# Patient Record
Sex: MALE | Race: WHITE | Employment: OTHER | URBAN - METROPOLITAN AREA
[De-identification: names, ages, dates, MRNs, and addresses within clinical notes are randomized per-mention and may not be internally consistent; named-entity substitution may affect disease eponyms.]

---

## 2021-02-24 ENCOUNTER — APPOINTMENT (OUTPATIENT)
Dept: CT IMAGING | Age: 71
DRG: 312 | End: 2021-02-24
Payer: MEDICARE

## 2021-02-24 ENCOUNTER — APPOINTMENT (OUTPATIENT)
Dept: GENERAL RADIOLOGY | Age: 71
DRG: 312 | End: 2021-02-24
Payer: MEDICARE

## 2021-02-24 ENCOUNTER — HOSPITAL ENCOUNTER (INPATIENT)
Age: 71
LOS: 1 days | Discharge: NURSING, REHAB OR LONG-TERM CARE | DRG: 312 | End: 2021-02-26
Attending: EMERGENCY MEDICINE | Admitting: INTERNAL MEDICINE
Payer: MEDICARE

## 2021-02-24 DIAGNOSIS — R53.83 OTHER FATIGUE: Primary | ICD-10-CM

## 2021-02-24 DIAGNOSIS — R77.8 ELEVATED TROPONIN: ICD-10-CM

## 2021-02-24 LAB
A/G RATIO: 0.9 (ref 1.1–2.2)
ALBUMIN SERPL-MCNC: 3.4 G/DL (ref 3.4–5)
ALP BLD-CCNC: 120 U/L (ref 40–129)
ALT SERPL-CCNC: 10 U/L (ref 10–40)
ANION GAP SERPL CALCULATED.3IONS-SCNC: 8 MMOL/L (ref 3–16)
AST SERPL-CCNC: 18 U/L (ref 15–37)
BACTERIA: ABNORMAL /HPF
BASOPHILS ABSOLUTE: 0.1 K/UL (ref 0–0.2)
BASOPHILS RELATIVE PERCENT: 1 %
BILIRUB SERPL-MCNC: 0.4 MG/DL (ref 0–1)
BILIRUBIN URINE: NEGATIVE
BLOOD, URINE: NEGATIVE
BUN BLDV-MCNC: 25 MG/DL (ref 7–20)
CALCIUM SERPL-MCNC: 9.7 MG/DL (ref 8.3–10.6)
CHLORIDE BLD-SCNC: 101 MMOL/L (ref 99–110)
CLARITY: CLEAR
CO2: 29 MMOL/L (ref 21–32)
COLOR: YELLOW
CREAT SERPL-MCNC: 1.8 MG/DL (ref 0.8–1.3)
EKG ATRIAL RATE: 68 BPM
EKG DIAGNOSIS: NORMAL
EKG P AXIS: 35 DEGREES
EKG P-R INTERVAL: 202 MS
EKG Q-T INTERVAL: 426 MS
EKG QRS DURATION: 84 MS
EKG QTC CALCULATION (BAZETT): 452 MS
EKG R AXIS: 14 DEGREES
EKG T AXIS: 43 DEGREES
EKG VENTRICULAR RATE: 68 BPM
EOSINOPHILS ABSOLUTE: 0.4 K/UL (ref 0–0.6)
EOSINOPHILS RELATIVE PERCENT: 3.9 %
EPITHELIAL CELLS, UA: ABNORMAL /HPF (ref 0–5)
GFR AFRICAN AMERICAN: 46
GFR NON-AFRICAN AMERICAN: 38
GLOBULIN: 3.8 G/DL
GLUCOSE BLD-MCNC: 121 MG/DL (ref 70–99)
GLUCOSE BLD-MCNC: 177 MG/DL (ref 70–99)
GLUCOSE URINE: NEGATIVE MG/DL
HCT VFR BLD CALC: 36.4 % (ref 40.5–52.5)
HEMOGLOBIN: 11.5 G/DL (ref 13.5–17.5)
KETONES, URINE: NEGATIVE MG/DL
LACTIC ACID: 1.1 MMOL/L (ref 0.4–2)
LEUKOCYTE ESTERASE, URINE: ABNORMAL
LIPASE: 33 U/L (ref 13–60)
LYMPHOCYTES ABSOLUTE: 2.3 K/UL (ref 1–5.1)
LYMPHOCYTES RELATIVE PERCENT: 22 %
MCH RBC QN AUTO: 26.7 PG (ref 26–34)
MCHC RBC AUTO-ENTMCNC: 31.7 G/DL (ref 31–36)
MCV RBC AUTO: 84.2 FL (ref 80–100)
MICROSCOPIC EXAMINATION: YES
MONOCYTES ABSOLUTE: 1 K/UL (ref 0–1.3)
MONOCYTES RELATIVE PERCENT: 9.5 %
NEUTROPHILS ABSOLUTE: 6.7 K/UL (ref 1.7–7.7)
NEUTROPHILS RELATIVE PERCENT: 63.6 %
NITRITE, URINE: NEGATIVE
PDW BLD-RTO: 17.1 % (ref 12.4–15.4)
PERFORMED ON: ABNORMAL
PH UA: 6 (ref 5–8)
PLATELET # BLD: 272 K/UL (ref 135–450)
PMV BLD AUTO: 8.6 FL (ref 5–10.5)
POTASSIUM REFLEX MAGNESIUM: 4.8 MMOL/L (ref 3.5–5.1)
PROTEIN UA: ABNORMAL MG/DL
RBC # BLD: 4.32 M/UL (ref 4.2–5.9)
RBC UA: ABNORMAL /HPF (ref 0–4)
SODIUM BLD-SCNC: 138 MMOL/L (ref 136–145)
SPECIFIC GRAVITY UA: 1.02 (ref 1–1.03)
TOTAL PROTEIN: 7.2 G/DL (ref 6.4–8.2)
TROPONIN: 0.03 NG/ML
TROPONIN: 0.04 NG/ML
TROPONIN: 0.04 NG/ML
URINE TYPE: ABNORMAL
UROBILINOGEN, URINE: 0.2 E.U./DL
WBC # BLD: 10.5 K/UL (ref 4–11)
WBC UA: ABNORMAL /HPF (ref 0–5)

## 2021-02-24 PROCEDURE — 6360000002 HC RX W HCPCS: Performed by: INTERNAL MEDICINE

## 2021-02-24 PROCEDURE — 99284 EMERGENCY DEPT VISIT MOD MDM: CPT

## 2021-02-24 PROCEDURE — 36415 COLL VENOUS BLD VENIPUNCTURE: CPT

## 2021-02-24 PROCEDURE — 96372 THER/PROPH/DIAG INJ SC/IM: CPT

## 2021-02-24 PROCEDURE — G0378 HOSPITAL OBSERVATION PER HR: HCPCS

## 2021-02-24 PROCEDURE — 84484 ASSAY OF TROPONIN QUANT: CPT

## 2021-02-24 PROCEDURE — U0003 INFECTIOUS AGENT DETECTION BY NUCLEIC ACID (DNA OR RNA); SEVERE ACUTE RESPIRATORY SYNDROME CORONAVIRUS 2 (SARS-COV-2) (CORONAVIRUS DISEASE [COVID-19]), AMPLIFIED PROBE TECHNIQUE, MAKING USE OF HIGH THROUGHPUT TECHNOLOGIES AS DESCRIBED BY CMS-2020-01-R: HCPCS

## 2021-02-24 PROCEDURE — 6370000000 HC RX 637 (ALT 250 FOR IP): Performed by: INTERNAL MEDICINE

## 2021-02-24 PROCEDURE — 81001 URINALYSIS AUTO W/SCOPE: CPT

## 2021-02-24 PROCEDURE — 71045 X-RAY EXAM CHEST 1 VIEW: CPT

## 2021-02-24 PROCEDURE — 85025 COMPLETE CBC W/AUTO DIFF WBC: CPT

## 2021-02-24 PROCEDURE — 80053 COMPREHEN METABOLIC PANEL: CPT

## 2021-02-24 PROCEDURE — 2580000003 HC RX 258: Performed by: INTERNAL MEDICINE

## 2021-02-24 PROCEDURE — 83605 ASSAY OF LACTIC ACID: CPT

## 2021-02-24 PROCEDURE — 93005 ELECTROCARDIOGRAM TRACING: CPT | Performed by: PHYSICIAN ASSISTANT

## 2021-02-24 PROCEDURE — 99205 OFFICE O/P NEW HI 60 MIN: CPT | Performed by: INTERNAL MEDICINE

## 2021-02-24 PROCEDURE — 83690 ASSAY OF LIPASE: CPT

## 2021-02-24 PROCEDURE — 70450 CT HEAD/BRAIN W/O DYE: CPT

## 2021-02-24 RX ORDER — RISPERIDONE 1 MG/1
1.5 TABLET, FILM COATED ORAL NIGHTLY
Status: ON HOLD | COMMUNITY
End: 2022-04-18

## 2021-02-24 RX ORDER — M-VIT,TX,IRON,MINS/CALC/FOLIC 27MG-0.4MG
1 TABLET ORAL DAILY
Status: ON HOLD | COMMUNITY
End: 2022-05-18 | Stop reason: HOSPADM

## 2021-02-24 RX ORDER — INSULIN DETEMIR 100 [IU]/ML
77 INJECTION, SOLUTION SUBCUTANEOUS NIGHTLY
Status: ON HOLD | COMMUNITY
End: 2021-02-26 | Stop reason: SDUPTHER

## 2021-02-24 RX ORDER — DOCUSATE SODIUM 100 MG/1
100 CAPSULE, LIQUID FILLED ORAL 2 TIMES DAILY
COMMUNITY

## 2021-02-24 RX ORDER — OYSTER SHELL CALCIUM WITH VITAMIN D 500; 200 MG/1; [IU]/1
1 TABLET, FILM COATED ORAL 2 TIMES DAILY
Status: ON HOLD | COMMUNITY
End: 2022-03-01 | Stop reason: HOSPADM

## 2021-02-24 RX ORDER — ACETAMINOPHEN 500 MG
500 TABLET ORAL EVERY 6 HOURS PRN
Status: ON HOLD | COMMUNITY
End: 2022-05-18 | Stop reason: HOSPADM

## 2021-02-24 RX ORDER — POLYETHYLENE GLYCOL 3350 17 G/17G
17 POWDER, FOR SOLUTION ORAL DAILY PRN
Status: DISCONTINUED | OUTPATIENT
Start: 2021-02-24 | End: 2021-02-26 | Stop reason: HOSPADM

## 2021-02-24 RX ORDER — INSULIN LISPRO 100 [IU]/ML
0-6 INJECTION, SOLUTION INTRAVENOUS; SUBCUTANEOUS NIGHTLY
Status: DISCONTINUED | OUTPATIENT
Start: 2021-02-24 | End: 2021-02-25

## 2021-02-24 RX ORDER — ACETAMINOPHEN 325 MG/1
650 TABLET ORAL EVERY 6 HOURS PRN
Status: DISCONTINUED | OUTPATIENT
Start: 2021-02-24 | End: 2021-02-26 | Stop reason: HOSPADM

## 2021-02-24 RX ORDER — ESTRADIOL 1 MG/1
1 TABLET ORAL DAILY
Status: ON HOLD | COMMUNITY
End: 2022-03-08 | Stop reason: HOSPADM

## 2021-02-24 RX ORDER — POLYVINYL ALCOHOL 14 MG/ML
1 SOLUTION/ DROPS OPHTHALMIC PRN
Status: ON HOLD | COMMUNITY
End: 2022-02-27 | Stop reason: CLARIF

## 2021-02-24 RX ORDER — ACETAMINOPHEN 650 MG/1
650 SUPPOSITORY RECTAL EVERY 6 HOURS PRN
Status: DISCONTINUED | OUTPATIENT
Start: 2021-02-24 | End: 2021-02-26 | Stop reason: HOSPADM

## 2021-02-24 RX ORDER — DEXTROSE MONOHYDRATE 25 G/50ML
12.5 INJECTION, SOLUTION INTRAVENOUS PRN
Status: DISCONTINUED | OUTPATIENT
Start: 2021-02-24 | End: 2021-02-26 | Stop reason: HOSPADM

## 2021-02-24 RX ORDER — ONDANSETRON 2 MG/ML
4 INJECTION INTRAMUSCULAR; INTRAVENOUS EVERY 6 HOURS PRN
Status: DISCONTINUED | OUTPATIENT
Start: 2021-02-24 | End: 2021-02-26 | Stop reason: HOSPADM

## 2021-02-24 RX ORDER — SODIUM CHLORIDE 0.9 % (FLUSH) 0.9 %
10 SYRINGE (ML) INJECTION EVERY 12 HOURS SCHEDULED
Status: DISCONTINUED | OUTPATIENT
Start: 2021-02-24 | End: 2021-02-26 | Stop reason: HOSPADM

## 2021-02-24 RX ORDER — ASPIRIN 81 MG/1
81 TABLET ORAL DAILY
COMMUNITY

## 2021-02-24 RX ORDER — RISPERIDONE 0.5 MG/1
0.5 TABLET, FILM COATED ORAL
Status: ON HOLD | COMMUNITY
End: 2022-04-18

## 2021-02-24 RX ORDER — TAMSULOSIN HYDROCHLORIDE 0.4 MG/1
0.8 CAPSULE ORAL NIGHTLY
COMMUNITY

## 2021-02-24 RX ORDER — FUROSEMIDE 20 MG/1
20 TABLET ORAL DAILY
Status: ON HOLD | COMMUNITY
End: 2021-02-26 | Stop reason: HOSPADM

## 2021-02-24 RX ORDER — ALLOPURINOL 100 MG/1
100 TABLET ORAL DAILY
COMMUNITY

## 2021-02-24 RX ORDER — SODIUM CHLORIDE 0.9 % (FLUSH) 0.9 %
10 SYRINGE (ML) INJECTION PRN
Status: DISCONTINUED | OUTPATIENT
Start: 2021-02-24 | End: 2021-02-26 | Stop reason: HOSPADM

## 2021-02-24 RX ORDER — ATORVASTATIN CALCIUM 40 MG/1
40 TABLET, FILM COATED ORAL NIGHTLY
COMMUNITY

## 2021-02-24 RX ORDER — DEXTROSE MONOHYDRATE 50 MG/ML
100 INJECTION, SOLUTION INTRAVENOUS PRN
Status: DISCONTINUED | OUTPATIENT
Start: 2021-02-24 | End: 2021-02-26 | Stop reason: HOSPADM

## 2021-02-24 RX ORDER — ONDANSETRON 4 MG/1
4 TABLET, ORALLY DISINTEGRATING ORAL EVERY 8 HOURS PRN
COMMUNITY

## 2021-02-24 RX ORDER — GABAPENTIN 300 MG/1
300 CAPSULE ORAL 3 TIMES DAILY
Status: ON HOLD | COMMUNITY
End: 2021-02-26 | Stop reason: SDUPTHER

## 2021-02-24 RX ORDER — BISACODYL 10 MG
10 SUPPOSITORY, RECTAL RECTAL DAILY PRN
COMMUNITY

## 2021-02-24 RX ORDER — PROMETHAZINE HYDROCHLORIDE 25 MG/1
12.5 TABLET ORAL EVERY 6 HOURS PRN
Status: DISCONTINUED | OUTPATIENT
Start: 2021-02-24 | End: 2021-02-26 | Stop reason: HOSPADM

## 2021-02-24 RX ORDER — POLYETHYLENE GLYCOL 3350 17 G/17G
17 POWDER, FOR SOLUTION ORAL DAILY PRN
COMMUNITY

## 2021-02-24 RX ORDER — VALPROIC ACID 250 MG/5ML
250 SOLUTION ORAL 2 TIMES DAILY
Status: ON HOLD | COMMUNITY
End: 2022-02-27 | Stop reason: CLARIF

## 2021-02-24 RX ORDER — CHOLECALCIFEROL (VITAMIN D3) 1250 MCG
50000 CAPSULE ORAL WEEKLY
Status: ON HOLD | COMMUNITY
End: 2022-03-01 | Stop reason: HOSPADM

## 2021-02-24 RX ORDER — ARIPIPRAZOLE 15 MG/1
15 TABLET ORAL DAILY
COMMUNITY

## 2021-02-24 RX ORDER — FLUOXETINE HYDROCHLORIDE 60 MG/1
60 TABLET, FILM COATED ORAL; ORAL DAILY
Status: ON HOLD | COMMUNITY
End: 2022-04-18 | Stop reason: SDUPTHER

## 2021-02-24 RX ORDER — AMLODIPINE BESYLATE 5 MG/1
5 TABLET ORAL DAILY
Status: ON HOLD | COMMUNITY
End: 2022-04-18 | Stop reason: HOSPADM

## 2021-02-24 RX ORDER — FINASTERIDE 5 MG/1
5 TABLET, FILM COATED ORAL DAILY
COMMUNITY

## 2021-02-24 RX ORDER — INSULIN LISPRO 100 [IU]/ML
0-12 INJECTION, SOLUTION INTRAVENOUS; SUBCUTANEOUS
Status: DISCONTINUED | OUTPATIENT
Start: 2021-02-25 | End: 2021-02-25

## 2021-02-24 RX ORDER — NICOTINE POLACRILEX 4 MG
15 LOZENGE BUCCAL PRN
Status: DISCONTINUED | OUTPATIENT
Start: 2021-02-24 | End: 2021-02-26 | Stop reason: HOSPADM

## 2021-02-24 RX ADMIN — INSULIN LISPRO 1 UNITS: 100 INJECTION, SOLUTION INTRAVENOUS; SUBCUTANEOUS at 22:44

## 2021-02-24 RX ADMIN — Medication 10 ML: at 21:07

## 2021-02-24 RX ADMIN — ACETAMINOPHEN 650 MG: 325 TABLET ORAL at 18:51

## 2021-02-24 RX ADMIN — ENOXAPARIN SODIUM 40 MG: 40 INJECTION SUBCUTANEOUS at 21:06

## 2021-02-24 ASSESSMENT — ENCOUNTER SYMPTOMS
DIARRHEA: 0
SHORTNESS OF BREATH: 0
COUGH: 0
VOMITING: 0
ABDOMINAL PAIN: 0
NAUSEA: 0

## 2021-02-24 ASSESSMENT — PAIN SCALES - GENERAL
PAINLEVEL_OUTOF10: 0
PAINLEVEL_OUTOF10: 4

## 2021-02-24 NOTE — H&P
Hospital Medicine History & Physical      PCP: Cristopher Brown MD    Date of Admission: 2/24/2021    Date of Service: Pt seen/examined on 02/4/21and Admitted to observation with expected LOS less than two midnights due to medical therapy. Chief Complaint: Lethargy and dizziness. History Of Present Illness:     78 y.o. male with past medical history of diabetes mellitus type 2: Hypertension, hyperlipidemia, chronic kidney disease, schizophrenia, depression/bipolar disorder presented from [address] with fatigue. Patient states that he just felt tired and has not been able to get up for 2 days. He denies chest pain, shortness of breath, fever, chills, nausea, vomiting, abdominal pain, dysuria or diarrhea. Denies any sensation of room spinning or ringing in the ears. Per ER physician, nursing facility stated that patient had a fall 2 weeks ago while in isolation unit. He did hit his head. Has been having headache and slurring of speech for the past week and leaning towards the right for the past 2 weeks at the nursing facility. Per patient he does let his speech and lean towards her right side at baseline. On my evaluation, patient lethargic but able to answer questions. In the ED, patient labs are significant for creatinine at 1.8, glucose 101, troponin 0 0.03 with repeat 0.04. CT head was negative for any bleed or infarct. Chest x-ray showed bibasilar airspace disease. EKG was normal with normal axis and no acute ST segment changes. UA was negative. COVID-19 test was sent. Patient is being admitted for further management.     Past Medical History:          Diagnosis Date    Ataxia     Bipolar 1 disorder ([address]     BPH (benign prostatic hyperplasia)     Cerebral infarct (HCC)     Chronic kidney disease     [facility]     Depression     Diabetes mellitus type 2, uncomplicated ([facility]     Gout     Hyperlipidemia     Hypertension     Schizophrenia ([facility] Past Surgical History:      History reviewed. No pertinent surgical history. Medications Prior to Admission:      Prior to Admission medications    Not on File       Allergies:  Codeine, Cogentin [benztropine], and Penicillins    Social History:      The patient currently lives at [address] [place] [address]:   reports that he has never smoked. He has never used smokeless tobacco.  ETOH:   reports previous alcohol use. Family History:      Reviewed in detail and negative for DM, CAD, Cancer, CVA    History reviewed. No pertinent family history. REVIEW OF SYSTEMS:   Pertinent positives as noted in the HPI. All other systems reviewed and negative. PHYSICAL EXAM PERFORMED:    /60   Pulse 75   Temp 97.6 °F (36.4 °C) (Oral)   Resp 14   SpO2 99%     General appearance:  No apparent distress, appears stated age and cooperative. Appears lethargic  HEENT:  Normal cephalic, atraumatic without obvious deformity. Pupils equal, round, and reactive to light. Extra ocular muscles intact. Conjunctivae/corneas clear. Neck: Supple, with full range of motion. No jugular venous distention. Trachea midline. Respiratory:  Normal respiratory effort. Clear to auscultation, bilaterally without Rales/Wheezes/Rhonchi. Cardiovascular:  Regular rate and rhythm with normal S1/S2 without murmurs, rubs or gallops. Abdomen: Soft, non-tender, non-distended with normal bowel sounds. Musculoskeletal:  No clubbing, cyanosis or edema bilaterally. Full range of motion without deformity.   Skin: Bilateral lower extremities with chronic skin changes  Neurologic: Grossly nonfocal.  Dysarthria/slurred speech noted  Psychiatric:  Alert and oriented  Capillary Refill: Brisk,< 3 seconds   Peripheral Pulses: +2 palpable, equal bilaterally       Labs:     Recent Labs     02/24/21  1118   WBC 10.5   HGB 11.5*   HCT 36.4*        Recent Labs     02/24/21  1118      K 4.8      CO2 29   BUN 25*   CREATININE 1.8* CALCIUM 9.7     Recent Labs     02/24/21  1118   AST 18   ALT 10   BILITOT 0.4   ALKPHOS 120     No results for input(s): INR in the last 72 hours. Recent Labs     02/24/21  1118 02/24/21  1303   TROPONINI 0.04* 0.03*       Urinalysis:      Lab Results   Component Value Date    NITRU Negative 02/24/2021    WBCUA 3-5 02/24/2021    BACTERIA Rare 02/24/2021    RBCUA 0-2 02/24/2021    BLOODU Negative 02/24/2021    SPECGRAV 1.025 02/24/2021    GLUCOSEU Negative 02/24/2021       Radiology:     CXR: I have reviewed the CXR with the following interpretation:   As mentioned above    EKG:  I have reviewed the EKG with the following interpretation:  As mentioned above    CT HEAD WO CONTRAST   Final Result      1. No findings for acute intracranial abnormality. 2.  Age-related atrophy of mild periventricular white matter changes bilaterally consistent with chronic small vessel ischemia. XR CHEST PORTABLE   Final Result   Impression: Bibasilar airspace opacities.           ASSESSMENT:    Active Hospital Problems    Diagnosis Date Noted    Elevated troponin [R77.8] 02/24/2021         PLAN:    Fatigue and dizziness:  Might be secondary to COVID-19 or orthostatic hypotension  Blood pressure low on admission  Orthostatic vital signs pending  COVID-19 test pending  We will start gentle IV hydration after orthostatic vitals obtained  Continue droplet plus isolation    Elevated troponin:  Likely secondary to decreased clearance from CKD  Patient denies chest pain  EKG without acute ST segment change  Monitor troponin  Cardiology consulted    CKD stage IV:  Patient creatinine in care everywhere from 6380-0371 close to 1.7-2.0  Creatinine 1.8 on admission  Appears to be stable    Hypertension:  Pharmacy consulted for med  Blood pressure on low side  Will hold off on any antihypertensive for now    Diabetes mellitus type 2:  Monitor blood sugars  Continue carb controlled diet  Sliding scale insulin started    Schizophrenia/bipolar disorder/depression:  Pharmacy consulted for med rec  Will resume once medication confirmed    Bilateral lower extremity chronic skin changes:  Wound care consulted    DVT Prophylaxis: Lovenox  Diet: Carb controlled diet  Code Status: Full code    PT/OT Eval Status: Ordered    Dispo -anticipate discharge in 50 to 67 hours       Hawa Castellano MD    Thank you Georgia Rosario MD for the opportunity to be involved in this patient's care. If you have any questions or concerns please feel free to contact me at [phone].

## 2021-02-24 NOTE — ED NOTES
received report from [facility] Elsi KRUGER, [address] [place] [address]  02/24/21 5990

## 2021-02-24 NOTE — ED PROVIDER NOTES
[address] [facility] ENCOUNTER          PHYSICIAN ASSISTANT NOTE       Date of evaluation: 2/24/2021    Chief Complaint     Fatigue      History of Present Illness     Arlyn Schaumann is a 78 y.o. male with PMH of Dementia, HTN, HLD, Schizophrenia, CKD who presents with from [address] [facility] facility with fatigue. Patient states that he has been feeling generally weak, fatigued and dizzy for the past few days. He denies sensation of room spinning. Denies any loss of consciousness. He denies any fevers or chills, chest pain or shortness of breath, cough, nausea or vomiting, abdominal pain, change in bowel or urinary habits. He states that he always slurrs his speech and always leans to the right side. This is not new. He has chronic skin changes to bilateral lower extremities. He states that he typically transfers from bed to wheelchair and sometimes requires assistance with this. He does not ambulate. Per nursing staff at [facility], patient told the nurse at facility today that he thought \"his brain was hemorrhaging. \"  Patient reported a fall 2 weeks ago while on the isolation unit. He states that he had hit his head. He reportedly has been complaining of a headache for the past 24 hours. Staff at facility states that he seems to be slurring his speech more than usual in the past week and has been leaning to the right side for the past 2 weeks. Today he seemed more hard of hearing. This prompted them to send him to the emergency department. Review of Systems     Review of Systems   Constitutional: Positive for fatigue. Negative for chills and fever. Eyes: Negative for visual disturbance. Respiratory: Negative for cough and shortness of breath. Cardiovascular: Positive for leg swelling. Negative for chest pain. Gastrointestinal: Negative for abdominal pain, diarrhea, nausea and vomiting. Musculoskeletal: Negative for gait problem.    Skin: Negative for wound.   Neurological: Positive for dizziness and weakness. Negative for syncope and headaches. Psychiatric/Behavioral: The patient is not nervous/anxious. Past Medical, Surgical, Family, and Social History     He has a past medical history of Ataxia, Bipolar 1 disorder ([address] BPH (benign prostatic hyperplasia), Cerebral infarct ([address] Chronic kidney disease, Dementia ([facility] Depression, Diabetes mellitus type 2, uncomplicated ([address] Gout, Hyperlipidemia, Hypertension, and Schizophrenia ([facility] He has no past surgical history on file. His family history is not on file. He reports that he has never smoked. He has never used smokeless tobacco. He reports previous alcohol use. Medications     Previous Medications    No medications on file       Allergies     He is allergic to codeine; cogentin [benztropine]; and penicillins. Physical Exam     INITIAL VITALS: BP: (!) 110/54,Temp: 97.6 °F (36.4 °C), Pulse: 75, Resp: 14, SpO2: 100 %   Physical Exam  Vitals signs and nursing note reviewed. Constitutional:       General: He is not in acute distress. HENT:      Head: Normocephalic and atraumatic. Mouth/Throat:      Mouth: Mucous membranes are moist.   Eyes:      Extraocular Movements: Extraocular movements intact. Conjunctiva/sclera: Conjunctivae normal.   Neck:      Musculoskeletal: Neck supple. Cardiovascular:      Rate and Rhythm: Normal rate and regular rhythm. Pulmonary:      Effort: Pulmonary effort is normal. No respiratory distress. Breath sounds: No wheezing, rhonchi or rales. Abdominal:      General: Bowel sounds are normal. There is no distension. Palpations: Abdomen is soft. Tenderness: There is no abdominal tenderness. There is no guarding or rebound. Musculoskeletal:         General: No deformity. Skin:     General: Skin is warm and dry. Neurological:      General: No focal deficit present.       Mental Status: He is alert and oriented to person, place, and time.      Cranial Nerves: Dysarthria (baseline per patient) present. No cranial nerve deficit. Sensory: Sensation is intact. Motor: Motor function is intact. No pronator drift. Coordination: Finger-Nose-Finger Test normal.   Psychiatric:         Mood and Affect: Mood normal.         Behavior: Behavior normal.         Diagnostic Results     EKG   Interpreted in conjunction with emergencydepartment physician No att. providers found  Rhythm: normal sinus   Rate: normal  Axis: normal  Ectopy: none  Conduction: normal  ST Segments: no acute change  T Waves:no acute change  Q Waves: none  Clinical Impression: no acute changes  Comparison:  none    RADIOLOGY:  CT HEAD WO CONTRAST   Final Result      1. No findings for acute intracranial abnormality. 2.  Age-related atrophy of mild periventricular white matter changes bilaterally consistent with chronic small vessel ischemia. XR CHEST PORTABLE   Final Result   Impression: Bibasilar airspace opacities.           LABS:   Results for orders placed or performed during the hospital encounter of 02/24/21   CBC Auto Differential   Result Value Ref Range    WBC 10.5 4.0 - 11.0 K/uL    RBC 4.32 4.20 - 5.90 M/uL    Hemoglobin 11.5 (L) 13.5 - 17.5 g/dL    Hematocrit 36.4 (L) 40.5 - 52.5 %    MCV 84.2 80.0 - 100.0 fL    MCH 26.7 26.0 - 34.0 pg    MCHC 31.7 31.0 - 36.0 g/dL    RDW 17.1 (H) 12.4 - 15.4 %    Platelets 606 050 - 907 K/uL    MPV 8.6 5.0 - 10.5 fL    Neutrophils % 63.6 %    Lymphocytes % 22.0 %    Monocytes % 9.5 %    Eosinophils % 3.9 %    Basophils % 1.0 %    Neutrophils Absolute 6.7 1.7 - 7.7 K/uL    Lymphocytes Absolute 2.3 1.0 - 5.1 K/uL    Monocytes Absolute 1.0 0.0 - 1.3 K/uL    Eosinophils Absolute 0.4 0.0 - 0.6 K/uL    Basophils Absolute 0.1 0.0 - 0.2 K/uL   Comprehensive Metabolic Panel w/ Reflex to MG   Result Value Ref Range    Sodium 138 136 - 145 mmol/L    Potassium reflex Magnesium 4.8 3.5 - 5.1 mmol/L    Chloride 101 99 - 110 mmol/L    CO2 29 21 - 32 mmol/L    Anion Gap 8 3 - 16    Glucose 121 (H) 70 - 99 mg/dL    BUN 25 (H) 7 - 20 mg/dL    CREATININE 1.8 (H) 0.8 - 1.3 mg/dL    GFR Non- 38 (A) >60    GFR  46 (A) >60    Calcium 9.7 8.3 - 10.6 mg/dL    Total Protein 7.2 6.4 - 8.2 g/dL    Albumin 3.4 3.4 - 5.0 g/dL    Albumin/Globulin Ratio 0.9 (L) 1.1 - 2.2    Total Bilirubin 0.4 0.0 - 1.0 mg/dL    Alkaline Phosphatase 120 40 - 129 U/L    ALT 10 10 - 40 U/L    AST 18 15 - 37 U/L    Globulin 3.8 g/dL   Lipase   Result Value Ref Range    Lipase 33.0 13.0 - 60.0 U/L   Troponin   Result Value Ref Range    Troponin 0.04 (H) <0.01 ng/mL   Urinalysis, reflex to microscopic   Result Value Ref Range    Color, UA Yellow Straw/Yellow    Clarity, UA Clear Clear    Glucose, Ur Negative Negative mg/dL    Bilirubin Urine Negative Negative    Ketones, Urine Negative Negative mg/dL    Specific Gravity, UA 1.025 1.005 - 1.030    Blood, Urine Negative Negative    pH, UA 6.0 5.0 - 8.0    Protein, UA TRACE (A) Negative mg/dL    Urobilinogen, Urine 0.2 <2.0 E.U./dL    Nitrite, Urine Negative Negative    Leukocyte Esterase, Urine TRACE (A) Negative    Microscopic Examination YES     Urine Type NotGiven    Lactate, plasma   Result Value Ref Range    Lactic Acid 1.1 0.4 - 2.0 mmol/L   Microscopic Urinalysis   Result Value Ref Range    WBC, UA 3-5 0 - 5 /HPF    RBC, UA 0-2 0 - 4 /HPF    Epithelial Cells, UA 6-10 (A) 0 - 5 /HPF    Bacteria, UA Rare (A) None Seen /HPF   Troponin   Result Value Ref Range    Troponin 0.03 (H) <0.01 ng/mL   EKG 12 Lead   Result Value Ref Range    Ventricular Rate 68 BPM    Atrial Rate 68 BPM    P-R Interval 202 ms    QRS Duration 84 ms    Q-T Interval 426 ms    QTc Calculation (Bazett) 452 ms    P Axis 35 degrees    R Axis 14 degrees    T Axis 43 degrees    Diagnosis       EKG performed in ER and to be interpreted by ER physician. Confirmed by MD, ER ([id number]),  Khloe Roblero ([id number]) on 2/24/2021 11:18:21 AM       RECENT VITALS:  BP: (!) 119/58, Temp: 97.6 °F (36.4 °C), Pulse: 75,Resp: 14, SpO2: 100 %     Procedures         ED Course     Nursing Notes, Past Medical Hx, Past Surgical Hx, Social Hx, Allergies, and Family Hx were reviewed. The patient was given the followingmedications:  No orders of the defined types were placed in this encounter. CONSULTS:  IP CONSULT TO HOSPITALIST  IP CONSULT TO CARDIOLOGY  IP CONSULT TO [address] [place] [address] / [place] / Cyndie Srinivas is a 78 y.o. male with PMH of Dementia, HTN, HLD, Schizophrenia, CKD who presents from [address] [facility] facility with fatigue. Patient reports fatigue, weakness and dizziness for several days. Denies other complaints. Per nursing staff at facility, patient was complaining of HA and seemed to be slurring more in the past 1 week. Patient reported a fall with head injury 2 weeks ago on isolation unit. No record of fall but this prompted his ED visit today. Physical exam reveals alert and oriented 74-year-old male in no acute distress. Cranial nerves II through XII are intact. Finger-nose-finger intact. Patient does have dysarthria he reports is baseline. No extremity drift. He does lean to the right. Sensation intact to light touch. Heart rhythm and rate are regular. Lungs clear to auscultation. Abdomen soft without  and some mild erythema of the abdomen. Chronic skin changes of lower extremities. EKG obtained revealed no acute ischemic changes. CXR reveals bibasilar airspace opacities. Patient swabbed for COVID-19. CT head negative. IV access was established. Labs including CBC, CMP, Lipase, Troponin, Lactate were obtained. Labs remarkable for creatinine 1.8 (baseline), troponin 0.04 --> 0.03.  UA negative. No signs of infection. No leukocytosis. Lactate wnl. CXR and UA negative. ? elevated troponin secondary to CKD. However, it has never been as elevated previously.  Will plan to admit for ACS r/o, trending of troponin and further w/u as indicated. This patient was also evaluated by the attending physician. All care plans were discussed and agreed upon. Clinical Impression     1. Other fatigue    2.  Elevated troponin        Disposition      DISPOSITION Admitted 02/24/2021 02:32:04 PM       Eliseo Jeronimo PA-C  02/24/21 1500

## 2021-02-24 NOTE — CONSULTS
[facility]    Cardiology Consult/H&P  Consulting Cardiologist Alessandro Matos M.D., [facility]  Referring Provider:  Stas Beaver MD    2/24/2021, 4:42 PM    Chief Complaint   Patient presents with    Fatigue      Primary Cardiologist:  Asked by No admitting provider for patient encounter./Pavan Solis MD to evaluate and assess this patient's elevated troponin levels    History of Present Illness:   Gina Boateng is a 78 y.o. male is here as a transfer from [address] [place] [address] because of Samson weakness and dizziness. Apparently did have a slight elevation of the troponin levels. This patient has apparently advanced dementia and schizophrenia and is on several medications. I am unable to actually get him to wake up enough to give me any answers to my questions. He denies or cannot complain about chest pains at this time and no apparent syncope. Troponin levels recorded at 0.04 and 0.03 with creatinine of 1.8. According to some notes from the nursing facility apparently had been complaining of some headaches but no apparent syncope was noted. Some question that he was slurring his speech. Past Medical History:   has a past medical history of Ataxia, Bipolar 1 disorder ([address] BPH (benign prostatic hyperplasia), Cerebral infarct ([facility] Chronic kidney disease, Dementia ([address] Depression, Diabetes mellitus type 2, uncomplicated ([address] Gout, Hyperlipidemia, Hypertension, and Schizophrenia ([facility] Surgical History:   has no past surgical history on file. Social History:   reports that he has never smoked. He has never used smokeless tobacco. He reports previous alcohol use. Family History:  family history is not on file.      Home Medications:  Prior to Admission medications    Not on File        Current Medications:  Current Facility-Administered Medications   Medication Dose Route Frequency Provider Last Rate Last Admin    acetaminophen (TYLENOL) tablet 650 mg  650 mg Oral Q6H PRN Patrice Choudhury MD        Or    acetaminophen (TYLENOL) suppository 650 mg  650 mg Rectal Q6H PRN Patrice Choudhury MD         No current outpatient medications on file. Allergies:  Codeine, Cogentin [benztropine], and Penicillins     Review of Systems:   · Constitutional: there has been no unanticipated weight loss. · Eyes: No visual changes or diplopia. No scleral icterus. · ENT: No Headaches, hearing loss or vertigo. No mouth sores or sore throat. · Cardiovascular: Reviewed in HPI  ·  Pulmonary:  no cough or sputum production. · Gastrointestinal: No abdominal pain, appetite loss, blood in stools. No change in bowel or bladder habits. · Genitourinary: No dysuria, trouble voiding, or hematuria. · Musculoskeletal:  No gait disturbance, weakness or joint complaints. · Integumentary: No rash or pruritis. Endocrine: No malaise, fatigue or temperature intolerance. Hematologic/Lymphatic: No abnormal bruising or bleeding, blood clots or swollen lymph nodes. · Allergic/Immunologic: No nasal congestion or hives. · All other ROS are reviewed and are unremarkable. Physical Examination:    Vitals:    02/24/21 1345 02/24/21 1400 02/24/21 1415 02/24/21 1430   BP:  139/61  (!) 119/58   Pulse:       Resp:       Temp:       TempSrc:       SpO2: 100% 100% 100% 100%        EXAMl and General Appearance: Obese. Looks poorly kept. Has brawny edema on both lower extremities. HEENT: eyes and ears intact. No nasal masses  THYROID: not enlarged  LUNGS:  · Clear to auscultation and percussion  HEART and VASCULAR:  · The apical impulses not displaced  · Heart tones are crisp and normal  · Cervical veins are not engorged  · The carotid upstroke is normal in amplitude and contour without delay or bruit  · Peripheral pulses are symmetrical and full  · There is no clubbing, cyanosis of the extremities.   · No peripheral edema  · Femoral Arteries: 2+ and equal  · Pedal Pulses:2+ and equal   ABDOMEN[de-identified]  · No masses or tenderness  · Liver/Spleen: No Abnormalities Noted  NEUROLOGICAL:  . Not awake not alert or oriented. I am arousable. SKIN: No lesions or rashes  LYMPH NODES: none enlarged    ·   ·   ·     CBC with Differential:    Lab Results   Component Value Date    WBC 10.5 02/24/2021    RBC 4.32 02/24/2021    HGB 11.5 02/24/2021    HCT 36.4 02/24/2021     02/24/2021    MCV 84.2 02/24/2021    MCH 26.7 02/24/2021    MCHC 31.7 02/24/2021    RDW 17.1 02/24/2021    LYMPHOPCT 22.0 02/24/2021    MONOPCT 9.5 02/24/2021    BASOPCT 1.0 02/24/2021    MONOSABS 1.0 02/24/2021    LYMPHSABS 2.3 02/24/2021    EOSABS 0.4 02/24/2021    BASOSABS 0.1 02/24/2021     BMP:    Lab Results   Component Value Date     02/24/2021    K 4.8 02/24/2021     02/24/2021    CO2 29 02/24/2021    BUN 25 02/24/2021    LABALBU 3.4 02/24/2021    CREATININE 1.8 02/24/2021    CALCIUM 9.7 02/24/2021    GFRAA 46 02/24/2021    LABGLOM 38 02/24/2021     Uric Acid:  No components found for: URIC  PT/INR:  No results found for: PROTIME, INR  Last 3 Troponin:    Lab Results   Component Value Date    TROPONINI 0.03 02/24/2021    TROPONINI 0.04 02/24/2021     FLP:  No results found for: TRIG, HDL, LDLCALC, LDLDIRECT, LABVLDL    EKG: On 2/24/2021 sinus rhythm 78/min. Essentially normal study. Georgina Heman echocardiogram pending  Assessment/ Plan     Patient Active Problem List    Diagnosis Date Noted    Elevated troponin 02/24/2021   Slight elevation of troponin levels likely related to his level of renal dysfunction. The EKG looks normal.  I found nothing credible that suggest that this is acute coronary syndrome. Given his overall degree of neuropsychiatric issues I am not sure we can do a complete cardiac evaluation. #1 would place him on telemetry monitoring complete his enzyme series  #2 Place him on telemetry monitoring  #3 echocardiograph  We will follow acutely.   Lisa Morataya MD, [facility]      Thanks for allowing me the opportunity  to participate in the evaluation and care of your patients.  Please call if we can assist further [phone]    This chart was likely completed using voice recognition technology and may contain unintended grammatical , phraseology,and/or punctuation errors  Rubina Medina M.D., [facility]  7/94/65280:42 PM

## 2021-02-24 NOTE — ED TRIAGE NOTES
Pt comes from [facility] with complaints of fatigue. Pt at baseline per EMS mentally. Pt states he fell about 2 weeks ago, but unsure if he hit head. NIH negative.

## 2021-02-24 NOTE — CONSULTS
Clinical Pharmacy Progress Note  Medication History     Admit Date: 2/24/2021    Asked by Dr. Richard Quevedo to clarify home medications. List of current medications patient is taking is complete. Source of information is [facility] Medication List.     Attempting to call RN from [address] to clarify the following:   · Novolog TIDM + Insulin NPH BID + Levemir QHS all on med list. Unusual for patient to be on NPH + Levemir. · Risperdal on med list as 0.5mg BID and 1mg nightly. Will clarify if patient takes 0.5mg in AM and after noon + 1mg nightly, or 0.5mg in AM and 1.5mg nightly       Please call with questions.   Jackelin Gallegos, PharmD, [address] [place] [address] Pharmacy: [phone]  [address] [place] [address]: [phone]  2/24/2021 6:16 PM

## 2021-02-24 NOTE — FLOWSHEET NOTE
02/24/21 1710   Vitals   Orthostatic B/P and Pulse?  Yes   Blood Pressure Lying 141/80   Pulse Lying 69 PER MINUTE   Blood Pressure Sitting 156/87   Pulse Sitting 62 PER MINUTE

## 2021-02-24 NOTE — ED NOTES
Pt resting comfortably in room, provided head support for pt and pt urinated at 1140 in urinal with assistance of NGHIA .       Leonel Meza RN  02/24/21 2729

## 2021-02-25 LAB
A/G RATIO: 0.8 (ref 1.1–2.2)
ALBUMIN SERPL-MCNC: 3.3 G/DL (ref 3.4–5)
ALP BLD-CCNC: 113 U/L (ref 40–129)
ALT SERPL-CCNC: 9 U/L (ref 10–40)
ANION GAP SERPL CALCULATED.3IONS-SCNC: 10 MMOL/L (ref 3–16)
AST SERPL-CCNC: 15 U/L (ref 15–37)
BASOPHILS ABSOLUTE: 0.1 K/UL (ref 0–0.2)
BASOPHILS RELATIVE PERCENT: 1 %
BILIRUB SERPL-MCNC: 0.4 MG/DL (ref 0–1)
BUN BLDV-MCNC: 29 MG/DL (ref 7–20)
CALCIUM SERPL-MCNC: 9.6 MG/DL (ref 8.3–10.6)
CHLORIDE BLD-SCNC: 98 MMOL/L (ref 99–110)
CO2: 27 MMOL/L (ref 21–32)
CREAT SERPL-MCNC: 1.5 MG/DL (ref 0.8–1.3)
EOSINOPHILS ABSOLUTE: 0.4 K/UL (ref 0–0.6)
EOSINOPHILS RELATIVE PERCENT: 3.8 %
GFR AFRICAN AMERICAN: 56
GFR NON-AFRICAN AMERICAN: 46
GLOBULIN: 3.9 G/DL
GLUCOSE BLD-MCNC: 192 MG/DL (ref 70–99)
GLUCOSE BLD-MCNC: 198 MG/DL (ref 70–99)
GLUCOSE BLD-MCNC: 210 MG/DL (ref 70–99)
GLUCOSE BLD-MCNC: 216 MG/DL (ref 70–99)
GLUCOSE BLD-MCNC: 244 MG/DL (ref 70–99)
GLUCOSE BLD-MCNC: 291 MG/DL (ref 70–99)
HCT VFR BLD CALC: 37.3 % (ref 40.5–52.5)
HEMOGLOBIN: 12 G/DL (ref 13.5–17.5)
LYMPHOCYTES ABSOLUTE: 2.3 K/UL (ref 1–5.1)
LYMPHOCYTES RELATIVE PERCENT: 23.6 %
MCH RBC QN AUTO: 26.6 PG (ref 26–34)
MCHC RBC AUTO-ENTMCNC: 32.2 G/DL (ref 31–36)
MCV RBC AUTO: 82.5 FL (ref 80–100)
MONOCYTES ABSOLUTE: 1 K/UL (ref 0–1.3)
MONOCYTES RELATIVE PERCENT: 10.1 %
NEUTROPHILS ABSOLUTE: 5.9 K/UL (ref 1.7–7.7)
NEUTROPHILS RELATIVE PERCENT: 61.5 %
PDW BLD-RTO: 16.7 % (ref 12.4–15.4)
PERFORMED ON: ABNORMAL
PLATELET # BLD: 260 K/UL (ref 135–450)
PMV BLD AUTO: 8.7 FL (ref 5–10.5)
POTASSIUM REFLEX MAGNESIUM: 4.7 MMOL/L (ref 3.5–5.1)
RBC # BLD: 4.52 M/UL (ref 4.2–5.9)
SARS-COV-2, PCR: NOT DETECTED
SODIUM BLD-SCNC: 135 MMOL/L (ref 136–145)
TOTAL PROTEIN: 7.2 G/DL (ref 6.4–8.2)
TROPONIN: 0.03 NG/ML
TROPONIN: 0.03 NG/ML
WBC # BLD: 9.6 K/UL (ref 4–11)

## 2021-02-25 PROCEDURE — 6360000002 HC RX W HCPCS: Performed by: INTERNAL MEDICINE

## 2021-02-25 PROCEDURE — 36415 COLL VENOUS BLD VENIPUNCTURE: CPT

## 2021-02-25 PROCEDURE — 6370000000 HC RX 637 (ALT 250 FOR IP): Performed by: INTERNAL MEDICINE

## 2021-02-25 PROCEDURE — 97530 THERAPEUTIC ACTIVITIES: CPT

## 2021-02-25 PROCEDURE — 85025 COMPLETE CBC W/AUTO DIFF WBC: CPT

## 2021-02-25 PROCEDURE — G0378 HOSPITAL OBSERVATION PER HR: HCPCS

## 2021-02-25 PROCEDURE — 97535 SELF CARE MNGMENT TRAINING: CPT

## 2021-02-25 PROCEDURE — 80053 COMPREHEN METABOLIC PANEL: CPT

## 2021-02-25 PROCEDURE — 84484 ASSAY OF TROPONIN QUANT: CPT

## 2021-02-25 PROCEDURE — 97116 GAIT TRAINING THERAPY: CPT

## 2021-02-25 PROCEDURE — 1200000000 HC SEMI PRIVATE

## 2021-02-25 PROCEDURE — 2580000003 HC RX 258: Performed by: INTERNAL MEDICINE

## 2021-02-25 PROCEDURE — 97162 PT EVAL MOD COMPLEX 30 MIN: CPT

## 2021-02-25 PROCEDURE — 99233 SBSQ HOSP IP/OBS HIGH 50: CPT | Performed by: INTERNAL MEDICINE

## 2021-02-25 PROCEDURE — 97166 OT EVAL MOD COMPLEX 45 MIN: CPT

## 2021-02-25 RX ORDER — LANOLIN ALCOHOL/MO/W.PET/CERES
CREAM (GRAM) TOPICAL 2 TIMES DAILY
Status: DISCONTINUED | OUTPATIENT
Start: 2021-02-25 | End: 2021-02-26 | Stop reason: HOSPADM

## 2021-02-25 RX ORDER — ATORVASTATIN CALCIUM 40 MG/1
40 TABLET, FILM COATED ORAL NIGHTLY
Status: DISCONTINUED | OUTPATIENT
Start: 2021-02-25 | End: 2021-02-26 | Stop reason: HOSPADM

## 2021-02-25 RX ORDER — DOCUSATE SODIUM 100 MG/1
100 CAPSULE, LIQUID FILLED ORAL 2 TIMES DAILY
Status: DISCONTINUED | OUTPATIENT
Start: 2021-02-25 | End: 2021-02-26 | Stop reason: HOSPADM

## 2021-02-25 RX ORDER — BISACODYL 10 MG
10 SUPPOSITORY, RECTAL RECTAL DAILY PRN
Status: DISCONTINUED | OUTPATIENT
Start: 2021-02-25 | End: 2021-02-26 | Stop reason: HOSPADM

## 2021-02-25 RX ORDER — B-COMPLEX WITH VITAMIN C
1 TABLET ORAL 2 TIMES DAILY
Status: DISCONTINUED | OUTPATIENT
Start: 2021-02-25 | End: 2021-02-26 | Stop reason: HOSPADM

## 2021-02-25 RX ORDER — TAMSULOSIN HYDROCHLORIDE 0.4 MG/1
0.8 CAPSULE ORAL NIGHTLY
Status: DISCONTINUED | OUTPATIENT
Start: 2021-02-25 | End: 2021-02-26 | Stop reason: HOSPADM

## 2021-02-25 RX ORDER — INSULIN LISPRO 100 [IU]/ML
0-18 INJECTION, SOLUTION INTRAVENOUS; SUBCUTANEOUS
Status: DISCONTINUED | OUTPATIENT
Start: 2021-02-25 | End: 2021-02-26 | Stop reason: HOSPADM

## 2021-02-25 RX ORDER — ALLOPURINOL 100 MG/1
100 TABLET ORAL DAILY
Status: DISCONTINUED | OUTPATIENT
Start: 2021-02-25 | End: 2021-02-26 | Stop reason: HOSPADM

## 2021-02-25 RX ORDER — FLUOXETINE HYDROCHLORIDE 20 MG/1
40 CAPSULE ORAL DAILY
Status: DISCONTINUED | OUTPATIENT
Start: 2021-02-25 | End: 2021-02-26 | Stop reason: HOSPADM

## 2021-02-25 RX ORDER — ASPIRIN 81 MG/1
81 TABLET ORAL DAILY
Status: DISCONTINUED | OUTPATIENT
Start: 2021-02-25 | End: 2021-02-26 | Stop reason: HOSPADM

## 2021-02-25 RX ORDER — RISPERIDONE 0.25 MG/1
0.5 TABLET, FILM COATED ORAL
Status: DISCONTINUED | OUTPATIENT
Start: 2021-02-25 | End: 2021-02-26 | Stop reason: HOSPADM

## 2021-02-25 RX ORDER — VALPROIC ACID 250 MG/5ML
250 SOLUTION ORAL 2 TIMES DAILY
Status: DISCONTINUED | OUTPATIENT
Start: 2021-02-25 | End: 2021-02-26 | Stop reason: HOSPADM

## 2021-02-25 RX ORDER — RISPERIDONE 0.25 MG/1
0.5 TABLET, FILM COATED ORAL 2 TIMES DAILY
Status: DISCONTINUED | OUTPATIENT
Start: 2021-02-25 | End: 2021-02-25

## 2021-02-25 RX ORDER — ARIPIPRAZOLE 5 MG/1
15 TABLET ORAL DAILY
Status: DISCONTINUED | OUTPATIENT
Start: 2021-02-25 | End: 2021-02-26 | Stop reason: HOSPADM

## 2021-02-25 RX ORDER — FINASTERIDE 5 MG/1
5 TABLET, FILM COATED ORAL DAILY
Status: DISCONTINUED | OUTPATIENT
Start: 2021-02-25 | End: 2021-02-26 | Stop reason: HOSPADM

## 2021-02-25 RX ORDER — RISPERIDONE 1 MG/1
1 TABLET, FILM COATED ORAL NIGHTLY
Status: DISCONTINUED | OUTPATIENT
Start: 2021-02-25 | End: 2021-02-25

## 2021-02-25 RX ORDER — INSULIN LISPRO 100 [IU]/ML
0-9 INJECTION, SOLUTION INTRAVENOUS; SUBCUTANEOUS NIGHTLY
Status: DISCONTINUED | OUTPATIENT
Start: 2021-02-25 | End: 2021-02-26 | Stop reason: HOSPADM

## 2021-02-25 RX ADMIN — ACETAMINOPHEN 650 MG: 325 TABLET ORAL at 22:57

## 2021-02-25 RX ADMIN — CALCIUM CARBONATE-VITAMIN D TAB 500 MG-200 UNIT 1 TABLET: 500-200 TAB at 20:45

## 2021-02-25 RX ADMIN — RISPERIDONE 0.5 MG: 0.25 TABLET ORAL at 12:45

## 2021-02-25 RX ADMIN — RISPERIDONE 1.5 MG: 1 TABLET ORAL at 20:45

## 2021-02-25 RX ADMIN — ALLOPURINOL 100 MG: 100 TABLET ORAL at 09:21

## 2021-02-25 RX ADMIN — VALPROIC ACID 250 MG: 250 SOLUTION ORAL at 22:00

## 2021-02-25 RX ADMIN — ATORVASTATIN CALCIUM 40 MG: 40 TABLET, FILM COATED ORAL at 20:45

## 2021-02-25 RX ADMIN — ACETAMINOPHEN 650 MG: 325 TABLET ORAL at 03:24

## 2021-02-25 RX ADMIN — INSULIN LISPRO 6 UNITS: 100 INJECTION, SOLUTION INTRAVENOUS; SUBCUTANEOUS at 18:54

## 2021-02-25 RX ADMIN — FINASTERIDE 5 MG: 5 TABLET, FILM COATED ORAL at 09:21

## 2021-02-25 RX ADMIN — ASPIRIN 81 MG: 81 TABLET, FILM COATED ORAL at 09:21

## 2021-02-25 RX ADMIN — Medication: at 20:49

## 2021-02-25 RX ADMIN — VALPROIC ACID 250 MG: 250 SOLUTION ORAL at 12:45

## 2021-02-25 RX ADMIN — ENOXAPARIN SODIUM 40 MG: 40 INJECTION SUBCUTANEOUS at 09:22

## 2021-02-25 RX ADMIN — TAMSULOSIN HYDROCHLORIDE 0.8 MG: 0.4 CAPSULE ORAL at 20:45

## 2021-02-25 RX ADMIN — INSULIN LISPRO 3 UNITS: 100 INJECTION, SOLUTION INTRAVENOUS; SUBCUTANEOUS at 22:00

## 2021-02-25 RX ADMIN — FLUOXETINE 40 MG: 20 CAPSULE ORAL at 09:21

## 2021-02-25 RX ADMIN — ARIPIPRAZOLE 15 MG: 5 TABLET ORAL at 09:21

## 2021-02-25 RX ADMIN — INSULIN LISPRO 2 UNITS: 100 INJECTION, SOLUTION INTRAVENOUS; SUBCUTANEOUS at 09:29

## 2021-02-25 RX ADMIN — Medication 10 ML: at 09:22

## 2021-02-25 RX ADMIN — Medication 10 ML: at 22:05

## 2021-02-25 RX ADMIN — INSULIN LISPRO 9 UNITS: 100 INJECTION, SOLUTION INTRAVENOUS; SUBCUTANEOUS at 12:47

## 2021-02-25 RX ADMIN — CALCIUM CARBONATE-VITAMIN D TAB 500 MG-200 UNIT 1 TABLET: 500-200 TAB at 12:44

## 2021-02-25 RX ADMIN — INSULIN GLARGINE 5 UNITS: 100 INJECTION, SOLUTION SUBCUTANEOUS at 22:00

## 2021-02-25 ASSESSMENT — PAIN DESCRIPTION - PAIN TYPE: TYPE: ACUTE PAIN

## 2021-02-25 ASSESSMENT — PAIN DESCRIPTION - PROGRESSION: CLINICAL_PROGRESSION: GRADUALLY WORSENING

## 2021-02-25 ASSESSMENT — PAIN DESCRIPTION - ONSET: ONSET: ON-GOING

## 2021-02-25 ASSESSMENT — PAIN SCALES - GENERAL
PAINLEVEL_OUTOF10: 0
PAINLEVEL_OUTOF10: 3
PAINLEVEL_OUTOF10: 0
PAINLEVEL_OUTOF10: 0

## 2021-02-25 ASSESSMENT — PAIN DESCRIPTION - ORIENTATION: ORIENTATION: RIGHT

## 2021-02-25 NOTE — PLAN OF CARE
Problem: Gas Exchange - Impaired  Goal: Absence of hypoxia  Outcome: Ongoing  Note: Thus far this shift pt has maintained SpO2 > 92% on room air with respiratory rate 18-20 breaths/min and no complaints of shortness of breath/dyspnea. Will continue to monitor. Problem: Body Temperature -  Risk of, Imbalanced  Goal: Ability to maintain a body temperature within defined limits  Outcome: Ongoing  Note: Thus far this shift pt has remained afebrile. Will continue to monitor.

## 2021-02-25 NOTE — PLAN OF CARE
Problem: Falls - Risk of:  Goal: Will remain free from falls  Description: Will remain free from falls  Outcome: Ongoing   Bed alarm on, call light with in reach. Encourage pt to use call light. Will continue to monitor safety. Problem: Skin Integrity:  Goal: Will show no infection signs and symptoms  Description: Will show no infection signs and symptoms  Outcome: Ongoing   Decrease Pio scale. Turning pt every two hours to help with skin breakdown. Skin barrier cream applied. Heels elevated off bed. Patient getting zinc cream to coccyx for a dried healing abrasion. Wound care evaluate and was their recommendation. Patient is incontinent, trying to keep CDI. Will continue to monitor skin. Problem: Infection:  Goal: Will remain free from infection  Description: Will remain free from infection  Outcome: Completed   Patient showing no signs of an infection. A-febrile. CoVID 19 not detected. Problem: Pain:  Goal: Patient's pain/discomfort is manageable  Description: Patient's pain/discomfort is manageable  Outcome: Ongoing   No complaints of pain throughout shift, will continue to monitor.

## 2021-02-25 NOTE — PROGRESS NOTES
The [address] [place] [address]  Cardiology Daily Progress Note  2/25/2021,4:32 PM      Rubi Vyas MD  Primary cardiologist:    [address] [place] [address] Date:  2/24/2021  Hospital Day: Hospital Day: 2  Subjective:  Mr. Luz Obrien is seen on the floor. He is clinically unchanged from yesterday. Sitting up in chair and unresponsive unable to speak to me at this time. I do not see any evidence for chest pain and his rhythm has remained stable. The blood pressure and vitals are stable. Troponin levels 0.03 and 0.03. Creatinine is 1.5. Objective:   /65   Pulse 78   Temp 98 °F (36.7 °C) (Oral)   Resp 18   Ht 5' 8\" (1.727 m)   Wt 223 lb 12.3 oz (101.5 kg)   SpO2 99%   BMI 34.02 kg/m²       Intake/Output Summary (Last 24 hours) at 2/25/2021 1632  Last data filed at 2/25/2021 1400  Gross per 24 hour   Intake 735 ml   Output 0 ml   Net 735 ml       TELEMETRY: Sinus at 80 bpm.     Physical Examination:    Vitals:    02/25/21 0324 02/25/21 0913 02/25/21 1230 02/25/21 1523   BP: 117/68 123/69 135/73 127/65   Pulse: 82 80 73 78   Resp: 20 18 18 18   Temp: 98.5 °F (36.9 °C) 97.6 °F (36.4 °C) 97.5 °F (36.4 °C) 98 °F (36.7 °C)   TempSrc: Oral Oral Oral Oral   SpO2: 94% 96% 99% 99%   Weight:       Height:            Constitutional and General Appearance: Elderly unresponsive to me. HEENT: eyes and ears intact. No nasal masses  THYROID: not enlarged  LUNGS:  · Clear to auscultation and percussion  HEART and VASCULAR:  · The apical impulses not displaced  · Heart tones are crisp and normal  · Cervical veins are not engorged  · The carotid upstroke is normal in amplitude and contour without delay or bruit  · Peripheral pulses are symmetrical and full  · There is no clubbing, cyanosis of the extremities.   · No peripheral edema  · Femoral Arteries: 2+ and equal  · Pedal Pulses: 2+ and equal   ABDOMEN[de-identified]  · No masses or tenderness  · Liver/Spleen: No Abnormalities Noted  NEUROLOGICAL:  . Elderly. Sitting up in chair. Poorly responsive. SKIN: No lesions or rashes  LYMPH NODES: none enlarged      Medications:    allopurinol  100 mg Oral Daily    ARIPiprazole  15 mg Oral Daily    aspirin  81 mg Oral Daily    atorvastatin  40 mg Oral Nightly    Oyster Shell Calcium/D  1 tablet Oral BID    docusate sodium  100 mg Oral BID    finasteride  5 mg Oral Daily    FLUoxetine  40 mg Oral Daily    tamsulosin  0.8 mg Oral Nightly    valproic acid  250 mg Oral BID    risperiDONE  0.5 mg Oral QAM AC    risperiDONE  1.5 mg Oral Nightly    insulin lispro  0-18 Units Subcutaneous TID WC    insulin lispro  0-9 Units Subcutaneous Nightly    insulin glargine  5 Units Subcutaneous Nightly    Hydrocerin   Topical BID    sodium chloride flush  10 mL Intravenous 2 times per day    enoxaparin  40 mg Subcutaneous Daily      dextrose       bisacodyl, sodium chloride flush, promethazine **OR** ondansetron, polyethylene glycol, acetaminophen **OR** acetaminophen, glucose, dextrose, glucagon (rDNA), dextrose, perflutren lipid microspheres    Lab Data:  CBC:   Recent Labs     02/24/21  1118 02/25/21  0428   WBC 10.5 9.6   HGB 11.5* 12.0*   HCT 36.4* 37.3*   MCV 84.2 82.5    260     BMP:   Recent Labs     02/24/21  1118 02/25/21  0428    135*   K 4.8 4.7    98*   CO2 29 27   BUN 25* 29*   CREATININE 1.8* 1.5*     Troponin:Troponin:   Lab Results   Component Value Date    TROPONINI 0.03 02/25/2021     LIVER PROFILE:   Recent Labs     02/24/21  1118 02/25/21  0428   AST 18 15   ALT 10 9*   LIPASE 33.0  --    BILITOT 0.4 0.4   ALKPHOS 120 113     PT/INR: No results for input(s): PROTIME, INR in the last 72 hours. APTT: No results for input(s): APTT in the last 72 hours. BNP:  No results for input(s): BNP in the last 72 hours. IMAGING: as noted  Echocardiogram pending.   Assessment:  Patient Active Problem List    Diagnosis Date Noted    Elevated troponin 02/24/2021       Plan:   Continue current medications. Core Measures:  · Discharge instructions:   · LVEF documented:   · ACEI for LV dysfunction:   ·    Patient seems clinically stable. Given his overall neurological obtundation. I do not see any further need for further cardiac evaluation. We will follow acutely. The echocardiograph that was ordered is pending. Will follow acutely. Thanks for allowing me  the opportunity to participate in the evaluation and care of your patient.  If there are questions please call me [phone]    This note was likely completed using voice recognition technology and may contain unintended grammatical, phraseology and/or punctuation  errors      Renaldo Forbes M.D.,[facility]  2/25/2021 4:32 PM

## 2021-02-25 NOTE — PROGRESS NOTES
Physical Therapy    Facility/Department: [place] [address] [facility]  Initial Assessment and treatment    NAME: Jeannette Otto  : 1950  MRN: [id number]    Date of Service: 2021    Discharge Recommendations:Jose Daniels scored a 16/24 on the AM-PAC short mobility form. Current research shows that an AM-PAC score of 17 or less is typically not associated with a discharge to the patient's home setting. Based on the patient's AM-PAC score and their current functional mobility deficits, it is recommended that the patient have 3-5 sessions per week of Physical Therapy at d/c to increase the patient's independence. Please see assessment section for further patient specific details. If patient discharges prior to next session this note will serve as a discharge summary. Please see below for the latest assessment towards goals. PT Equipment Recommendations  Equipment Needed: No    Assessment   Body structures, Functions, Activity limitations: Decreased functional mobility   Assessment: The pt is a 79 y/o male who presents with fatigue and dizziness. He is a questionable historian and at times contradicts what he said at another time so PLOF is questionable. It sounds as though he ambulates with walker very short distances and uses w/c otherwise. Pt with weakness, decreased activity tolerance, pain in LE with mobility, impaired balance, and impaired gait mechanics. The pt requires assist for transfers and ambulation today. He would benefit from further skilled PT upon discharge to improve his safety and independence with mobility. PT to continue to follow in the acute setting. Treatment Diagnosis: impaired mobility secondary to fatigue  Prognosis: Good  Decision Making: Medium Complexity  PT Education: Goals;PT Role;Plan of Care; Functional Mobility Training;Transfer Training  Patient Education: pt will require reinforcement  Barriers to Learning: [place], cognition?   REQUIRES PT FOLLOW UP: Yes  Activity Tolerance  Activity Tolerance: Patient limited by fatigue;Patient limited by endurance       Patient Diagnosis(es): The primary encounter diagnosis was Other fatigue. A diagnosis of Elevated troponin was also pertinent to this visit. has a past medical history of Ataxia, Bipolar 1 disorder ([facility] BPH (benign prostatic hyperplasia), Cerebral infarct ([facility] Chronic kidney disease, Dementia ([facility] [address] Depression, Diabetes mellitus type 2, uncomplicated ([facility] [address] Gout, Hyperlipidemia, Hypertension, and Schizophrenia ([facility] [address] has no past surgical history on file. Restrictions  Position Activity Restriction  Other position/activity restrictions: up as tolerated  Vision/Hearing  Vision: Within Functional Limits  Hearing: Exceptions to [facility]  Hearing Exceptions: Hard of hearing/hearing concerns     Subjective  General  Chart Reviewed: Yes  Patient assessed for rehabilitation services?: Yes  Additional Pertinent Hx: Pt admitted 2/24/21 with fatigue, weakness and dizziness. Head CT= No findings for acute intracranial abnormality, Age-related atrophy of mild periventricular white matter changes bilaterally consistent with chronic small vessel ischemia. PMH includes: DM, ataxia, HTN, depression, bi-polar, cerebral infarct, schizophrenia, gout, CKD  Family / Caregiver Present: No  Referring Practitioner: Sawyer Rojas MD  Diagnosis: elevated troponin  Follows Commands: Impaired  General Comment  Comments: Pt presents sitting up in chair and willing to work with therapist.  Subjective  Subjective: \"Sure I can work with you. \"  Pain Screening  Patient Currently in Pain: Denies  Vital Signs  Patient Currently in Pain: Denies       Orientation  Orientation  Overall Orientation Status: Within Functional Limits  Social/Functional History  Social/Functional History  Lives With: Other (comment)(facility staff)  Type of Home: Facility  Home Layout: One level  Home Access: Level entry  Bathroom Shower/Tub: Shower chair with back  ADL Assistance: Needs assistance  Homemaking Assistance: Needs assistance  Ambulation Assistance: Needs assistance  Transfer Assistance: Needs assistance  Active : No  Additional Comments: pt notes that he falls a lot at [employer], goes to dining room in w/c. He states that he walks short distances with walker sometimes. Pt notes that lately he hasn't been able to use a walker. He says he uses w/c to the bathroom though with staff present. Pt questionable historian  Cognition   Cognition  Overall Cognitive Status: Exceptions  Arousal/Alertness: Delayed responses to stimuli  Following Commands: Follows one step commands with repetition; Follows one step commands with increased time  Attention Span: Attends with cues to redirect  Memory: Decreased recall of biographical Information;Decreased short term memory  Safety Judgement: Decreased awareness of need for assistance  Problem Solving: Assistance required to generate solutions;Assistance required to identify errors made;Assistance required to implement solutions  Insights: Decreased awareness of deficits  Initiation: Requires cues for some  Sequencing: Requires cues for some    Objective       Strength RLE  Strength RLE: Exception  Comment: hip flex 4-/5, knee flex 4-/5, knee ext 4-/5, DF3+/5  Strength LLE  Strength LLE: Exception  Comment: hip flex 4-/5, knee flex 4-/5, knee ext 4-/5, DF3+/5           Transfers  Sit to Stand: Contact guard assistance(from bedside chair)  Stand to sit: Contact guard assistance(to bedside chair)  Comment: 2 stands from chair  Ambulation  Ambulation?: Yes  Ambulation 1  Surface: level tile  Device: Rolling Walker  Assistance: Contact guard assistance  Quality of Gait: decreased step height and length, shuffling gait, forward flexed posture with walker out in front  Distance: 6'  Comments: pt noting that he needs to sit but unable to state exactly why- weakness? .  He refused transfer to bed and then back to chair  Stairs/Curb  Stairs?: No     Balance  Sitting - Static: Good  Sitting - Dynamic: Fair;+  Standing - Static: Fair  Standing - Dynamic: Fair;-      Treatment:  Functional mobility training and pt education    Plan   Plan  Times per week: 2-5  Times per day: Daily  Current Treatment Recommendations: Strengthening, Transfer Training, Endurance Training, Neuromuscular Re-education, Patient/Caregiver Education & Training, Balance Training, Gait Training, Functional Mobility Training, Safety Education & Training  Safety Devices  Type of devices: Call light within reach, Chair alarm in place, Left in chair, Nurse notified    AM-PAC Score  AM-PAC Inpatient Mobility Raw Score : 16 (02/25/21 1100)  AM-PAC Inpatient T-Scale Score : 40.78 (02/25/21 1100)  Mobility Inpatient CMS 0-100% Score: 54.16 (02/25/21 1100)  Mobility Inpatient CMS G-Code Modifier : CK (02/25/21 1100)          Goals  Short term goals  Time Frame for Short term goals: By discharge  Short term goal 1: The pt will perform bed mobility with supervision  Short term goal 2: The pt will transfer with supervision and RW  Short term goal 3: The pt will ambulate with supervision and RW x 150'  Patient Goals   Patient goals :  To get better       Therapy Time   Individual Concurrent Group Co-treatment   Time In 0828         Time Out 0910         Minutes 42         Timed Code Treatment Minutes: 27 Minutes    Timed Code Treatment Minutes:  27 Minutes    Total Treatment Minutes:  42 minutes    Trice Ross, PT

## 2021-02-25 NOTE — CARE COORDINATION
Case Management Assessment           Initial Evaluation                Date / Time of Evaluation: 2/25/2021 3:05 PM                 Assessment Completed by: Matti Lara    Patient Name: Eva Nguyen     YOB: 1950  Diagnosis: Elevated troponin [R77.8]     Date / Time: 2/24/2021  9:12 AM    Patient Admission Status: Inpatient    If patient is discharged prior to next notation, then this note serves as note for discharge by case management. Current PCP: Beatrice Maciel MD  Clinic Patient: No    Chart Reviewed: Yes  Patient/ Family Interviewed: Yes    Initial assessment completed at bedside with: patient via phone due to [facility]    Hospitalization in the last 30 days: No    Emergency Contacts:  Extended Emergency Contact Information  Primary Emergency Contact: Poly Drew  [place] Phone: [phone]  Relation: Brother/Sister  Secondary Emergency Contact: [address] [place] [address] [place] [address] Phone: [phone]  Relation: Brother/Sister    Advance Directives:   Code Status: Full 2021 Monik Esquively: No  Agent:   Contact Number:     Copy present: No     In paper Chart: No    Scanned into EMR No    Financial  Payor: MEDICARE / Plan: MEDICARE PART A AND B / Product Type: *No Product type* /     Pre-cert required for SNF: No    Pharmacy  No Pharmacies Listed    Potential assistance Purchasing Medications: Potential Assistance Purchasing Medications: No  Does Patient want to participate in local refill/ meds to beds program?: No    Meds To Beds General Rules:  1. Can ONLY be done Monday- Friday between 8:30am-5pm  2. Prescription(s) must be in pharmacy by 3pm to be filled same day  3. Copy of patient's insurance/ prescription drug card and patient face sheet must be sent along with the prescription(s)  4. Cost of Rx cannot be added to hospital bill. If financial assistance is needed, please contact unit  or ;   or  CANNOT provide pharmacy voucher for patients co-pays  5. Patients can then  the prescription on their way out of the hospital at discharge, or pharmacy can deliver to the bedside if staff is available. (payment due at time of pick-up or delivery - cash, check, or card accepted)     Able to afford home medications/ co-pay costs: Yes    ADLS  Support Systems: Family Members    PT AM-PAC: 16 /24  OT AM-PAC: 17 /24    [place]: from long term care at [facility]  Steps: 0    Plans to RETURN to current housing: Yes  Barriers to RETURNING to current housing: none per patient; if COVID + the will need alternate placement until negative    Sheng Rangel [address]  Currently ACTIVE with [address]: No  Home Care Agency: Not Applicable    Durable Medical Equipment  DME Provider: has at facility  Equipment: wheelchair and walker    Home Oxygen and [address] [place] prior to admission: No  [address]: Not Applicable  Other Respiratory Equipment:     Informed of need to bring portable home O2 tank on day of DISCHARGE for nursing to connect prior to leaving: No  Verbalized agreement/Understanding: No  Person to bring portable tank at discharge:     Dialysis  Active with HD/PD prior to admission: No  Nephrologist:     HD Center:  Not Applicable    DISCHARGE PLAN:  Disposition: [facility] ([place]): [facility]  Phone: [phone]  Fax: [phone]    Transportation PLAN for discharge: EMS transportation     Factors facilitating achievement of predicted outcomes: Family support, Caregiver support, Cooperative, Pleasant and Has needed Durable Medical Equipment at home    Barriers to discharge: Medication managment and pending testing    Additional Case Management Notes: CM spoke with patient via phone due to [address], patient reports he has been at [facility] for a little over 1 year, he plans to return there at [place].  CM has left voicemail with admissions Teresa ([phone]) requesting return call, CM needs to verify pre-cert need and to verify patient can return at [place]. Awaiting return call at this time. Patient reports using a wheelchair and walker at facility and will need ambulance transport back to facility at [place]. The Plan for Transition of Care is related to the following treatment goals of Elevated troponin [R77.8]    The Patient and/or patient representative Kandi Ashford and his family were provided with a choice of provider and agrees with the discharge plan Yes    Freedom of choice list was provided with basic dialogue that supports the patient's individualized plan of care/goals and shares the quality data associated with the providers.  Yes    Care Transition patient: No    Juan Oconnor RN  [facility]  Case Management Department  Ph: [phone]   Fax: [phone]

## 2021-02-25 NOTE — PROGRESS NOTES
4 Eyes Admission Assessment     I agree as the admission nurse that 2 RN's have performed a thorough Head to Toe Skin Assessment on the patient. ALL assessment sites listed below have been assessed on admission. Areas assessed by both nurses:   [x]   Head, Face, and Ears   [x]   Shoulders, Back, and Chest  [x]   Arms, Elbows, and Hands   [x]   Coccyx, Sacrum, and Ischium  [x]   Legs, Feet, and Heels        Does the Patient have Skin Breakdown?   Yes a wound was noted on the Admission Assessment and an LDA was Initiated documentation include the Franca-wound, Wound Assessment, Measurements, Dressing Treatment, Drainage, and Color\",         Pio Prevention initiated:  Yes   Wound Care Orders initiated:  No      WOC nurse consulted for Pressure Injury (Stage 3,4, Unstageable, DTI, NWPT, and Complex wounds) or Pio score 18 or lower:  No      Nurse 1 eSignature: Electronically signed by Valentine Domingo RN on 2/24/21 at 8:52 PM EST    **SHARE this note so that the co-signing nurse is able to place an eSignature**    Nurse 2 eSignature: Electronically signed by Monica Palacios RN on 2/25/21 at 1:23 AM EST

## 2021-02-25 NOTE — PROGRESS NOTES
Pt's daughter updated on pt's current status and plan of care. All questions answered.  Electronically signed by Zacarias Solis RN on 2/24/2021 at 8:52 PM

## 2021-02-25 NOTE — PROGRESS NOTES
Updated patient sister Carey Javed. Aware of the plan of needing echo and that covid test just resulted negative, which we were waiting on this before completing echo. Kameron Paris said she usually does not get up till 8 am, if possible to not call prior to that time.

## 2021-02-25 NOTE — PLAN OF CARE
Nutrition Problem #1: Increased nutrient needs  Intervention: Food and/or Nutrient Delivery: Continue Current Diet, Start Oral Nutrition Supplement  Nutritional Goals: Pt to meet >50% of nutritional requirements from diet and ONS

## 2021-02-25 NOTE — PROGRESS NOTES
Occupational Therapy   Occupational Therapy Initial Assessment/Treatment  Date: 2021   Patient Name: Teodora Morel  MRN: [id number]     : 1950    Date of Service: 2021    Discharge Recommendations:    Teodora Morel scored a 17/24 on the AM-PAC ADL Inpatient form. Current research shows that an AM-PAC score of 17 or less is typically not associated with a discharge to the patient's home setting. Based on the patient's AM-PAC score and their current ADL deficits, it is recommended that the patient have 3-5 sessions per week of Occupational Therapy at d/c to increase the patient's independence. Please see assessment section for further patient specific details. If patient discharges prior to next session this note will serve as a discharge summary. Please see below for the latest assessment towards goals. OT Equipment Recommendations  Equipment Needed: No    Assessment   Performance deficits / Impairments: Decreased functional mobility ; Decreased ADL status; Decreased endurance;Decreased balance;Decreased coordination  Assessment: Pt presents with a decline in functional independence. Pt is from a facility and received assist with ADL and mobility. Feel pt would benefit from further OT services. Treatment Diagnosis: Impaired ADL, functional mobility, and Rt UE function  Prognosis: Good  Decision Making: Medium Complexity  OT Education: OT Role;Plan of Care  Patient Education: Pt verbalized understanding  REQUIRES OT FOLLOW UP: Yes  Activity Tolerance  Activity Tolerance: Patient Tolerated treatment well  Safety Devices  Safety Devices in place: Yes  Type of devices: Left in chair;Call light within reach; Chair alarm in place;Nurse notified         Treatment Diagnosis: Impaired ADL, functional mobility, and Rt UE function      Restrictions  Position Activity Restriction  Other position/activity restrictions: up as tolerated    Subjective   General  Chart Reviewed: Yes  Patient assessed for rehabilitation services?: Yes  Additional Pertinent Hx: Pt admitted 2/24/21 with fatigue, weakness and dizziness. Head CT= No findings for acute intracranial abnormality, Age-related atrophy of mild periventricular white matter changes bilaterally consistent with chronic small vessel ischemia. PMH includes: DM, ataxia, HTN, depression, bi-polar, cerebral infarct, schizophrenia, gout, CKD  Family / Caregiver Present: No  Referring Practitioner: Dr. Lyric Edwards  Diagnosis: Elevated troponin  Subjective  Subjective: Pt p in chair upon entry. Pt agreeable to activity. Patient Currently in Pain: Denies  Pain Assessment  Pain Assessment: 0-10  Pain Level: 0  Vital Signs  Temp: 97.6 °F (36.4 °C)  Temp Source: Oral  Pulse: 80  Heart Rate Source: Monitor  Resp: 18  BP: 123/69  BP Location: Left upper arm  MAP (mmHg): 87  Patient Position: Semi fowlers  Level of Consciousness: Alert (0)  MEWS Score: 1  Patient Currently in Pain: Denies  Oxygen Therapy  SpO2: 96 %  O2 Device: None (Room air)  Social/Functional History  Social/Functional History  Lives With: Other (comment)(facility staff)  Type of Home: Facility  Home Layout: One level  Home Access: Level entry  Bathroom Shower/Tub: Shower chair with back  ADL Assistance: Needs assistance  Homemaking Assistance: Needs assistance  Ambulation Assistance: Needs assistance  Transfer Assistance: Needs assistance  Active : No  Additional Comments: pt notes that he falls a lot at [facility], goes to dining room in w/c. He states that he walks short distances with walker sometimes. He says he uses w/c to the bathroom though with staff present.   Pt questionable historian       Objective   Vision: Within Functional Limits  Hearing: Exceptions to [facility]  Hearing Exceptions: Hard of hearing/hearing concerns    Orientation  Overall Orientation Status: Within Functional Limits     Balance  Sitting Balance: Stand by assistance  Standing Balance: Contact guard assistance  Standing Balance  Time: ~1 1/2 min  Activity: short walk in room  ADL  Feeding: Setup(to open containers/packets)  Grooming: Setup(to wipe face and hands, seated in chair)  LE Dressing: Moderate assistance  Toileting: (denied need)  Tone RUE  RUE Tone: Normotonic  Tone LUE  LUE Tone: Normotonic  Coordination  Movements Are Fluid And Coordinated: No  Coordination and Movement description: Right UE;Fine motor impairments        Transfers  Stand Step Transfers: Contact guard assistance  Sit to stand: Contact guard assistance  Stand to sit: Contact guard assistance     Cognition  Overall Cognitive Status: WFL                 LUE AROM (degrees)  LUE AROM : WFL  RUE AROM (degrees)  RUE AROM : WFL  LUE Strength  Gross LUE Strength: WFL  RUE Strength  Gross RUE Strength: WFL     Hand Dominance  Hand Dominance: Right     Treatment included ADL and transfer training.         Plan   Plan  Times per week: 2-5  Times per day: Daily  Current Treatment Recommendations: ROM, Balance Training, Functional Mobility Training, Endurance Training, Self-Care / ADL    AM-[facility] Score        -[facility] Inpatient Daily Activity Raw Score: 17 (02/25/21 1012)  AM-PAC Inpatient ADL T-Scale Score : 37.26 (02/25/21 1012)  ADL Inpatient CMS 0-100% Score: 50.11 (02/25/21 1012)  ADL Inpatient CMS G-Code Modifier : CK (02/25/21 1012)    Goals                         No goals met  Short term goals  Time Frame for Short term goals: Discharge  Short term goal 1: Transfer to/from toilet with SBA  Short term goal 2: Stance with SBA x4 min while engaging in ADL/functional mobility  Short term goal 3: Lower body dressing with min assist  Short term goal 4: Pt will use Rt UE as max assist for ADL  Patient Goals   Patient goals : Return to [address] [facility] Time   Individual Concurrent Group Co-treatment   Time In 1825         Time Out 0910         Minutes 38         Timed Code Treatment Minutes: 28 Minutes    Total Treatment Qiana Tucker Thomas Ragsdale, [address] [place] [address]

## 2021-02-25 NOTE — PROGRESS NOTES
NUTRITION ASSESSMENT  Admission Date: 2/24/2021     Type and Reason for Visit: Positive Nutrition Screen    NUTRITION RECOMMENDATIONS:   1. Continue with CC4 per MD  2. Adding wound healing ONS BID    NUTRITION ASSESSMENT:  RD assessment triggered for difficulty chewing/ swallowing, pressure ulcers and home EN/TPN for pt with PMHx of T2DM (no hgb a1c per EMR), Hypertension, hyperlipidemia, CKD stage IV, schizophrenia and  depression/bipolar disorder who is admitted for fatigue and dizziness. RD confirmed with nursing- co concern with swallowing nor EN access. Excellent PO intake also reported per nursing. RD adding cole to aid with meeting elevated nutritional needs for wound healing and will continue to monitor per [facility]. RD did not conduct direct, in-person nutrition evaluation in efforts to reduce exposure and use of PPE for high risk persons, PUI persons, patients who have tested positive for Covid-19 or those awaiting respiratory panel results. EMR was screened for nutrition risk factors, as defined per nutrition standards of care. MALNUTRITION ASSESSMENT  Context of Malnutrition: Acute Illness   Malnutrition Status: No malnutrition    NUTRITION DIAGNOSIS   Problem: Problem #1: Increased nutrient needs  Etiology: Increased demand for nutrient  Signs & Symptoms: Presence of wounds     NUTRITION INTERVENTION  Food and/or Nutrient Delivery:Continue Current diet  or Start ONS   Nutrition education/counseling/coordination of care: Continue Inpatient Monitoring     NUTRITION MONITORING & EVALUATION:  Evaluation:Goals set   Goals:Goals: Pt to meet >50% of nutritional requirements from diet and ONS  Monitoring: Meal Intake , Supplement Intake  or Wound Healing      OBJECTIVE DATA:  · Nutrition-Focused Physical Findings: +1 pitting edema in BLE.  BM 2/24  · Wounds Pressure Ulcer      Past Medical History:   Diagnosis Date    Ataxia     Bipolar 1 disorder ([address]     BPH (benign prostatic hyperplasia)     Cerebral infarct (HCC)     Chronic kidney disease     Dementia [facility]     Depression     Diabetes mellitus type 2, uncomplicated (HCC)     Gout     Hyperlipidemia     Hypertension     Schizophrenia ([email or web address]         ANTHROPOMETRICS  Current Height: 5' 8\" (172.7 cm)  Current Weight: 223 lb 12.3 oz (101.5 kg)    Admission weight: 223 lb 12.3 oz (101.5 kg)  Ideal Bodyweight 154 lb   Usual Bodyweight ALLYSON   Weight Changes ALLYSON       BMI BMI (Calculated): 34.1    Wt Readings from Last 50 Encounters:   02/24/21 223 lb 12.3 oz (101.5 kg)       COMPARATIVE STANDARDS  Estimated Total Kcals/Day : 15-18 Current Bodyweight (101 kg) 6239-2570 kcal    Estimated Total Protein (g/day) : 1.3-1.5 Ideal Bodyweight  (70 kg)  g/day  Estimated Daily Total Fluid (ml/day): 3384-2259 mL per day     Food / Nutrition-Related History  Pre-Admission / Home Diet:  Pre-Admission/Home Diet: General   Home Supplements / Herbals:    none noted  Food Restrictions / Cultural Requests:    none noted    Current Nutrition Therapies   DIET CARB CONTROL; Carb Control: 4 carb choices (60 gms)/meal     PO Intake: % x1 meal  PO Supplement: None   PO Supplement Intake: None   IVF: none    NUTRITION RISK LEVEL: Risk Level:  Moderate     Mariusz Patterson, [address] [place] [address]  Kristofer:  [phone]  Office:  [phone]

## 2021-02-25 NOTE — PROGRESS NOTES
Clinical Pharmacy Progress Note  Medication History     Admit Date: 2/24/2021    Spoke with RN at [facility] ([phone]) to clarify Risperidone and insulin orders. RN confirmed that patient does take the following:  · Humulin N 12 units TIDWM  · Novolog 12 units TIDWM  · Novolog per slidind scale TIDWM  · Levemir 77 units QHS  · Also confirmed that patient takes Risperidone 0.5 mg in AM and 1.5 mg QHS. Please call with any questions.   Rick Butler PharmD, [facility]  Wireless # [phone]  2/25/2021 9:19 AM

## 2021-02-25 NOTE — ACP (ADVANCE CARE PLANNING)
Advance Care Planning     Advance Care Planning Activator (Inpatient)  Conversation Note      Date of ACP Conversation: 2/24/2021    Conversation Conducted with: Patient with Decision Making Capacity    ACP Activator: [address] Decision Maker:     Current Designated Health Care Decision Maker:     Primary Decision Maker (Active): Leonidas Phillip - Brother/Sister - [phone]    Secondary Decision Maker: Dinora Melendez - Brother/Sister - [phone]  Click here to complete Healthcare Decision Makers including section of the Healthcare Decision Maker Relationship (ie \"Primary\")  Today we of for health care decision makers is based on who is most available between two sister. .    Care Preferences    Ventilation: \"If you were in your present state of health and suddenly became very ill and were unable to breathe on your own, what would your preference be about the use of a ventilator (breathing machine) if it were available to you? \"      Would the patient desire the use of ventilator (breathing machine)?: yes    \"If your health worsens and it becomes clear that your chance of recovery is unlikely, what would your preference be about the use of a ventilator (breathing machine) if it were available to you? \"     Would the patient desire the use of ventilator (breathing machine)?: Yes      Resuscitation  \"CPR works best to restart the heart when there is a sudden event, like a heart attack, in someone who is otherwise healthy. Unfortunately, CPR does not typically restart the heart for people who have serious health conditions or who are very sick. \"    \"In the event your heart stopped as a result of an underlying serious health condition, would you want attempts to be made to restart your heart (answer \"yes\" for attempt to resuscitate) or would you prefer a natural death (answer \"no\" for do not attempt to resuscitate)? \" yes        [x] Yes   [] No   Educated Patient / Decision Maker regarding differences between Advance Directives and portable DNR orders.     Length of ACP Conversation in minutes:      Conversation Outcomes:  [x] ACP discussion completed  [] Existing advance directive reviewed with patient; no changes to patient's previously recorded wishes  [] New Advance Directive completed  [] Portable Do Not Rescitate prepared for Provider review and signature  [] POLST/POST/MOLST/MOST prepared for Provider review and signature      Follow-up plan:    [] Schedule follow-up conversation to continue planning  [] Referred individual to Provider for additional questions/concerns   [] Advised patient/agent/surrogate to review completed ACP document and update if needed with changes in condition, patient preferences or care setting    [x] This note routed to one or more involved healthcare providers

## 2021-02-26 VITALS
HEART RATE: 88 BPM | BODY MASS INDEX: 33.91 KG/M2 | HEIGHT: 68 IN | WEIGHT: 223.77 LBS | SYSTOLIC BLOOD PRESSURE: 157 MMHG | OXYGEN SATURATION: 98 % | TEMPERATURE: 97.4 F | RESPIRATION RATE: 18 BRPM | DIASTOLIC BLOOD PRESSURE: 74 MMHG

## 2021-02-26 LAB
A/G RATIO: 1 (ref 1.1–2.2)
ALBUMIN SERPL-MCNC: 3.7 G/DL (ref 3.4–5)
ALP BLD-CCNC: 130 U/L (ref 40–129)
ALT SERPL-CCNC: 11 U/L (ref 10–40)
ANION GAP SERPL CALCULATED.3IONS-SCNC: 10 MMOL/L (ref 3–16)
AST SERPL-CCNC: 17 U/L (ref 15–37)
BASOPHILS ABSOLUTE: 0.1 K/UL (ref 0–0.2)
BASOPHILS RELATIVE PERCENT: 1.3 %
BILIRUB SERPL-MCNC: 0.3 MG/DL (ref 0–1)
BUN BLDV-MCNC: 30 MG/DL (ref 7–20)
CALCIUM SERPL-MCNC: 10.3 MG/DL (ref 8.3–10.6)
CHLORIDE BLD-SCNC: 98 MMOL/L (ref 99–110)
CHOLESTEROL, TOTAL: 130 MG/DL (ref 0–199)
CO2: 28 MMOL/L (ref 21–32)
CREAT SERPL-MCNC: 1.9 MG/DL (ref 0.8–1.3)
EOSINOPHILS ABSOLUTE: 0.5 K/UL (ref 0–0.6)
EOSINOPHILS RELATIVE PERCENT: 5.5 %
GFR AFRICAN AMERICAN: 43
GFR NON-AFRICAN AMERICAN: 35
GLOBULIN: 3.8 G/DL
GLUCOSE BLD-MCNC: 196 MG/DL (ref 70–99)
GLUCOSE BLD-MCNC: 219 MG/DL (ref 70–99)
GLUCOSE BLD-MCNC: 305 MG/DL (ref 70–99)
HCT VFR BLD CALC: 37.7 % (ref 40.5–52.5)
HDLC SERPL-MCNC: 31 MG/DL (ref 40–60)
HEMOGLOBIN: 12.3 G/DL (ref 13.5–17.5)
LDL CHOLESTEROL CALCULATED: 67 MG/DL
LV EF: 58 %
LVEF MODALITY: NORMAL
LYMPHOCYTES ABSOLUTE: 2.4 K/UL (ref 1–5.1)
LYMPHOCYTES RELATIVE PERCENT: 29.2 %
MAGNESIUM: 2 MG/DL (ref 1.8–2.4)
MCH RBC QN AUTO: 27.1 PG (ref 26–34)
MCHC RBC AUTO-ENTMCNC: 32.7 G/DL (ref 31–36)
MCV RBC AUTO: 82.9 FL (ref 80–100)
MONOCYTES ABSOLUTE: 0.8 K/UL (ref 0–1.3)
MONOCYTES RELATIVE PERCENT: 10.3 %
NEUTROPHILS ABSOLUTE: 4.4 K/UL (ref 1.7–7.7)
NEUTROPHILS RELATIVE PERCENT: 53.7 %
PDW BLD-RTO: 17.3 % (ref 12.4–15.4)
PERFORMED ON: ABNORMAL
PERFORMED ON: ABNORMAL
PLATELET # BLD: 282 K/UL (ref 135–450)
PMV BLD AUTO: 8.8 FL (ref 5–10.5)
POTASSIUM REFLEX MAGNESIUM: 4.8 MMOL/L (ref 3.5–5.1)
PRO-BNP: 27 PG/ML (ref 0–124)
RBC # BLD: 4.55 M/UL (ref 4.2–5.9)
SODIUM BLD-SCNC: 136 MMOL/L (ref 136–145)
T4 FREE: 1.1 NG/DL (ref 0.9–1.8)
TOTAL PROTEIN: 7.5 G/DL (ref 6.4–8.2)
TRIGL SERPL-MCNC: 160 MG/DL (ref 0–150)
TSH REFLEX: 11.91 UIU/ML (ref 0.27–4.2)
VITAMIN D 25-HYDROXY: 60.3 NG/ML
VLDLC SERPL CALC-MCNC: 32 MG/DL
WBC # BLD: 8.3 K/UL (ref 4–11)

## 2021-02-26 PROCEDURE — C8929 TTE W OR WO FOL WCON,DOPPLER: HCPCS

## 2021-02-26 PROCEDURE — 2580000003 HC RX 258: Performed by: INTERNAL MEDICINE

## 2021-02-26 PROCEDURE — 99233 SBSQ HOSP IP/OBS HIGH 50: CPT | Performed by: NURSE PRACTITIONER

## 2021-02-26 PROCEDURE — 84439 ASSAY OF FREE THYROXINE: CPT

## 2021-02-26 PROCEDURE — 80061 LIPID PANEL: CPT

## 2021-02-26 PROCEDURE — 6370000000 HC RX 637 (ALT 250 FOR IP): Performed by: INTERNAL MEDICINE

## 2021-02-26 PROCEDURE — 97116 GAIT TRAINING THERAPY: CPT

## 2021-02-26 PROCEDURE — 80053 COMPREHEN METABOLIC PANEL: CPT

## 2021-02-26 PROCEDURE — 84443 ASSAY THYROID STIM HORMONE: CPT

## 2021-02-26 PROCEDURE — 99233 SBSQ HOSP IP/OBS HIGH 50: CPT | Performed by: INTERNAL MEDICINE

## 2021-02-26 PROCEDURE — 83880 ASSAY OF NATRIURETIC PEPTIDE: CPT

## 2021-02-26 PROCEDURE — 36415 COLL VENOUS BLD VENIPUNCTURE: CPT

## 2021-02-26 PROCEDURE — 6360000004 HC RX CONTRAST MEDICATION: Performed by: INTERNAL MEDICINE

## 2021-02-26 PROCEDURE — 97530 THERAPEUTIC ACTIVITIES: CPT

## 2021-02-26 PROCEDURE — 85025 COMPLETE CBC W/AUTO DIFF WBC: CPT

## 2021-02-26 PROCEDURE — 82306 VITAMIN D 25 HYDROXY: CPT

## 2021-02-26 PROCEDURE — 6360000002 HC RX W HCPCS: Performed by: INTERNAL MEDICINE

## 2021-02-26 PROCEDURE — 83735 ASSAY OF MAGNESIUM: CPT

## 2021-02-26 RX ORDER — LANOLIN ALCOHOL/MO/W.PET/CERES
CREAM (GRAM) TOPICAL 2 TIMES DAILY
Qty: 1 CONTAINER | Refills: 0 | Status: ON HOLD | OUTPATIENT
Start: 2021-02-26 | End: 2022-04-18

## 2021-02-26 RX ORDER — INSULIN DETEMIR 100 [IU]/ML
10 INJECTION, SOLUTION SUBCUTANEOUS NIGHTLY
Qty: 1 VIAL | Refills: 3 | Status: ON HOLD | OUTPATIENT
Start: 2021-02-26 | End: 2022-03-08 | Stop reason: SDUPTHER

## 2021-02-26 RX ORDER — 0.9 % SODIUM CHLORIDE 0.9 %
500 INTRAVENOUS SOLUTION INTRAVENOUS ONCE
Status: COMPLETED | OUTPATIENT
Start: 2021-02-26 | End: 2021-02-26

## 2021-02-26 RX ORDER — GABAPENTIN 300 MG/1
300 CAPSULE ORAL 2 TIMES DAILY
Qty: 60 CAPSULE | Refills: 0 | Status: ON HOLD | OUTPATIENT
Start: 2021-02-26 | End: 2022-03-08 | Stop reason: HOSPADM

## 2021-02-26 RX ADMIN — INSULIN LISPRO 3 UNITS: 100 INJECTION, SOLUTION INTRAVENOUS; SUBCUTANEOUS at 09:14

## 2021-02-26 RX ADMIN — Medication 10 ML: at 12:20

## 2021-02-26 RX ADMIN — FLUOXETINE 40 MG: 20 CAPSULE ORAL at 09:15

## 2021-02-26 RX ADMIN — SODIUM CHLORIDE 500 ML: 9 INJECTION, SOLUTION INTRAVENOUS at 12:20

## 2021-02-26 RX ADMIN — CALCIUM CARBONATE-VITAMIN D TAB 500 MG-200 UNIT 1 TABLET: 500-200 TAB at 09:15

## 2021-02-26 RX ADMIN — RISPERIDONE 0.5 MG: 0.25 TABLET ORAL at 05:38

## 2021-02-26 RX ADMIN — Medication: at 09:14

## 2021-02-26 RX ADMIN — FINASTERIDE 5 MG: 5 TABLET, FILM COATED ORAL at 09:15

## 2021-02-26 RX ADMIN — PERFLUTREN 1.65 MG: 6.52 INJECTION, SUSPENSION INTRAVENOUS at 08:54

## 2021-02-26 RX ADMIN — ENOXAPARIN SODIUM 40 MG: 40 INJECTION SUBCUTANEOUS at 09:15

## 2021-02-26 RX ADMIN — INSULIN LISPRO 12 UNITS: 100 INJECTION, SOLUTION INTRAVENOUS; SUBCUTANEOUS at 12:20

## 2021-02-26 RX ADMIN — ASPIRIN 81 MG: 81 TABLET, FILM COATED ORAL at 09:15

## 2021-02-26 RX ADMIN — VALPROIC ACID 250 MG: 250 SOLUTION ORAL at 09:14

## 2021-02-26 RX ADMIN — DOCUSATE SODIUM 100 MG: 100 CAPSULE, LIQUID FILLED ORAL at 09:15

## 2021-02-26 RX ADMIN — ARIPIPRAZOLE 15 MG: 5 TABLET ORAL at 09:15

## 2021-02-26 RX ADMIN — ALLOPURINOL 100 MG: 100 TABLET ORAL at 09:15

## 2021-02-26 ASSESSMENT — PAIN SCALES - GENERAL
PAINLEVEL_OUTOF10: 0

## 2021-02-26 NOTE — DISCHARGE INSTR - COC
Continuity of Care Form    Patient Name: Greta Dobson   :  1950  MRN:  [id number]    Admit date:  2021  Discharge date:  2021    Code Status Order: Full Code   Advance Directives:   [facility] Documentation       Date/Time Healthcare Directive Type of Healthcare Directive Copy in [address] Agent's Name Healthcare Agent's Phone Number    21 2411  Yes, patient has an advance directive for healthcare treatment -- -- -- -- --    21  No, patient does not have an advance directive for healthcare treatment -- -- -- -- --            Admitting Physician:  Gio Pugh MD  PCP: Zuleyka Gupta MD    Discharging Nurse: [facility] Unit/Room#: 0199/6395-41  Discharging Unit Phone Number: [phone]    Emergency Contact:   Extended Emergency Contact Information  Primary Emergency Contact: [employer] Phone: [phone]  Relation: Brother/Sister  Secondary Emergency Contact: [address] [place] [address] Phone: [phone]  Relation: Brother/Sister    Past Surgical History:  History reviewed. No pertinent surgical history. Immunization History: There is no immunization history on file for this patient. Active Problems:  Patient Active Problem List   Diagnosis Code    Elevated troponin R77.8    Other fatigue R53.83       Isolation/Infection:   Isolation            No Isolation          Patient Infection Status       Infection Onset Added Last Indicated Last Indicated By Review Planned Expiration Resolved Resolved By    None active    Resolved    COVID-19 Rule Out 21 COVID-19 (Ordered)   21 Rule-Out Test Resulted            Nurse Assessment:  Last Vital Signs: /80   Pulse 89   Temp 98.1 °F (36.7 °C) (Oral)   Resp 18   Ht 5' 8\" (1.727 m)   Wt 223 lb 12.3 oz (101.5 kg)   SpO2 99%   BMI 34.02 kg/m²     Last documented pain score (0-10 scale): Pain Level: 0  Last Weight:    Wt Readings from Last 1 Encounters:   02/24/21 223 lb 12.3 oz (101.5 kg)     Mental Status:  alert    IV Access:  - None    Nursing Mobility/ADLs:  Walking   Assisted  Transfer  Assisted  Bathing  Assisted  Dressing  Assisted  Toileting  Assisted  Feeding  [address] [facility] Delivery   none    Wound Care Documentation and Therapy:  Wound 02/24/21 Buttocks Inner (Active)   Wound Cleansed Soap and water 02/26/21 0738   Dressing/Treatment Open to air 02/26/21 0738   Wound Assessment Purple/maroon 02/26/21 0738   Drainage Amount None 02/26/21 0738   Franca-wound Assessment Blanchable erythema 02/26/21 0347   Number of days: 1        Elimination:  Continence:   · Bowel: Yes  · Bladder: No  Urinary Catheter: None   Colostomy/Ileostomy/Ileal Conduit: No       Date of Last BM: PTA    Intake/Output Summary (Last 24 hours) at 2/26/2021 1143  Last data filed at 2/25/2021 1400  Gross per 24 hour   Intake 240 ml   Output --   Net 240 ml     I/O last 3 completed shifts: In: 600 [P.O.:600]  Out: -     Safety Concerns: At Risk for Falls    Impairments/Disabilities:      Hearing    Nutrition Therapy:  Current Nutrition Therapy:   - Oral Diet:  Carb Control 3 carbs/meal (1500kcals/day)    Routes of Feeding: Oral  Liquids: No Restrictions  Daily Fluid Restriction: no  Last Modified Barium Swallow with Video (Video Swallowing Test): not done    Treatments at the Time of Hospital Discharge:   Respiratory Treatments: RA  Oxygen Therapy:  is not on home oxygen therapy.   Ventilator:    - No ventilator support    Rehab Therapies: Physical Therapy and Occupational Therapy  Weight Bearing Status/Restrictions: No weight bearing restirctions  Other Medical Equipment (for information only, NOT a DME order):  walker  Other Treatments: N/A    Patient's personal belongings (please select all that are sent with patient):  None    RN SIGNATURE:  Electronically signed by Kasandra Newby RN on 2/26/21 at 2:32 PM EST    CASE MANAGEMENT/SOCIAL WORK SECTION    Inpatient Status Date: 02.25.2021    Readmission Risk Assessment Score:  Readmission Risk              Risk of Unplanned Readmission:        20           Discharging to Facility/ Agency   · Name: [facility]  · Address: [address]  · Phone: [phone]  · Fax: [phone]    Dialysis Facility (if applicable)   · Name:  · Address:  · Dialysis Schedule:  · Phone:  · Fax:    / signature: Electronically signed by Matti Lara RN on 2/26/21 at 11:43 AM EST    PHYSICIAN SECTION    Prognosis: Fair    Condition at Discharge: Stable    Rehab Potential (if transferring to Rehab): N/A    Recommended Labs or Other Treatments After Discharge: Monitor BP. Lasix discontinued. May resume if needed. Monitor BG, adjust as needed. Follow up with PCP    Physician Certification: I certify the above information and transfer of Eva Nguyen  is necessary for the continuing treatment of the diagnosis listed and that he requires Intermediate Nursing Care for greater 30 days.      Update Admission H&P: No change in H&P    PHYSICIAN SIGNATURE:  Electronically signed by Kassie Cabrera MD on 2/26/21 at 12:21 PM EST

## 2021-02-26 NOTE — PROGRESS NOTES
Physical Therapy  Facility/Department: [place] [address] [facility]  Daily Treatment Note  NAME: Fady Orellana  : 1950  MRN: [id number]    Date of Service: 2021    Discharge Recommendations:    Fady Orellana scored a 17/24 on the AM-PAC short mobility form. Current research shows that an AM-PAC score of 17 or less is typically not associated with a discharge to the patient's home setting. Based on the patient's AM-PAC score and their current functional mobility deficits, it is recommended that the patient have 3-5 sessions per week of Physical Therapy at d/c to increase the patient's independence. Please see assessment section for further patient specific details. PT Equipment Recommendations  Equipment Needed: No    Assessment   Body structures, Functions, Activity limitations: Decreased functional mobility   Assessment: Patient able to increase ambulation distance during today's session but continues to be able to tolerate only short distances prior to needing seated rest break. Patient CGA for transfers and ambulation with use of FWW, verbal cues for safety throughout session. Patient hopeful for d/c back to [address] today. Recommend continued skilled PT upon discharge to improve safety and independence with mobility. Will continue to follow while in acute care setting. Treatment Diagnosis: impaired mobility secondary to fatigue  Prognosis: Good  PT Education: PT Role;Functional Mobility Training;Transfer Training;Gait Training;General Safety  Patient Education: pt will require reinforcement  REQUIRES PT FOLLOW UP: Yes  Activity Tolerance  Activity Tolerance: Patient limited by fatigue;Patient limited by endurance     Patient Diagnosis(es): The primary encounter diagnosis was Other fatigue. A diagnosis of Elevated troponin was also pertinent to this visit.      has a past medical history of Ataxia, Bipolar 1 disorder ([facility] [address] BPH (benign prostatic hyperplasia), Cerebral infarct ([facility] [address] [facility] Chronic kidney disease, Dementia ([facility] [address] Depression, Diabetes mellitus type 2, uncomplicated ([facility] [address] Gout, Hyperlipidemia, Hypertension, and Schizophrenia ([facility] [address] has no past surgical history on file. Restrictions  Position Activity Restriction  Other position/activity restrictions: up as tolerated  Subjective   General  Chart Reviewed: Yes  Additional Pertinent Hx: Pt admitted 2/24/21 with fatigue, weakness and dizziness. Head CT= No findings for acute intracranial abnormality, Age-related atrophy of mild periventricular white matter changes bilaterally consistent with chronic small vessel ischemia. PMH includes: DM, ataxia, HTN, depression, bi-polar, cerebral infarct, schizophrenia, gout, CKD  Family / Caregiver Present: No  Referring Practitioner: Abel Wells MD  Subjective  Subjective: \"If I have to stay another day, I'm going to be pissed. \"  General Comment  Comments: Patient sitting up in bedside chair upon arrival, agreeable to PT treatment. Pain Screening  Patient Currently in Pain: Denies  Vital Signs  Patient Currently in Pain: Denies       Orientation  Orientation  Overall Orientation Status: Within Functional Limits  Cognition   Cognition  Overall Cognitive Status: Exceptions  Arousal/Alertness: Delayed responses to stimuli  Following Commands: Follows one step commands with repetition; Follows one step commands with increased time  Attention Span: Attends with cues to redirect  Memory: Decreased recall of biographical Information;Decreased short term memory  Safety Judgement: Decreased awareness of need for assistance  Problem Solving: Assistance required to generate solutions;Assistance required to identify errors made;Assistance required to implement solutions  Insights: Decreased awareness of deficits  Initiation: Requires cues for some  Sequencing: Requires cues for some  Objective   Bed mobility  Comment: patient sitting up in bedside chair upon arrival and at end of session  Transfers  Sit to Stand: Contact guard assistance(from EOB x 3 trials and from toilet)  Stand to sit: Contact guard assistance(to toilet and to bedside chair, verbal cues for safety)  Ambulation  Ambulation?: Yes  Ambulation 1  Surface: level tile  Device: Rolling Walker  Assistance: Contact guard assistance  Quality of Gait: decreased step height and length, shuffling gait, forward flexed posture with walker out in front, cues for safety, gait fairly steady with no overt loss of balance noted  Distance: 6' forward, 6' backward, 8' into bathroom, 8' back to bedside chair  Stairs/Curb  Stairs?: No     Balance  Sitting - Static: Good  Standing - Static: Fair(CGA with UE support)  Standing - Dynamic: Fair(CGA to ambulate with FWW)                           OutComes Score                                                     AM-PAC Score  AM-PAC Inpatient Mobility Raw Score : 17 (02/26/21 1239)  AM-PAC Inpatient T-Scale Score : 42.13 (02/26/21 1239)  Mobility Inpatient CMS 0-100% Score: 50.57 (02/26/21 1239)  Mobility Inpatient CMS G-Code Modifier : CK (02/26/21 1239)          Goals  Short term goals  Time Frame for Short term goals: By discharge  Short term goal 1: The pt will perform bed mobility with supervision - ongoing 2/26  Short term goal 2: The pt will transfer with supervision and RW - ongoing 2/26  Short term goal 3: The pt will ambulate with supervision and RW x 150' - ongoing 2/26  Patient Goals   Patient goals :  To get better    Plan    Plan  Times per week: 2-5  Times per day: Daily  Current Treatment Recommendations: Strengthening, Transfer Training, Endurance Training, Neuromuscular Re-education, Patient/Caregiver Education & Training, Balance Training, Gait Training, Functional Mobility Training, Safety Education & Training  Safety Devices  Type of devices: Call light within reach, Chair alarm in place, Left in chair, Nurse notified, Gait belt     Therapy Time   Individual Concurrent Group Co-treatment   Time In 1030 Time Out 1055         Minutes 25         Timed Code Treatment Minutes: 25 Minutes     If patient discharges prior to next session this note will serve as a discharge summary. Please see below for the latest assessment towards goals.    [address] [address]

## 2021-02-26 NOTE — PROGRESS NOTES
The [address] [place] [address]  Cardiology Daily Progress Note  2/26/2021,9:27 AM      Parviz Brush MD  Primary cardiologist:    Jannette Davis Date:  2/24/2021  Hospital Day: Hospital Day: 3    Interval: Subjective: Pt up in chair, eating breakfast. More interactive today, asking if he can go home. Pt denies SOB/chest pain/palpitations. Troponin stable. VSS / in NSR   COVID neg  TTE today     HPI:  Arlyn Schaumann is a 78 y.o. male is here as a transfer from [address] [place] [address] because of [place] weakness and dizziness. Apparently did have a slight elevation of the troponin levels. This patient has apparently advanced dementia and schizophrenia and is on several medications. I am unable to actually get him to wake up enough to give me any answers to my questions. He denies or cannot complain about chest pains at this time and no apparent syncope. Troponin levels recorded at 0.04 and 0.03 with creatinine of 1.8. Objective:   /80   Pulse 89   Temp 98.1 °F (36.7 °C) (Oral)   Resp 18   Ht 5' 8\" (1.727 m)   Wt 223 lb 12.3 oz (101.5 kg)   SpO2 99%   BMI 34.02 kg/m²       Intake/Output Summary (Last 24 hours) at 2/26/2021 0927  Last data filed at 2/25/2021 1400  Gross per 24 hour   Intake 600 ml   Output --   Net 600 ml       TELEMETRY: Sinus at 70-80 bpm.     Physical Examination:    Vitals:    02/25/21 2043 02/25/21 2321 02/26/21 0329 02/26/21 0738   BP: 115/70 (!) 153/83 (!) 156/85 134/80   Pulse: 75 77 82 89   Resp: 18 16 16 18   Temp: 98.3 °F (36.8 °C) 98 °F (36.7 °C) 97.9 °F (36.6 °C) 98.1 °F (36.7 °C)   TempSrc: Oral Oral Oral Oral   SpO2: 97% 94% 94% 99%   Weight:       Height:            Constitutional and General Appearance: Sleepy drowsy difficulty alerting  HEENT: eyes and ears intact.  No nasal masses  THYROID: not enlarged  LUNGS:  · Clear to auscultation and percussion  HEART and VASCULAR:  · The apical impulses not displaced  · Heart tones are crisp and normal  · Cervical veins are not engorged  · The carotid upstroke is normal in amplitude and contour without delay or bruit  · Peripheral pulses are symmetrical and full  · There is no clubbing, cyanosis of the extremities. · BLE +1 edema  · Femoral Arteries: 2+ and equal  · Pedal Pulses: 2+ and equal   ABDOMEN[de-identified]  · No masses or tenderness  · Liver/Spleen: No Abnormalities Noted  NEUROLOGICAL: Elderly. Sitting up in chair. Interactive, asking appropriate questions.   SKIN: No lesions or rashes  LYMPH NODES: none enlarged      Medications:    insulin glargine  8 Units Subcutaneous Nightly    sodium chloride  500 mL Intravenous Once    allopurinol  100 mg Oral Daily    ARIPiprazole  15 mg Oral Daily    aspirin  81 mg Oral Daily    atorvastatin  40 mg Oral Nightly    Oyster Shell Calcium/D  1 tablet Oral BID    docusate sodium  100 mg Oral BID    finasteride  5 mg Oral Daily    FLUoxetine  40 mg Oral Daily    tamsulosin  0.8 mg Oral Nightly    valproic acid  250 mg Oral BID    risperiDONE  0.5 mg Oral QAM AC    risperiDONE  1.5 mg Oral Nightly    insulin lispro  0-18 Units Subcutaneous TID WC    insulin lispro  0-9 Units Subcutaneous Nightly    Hydrocerin   Topical BID    sodium chloride flush  10 mL Intravenous 2 times per day    enoxaparin  40 mg Subcutaneous Daily      dextrose       perflutren lipid microspheres, bisacodyl, sodium chloride flush, promethazine **OR** ondansetron, polyethylene glycol, acetaminophen **OR** acetaminophen, glucose, dextrose, glucagon (rDNA), dextrose    Lab Data:  CBC:   Recent Labs     02/24/21  1118 02/25/21  0428 02/26/21  0456   WBC 10.5 9.6 8.3   HGB 11.5* 12.0* 12.3*   HCT 36.4* 37.3* 37.7*   MCV 84.2 82.5 82.9    260 282     BMP:   Recent Labs     02/24/21  1118 02/25/21  0428 02/26/21  0456    135* 136   K 4.8 4.7 4.8    98* 98*   CO2 29 27 28   BUN 25* 29* 30*   CREATININE 1.8* 1.5* 1.9*     Troponin:  Lab Results

## 2021-02-26 NOTE — CARE COORDINATION
Case Management Assessment            Discharge Note                    Date / Time of Note: 2/26/2021 11:23 AM                  Discharge Note Completed by: Roxana Waco    Patient Name: Brigitte Galeano   YOB: 1950  Diagnosis: Elevated troponin [R77.8]   Date / Time: 2/24/2021  9:12 AM    Current PCP: Colby Contreras MD  Clinic patient: No    Hospitalization in the last 30 days: No    Advance Directives:  Code Status: Full Code  [place] DNR form completed and on chart: No, Not Indicated    Financial:  Payor: MEDICARE / Plan: MEDICARE PART A AND B / Product Type: *No Product type* /      Pharmacy:  No Pharmacies [address] medications?: Potential Assistance Purchasing Medications: No  Assistance provided by Case Management: None at this time    Does patient want to participate in local refill/ meds to beds program?: No    Meds To Beds General Rules:  1. Can ONLY be done Monday- Friday between 8:30am-5pm  2. Prescription(s) must be in pharmacy by 3pm to be filled same day  3. Copy of patient's insurance/ prescription drug card and patient face sheet must be sent along with the prescription(s)  4. Cost of Rx cannot be added to hospital bill. If financial assistance is needed, please contact unit  or ;  or  CANNOT provide pharmacy voucher for patients co-pays  5.  Patients can then  the prescription on their way out of the hospital at discharge, or pharmacy can deliver to the bedside if staff is available. (payment due at time of pick-up or delivery - cash, check, or card accepted)     Able to afford home medications/ co-pay costs: Yes    ADLS:  Current PT AM-PAC Score: 16 /24  Current OT AM-PAC Score: 17 /24      DISCHARGE Disposition: [place] (LTC): [facility]  Phone: [phone]  Fax: [phone]    LOC at discharge: [address] Completed: Yes    Notification completed in HENS/PAS?:  Not Applicable    IMM Completed:   Not Indicated    Transportation:  Transportation PLAN for discharge: EMS transportation   Mode of Transport: Ambulance stretcher - BLS  Reason for medical transport: Confused due to dementia and requires ambulance transport due to supervision during trasnport and Flight Risk due to schizophrenia and bipolar disorder as well as dementia  Name of [address]: [address]  Phone: [phone]  Time of Transport: 1600    Transport form completed: Yes    Home Care:  [address] ordered at discharge: No, Not [address]: Not Applicable  Orders faxed: No    Durable Medical Equipment:  DME Provider: facility provides  Equipment obtained during hospitalization:     Home Oxygen and Respiratory Equipment:  Oxygen needed at discharge?: No, Not [address]: Not Applicable  Portable tank available for discharge?: No, Not Indicated    Dialysis:  Dialysis patient: No    Dialysis Center:  Not Applicable    Hospice Services:  Location: Not Applicable  Agency: Not Applicable    Consents signed: No, Not Indicated    Referrals made at [facility] for outpatient continued care:  Not Applicable    Additional CM Notes: Patient plans for return to [facility] ([phone]) in admissions agreeable to patient return to facility today and reports no pre-cert needed for return. The Plan for Transition of Care is related to the following treatment goals of Elevated troponin [R77.8]    The Patient and/or patient representative Gonzalez Elder and his family were provided with a choice of provider and agrees with the discharge plan Yes    Freedom of choice list was provided with basic dialogue that supports the patient's individualized plan of care/goals and shares the quality data associated with the providers.  Yes    Care Transitions patient: No    Jose Luis Fong RN  [facility]  Case Management Department  Ph: [phone]  Fax: [phone]

## 2021-02-26 NOTE — DISCHARGE SUMMARY
Hospital Medicine Discharge Summary    Patient ID: Kimmy       Patient's PCP: Patrica Oliveros MD    Admit Date: 2/24/2021     Discharge Date:   02/26/21    Admitting Physician: Jenny Hernández MD     Discharge Physician: Jenny Hernández MD     Discharge Diagnoses: Active Hospital Problems    Diagnosis Date Noted    Other fatigue [R53.83]     Elevated troponin [R77.8] 02/24/2021       The patient was seen and examined on day of discharge and this discharge summary is in conjunction with any daily progress note from day of discharge. Hospital Course:   Patient was admitted and treated for following:    Fatigue and dizziness:  Was thought to be secondary to possible COVID-19 vs orthostatic hypotension. COVID-19 test was pending. Blood pressure was low on admission. Home antihypertensive medication were held. Patient dizziness improved. Orthostatic vital signs were normal off of antihypertensive medication. COVID-19 test came back negative. Droplet plus precaution was removed. Lasix discontinued on discharge as patient might have been dehydrated on admission. Fatigue and dizziness resolved with holding antihypertensive medication and normal saline bolus. Blood pressure started to trend up. Patient to continue amlodipine on discharge     Elevated troponin:  Likely was secondary to decreased clearance from CKD. Patient denied chest pain. EKG was without any acute ST segment changes. Troponin was monitored and remained stable. Cardiology was consulted. Echo was ordered. Echo showed an EF of 55 to 60%. Cardiology recommended no further work-up.     CKD stage IV:  Patient creatinine in care everywhere from 8106-2432 close to 1.7-2.0. Creatinine was 1.8 on admission and range from 1.5-1.9.     Hypertension:  Blood pressure was low on admission. Lasix and amlodipine was held. Blood pressure remained within normal limits. On the day of discharge blood pressure started trending up. Amlodipine resumed on discharge but Lasix discontinued     Diabetes mellitus type 2:  Patient was on 3 different kind of insulin at nursing facility. That regimen was held. Blood glucose was monitored. Patient was continued carb controlled diet, Lantus and sliding scale insulin. Patient being discharged on new insulin regimen. Blood glucose to be monitored in nursing facility and insulin to be adjusted as needed     Schizophrenia/bipolar disorder/depression:  Abilify, risperidone and valproic acid was continued     Bilateral lower extremity chronic skin changes:  Wound care was consulted and recommended moisturizing lotion for xerosis.     BPH:  Flomax and Proscar was continued     Obesity  BMI 34.02      Physical Exam Performed:     BP (!) 157/74   Pulse 88   Temp 97.4 °F (36.3 °C) (Oral)   Resp 18   Ht 5' 8\" (1.727 m)   Wt 223 lb 12.3 oz (101.5 kg)   SpO2 98%   BMI 34.02 kg/m²       General appearance:  No apparent distress, appears stated age and cooperative. Obese  HEENT:  Normal cephalic, atraumatic without obvious deformity. Pupils equal, round, and reactive to light. Extra ocular muscles intact. Conjunctivae/corneas clear. Neck: Supple, with full range of motion. No jugular venous distention. Trachea midline. Respiratory:  Normal respiratory effort. Clear to auscultation, bilaterally without Rales/Wheezes/Rhonchi. Cardiovascular:  Regular rate and rhythm with normal S1/S2 without murmurs, rubs or gallops. Abdomen: Soft, non-tender, non-distended with normal bowel sounds. Musculoskeletal:  No clubbing, cyanosis or edema bilaterally. Full range of motion without deformity. Skin: Bilateral lower extremity with chronic skin changes  Neurologic:  Grossly non-focal.  At his baseline  Psychiatric:  Alert and oriented  Capillary Refill: Brisk,< 3 seconds   Peripheral Pulses: +2 palpable, equal bilaterally       Labs:  For convenience and continuity at follow-up the following most recent labs are counseling and review of medications and discharge plan. Signed:    Tamiko Roy MD   2/26/2021      Thank you Vladimir Urbnia MD for the opportunity to be involved in this patient's care. If you have any questions or concerns please feel free to contact me at [phone].

## 2021-02-26 NOTE — PLAN OF CARE
Problem: Skin Integrity:  Goal: Absence of new skin breakdown  Description: Absence of new skin breakdown  Outcome: Ongoing     Problem: Daily Care:  Goal: Daily care needs are met  Description: Daily care needs are met  2/26/2021 0149 by Sharmila Byrd RN  Outcome: Ongoing  2/25/2021 1652 by Mitchell Brown RN  Outcome: Ongoing     Problem: Pain:  Goal: Patient's pain/discomfort is manageable  Description: Patient's pain/discomfort is manageable  2/26/2021 0149 by Sharmila Byrd RN  Outcome: Ongoing     Problem: Breathing Pattern - Ineffective  Goal: Ability to achieve and maintain a regular respiratory rate  2/26/2021 0149 by Sharmila Byrd RN  Outcome: Ongoing

## 2021-03-26 PROBLEM — R77.8 ELEVATED TROPONIN: Status: RESOLVED | Noted: 2021-02-24 | Resolved: 2021-03-26

## 2021-10-06 ENCOUNTER — APPOINTMENT (OUTPATIENT)
Dept: CT IMAGING | Age: 71
End: 2021-10-06
Payer: MEDICARE

## 2021-10-06 ENCOUNTER — APPOINTMENT (OUTPATIENT)
Dept: MRI IMAGING | Age: 71
End: 2021-10-06
Payer: MEDICARE

## 2021-10-06 ENCOUNTER — HOSPITAL ENCOUNTER (OUTPATIENT)
Age: 71
Setting detail: OBSERVATION
Discharge: HOME | End: 2021-10-07
Attending: EMERGENCY MEDICINE | Admitting: INTERNAL MEDICINE
Payer: MEDICARE

## 2021-10-06 ENCOUNTER — APPOINTMENT (OUTPATIENT)
Dept: GENERAL RADIOLOGY | Age: 71
End: 2021-10-06
Payer: MEDICARE

## 2021-10-06 DIAGNOSIS — I63.9 CEREBROVASCULAR ACCIDENT (CVA), UNSPECIFIED MECHANISM (HCC): Primary | ICD-10-CM

## 2021-10-06 PROBLEM — R29.898 RIGHT ARM WEAKNESS: Status: ACTIVE | Noted: 2021-10-06

## 2021-10-06 LAB
A/G RATIO: 0.9 (ref 1.1–2.2)
ALBUMIN SERPL-MCNC: 3.9 G/DL (ref 3.4–5)
ALP BLD-CCNC: 117 U/L (ref 40–129)
ALT SERPL-CCNC: 14 U/L (ref 10–40)
ANION GAP SERPL CALCULATED.3IONS-SCNC: 11 MMOL/L (ref 3–16)
AST SERPL-CCNC: 22 U/L (ref 15–37)
BASOPHILS ABSOLUTE: 0.1 K/UL (ref 0–0.2)
BASOPHILS RELATIVE PERCENT: 1.3 %
BILIRUB SERPL-MCNC: 0.3 MG/DL (ref 0–1)
BUN BLDV-MCNC: 26 MG/DL (ref 7–20)
CALCIUM SERPL-MCNC: 10.1 MG/DL (ref 8.3–10.6)
CHLORIDE BLD-SCNC: 98 MMOL/L (ref 99–110)
CHP ED QC CHECK: YES
CO2: 30 MMOL/L (ref 21–32)
CREAT SERPL-MCNC: 1.8 MG/DL (ref 0.8–1.3)
EOSINOPHILS ABSOLUTE: 0.4 K/UL (ref 0–0.6)
EOSINOPHILS RELATIVE PERCENT: 4.2 %
GFR AFRICAN AMERICAN: 45
GFR NON-AFRICAN AMERICAN: 38
GLOBULIN: 4.3 G/DL
GLUCOSE BLD-MCNC: 106 MG/DL
GLUCOSE BLD-MCNC: 106 MG/DL (ref 70–99)
GLUCOSE BLD-MCNC: 94 MG/DL (ref 70–99)
HCT VFR BLD CALC: 35.2 % (ref 40.5–52.5)
HEMOGLOBIN: 11.4 G/DL (ref 13.5–17.5)
INR BLD: 0.99 (ref 0.88–1.12)
LYMPHOCYTES ABSOLUTE: 2.1 K/UL (ref 1–5.1)
LYMPHOCYTES RELATIVE PERCENT: 21 %
MCH RBC QN AUTO: 27.5 PG (ref 26–34)
MCHC RBC AUTO-ENTMCNC: 32.5 G/DL (ref 31–36)
MCV RBC AUTO: 84.6 FL (ref 80–100)
MONOCYTES ABSOLUTE: 1 K/UL (ref 0–1.3)
MONOCYTES RELATIVE PERCENT: 10.2 %
NEUTROPHILS ABSOLUTE: 6.3 K/UL (ref 1.7–7.7)
NEUTROPHILS RELATIVE PERCENT: 63.3 %
PDW BLD-RTO: 15.8 % (ref 12.4–15.4)
PERFORMED ON: ABNORMAL
PLATELET # BLD: 285 K/UL (ref 135–450)
PMV BLD AUTO: 8.3 FL (ref 5–10.5)
POTASSIUM REFLEX MAGNESIUM: 4.1 MMOL/L (ref 3.5–5.1)
PROTHROMBIN TIME: 11.2 SEC (ref 9.9–12.7)
RBC # BLD: 4.16 M/UL (ref 4.2–5.9)
SODIUM BLD-SCNC: 139 MMOL/L (ref 136–145)
TOTAL PROTEIN: 8.2 G/DL (ref 6.4–8.2)
TROPONIN: 0.02 NG/ML
TROPONIN: 0.03 NG/ML
WBC # BLD: 10 K/UL (ref 4–11)

## 2021-10-06 PROCEDURE — G0378 HOSPITAL OBSERVATION PER HR: HCPCS

## 2021-10-06 PROCEDURE — 85610 PROTHROMBIN TIME: CPT

## 2021-10-06 PROCEDURE — 70551 MRI BRAIN STEM W/O DYE: CPT

## 2021-10-06 PROCEDURE — 71045 X-RAY EXAM CHEST 1 VIEW: CPT

## 2021-10-06 PROCEDURE — 6360000004 HC RX CONTRAST MEDICATION: Performed by: EMERGENCY MEDICINE

## 2021-10-06 PROCEDURE — 6370000000 HC RX 637 (ALT 250 FOR IP): Performed by: INTERNAL MEDICINE

## 2021-10-06 PROCEDURE — 70450 CT HEAD/BRAIN W/O DYE: CPT

## 2021-10-06 PROCEDURE — 99285 EMERGENCY DEPT VISIT HI MDM: CPT

## 2021-10-06 PROCEDURE — 2580000003 HC RX 258: Performed by: STUDENT IN AN ORGANIZED HEALTH CARE EDUCATION/TRAINING PROGRAM

## 2021-10-06 PROCEDURE — 6360000002 HC RX W HCPCS: Performed by: INTERNAL MEDICINE

## 2021-10-06 PROCEDURE — 85025 COMPLETE CBC W/AUTO DIFF WBC: CPT

## 2021-10-06 PROCEDURE — 80053 COMPREHEN METABOLIC PANEL: CPT

## 2021-10-06 PROCEDURE — 96372 THER/PROPH/DIAG INJ SC/IM: CPT

## 2021-10-06 PROCEDURE — 70496 CT ANGIOGRAPHY HEAD: CPT

## 2021-10-06 PROCEDURE — 84484 ASSAY OF TROPONIN QUANT: CPT

## 2021-10-06 RX ORDER — FUROSEMIDE 20 MG/1
20 TABLET ORAL DAILY
Status: ON HOLD | COMMUNITY
End: 2022-04-18 | Stop reason: SDUPTHER

## 2021-10-06 RX ORDER — ARIPIPRAZOLE 5 MG/1
15 TABLET ORAL DAILY
Status: DISCONTINUED | OUTPATIENT
Start: 2021-10-07 | End: 2021-10-07 | Stop reason: HOSPADM

## 2021-10-06 RX ORDER — UREA 10 %
1 LOTION (ML) TOPICAL NIGHTLY
Status: ON HOLD | COMMUNITY
End: 2022-04-18

## 2021-10-06 RX ORDER — FINASTERIDE 5 MG/1
5 TABLET, FILM COATED ORAL DAILY
Status: DISCONTINUED | OUTPATIENT
Start: 2021-10-07 | End: 2021-10-07 | Stop reason: HOSPADM

## 2021-10-06 RX ORDER — ASPIRIN 81 MG/1
81 TABLET ORAL DAILY
Status: DISCONTINUED | OUTPATIENT
Start: 2021-10-06 | End: 2021-10-07 | Stop reason: HOSPADM

## 2021-10-06 RX ORDER — FLUOXETINE HYDROCHLORIDE 20 MG/1
40 CAPSULE ORAL DAILY
Status: DISCONTINUED | OUTPATIENT
Start: 2021-10-07 | End: 2021-10-07 | Stop reason: HOSPADM

## 2021-10-06 RX ORDER — ONDANSETRON 4 MG/1
4 TABLET, ORALLY DISINTEGRATING ORAL EVERY 8 HOURS PRN
Status: DISCONTINUED | OUTPATIENT
Start: 2021-10-06 | End: 2021-10-07 | Stop reason: HOSPADM

## 2021-10-06 RX ORDER — POLYMYXIN B SULFATE AND TRIMETHOPRIM 1; 10000 MG/ML; [USP'U]/ML
SOLUTION OPHTHALMIC
Status: ON HOLD | COMMUNITY
End: 2022-02-27 | Stop reason: CLARIF

## 2021-10-06 RX ORDER — ONDANSETRON 2 MG/ML
4 INJECTION INTRAMUSCULAR; INTRAVENOUS EVERY 6 HOURS PRN
Status: DISCONTINUED | OUTPATIENT
Start: 2021-10-06 | End: 2021-10-07 | Stop reason: HOSPADM

## 2021-10-06 RX ORDER — CLOPIDOGREL BISULFATE 75 MG/1
75 TABLET ORAL DAILY
Status: DISCONTINUED | OUTPATIENT
Start: 2021-10-06 | End: 2021-10-07 | Stop reason: HOSPADM

## 2021-10-06 RX ORDER — NICOTINE POLACRILEX 4 MG
15 LOZENGE BUCCAL PRN
Status: DISCONTINUED | OUTPATIENT
Start: 2021-10-06 | End: 2021-10-07 | Stop reason: HOSPADM

## 2021-10-06 RX ORDER — SODIUM CHLORIDE 9 MG/ML
25 INJECTION, SOLUTION INTRAVENOUS PRN
Status: DISCONTINUED | OUTPATIENT
Start: 2021-10-06 | End: 2021-10-07 | Stop reason: HOSPADM

## 2021-10-06 RX ORDER — LATANOPROST 50 UG/ML
1 SOLUTION/ DROPS OPHTHALMIC NIGHTLY
COMMUNITY

## 2021-10-06 RX ORDER — ATORVASTATIN CALCIUM 40 MG/1
40 TABLET, FILM COATED ORAL NIGHTLY
Status: DISCONTINUED | OUTPATIENT
Start: 2021-10-06 | End: 2021-10-07 | Stop reason: HOSPADM

## 2021-10-06 RX ORDER — SODIUM CHLORIDE 0.9 % (FLUSH) 0.9 %
5-40 SYRINGE (ML) INJECTION EVERY 12 HOURS SCHEDULED
Status: DISCONTINUED | OUTPATIENT
Start: 2021-10-06 | End: 2021-10-07 | Stop reason: HOSPADM

## 2021-10-06 RX ORDER — VALPROIC ACID 250 MG/5ML
250 SOLUTION ORAL 2 TIMES DAILY
Status: DISCONTINUED | OUTPATIENT
Start: 2021-10-06 | End: 2021-10-07 | Stop reason: HOSPADM

## 2021-10-06 RX ORDER — POLYETHYLENE GLYCOL 3350 17 G/17G
17 POWDER, FOR SOLUTION ORAL DAILY PRN
Status: DISCONTINUED | OUTPATIENT
Start: 2021-10-06 | End: 2021-10-07 | Stop reason: HOSPADM

## 2021-10-06 RX ORDER — 0.9 % SODIUM CHLORIDE 0.9 %
500 INTRAVENOUS SOLUTION INTRAVENOUS ONCE
Status: COMPLETED | OUTPATIENT
Start: 2021-10-06 | End: 2021-10-06

## 2021-10-06 RX ORDER — DEXTROSE MONOHYDRATE 25 G/50ML
12.5 INJECTION, SOLUTION INTRAVENOUS PRN
Status: DISCONTINUED | OUTPATIENT
Start: 2021-10-06 | End: 2021-10-07 | Stop reason: HOSPADM

## 2021-10-06 RX ORDER — RISPERIDONE 0.5 MG/1
0.5 TABLET, FILM COATED ORAL
Status: DISCONTINUED | OUTPATIENT
Start: 2021-10-07 | End: 2021-10-07 | Stop reason: HOSPADM

## 2021-10-06 RX ORDER — DEXTROSE MONOHYDRATE 50 MG/ML
100 INJECTION, SOLUTION INTRAVENOUS PRN
Status: DISCONTINUED | OUTPATIENT
Start: 2021-10-06 | End: 2021-10-07 | Stop reason: HOSPADM

## 2021-10-06 RX ORDER — SODIUM CHLORIDE 0.9 % (FLUSH) 0.9 %
5-40 SYRINGE (ML) INJECTION PRN
Status: DISCONTINUED | OUTPATIENT
Start: 2021-10-06 | End: 2021-10-07 | Stop reason: HOSPADM

## 2021-10-06 RX ADMIN — SODIUM CHLORIDE 500 ML: 9 INJECTION, SOLUTION INTRAVENOUS at 17:27

## 2021-10-06 RX ADMIN — ASPIRIN 81 MG: 81 TABLET, COATED ORAL at 23:36

## 2021-10-06 RX ADMIN — CLOPIDOGREL BISULFATE 75 MG: 75 TABLET ORAL at 23:37

## 2021-10-06 RX ADMIN — ATORVASTATIN CALCIUM 40 MG: 40 TABLET, FILM COATED ORAL at 23:37

## 2021-10-06 RX ADMIN — IOPAMIDOL 80 ML: 755 INJECTION, SOLUTION INTRAVENOUS at 16:16

## 2021-10-06 RX ADMIN — VALPROIC ACID 250 MG: 250 SOLUTION ORAL at 23:37

## 2021-10-06 RX ADMIN — ENOXAPARIN SODIUM 40 MG: 40 INJECTION SUBCUTANEOUS at 23:36

## 2021-10-06 RX ADMIN — INSULIN GLARGINE 10 UNITS: 100 INJECTION, SOLUTION SUBCUTANEOUS at 23:52

## 2021-10-06 RX ADMIN — RISPERIDONE 1.5 MG: 1 TABLET ORAL at 23:37

## 2021-10-06 ASSESSMENT — ENCOUNTER SYMPTOMS
CONSTIPATION: 0
VOMITING: 0
NAUSEA: 0
SHORTNESS OF BREATH: 0
DIARRHEA: 0
ABDOMINAL PAIN: 0
COUGH: 0

## 2021-10-06 ASSESSMENT — PAIN SCALES - GENERAL: PAINLEVEL_OUTOF10: 0

## 2021-10-06 NOTE — ED TRIAGE NOTES
Pt comes to ED with R sided weakness x about 4 hours (per patient). FSBS completed at bedside and EKG. Pt placed onto bedside cardiac monitor.

## 2021-10-06 NOTE — FLOWSHEET NOTE
No med list sent from [facility] with [facility] called to have list faxed- no answer.

## 2021-10-06 NOTE — ED NOTES
Upon rolling patient to change depends, I asked to grab rail and he reached out and grasped rail with right hand     Lucy Martinez RN  10/06/21 4716

## 2021-10-06 NOTE — ED NOTES
Got call from [facility] and MRI is cancelled     Debbie Rai, RN  10/06/21 3064

## 2021-10-06 NOTE — ED PROVIDER NOTES
Date of evaluation: 10/6/2021    Chief Complaint   Extremity Weakness (R side )    Nursing Notes, Past Medical Hx, Past Surgical Hx, Social Hx,Allergies, and Family Hx were reviewed. History of Present Illness     Susan Cox is a 73 y.o. male who presents with reported right-sided weakness. Patient brought in by EMS from his nursing home for reported right-sided weakness. Mixed reports have been received about the chronicity of events, however the onset of symptoms happened somewhere between 2 to 4 hours ago. (EMS reports 2 hours, Patient reports 4 hours.)  Upon arrival patient was evaluated and does have a focal weakness in his right hand which the patient states is new. Upon arrival given the patient's focal neurological deficits, CT head and CTA were ordered to evaluate for the possibility of an acute neurovascular ischemic event. Attempts were made to contact the patient's facility from which he arrived, however further collateral was not available to determine the exact onset of symptoms. The patient's exact last known normal unknown at this time. Review of Systems   Review of Systems   Constitutional: Negative for chills, fatigue and fever. HENT: Negative for congestion. Eyes: Negative for visual disturbance. Respiratory: Negative for cough and shortness of breath. Cardiovascular: Negative for chest pain, palpitations and leg swelling. Gastrointestinal: Negative for abdominal pain, constipation, diarrhea, nausea and vomiting. Neurological: Positive for numbness (Right hand). Negative for facial asymmetry, light-headedness and headaches.      Past Medical, Surgical, Family, and Social History         Diagnosis Date    Ataxia     Bipolar 1 disorder ([address]     BPH (benign prostatic hyperplasia)     Cerebral infarct ([address]     Chronic kidney disease     Dementia ([facility]     Depression     Diabetes mellitus type 2, uncomplicated ([address]     Gout     Hyperlipidemia     (ZOFRAN-ODT) 4 MG DISINTEGRATING TABLET    Take 4 mg by mouth every 8 hours as needed for Nausea or Vomiting    POLYETHYLENE GLYCOL (GLYCOLAX) 17 G PACKET    Take 17 g by mouth daily as needed for Constipation    POLYVINYL ALCOHOL (LIQUIFILM TEARS) 1.4 % OPHTHALMIC SOLUTION    Place 1 drop into both eyes as needed for Dry Eyes    RISPERIDONE (RISPERDAL) 0.5 MG TABLET    Take 0.5 mg by mouth every morning (before breakfast)     RISPERIDONE (RISPERDAL) 1 MG TABLET    Take 1.5 mg by mouth nightly     SKIN PROTECTANTS, MISC. (HYDROCERIN) CREA CREAM    Apply topically 2 times daily    TAMSULOSIN (FLOMAX) 0.4 MG CAPSULE    Take 0.8 mg by mouth nightly    VALPROIC ACID (DEPACON) 250 MG/5ML SOLN ORAL SOLUTION    Take 250 mg by mouth 2 times daily       Allergies     He is allergic to codeine, cogentin [benztropine], and penicillins. Physical Exam     INITIAL VITALS: /81   Pulse 76   Temp 97.6 °F (36.4 °C) (Oral)   Resp 18   Ht 5' 8\" (1.727 m)   Wt 227 lb (103 kg)   SpO2 94%   BMI 34.52 kg/m²      Physical Exam  Constitutional:       General: He is not in acute distress. Appearance: Normal appearance. He is not ill-appearing. HENT:      Head: Normocephalic and atraumatic. Right Ear: External ear normal.      Left Ear: External ear normal.      Nose:      Comments: Patient wearing surgical mask. Mouth/Throat:      Comments: Patient wearing surgical mask. Eyes:      Extraocular Movements: Extraocular movements intact. Pupils: Pupils are equal, round, and reactive to light. Cardiovascular:      Rate and Rhythm: Normal rate and regular rhythm. Heart sounds: Normal heart sounds. No murmur heard. No friction rub. No gallop. Pulmonary:      Effort: Pulmonary effort is normal. No respiratory distress. Breath sounds: No wheezing, rhonchi or rales. Abdominal:      General: Abdomen is flat. There is no distension. Palpations: Abdomen is soft. Tenderness:  There is no abdominal tenderness. Musculoskeletal:      Cervical back: Normal range of motion and neck supple. Right lower leg: No edema. Left lower leg: No edema. Comments: Bilateral feet with chronic venous stasis changes. Skin:     General: Skin is warm and dry. Capillary Refill: Capillary refill takes less than 2 seconds. Neurological:      Mental Status: He is alert. Sensory: Sensory deficit (Patient endorses numbness in his right hand, however this is not replicated elsewhere.) present. Motor: Weakness (Patient has focal weakness in his right hand, is unable to squeeze my fingers on physical exam however his strength is intact on the left. Upper extremities with 5 out of 5 strength on extension and flexion bilaterally. Leg extension without drift bl.) present. Psychiatric:         Mood and Affect: Mood normal.         Behavior: Behavior normal.           Diagnostic Results     EKG   Interpreted by emergency department physician Jaylyn Gallegos MD  Rhythm: normal sinus   Rate: normal  Axis: normal  Ectopy: none  Conduction: normal  ST Segments: no acute change  T Waves: no acute change  Q Waves:none  Clinical Impression: no acute changes  Comparison: 02/24/2021    RADIOLOGY:  XR CHEST PORTABLE   Final Result      Limited inspiration. Patchy ill-defined increased density is seen bilaterally, nonspecific. Atypical or viral pneumonia is not excluded. Correlate clinically. CTA HEAD NECK W CONTRAST   Final Result      Moderate focal stenosis at the level of the left carotid bifurcation. No other evidence of significant stenosis or occlusion.       PROCEDURE: CT ANGIOGRAPHY HEAD WITH CONTRAST      INDICATION: R arm weakness, facial droop      COMPARISON: Same-day CT      TECHNIQUE: Axial CT imaging obtained through the head following administration of IV contrast. Axial images, multiplanar reformatted images, and maximum intensity projection images were reviewed for CT angiographic technique. Individualized dose    optimization technique was used in order to meet ALARA standards for radiation dose reduction. In addition to vendor specific dose reduction algorithms, the dose reduction techniques vary based on the specific scanner utilized but frequently include    automated exposure control, adjustment of the mA and/or kV according to patient size, and use of iterative reconstruction technique. IV contrast: 80 mL Isovue 370. FINDINGS:      ANTERIOR CIRCULATION: The intracranial internal carotid arteries, anterior cerebral arteries, and middle cerebral arteries demonstrate no occlusion or stenosis. No evidence for aneurysm or arteriovenous malformation. POSTERIOR CIRCULATION: The bilateral vertebral arteries, basilar artery and posterior cerebral arteries demonstrate no occlusion or stenosis. No evidence for aneurysm or arteriovenous malformation. INTRACRANIAL VENOUS SYSTEM: No evidence for intracranial venous thrombosis. IMPRESSION:      No evidence of significant stenosis or occlusion. CT HEAD WO CONTRAST   Final Result      Cerebral atrophy. No acute intracranial findings. LABS:   Labs Reviewed   CBC WITH AUTO DIFFERENTIAL - Abnormal; Notable for the following components:       Result Value    RBC 4.16 (*)     Hemoglobin 11.4 (*)     Hematocrit 35.2 (*)     RDW 15.8 (*)     All other components within normal limits    Narrative:     Performed at: [facility]  [address]   Phone [phone]   COMPREHENSIVE METABOLIC PANEL W/ REFLEX TO MG FOR LOW K - Abnormal; Notable for the following components:    Chloride 98 (*)     BUN 26 (*)     CREATININE 1.8 (*)     GFR Non- 38 (*)     GFR African American 45 (*)     Albumin/Globulin Ratio 0.9 (*)     All other components within normal limits    Narrative:     Performed at:   [facility]  [address] [facility] [address]   Phone [phone]   TROPONIN - Abnormal; Notable for the following components:    Troponin 0.03 (*)     All other components within normal limits    Narrative:     Performed at: [facility]  [address] [facility] [address]   Phone [phone]   POCT GLUCOSE - Abnormal; Notable for the following components:    POC Glucose 106 (*)     All other components within normal limits    Narrative:     Performed at: [facility]  [address] [facility] [address] [address]   Phone [phone]   POCT GLUCOSE - Normal   PROTIME-INR    Narrative:     Performed at: [facility]  [address] [facility] [address]   Phone [phone]   LIPID PANEL   HEMOGLOBIN A1C   TROPONIN       RECENT VITALS:  BP: 136/81, Temp: 97.6 °F (36.4 °C), Pulse: 76,Resp: 18     Procedures     N/A    ED Course     The patient was given the following medications:  Orders Placed This Encounter   Medications    iopamidol (ISOVUE-370) 76 % injection 80 mL    0.9 % sodium chloride bolus    perflutren lipid microspheres (DEFINITY) injection 1.65 mg          CONSULTS:  IP CONSULT TO PHARMACY  PHARMACY TO CHANGE BASE FLUIDS  IP CONSULT TO HOSPITALIST  IP CONSULT TO [address] [place] [address]     Merlinda Rocker is a 73 y.o. male who presents via EMS for focal right-sided weakness in his hand. Initially upon presentation it was unclear about the exact last known normal for the patient. Patient himself stated been 4 hours since the incident, however EMS reported only 2 hours. Initially a CT head and CTA of the head and neck were ordered to look for causes of an acute ischemic event and rule out an intracranial hemorrhage. The studies were without notable abnormality.  The  stroke team was contacted in order to discuss the patient's care and next steps due to the uncertainty of the patient's last known normal.  The possibility of a CT perfusion or MRI with wake-up protocols were discussed. Initially a STAT MRI was ordered to evaluate for further etiology of stroke, however it was at this point that the facility at which the patient lives was able to be contacted who confirmed that the patient symptoms started around 11 AM this morning. Given this new last known normal, the patient was not a candidate for tPA at this time. Upon reevaluation, and it appears the patient's right-sided hand weakness may have slightly subsided, however the patient still endorses ongoing weakness in his right hand. Consult was placed to the admitting hospitalist to discuss potential admission to the hospital.  It was agreed that the patient would benefit from inpatient medical management to optimize future stroke prevention, and to perform a work-up to potentially reveal underlying etiology of his stroke. This patient was also evaluated by the attending physician. All care plans were discussed and agreed upon. Clinical Impression     1. Cerebrovascular accident (CVA), unspecified mechanism ([address]      Disposition/Plan     PATIENT REFERRED TO:  No follow-up provider specified.     DISCHARGE MEDICATIONS:  New Prescriptions    No medications on file       DISPOSITION Admitted 10/06/2021 06:49:57 PM       Debbie Craig MD  Resident  10/06/21 9660

## 2021-10-06 NOTE — ED NOTES
Spoke with nurse at [facility] and she says between 11:00am and 12PM today, pt called her over while eating and said he was having trouble holding cup; states pt is WC bound and nonambulatory; able to pivot for transfers     Zack Cristobal RN  10/06/21 4822

## 2021-10-06 NOTE — ED NOTES
Bed: B1-  Expected date:   Expected time:   Means of arrival:   Comments:  [place] Carrie Marie RN  10/06/21 3871

## 2021-10-07 VITALS
SYSTOLIC BLOOD PRESSURE: 158 MMHG | HEIGHT: 68 IN | TEMPERATURE: 97.4 F | OXYGEN SATURATION: 99 % | DIASTOLIC BLOOD PRESSURE: 76 MMHG | RESPIRATION RATE: 18 BRPM | WEIGHT: 227 LBS | HEART RATE: 96 BPM | BODY MASS INDEX: 34.4 KG/M2

## 2021-10-07 PROBLEM — R20.0 NUMBNESS OF RIGHT HAND: Status: ACTIVE | Noted: 2021-10-07

## 2021-10-07 LAB
CHOLESTEROL, TOTAL: 121 MG/DL (ref 0–199)
GLUCOSE BLD-MCNC: 128 MG/DL (ref 70–99)
GLUCOSE BLD-MCNC: 238 MG/DL (ref 70–99)
GLUCOSE BLD-MCNC: 339 MG/DL (ref 70–99)
HCT VFR BLD CALC: 36.8 % (ref 40.5–52.5)
HDLC SERPL-MCNC: 38 MG/DL (ref 40–60)
HEMOGLOBIN: 12 G/DL (ref 13.5–17.5)
LDL CHOLESTEROL CALCULATED: 63 MG/DL
MCH RBC QN AUTO: 27.7 PG (ref 26–34)
MCHC RBC AUTO-ENTMCNC: 32.6 G/DL (ref 31–36)
MCV RBC AUTO: 84.8 FL (ref 80–100)
PDW BLD-RTO: 15.7 % (ref 12.4–15.4)
PERFORMED ON: ABNORMAL
PLATELET # BLD: 306 K/UL (ref 135–450)
PMV BLD AUTO: 8.5 FL (ref 5–10.5)
RBC # BLD: 4.34 M/UL (ref 4.2–5.9)
TRIGL SERPL-MCNC: 100 MG/DL (ref 0–150)
VLDLC SERPL CALC-MCNC: 20 MG/DL
WBC # BLD: 9.9 K/UL (ref 4–11)

## 2021-10-07 PROCEDURE — 92610 EVALUATE SWALLOWING FUNCTION: CPT

## 2021-10-07 PROCEDURE — 85027 COMPLETE CBC AUTOMATED: CPT

## 2021-10-07 PROCEDURE — 2580000003 HC RX 258: Performed by: INTERNAL MEDICINE

## 2021-10-07 PROCEDURE — 6370000000 HC RX 637 (ALT 250 FOR IP): Performed by: INTERNAL MEDICINE

## 2021-10-07 PROCEDURE — 96372 THER/PROPH/DIAG INJ SC/IM: CPT

## 2021-10-07 PROCEDURE — 80061 LIPID PANEL: CPT

## 2021-10-07 PROCEDURE — 97166 OT EVAL MOD COMPLEX 45 MIN: CPT

## 2021-10-07 PROCEDURE — 92526 ORAL FUNCTION THERAPY: CPT

## 2021-10-07 PROCEDURE — 97530 THERAPEUTIC ACTIVITIES: CPT

## 2021-10-07 PROCEDURE — 99220 PR INITIAL OBSERVATION CARE/DAY 70 MINUTES: CPT | Performed by: PSYCHIATRY & NEUROLOGY

## 2021-10-07 PROCEDURE — 36415 COLL VENOUS BLD VENIPUNCTURE: CPT

## 2021-10-07 PROCEDURE — 6360000002 HC RX W HCPCS: Performed by: INTERNAL MEDICINE

## 2021-10-07 PROCEDURE — G0378 HOSPITAL OBSERVATION PER HR: HCPCS

## 2021-10-07 PROCEDURE — 97535 SELF CARE MNGMENT TRAINING: CPT

## 2021-10-07 PROCEDURE — 83036 HEMOGLOBIN GLYCOSYLATED A1C: CPT

## 2021-10-07 RX ORDER — ACETAMINOPHEN 325 MG/1
650 TABLET ORAL EVERY 4 HOURS PRN
Status: DISCONTINUED | OUTPATIENT
Start: 2021-10-07 | End: 2021-10-07 | Stop reason: HOSPADM

## 2021-10-07 RX ADMIN — ACETAMINOPHEN 650 MG: 325 TABLET ORAL at 10:43

## 2021-10-07 RX ADMIN — FLUOXETINE 40 MG: 20 CAPSULE ORAL at 10:43

## 2021-10-07 RX ADMIN — VALPROIC ACID 250 MG: 250 SOLUTION ORAL at 10:43

## 2021-10-07 RX ADMIN — FINASTERIDE 5 MG: 5 TABLET, FILM COATED ORAL at 10:43

## 2021-10-07 RX ADMIN — CLOPIDOGREL BISULFATE 75 MG: 75 TABLET ORAL at 10:43

## 2021-10-07 RX ADMIN — ASPIRIN 81 MG: 81 TABLET, COATED ORAL at 10:43

## 2021-10-07 RX ADMIN — ACETAMINOPHEN 650 MG: 325 TABLET ORAL at 04:15

## 2021-10-07 RX ADMIN — Medication 10 ML: at 10:42

## 2021-10-07 RX ADMIN — RISPERIDONE 0.5 MG: 0.5 TABLET ORAL at 06:13

## 2021-10-07 RX ADMIN — ENOXAPARIN SODIUM 40 MG: 40 INJECTION SUBCUTANEOUS at 10:43

## 2021-10-07 RX ADMIN — ARIPIPRAZOLE 15 MG: 5 TABLET ORAL at 10:43

## 2021-10-07 ASSESSMENT — PAIN DESCRIPTION - ONSET: ONSET: GRADUAL

## 2021-10-07 ASSESSMENT — PAIN DESCRIPTION - PROGRESSION: CLINICAL_PROGRESSION: NOT CHANGED

## 2021-10-07 ASSESSMENT — PAIN SCALES - GENERAL
PAINLEVEL_OUTOF10: 3
PAINLEVEL_OUTOF10: 3

## 2021-10-07 ASSESSMENT — PAIN DESCRIPTION - DESCRIPTORS: DESCRIPTORS: ACHING

## 2021-10-07 ASSESSMENT — PAIN DESCRIPTION - FREQUENCY: FREQUENCY: CONTINUOUS

## 2021-10-07 ASSESSMENT — PAIN DESCRIPTION - ORIENTATION: ORIENTATION: MID;LOWER

## 2021-10-07 ASSESSMENT — PAIN DESCRIPTION - LOCATION: LOCATION: BACK

## 2021-10-07 ASSESSMENT — PAIN DESCRIPTION - PAIN TYPE: TYPE: CHRONIC PAIN

## 2021-10-07 ASSESSMENT — PAIN - FUNCTIONAL ASSESSMENT: PAIN_FUNCTIONAL_ASSESSMENT: PREVENTS OR INTERFERES SOME ACTIVE ACTIVITIES AND ADLS

## 2021-10-07 NOTE — CARE COORDINATION
Case Management            Discharge Note                    Date / Time of Note: 10/7/2021 12:30 PM                  Discharge Note Completed by: JONAS Mascorro    Patient Name: Edwin Ag   YOB: 1950  Diagnosis: Right arm weakness [R29.898]  Cerebrovascular accident (CVA), unspecified mechanism ([facility] [I63.9]   Date / Time: 10/6/2021  3:38 PM    Current PCP: Camille Fernandez MD  Clinic patient: No    Hospitalization in the last 30 days: n/a     Advance Directives:  Code Status: Full Code  [place] DNR form completed and on chart: Not Indicated    Financial:  Payor: MEDICARE / Plan: MEDICARE PART A AND B / Product Type: *No Product type* /      Pharmacy:    [address] [place] [address] [phone] Mirna James [phone]  [address] [place] [address]  Phone: [phone] Fax: [phone]      Assistance purchasing medications?: Potential Assistance Purchasing Medications: No  Assistance provided by Case Management: None at this time    Does patient want to participate in local refill/ meds to beds program?: No    Meds To Beds General Rules:  1. Can ONLY be done Monday- Friday between 8:30am-5pm  2. Prescription(s) must be in pharmacy by 3pm to be filled same day  3. Copy of patient's insurance/ prescription drug card and patient face sheet must be sent along with the prescription(s)  4. Cost of Rx cannot be added to hospital bill. If financial assistance is needed, please contact unit  or ;  or  CANNOT provide pharmacy voucher for patients co-pays  5.  Patients can then  the prescription on their way out of the hospital at discharge, or pharmacy can deliver to the bedside if staff is available. (payment due at time of pick-up or delivery - cash, check, or card accepted)     Able to afford home medications/ co-pay costs: Yes    ADLS:  Current PT AM-PAC Score:   /24  Current OT AM-PAC Score:   /24      Discharge Disposition: [place] (LTC):   [facility]  [address], [address] [place] [address]  Report: [phone]   Fax: [phone]    LOC at discharge: [address] Completed: Yes    Notification completed in HENS/PAS?:  Not Applicable    IMM Completed:   Not Indicated    Transportation:  Transportation Plan for discharge: EMS transportation   Mode of Transport: Ambulance stretcher - BLS  Reason for medical transport: Confused due to Dementia and requires ambulance transport due to orientation status  and Other: Hx of Bi-polar and schizeophrenia. assit with transfers  Name of [address]: [employer]: [phone]  Transport Time: 6:30 pm     Transportation form completed: Yes    Additional CM Notes:   Patient medically ready to return to facility per Dr. Chelsi Fan. Patient returning to long-term care. DAVION called and notified Josh Abrams in admissions of patient returning and time. Set up with [address] [facility]. SW placed all to Rayne Gomez, decision maker [phone]. She was updated by DAVION and is in agreement with discharge plan. The Plan for Transition of Care is related to the following treatment goals Right arm weakness [R29.898]  Cerebrovascular accident (CVA), unspecified mechanism ([facility] [I63.9]      The Patient and/or patient representative  was provided with a choice of provider and agrees with the discharge plan Yes    Freedom of choice list was provided with basic dialogue that supports the patient's individualized plan of care/goals and shares the quality data associated with the providers.  Yes    Care Transitions patient: No    Dc Venegas, MSW  [facility]   Case Management Department  Ph: [phone]

## 2021-10-07 NOTE — H&P
Hospital Medicine History & Physical      PCP: Sonido Terry MD    Date of Admission: 10/6/2021    Date of Service: Pt seen/examined on 10/6/2021 and Placed in Observation. Chief Complaint:  RUE weakness      History Of Present Illness:  Patient is 53-year-old gentleman with a history of CKD stage IV, hypertension, diabetes mellitus type 2, schizophrenia/bipolar disorder, chronic venous stasis leg resident of [address] [place] [address] service came into ER with a complaint of right upper extremity weakness started at 11 AM this morning. Patient is not a good historian. History is limited. He is hard of hearing. During my evaluation denies any chest pain, shortness of breath, nausea, vomiting. Code stroke was called. On arrival to ER hemodynamically stable. Blood work within normal limit. CT head, CTA no acute intracranial abnormity. Chest x-ray patchy ill-defined increased density is seen bilaterally nonspecific. EKG normal sinus rhythm. No acute ST changes. Past Medical History:          Diagnosis Date    Ataxia     [facility] 1 disorder [facility]     BPH (benign prostatic hyperplasia)     Cerebral infarct (HCC)     Chronic kidney disease     Dementia ([address]     Depression     Diabetes mellitus type 2, uncomplicated ([address]     Gout     Hyperlipidemia     Hypertension     Schizophrenia ([facility]        Past Surgical History:      History reviewed. No pertinent surgical history. Medications Prior to Admission:      Prior to Admission medications    Medication Sig Start Date End Date Taking? Authorizing Provider   gabapentin (NEURONTIN) 300 MG capsule Take 1 capsule by mouth 2 times daily for 30 days. 2/26/21 3/28/21  Maria De Jesus Gottlieb MD   insulin detemir (LEVEMIR) 100 UNIT/ML injection vial Inject 10 Units into the skin nightly 2/26/21   Maria De Jesus Gottlieb MD   Skin Protectants, Misc.  (HYDROCERIN) CREA cream Apply topically 2 times daily 2/26/21   Maria De Jesus Gottlieb MD   acetaminophen (TYLENOL) 500 MG tablet Take 500 mg by mouth every 6 hours as needed for Pain    Historical Provider, MD   allopurinol (ZYLOPRIM) 100 MG tablet Take 100 mg by mouth daily    Historical Provider, MD   amLODIPine (NORVASC) 5 MG tablet Take 5 mg by mouth daily Hold for SBP <110 or Pulse <60    Historical Provider, MD   ARIPiprazole (ABILIFY) 15 MG tablet Take 15 mg by mouth daily    Historical Provider, MD   polyvinyl alcohol (LIQUIFILM TEARS) 1.4 % ophthalmic solution Place 1 drop into both eyes as needed for Dry Eyes    Historical Provider, MD   aspirin 81 MG EC tablet Take 81 mg by mouth daily    Historical Provider, MD   atorvastatin (LIPITOR) 40 MG tablet Take 40 mg by mouth nightly    Historical Provider, MD   bisacodyl (DULCOLAX) 10 MG suppository Place 10 mg rectally daily as needed for Constipation    Historical Provider, MD   Cranberry 425 MG CAPS Take 425 mg by mouth daily    Historical Provider, MD   docusate sodium (COLACE) 100 MG capsule Take 100 mg by mouth 2 times daily    Historical Provider, MD   estradiol (ESTRACE) 1 MG tablet Take 1 mg by mouth daily    Historical Provider, MD   finasteride (PROSCAR) 5 MG tablet Take 5 mg by mouth daily    Historical Provider, MD   FLUoxetine (PROZAC) 20 MG capsule Take 40 mg by mouth daily    Historical Provider, MD   Multiple Vitamins-Minerals (THERAPEUTIC MULTIVITAMIN-MINERALS) tablet Take 1 tablet by mouth daily    Historical Provider, MD   insulin aspart (NOVOLOG) 100 UNIT/ML injection vial Inject 0-21 Units into the skin 3 times daily (before meals) Sliding scale   201-250 = 5 units  251-300 = 8 units  301-350 = 12 units  351-400 = 15 units  401-450 = 18 units  451-500 = 21 units  500+ notify MD    Historical Provider, MD   ondansetron (ZOFRAN-ODT) 4 MG disintegrating tablet Take 4 mg by mouth every 8 hours as needed for Nausea or Vomiting    Historical Provider, MD   calcium-vitamin D (OSCAL-500) 500-200 MG-UNIT per tablet Take 1 tablet by mouth 2 times daily    Historical Provider, MD   polyethylene glycol (GLYCOLAX) 17 g packet Take 17 g by mouth daily as needed for Constipation    Historical Provider, MD   risperiDONE (RISPERDAL) 0.5 MG tablet Take 0.5 mg by mouth every morning (before breakfast)     Historical Provider, MD   risperiDONE (RISPERDAL) 1 MG tablet Take 1.5 mg by mouth nightly     Historical Provider, MD   tamsulosin (FLOMAX) 0.4 MG capsule Take 0.8 mg by mouth nightly    Historical Provider, MD   valproic acid (DEPACON) 250 MG/5ML SOLN oral solution Take 250 mg by mouth 2 times daily    Historical Provider, MD   Cholecalciferol (VITAMIN D3) 1.25 MG (53761 UT) CAPS Take 50,000 Units by mouth once a week Fridays    Historical Provider, MD       Allergies:  Codeine, Cogentin [benztropine], and Penicillins    Social History:      The patient currently lives in a skilled nursing facility. TOBACCO:   reports that he has never smoked. He has never used smokeless tobacco.  ETOH:   reports previous alcohol use. Family History:       Reviewed in detail and negative for DM, CAD, Cancer, CVA. Positive as follows:    History reviewed. No pertinent family history. REVIEW OF SYSTEMS:   Pertinent positives as noted in the HPI. All other systems reviewed and negative. PHYSICAL EXAM PERFORMED:    /81   Pulse 76   Temp 97.6 °F (36.4 °C) (Oral)   Resp 18   Ht 5' 8\" (1.727 m)   Wt 227 lb (103 kg)   SpO2 94%   BMI 34.52 kg/m²     General appearance: Chronically ill looking, no apparent distress, appears stated age and cooperative. HEENT:  Normal cephalic, atraumatic without obvious deformity. Pupils equal, round, and reactive to light. Extra ocular muscles intact. Conjunctivae/corneas clear. Neck: Supple, with full range of motion. No jugular venous distention. Trachea midline. Respiratory:  Normal respiratory effort. Clear to auscultation, bilaterally without Rales/Wheezes/Rhonchi.   Cardiovascular:  Regular rate and rhythm with normal S1/S2 without murmurs, rubs or gallops. Abdomen: Soft, non-tender, non-distended with normal bowel sounds. Musculoskeletal: Bilateral lower extremity edema. Skin: Bilateral lower extremity venous stasis with dermatitis  Neurologic: Alert oriented to self, hard of hearing, right upper extremity motor 3 able to lift up against gravity, left upper extremity motor 5 able to give in grasp, bilateral lower extremity motor 4+  Psychiatric:  Alert and oriented to self  Capillary Refill: Brisk,< 3 seconds   Peripheral Pulses: +2 palpable, equal bilaterally       Labs:     Recent Labs     10/06/21  1550   WBC 10.0   HGB 11.4*   HCT 35.2*        Recent Labs     10/06/21  1550      K 4.1   CL 98*   CO2 30   BUN 26*   CREATININE 1.8*   CALCIUM 10.1     Recent Labs     10/06/21  1550   AST 22   ALT 14   BILITOT 0.3   ALKPHOS 117     Recent Labs     10/06/21  1550   INR 0.99     Recent Labs     10/06/21  1550 10/06/21  1858   TROPONINI 0.03* 0.02*       Urinalysis:      Lab Results   Component Value Date    NITRU Negative 02/24/2021    WBCUA 3-5 02/24/2021    BACTERIA Rare 02/24/2021    RBCUA 0-2 02/24/2021    BLOODU Negative 02/24/2021    SPECGRAV 1.025 02/24/2021    GLUCOSEU Negative 02/24/2021       Radiology:     CXR: I have reviewed the CXR with the following interpretation: See radiology report. EKG:  I have reviewed the EKG with the following interpretation: Normal sinus rhythm, no acute ST changes. XR CHEST PORTABLE   Final Result      Limited inspiration. Patchy ill-defined increased density is seen bilaterally, nonspecific. Atypical or viral pneumonia is not excluded. Correlate clinically. CTA HEAD NECK W CONTRAST   Final Result      Moderate focal stenosis at the level of the left carotid bifurcation. No other evidence of significant stenosis or occlusion.       PROCEDURE: CT ANGIOGRAPHY HEAD WITH CONTRAST      INDICATION: R arm weakness, facial droop      COMPARISON: Same-day CT      TECHNIQUE: Axial CT imaging obtained through the head following administration of IV contrast. Axial images, multiplanar reformatted images, and maximum intensity projection images were reviewed for CT angiographic technique. Individualized dose    optimization technique was used in order to meet ALARA standards for radiation dose reduction. In addition to vendor specific dose reduction algorithms, the dose reduction techniques vary based on the specific scanner utilized but frequently include    automated exposure control, adjustment of the mA and/or kV according to patient size, and use of iterative reconstruction technique. IV contrast: 80 mL Isovue 370. FINDINGS:      ANTERIOR CIRCULATION: The intracranial internal carotid arteries, anterior cerebral arteries, and middle cerebral arteries demonstrate no occlusion or stenosis. No evidence for aneurysm or arteriovenous malformation. POSTERIOR CIRCULATION: The bilateral vertebral arteries, basilar artery and posterior cerebral arteries demonstrate no occlusion or stenosis. No evidence for aneurysm or arteriovenous malformation. INTRACRANIAL VENOUS SYSTEM: No evidence for intracranial venous thrombosis. IMPRESSION:      No evidence of significant stenosis or occlusion. CT HEAD WO CONTRAST   Final Result      Cerebral atrophy. No acute intracranial findings. ASSESSMENT:    Active Hospital Problems    Diagnosis Date Noted    Right arm weakness [R29.898] 10/06/2021       Assessment and plan  #Right upper extremity weakness rule out CVA  Out of window for TPA symptoms started at 11 AM.  Patient is still having weakness on right upper extremity. Already on aspirin. Due to high risk factor will start on Plavix  Neurochecks as per stroke power plan  Continue statin. Check lipid panel, A1c  I will obtain an MRI without contrast  Neurology consultation.   Patient had echo done in 5/20/2021 which was normal.  If MRI positive consider repeating limited echo

## 2021-10-07 NOTE — PROGRESS NOTES
RN screened patient's swallowing by administering the [facility] Protocol. The patient consumed 3 ounces of water by cup in sequential swallows without signs/symptoms of aspiration.

## 2021-10-07 NOTE — DISCHARGE INSTR - COC
Continuity of Care Form    Patient Name: Jamal Rivas   :  1950  MRN:  [id number]    Admit date:  10/6/2021  Discharge date:  10/7/2021     Code Status Order: Full Code   Advance Directives:      Admitting Physician:  Maulik Verma MD  PCP: Oneyda Quinones MD    Discharging Nurse: [facility] Unit/Room#: 1616/5225-08  Discharging Unit Phone Number: ***    Emergency Contact:   Extended Emergency Contact Information  Primary Emergency Contact: [address] [place] [address], [address]  [facility] Phone: [phone]  Mobile Phone: [phone]  Relation: Brother/Sister  Secondary Emergency Contact: [address] [place] [address] Phone: [phone]  Relation: Brother/Sister    Past Surgical History:  History reviewed. No pertinent surgical history. Immunization History: There is no immunization history on file for this patient.     Active Problems:  Patient Active Problem List   Diagnosis Code    Other fatigue R53.83    Right hand weakness R29.898    Numbness of right hand R20.0    Schizophrenia ([address] F20.9    Dementia ([address] F03.90    Essential hypertension I10       Isolation/Infection:   Isolation            No Isolation          Patient Infection Status       Infection Onset Added Last Indicated Last Indicated By Review Planned Expiration Resolved Resolved By    None active    Resolved    COVID-19 Rule Out 21 COVID-19 (Ordered)   21 Rule-Out Test Resulted            Nurse Assessment:  Last Vital Signs: BP (!) 150/79   Pulse 79   Temp 97.3 °F (36.3 °C) (Oral)   Resp 18   Ht 5' 8\" (1.727 m)   Wt 227 lb (103 kg)   SpO2 98%   BMI 34.52 kg/m²     Last documented pain score (0-10 scale): Pain Level: 3  Last Weight:   Wt Readings from Last 1 Encounters:   10/06/21 227 lb (103 kg)     Mental Status:  oriented, alert, coherent, logical, thought processes intact and able to concentrate and follow conversation    IV Access:  - None    Nursing Mobility/ADLs:  Walking   Dependent  Transfer Dependent  Bathing  Assisted  Dressing  Assisted  Toileting  Assisted  Feeding  Independent  Med Admin  Independent  Med Delivery   prefers mixed with applesauce    Wound Care Documentation and Therapy:  Wound 02/24/21 Buttocks Inner (Active)   Number of days: 224        Elimination:  Continence:   · Bowel: {YES / OP:79557}  · Bladder: {YES / EI:37910}  Urinary Catheter: None   Colostomy/Ileostomy/Ileal Conduit: {YES / OT:11878}       Date of Last BM: ***    Intake/Output Summary (Last 24 hours) at 10/7/2021 1100  Last data filed at 10/6/2021 2200  Gross per 24 hour   Intake 535.89 ml   Output 300 ml   Net 235.89 ml     I/O last 3 completed shifts: In: 535.9 [P.O.:75; IV Piggyback:460.9]  Out: 300 [Urine:300]    Safety Concerns: At Risk for Falls and Aspiration Risk    Impairments/Disabilities:      None    Nutrition Therapy:  Current Nutrition Therapy:   - Oral Diet:  easy to chew    Routes of Feeding: Oral  Liquids: {Slp liquid thickness:54261}  Daily Fluid Restriction: no  Last Modified Barium Swallow with Video (Video Swallowing Test): not done    Treatments at the Time of Hospital Discharge:   Respiratory Treatments: ***  Oxygen Therapy:  is not on home oxygen therapy.   Ventilator:    - No ventilator support    Rehab Therapies: {THERAPEUTIC INTERVENTION:8992163031}  Weight Bearing Status/Restrictions: No weight bearing restirctions  Other Medical Equipment (for information only, NOT a DME order):  {EQUIPMENT:781785563}  Other Treatments: ***    Patient's personal belongings (please select all that are sent with patient):  None    RN SIGNATURE:  Electronically signed by Dalia Sun RN on 10/7/21 at 11:22 AM EDT    CASE MANAGEMENT/SOCIAL WORK SECTION    Inpatient Status Date: 10/6/2021     Readmission Risk Assessment Score:  Readmission Risk              Risk of Unplanned Readmission:  0           Discharging to Facility/ [facility] [address] [facility] [address]  Report: [phone]   Fax: [phone]    / signature: Electronically signed by JONAS Monroe on 10/7/21 at 12:29 PM EDT    PHYSICIAN SECTION    Prognosis: Fair    Condition at Discharge: Stable    Rehab Potential (if transferring to Rehab): Fair    Recommended Labs or Other Treatments After Discharge: Follow up with neurology as outpatient  Need EMG/NCS for evaluation of right hand weakness and numbness    Physician Certification: I certify the above information and transfer of Juan Luis Tomas  is necessary for the continuing treatment of the diagnosis listed and that he requires Long term care facility for greater 30 days.      Update Admission H&P: No change in H&P    PHYSICIAN SIGNATURE:  Electronically signed by Mikala Cartwright MD on 10/7/21 at 11:34 AM EDT

## 2021-10-07 NOTE — PROGRESS NOTES
No acute events overnight. Pt up in the chair. Pt tolerates transfer per adrianna roque. NIH remains 1. No change in neuro status. Bed alarm on, wheels locked, bed in lowest position, side rails up 2/4, nonskid socks on, call light and bedside table in reach. Will continue to monitor.

## 2021-10-07 NOTE — PLAN OF CARE
Problem: Falls - Risk of:  Goal: Will remain free from falls  Description: Will remain free from falls  10/7/2021 1433 by Edna Hardy RN  Outcome: Completed     Problem: Falls - Risk of:  Goal: Absence of physical injury  Description: Absence of physical injury  Outcome: Completed     Problem: HEMODYNAMIC STATUS  Goal: Patient has stable vital signs and fluid balance  10/7/2021 1433 by Edna Hardy RN  Outcome: Completed     Problem: ACTIVITY INTOLERANCE/IMPAIRED MOBILITY  Goal: Mobility/activity is maintained at optimum level for patient  Outcome: Completed     Problem: COMMUNICATION IMPAIRMENT  Goal: Ability to express needs and understand communication  10/7/2021 1433 by Edna Hardy RN  Outcome: Completed     Problem: Skin Integrity:  Goal: Will show no infection signs and symptoms  Description: Will show no infection signs and symptoms  10/7/2021 1433 by Edna Hardy RN  Outcome: Completed     Problem: Skin Integrity:  Goal: Absence of new skin breakdown  Description: Absence of new skin breakdown  Outcome: Completed

## 2021-10-07 NOTE — PROGRESS NOTES
Pt arrived to 5505 from the ED. VSS. Pt is A/O x4. Pt reports pain in the back that he reports is new. NIH 1 for drift in the right arm. Pt also has very weak hand grasp to the right hand. Swallow screen passed. Skin assessed with second Rn. Pt has very red and swollen with large blisters and thickened skin. Pt's buttocks and angelica area red. Will put a wound care consult in and order a special mattress. Pt taken to the BR with the adrianna roque assist x2 and GB. Pt tolerated well. Pt educated on fall and safety precautions. Bed alarm on, wheels locked, bed in lowest position, side rails up 2/4, nonskid socks on, call light and bedside table in reach. Will continue to monitor.

## 2021-10-07 NOTE — PLAN OF CARE
Problem: Falls - Risk of:  Goal: Will remain free from falls  Description: Will remain free from falls  Outcome: Ongoing  Note: Pt will remain free of falls for the duration of the shift. Pt is A/O x 4 and uses the call light appropriately for needs. Pt is assist x1-2 with the adrianna roque. Pt is wc bound at baseline. Bed alarm on, wheels locked, bed in lowest position, side rails up 2/4, nonskid socks on, call light and bedside table in reach. Will continue to monitor. Problem: HEMODYNAMIC STATUS  Goal: Patient has stable vital signs and fluid balance  Outcome: Ongoing  Note: VSS. PO intake and output adequate.       Problem: COMMUNICATION IMPAIRMENT  Goal: Ability to express needs and understand communication  Outcome: Ongoing  Note: No communication impairment       Problem: Skin Integrity:  Goal: Will show no infection signs and symptoms  Description: Will show no infection signs and symptoms  Outcome: Ongoing  Note: Wound care eval ordered for BLE

## 2021-10-07 NOTE — CONSULTS
Neurology Consultation Note      Patient: Domi Barajas MRN: [id number]    YOB: 1950  Age: 73 y.o. Sex: male   Unit: [facility] Room/Bed: 9895/9939-36 Location: [address] [place] [address]    Date of Consultation: 10/7/2021  Date of Admission: 10/6/2021  3:38 PM ( LOS: 0 days )  Admitting Physician: Clary Sparrow    Primary Care Physician: Linda Roca MD   Consult Requested By: Patti Mosqueda MD     Reason for Consult: \"TIA\"    ASSESSMENT & RECOMMENDATIONS     Assessment  - 80yo man with psychiatric disease who presents with weakness and numbness isolated to just the right hand with negative MRI  - Diagnosis is listed as TIA, but with ongoing hand weakness, this is not a TIA and has been proven to not be a stroke by MRI  - Therefore, may be a peripheral process, but nothing about exam suggests a radiculopathy  - Will need outpt workup (EMG, etc) to further investigate    Recommendations  - No further in-patient neuro workup necessary [echo cancelled]  - Despite no evidence of TIA or stroke, would work on better BP control & continue ASA + statin  - Needs EMG/NCS as outpt; f/u with Neurology on discharge  - OT  - Will sign off; call with questions    SUBJECTIVE     Chief Complaint:   \"My right hand is numb\"    History of Present Illness:  Domi Barajas is a 73 y.o. man with PHx sig for CKD; HTN; DM; schizophrenia/bipolar disorder; cognitive decline. Presented to ER with weakness of right arm that started at 11AM yesterday morning. Per my interview with the patient:  He reports that he noticed yesterday that his right hand was numb. He also felt that it was harder to use it to do things. He feels that the numbness has improved and is very minimal, at worst. He feels that he is still having difficulty using his hand, however. he is unsure what would have precipitated these symptoms, other than the above. he feels that nothing makes them better and nothing makes them worse.  he rates the symptoms as moderate. he denies any other associated symptoms including no HA, no speech/language difficulties, no swallowing difficulties, no visual changes, no diplopia, no hearing loss, no tinnitus, no vertigo, no imbalance, no light-headedness, no other focal weakness, no sensory changes, no nausea or vomiting. he has never had this problem before. There is no history of this problem or anything similar in his family members. Past Medical History:   has a past medical history of Ataxia, Bipolar 1 disorder ([address] BPH (benign prostatic hyperplasia), Cerebral infarct ([facility] [address] Chronic kidney disease, Dementia ([facility] [address] Depression, Diabetes mellitus type 2, uncomplicated ([facility] [address] Gout, Hyperlipidemia, Hypertension, and Schizophrenia ([facility] [address] Past Surgical History:  History reviewed. No pertinent surgical history. Family History:  Reviewed with patient and/or other family members. No evidence of any potentially significant or related diagnoses in his genetically connected relatives. Social History:  he reports that he has never smoked. He has never used smokeless tobacco. He reports previous alcohol use. Medications: Allergies   Allergen Reactions    Codeine     Cogentin [Benztropine]     Penicillins        Medications Prior to Admission: furosemide (LASIX) 20 MG tablet, furosemide 20 mg tablet  melatonin 5 MG TABS tablet, Take 5 mg by mouth nightly  latanoprost (XALATAN) 0.005 % ophthalmic solution, Inject 1 drop into the eye nightly  trimethoprim-polymyxin b (POLYTRIM) 25411-4.1 UNIT/ML-% ophthalmic solution, Polytrim 10,000 unit-1 mg/mL eye drops  INSTILL 1 DROP INTO AFFECTED EYE(S) BY OPHTHALMIC ROUTE EVERY 6 HOURS  gabapentin (NEURONTIN) 300 MG capsule, Take 1 capsule by mouth 2 times daily for 30 days.  (Patient taking differently: Take 300 mg by mouth three times daily. )  insulin detemir (LEVEMIR) 100 UNIT/ML injection vial, Inject 10 Units into the skin nightly (Patient taking differently: Inject 77 Units into the skin nightly )  Skin Protectants, Misc.  (HYDROCERIN) CREA cream, Apply topically 2 times daily  acetaminophen (TYLENOL) 500 MG tablet, Take 500 mg by mouth every 6 hours as needed for Pain  allopurinol (ZYLOPRIM) 100 MG tablet, Take 100 mg by mouth daily  amLODIPine (NORVASC) 5 MG tablet, Take 5 mg by mouth daily Hold for SBP <110 or Pulse <60  ARIPiprazole (ABILIFY) 15 MG tablet, Take 15 mg by mouth daily  polyvinyl alcohol (LIQUIFILM TEARS) 1.4 % ophthalmic solution, Place 1 drop into both eyes as needed for Dry Eyes  aspirin 81 MG EC tablet, Take 81 mg by mouth daily  atorvastatin (LIPITOR) 40 MG tablet, Take 40 mg by mouth nightly  bisacodyl (DULCOLAX) 10 MG suppository, Place 10 mg rectally daily as needed for Constipation  Cranberry 425 MG CAPS, Take 425 mg by mouth daily  docusate sodium (COLACE) 100 MG capsule, Take 100 mg by mouth 2 times daily  estradiol (ESTRACE) 1 MG tablet, Take 1 mg by mouth daily  finasteride (PROSCAR) 5 MG tablet, Take 5 mg by mouth daily  FLUoxetine (PROZAC) 20 MG capsule, Take 40 mg by mouth daily  Multiple Vitamins-Minerals (THERAPEUTIC MULTIVITAMIN-MINERALS) tablet, Take 1 tablet by mouth daily  insulin aspart (NOVOLOG) 100 UNIT/ML injection vial, Inject 0-21 Units into the skin 3 times daily (before meals) Sliding scale  201-250 = 5 units 251-300 = 8 units 301-350 = 12 units 351-400 = 15 units 401-450 = 18 units 451-500 = 21 units 500+ notify MD  ondansetron (ZOFRAN-ODT) 4 MG disintegrating tablet, Take 4 mg by mouth every 8 hours as needed for Nausea or Vomiting  calcium-vitamin D (OSCAL-500) 500-200 MG-UNIT per tablet, Take 1 tablet by mouth 2 times daily  polyethylene glycol (GLYCOLAX) 17 g packet, Take 17 g by mouth daily as needed for Constipation  risperiDONE (RISPERDAL) 0.5 MG tablet, Take 0.5 mg by mouth every morning (before breakfast)   risperiDONE (RISPERDAL) 1 MG tablet, Take 1.5 mg by mouth nightly   tamsulosin (FLOMAX) 0.4 MG capsule, Take 0.8 mg by mouth nightly  valproic acid (DEPACON) 250 MG/5ML SOLN oral solution, Take 250 mg by mouth 2 times daily  Cholecalciferol (VITAMIN D3) 1.25 MG (25484 UT) CAPS, Take 50,000 Units by mouth once a week Fridays    Review of Systems:  No fevers; no fatigue; no general malaise; no visual changes/disturbances; no SOB; no cough; no CP; no palpitations; no abdominal pain; no nausea; no vomiting; no dyspepsia; no diarrhea; no constipation; no urinary complaints; no skin lesions/rashes; no myalgias; no arthralgias; no changes in mood/personality; neuro & others as per HPI    OBJECTIVE     Vitals:  Patient Vitals for the past 36 hrs:   BP Temp Temp src Pulse Resp SpO2 Height Weight   10/07/21 0715 (!) 141/86 97.3 °F (36.3 °C) Oral 79 18 97 % -- --   10/07/21 0300 (!) 164/89 97.5 °F (36.4 °C) Oral 91 16 94 % -- --   10/06/21 2345 136/74 98.5 °F (36.9 °C) Oral 75 16 96 % -- --   10/06/21 2000 (!) 166/89 -- Oral 83 16 95 % -- --   10/06/21 1830 136/81 -- -- 76 18 94 % -- --   10/06/21 1800 119/60 -- -- 70 15 94 % -- --   10/06/21 1730 105/62 -- -- 73 16 94 % -- --   10/06/21 1713 128/62 -- -- 74 19 97 % -- --   10/06/21 1649 -- -- -- -- -- -- 5' 8\" (1.727 m) 227 lb (103 kg)   10/06/21 1630 130/71 -- -- 74 -- 97 % -- --   10/06/21 1609 (!) 142/71 -- -- 78 -- 98 % -- --   10/06/21 1538 113/60 97.6 °F (36.4 °C) Oral 73 16 97 % -- --       Neurological Exam (performed on 10/7/2021 at 1015):  -Mental status: A&O x3; pleasant & appropriate  -Memory: Recent & remote memory grossly intact  -Attention: Normal  -Fund of Knowledge: Good  -Speech & Language: no aphasia; no dysarthria  -Cranial nerves: pupils 4mm->3mm bilaterally; fields intact; unable to visualize fundi; EOMI, no nystagmus; sensation intact V1, V2, V3; no facial asymmetry; hearing intact bilaterally; palate elevates symmetrically; SCMs & trapezii intact bilaterally; tongue midline  -Sensory: intact to lt touch throughout  -Motor:   RUE: 5/5 shoulder, 5/5 elbow flex/ext, 3/5 , no pronator drift  RLE: 5/5  LUE: 5/5, no pronator drift  LLE: 5/5  -Tone: Normal throughout  -Reflexes: 1+ & symmetric throughout  -Coordination: FNF intact  -Gait & Station: deferred for pt safety  -Other: no adventitious movements noted  Other Systems  -General Appearance: well-developed, well-nourished, no apparent distress  -Neck: supple  -Lungs: breathing unlabored, regular, no audible wheezes  -CV: pulses strong x4 extremities  -Abd: flat  -Extrem: no c/c/e      Imaging: All reports below personally reviewed & actual images reviewed where indicated. Pertinent positives & negatives are addressed in Assessment & Plan section of note  MRI brain without contrast  Images independently reviewed. Agree with findings. --KACHORIS    1. No acute stroke, mass, or hemorrhage. 2. Mild chronic small vessel ischemic white matter disease. 3. Mild cerebral atrophy. CTA HEAD & NECK W CONTRAST  Images independently reviewed. Agree with findings. --KACHORIS    NECK:  Moderate focal stenosis at the level of the left carotid bifurcation. No other evidence of significant stenosis or occlusion. HEAD:   No evidence of significant stenosis or occlusion. CT HEAD WO CONTRAST  Images independently reviewed. Agree with findings. --KACHORIS    Cerebral atrophy. No acute intracranial findings. - Echo (2/24/21): Left ventricular cavity size is decreased. There is concentric left   ventricular hypertrophy. Overall left ventricular systolic function appears normal with an estimated   ejection fraction of 55-60%. No regional wall motion abnormalities are noted. Diastolic filling parameters suggests normal diastolic function. The right ventricle is normal in size and function. Laboratory Review: All results below personally reviewed.  Pertinent positives & negatives are addressed in Assessment & Plan section of note  Recent Results (from the past 72 hour(s))   POCT Glucose    Collection Time: 10/06/21  3:41 PM   Result Value Ref Range    POC Glucose 106 (H) 70 - 99 mg/dl    Performed on ACCU-CHEK    POCT Glucose    Collection Time: 10/06/21  3:45 PM   Result Value Ref Range    Glucose 106 mg/dL    QC OK?  yes    CBC Auto Differential    Collection Time: 10/06/21  3:50 PM   Result Value Ref Range    WBC 10.0 4.0 - 11.0 K/uL    RBC 4.16 (L) 4.20 - 5.90 M/uL    Hemoglobin 11.4 (L) 13.5 - 17.5 g/dL    Hematocrit 35.2 (L) 40.5 - 52.5 %    MCV 84.6 80.0 - 100.0 fL    MCH 27.5 26.0 - 34.0 pg    MCHC 32.5 31.0 - 36.0 g/dL    RDW 15.8 (H) 12.4 - 15.4 %    Platelets 419 758 - 014 K/uL    MPV 8.3 5.0 - 10.5 fL    Neutrophils % 63.3 %    Lymphocytes % 21.0 %    Monocytes % 10.2 %    Eosinophils % 4.2 %    Basophils % 1.3 %    Neutrophils Absolute 6.3 1.7 - 7.7 K/uL    Lymphocytes Absolute 2.1 1.0 - 5.1 K/uL    Monocytes Absolute 1.0 0.0 - 1.3 K/uL    Eosinophils Absolute 0.4 0.0 - 0.6 K/uL    Basophils Absolute 0.1 0.0 - 0.2 K/uL   Comprehensive Metabolic Panel w/ Reflex to MG    Collection Time: 10/06/21  3:50 PM   Result Value Ref Range    Sodium 139 136 - 145 mmol/L    Potassium reflex Magnesium 4.1 3.5 - 5.1 mmol/L    Chloride 98 (L) 99 - 110 mmol/L    CO2 30 21 - 32 mmol/L    Anion Gap 11 3 - 16    Glucose 94 70 - 99 mg/dL    BUN 26 (H) 7 - 20 mg/dL    CREATININE 1.8 (H) 0.8 - 1.3 mg/dL    GFR Non- 38 (A) >60    GFR  45 (A) >60    Calcium 10.1 8.3 - 10.6 mg/dL    Total Protein 8.2 6.4 - 8.2 g/dL    Albumin 3.9 3.4 - 5.0 g/dL    Albumin/Globulin Ratio 0.9 (L) 1.1 - 2.2    Total Bilirubin 0.3 0.0 - 1.0 mg/dL    Alkaline Phosphatase 117 40 - 129 U/L    ALT 14 10 - 40 U/L    AST 22 15 - 37 U/L    Globulin 4.3 g/dL   Troponin    Collection Time: 10/06/21  3:50 PM   Result Value Ref Range    Troponin 0.03 (H) <0.01 ng/mL   Protime-INR    Collection Time: 10/06/21  3:50 PM   Result Value Ref Range    Protime 11.2 9.9 - 12.7 sec    INR 0.99 0.88 - 1.12   Troponin    Collection Time:

## 2021-10-07 NOTE — DISCHARGE SUMMARY
Hospital Medicine Discharge Summary    Patient ID: Juan Luis Tomas      Patient's PCP: Facundo Heck MD    Admit Date: 10/6/2021     Discharge Date:  10/07/2021    Admitting Physician: Mraiel Mayfield MD     Discharge Physician: Mikala Cartwright MD     Discharge Diagnoses:  Right hand numbness, ruled TIA/CVA, likely peripheral process, and will need outpatient work up      [address] Problems    Diagnosis Date Noted    Numbness of right hand [R20.0] 10/07/2021    Schizophrenia ([address] [F20.9]     Dementia ([address] [F03.90]     Essential hypertension [I10]     Right hand weakness [R29.898] 10/06/2021       The patient was seen and examined on day of discharge and this discharge summary is in conjunction with any daily progress note from day of discharge. Hospital Course:   - 71yo man with psychiatric disease who presents with weakness and numbness isolated to just the right hand with negative MRI. Agree with neurology not TIA, peripheral process and will need outpatient EMG/NCS. Review of outpatient records show good control of BP actually BP systolic 075O. I suspect the elevated BP here are due to anxiety of being in the hospital,  Ok to dc back to long term care facility, no changes in med. Will need outpatient neurology follow up and EMG/NCS    Physical Exam Performed:     BP (!) 150/79   Pulse 79   Temp 97.4 °F (36.3 °C) (Axillary)   Resp 18   Ht 5' 8\" (1.727 m)   Wt 227 lb (103 kg)   SpO2 98%   BMI 34.52 kg/m²     General appearance: Chronically ill looking, no apparent distress, appears stated age and cooperative. HEENT:  Normal cephalic, atraumatic without obvious deformity. Pupils equal, round, and reactive to light. Extra ocular muscles intact. Conjunctivae/corneas clear. Neck: Supple, with full range of motion. No jugular venous distention. Trachea midline. Respiratory:  Normal respiratory effort. Clear to auscultation, bilaterally without Rales/Wheezes/Rhonchi.   Cardiovascular: Regular rate and rhythm with normal S1/S2 without murmurs, rubs or gallops. Abdomen: Soft, non-tender, non-distended with normal bowel sounds. Musculoskeletal: Bilateral lower extremity edema. Skin: Bilateral lower extremity venous stasis with dermatitis  Neurologic: Alert oriented to self, hard of hearing, right upper extremity motor 3 able to lift up against gravity, left upper extremity motor 5 able to give in grasp, bilateral lower extremity motor 4+  Psychiatric:  Alert and oriented to self  Capillary Refill: Brisk,< 3 seconds   Peripheral Pulses: +2 palpable, equal bilaterally      Labs: For convenience and continuity at follow-up the following most recent labs are provided:      CBC:    Lab Results   Component Value Date    WBC 9.9 10/07/2021    HGB 12.0 10/07/2021    HCT 36.8 10/07/2021     10/07/2021       Renal:    Lab Results   Component Value Date     10/06/2021    K 4.1 10/06/2021    CL 98 10/06/2021    CO2 30 10/06/2021    BUN 26 10/06/2021    CREATININE 1.8 10/06/2021    CALCIUM 10.1 10/06/2021         Significant Diagnostic Studies    Radiology:   MRI brain without contrast   Final Result      1. No acute stroke, mass, or hemorrhage. 2. Mild chronic small vessel ischemic white matter disease. 3. Mild cerebral atrophy. XR CHEST PORTABLE   Final Result      Limited inspiration. Patchy ill-defined increased density is seen bilaterally, nonspecific. Atypical or viral pneumonia is not excluded. Correlate clinically. CTA HEAD NECK W CONTRAST   Final Result      Moderate focal stenosis at the level of the left carotid bifurcation. No other evidence of significant stenosis or occlusion.       PROCEDURE: CT ANGIOGRAPHY HEAD WITH CONTRAST      INDICATION: R arm weakness, facial droop      COMPARISON: Same-day CT      TECHNIQUE: Axial CT imaging obtained through the head following administration of IV contrast. Axial images, multiplanar reformatted images, and maximum intensity projection images were reviewed for CT angiographic technique. Individualized dose    optimization technique was used in order to meet ALARA standards for radiation dose reduction. In addition to vendor specific dose reduction algorithms, the dose reduction techniques vary based on the specific scanner utilized but frequently include    automated exposure control, adjustment of the mA and/or kV according to patient size, and use of iterative reconstruction technique. IV contrast: 80 mL Isovue 370. FINDINGS:      ANTERIOR CIRCULATION: The intracranial internal carotid arteries, anterior cerebral arteries, and middle cerebral arteries demonstrate no occlusion or stenosis. No evidence for aneurysm or arteriovenous malformation. POSTERIOR CIRCULATION: The bilateral vertebral arteries, basilar artery and posterior cerebral arteries demonstrate no occlusion or stenosis. No evidence for aneurysm or arteriovenous malformation. INTRACRANIAL VENOUS SYSTEM: No evidence for intracranial venous thrombosis. IMPRESSION:      No evidence of significant stenosis or occlusion. CT HEAD WO CONTRAST   Final Result      Cerebral atrophy. No acute intracranial findings.                    Consults:     IP CONSULT TO PHARMACY  PHARMACY TO CHANGE BASE FLUIDS  IP CONSULT TO HOSPITALIST  IP CONSULT TO NEUROLOGY    Disposition:  Long term care facility     Condition at Discharge: Stable    Discharge Instructions/Follow-up:    Follow up with neurology as outpatient    Code Status:  Full Code    Activity: activity as tolerated    Diet: regular diet      Discharge Medications:     Current Discharge Medication List           Details   furosemide (LASIX) 20 MG tablet furosemide 20 mg tablet      melatonin 5 MG TABS tablet Take 5 mg by mouth nightly      latanoprost (XALATAN) 0.005 % ophthalmic solution Inject 1 drop into the eye nightly      trimethoprim-polymyxin b (POLYTRIM) 16956-0.1 UNIT/ML-% ophthalmic solution Polytrim 10,000 unit-1 mg/mL eye drops   INSTILL 1 DROP INTO AFFECTED EYE(S) BY OPHTHALMIC ROUTE EVERY 6 HOURS      gabapentin (NEURONTIN) 300 MG capsule Take 1 capsule by mouth 2 times daily for 30 days. Qty: 60 capsule, Refills: 0      insulin detemir (LEVEMIR) 100 UNIT/ML injection vial Inject 10 Units into the skin nightly  Qty: 1 vial, Refills: 3      Skin Protectants, Misc.  (HYDROCERIN) CREA cream Apply topically 2 times daily  Qty: 1 Container, Refills: 0      acetaminophen (TYLENOL) 500 MG tablet Take 500 mg by mouth every 6 hours as needed for Pain      allopurinol (ZYLOPRIM) 100 MG tablet Take 100 mg by mouth daily      amLODIPine (NORVASC) 5 MG tablet Take 5 mg by mouth daily Hold for SBP <110 or Pulse <60      ARIPiprazole (ABILIFY) 15 MG tablet Take 15 mg by mouth daily      polyvinyl alcohol (LIQUIFILM TEARS) 1.4 % ophthalmic solution Place 1 drop into both eyes as needed for Dry Eyes      aspirin 81 MG EC tablet Take 81 mg by mouth daily      atorvastatin (LIPITOR) 40 MG tablet Take 40 mg by mouth nightly      bisacodyl (DULCOLAX) 10 MG suppository Place 10 mg rectally daily as needed for Constipation      Cranberry 425 MG CAPS Take 425 mg by mouth daily      docusate sodium (COLACE) 100 MG capsule Take 100 mg by mouth 2 times daily      estradiol (ESTRACE) 1 MG tablet Take 1 mg by mouth daily      finasteride (PROSCAR) 5 MG tablet Take 5 mg by mouth daily      FLUoxetine (PROZAC) 20 MG capsule Take 40 mg by mouth daily      Multiple Vitamins-Minerals (THERAPEUTIC MULTIVITAMIN-MINERALS) tablet Take 1 tablet by mouth daily      insulin aspart (NOVOLOG) 100 UNIT/ML injection vial Inject 0-21 Units into the skin 3 times daily (before meals) Sliding scale   201-250 = 5 units  251-300 = 8 units  301-350 = 12 units  351-400 = 15 units  401-450 = 18 units  451-500 = 21 units  500+ notify MD      ondansetron (ZOFRAN-ODT) 4 MG disintegrating tablet Take 4 mg by mouth every 8 hours as needed for Nausea or Vomiting      calcium-vitamin D (OSCAL-500) 500-200 MG-UNIT per tablet Take 1 tablet by mouth 2 times daily      polyethylene glycol (GLYCOLAX) 17 g packet Take 17 g by mouth daily as needed for Constipation      !! risperiDONE (RISPERDAL) 0.5 MG tablet Take 0.5 mg by mouth every morning (before breakfast)       !! risperiDONE (RISPERDAL) 1 MG tablet Take 1.5 mg by mouth nightly       tamsulosin (FLOMAX) 0.4 MG capsule Take 0.8 mg by mouth nightly      valproic acid (DEPACON) 250 MG/5ML SOLN oral solution Take 250 mg by mouth 2 times daily      Cholecalciferol (VITAMIN D3) 1.25 MG (79020 UT) CAPS Take 50,000 Units by mouth once a week Fridays       ! ! - Potential duplicate medications found. Please discuss with provider. Time Spent on discharge is more than 30 minutes in the examination, evaluation, counseling and review of medications and discharge plan. Signed:    Kumar Ahuja MD   10/7/2021      Thank you Oneyda Quinones MD for the opportunity to be involved in this patient's care. If you have any questions or concerns please feel free to contact me at [phone].

## 2021-10-07 NOTE — PROGRESS NOTES
Speech Language Pathology  Facility/Department: [facility] 5T ORTHO/NEURO   CLINICAL BEDSIDE SWALLOW EVALUATION  Speech, language, cognitive screen  Treatment      NAME: Julián uTrner  : 1950  MRN: [id number]    ADMISSION DATE: 10/6/2021  ADMITTING DIAGNOSIS: has Other fatigue and Right arm weakness on their problem list.  ONSET DATE: 10/6/21    Recent Chest Xray 10/6/21  Impression       Limited inspiration.       Patchy ill-defined increased density is seen bilaterally, nonspecific. Atypical or viral pneumonia is not excluded. Correlate clinically       CT of head 10/6/21  Impression       Cerebral atrophy.       No acute intracranial findings. MRI brain 10/6/21  Impression       1. No acute stroke, mass, or hemorrhage. 2. Mild chronic small vessel ischemic white matter disease. 3. Mild cerebral atrophy. Date of Eval: 10/7/2021  Evaluating Therapist: RICHIE Mann    Current Diet level:  Current Diet : NPO  Current Liquid Diet : NPO      Primary Complaint  Patient Complaint: pt c/o of right hand weakness    Pain:  Pt denied pain    Reason for Referral  Julián Turner was referred for a bedside swallow evaluation to assess the efficiency of his swallow function, identify signs and symptoms of aspiration and make recommendations regarding safe dietary consistencies, effective compensatory strategies, and safe eating environment. History of Present Illness      Julián Turner is a 73 y.o. male who presents with reported right-sided weakness. Patient brought in by EMS from his nursing home for reported right-sided weakness. Mixed reports have been received about the chronicity of events, however the onset of symptoms happened somewhere between 2 to 4 hours ago. (EMS reports 2 hours, Patient reports 4 hours.)  Upon arrival patient was evaluated and does have a focal weakness in his right hand which the patient states is new.  Upon arrival given the patient's focal neurological deficits, CT head and CTA were ordered to evaluate for the possibility of an acute neurovascular ischemic event. Attempts were made to contact the patient's facility from which he arrived, however further collateral was not available to determine the exact onset of symptoms. The patient's exact last known normal unknown at this time.       Impression  Pt alert and oriented x3. Pt's speech is dysarthric, but pt reports this is his baseline. Pt has history of CVA. Pt able to follow command and respond to most questions appropriately. Pt appears to be [facility] which interferes with comprehension at times. Pt is with limited dentition and dentition that is present is in poor condition. ROM of oral structures was Open English/Vow To Be [place] [place]. Pt had no difficulty closing lips around spoon or drawing liquid up a straw. Pt had mild dificulty with mastication with solids. There was no anterior spillage or oral residue noted with any consistency. Pt able to initiate swallow without difficulty with laryngeal movement observed. Vocal quality remained clear throughout. When pt was asked to consume 3oz of water continuously, pt produced a reactive cough after consuming approximately 1 oz of water. Pt endorsed sensation of aspiration. Attempted 3oz water screen again later in the session. On second attempt pt displayed no s/s aspiration. Per RN, pt passes swallow screen last night. Pt had no other incidences of coughing,  throat clearing or change of vocal quality through out the assessment, even when consuming thin liquids by straw. Will con't to monitor   Dysphagia Diagnosis: Mild oral stage dysphagia  Dysphagia Outcome Severity Scale: Level 5: Mild dysphagia- Distant supervision. May need one diet consistency restricted     Treatment Plan  Requires SLP Intervention: Yes  Duration/Frequency of Treatment: 2-4x  D/C Recommendations:  To be determined       Recommended Diet and Intervention  Diet Solids Recommendation:  Easy to chew diet  Liquid Consistency Recommendation: Thin-Make NPO if s/s aspiration emerge and alert SLP    Recommended Form of Meds: PO  Recommendations: Dysphagia treatment  Therapeutic Interventions: Diet tolerance monitoring;Patient/Family education    Compensatory Swallowing Strategies  Compensatory Swallowing Strategies: Upright as possible for all oral intake;Small bites/sips    Treatment/Goals  1- The patient will tolerate recommended diet without observed clinical signs of aspiration    2- The pt/caregiver will demonstrate understanding of swallowing recommendations and concerns. 10/7- The pt was educated to purpose of the visit, anatomy and physiology of the swallow, concerns for aspiration, swallowing strategies, diet recommendations and possibility of being made NPO if s/s aspiration emerge. The pt stated comprehension but would benefit from reinforcement. con't goal      General  Chart Reviewed: Yes  Behavior/Cognition: Alert; Cooperative;Pleasant mood  Respiratory Status: Room air  Communication Observation: Dysarthria (suspect is pt's basline. pt reported speech is at baseline as pt has history of CVA)  Follows Directions: Simple  Dentition: Some missing teeth (limited dentition with poor condition of dentition)  Patient Positioning: Upright in chair  Baseline Vocal Quality: Normal  Volitional Cough: Strong  Prior Dysphagia History: no history of dysphagia per pt and chart  Consistencies Administered: Reg solid; Dysphagia Pureed (Dysphagia I); Thin - straw; Thin - cup; Ice Chips         Vision/Hearing  Vision  Vision: Within Functional Limits  Hearing  Hearing: Exceptions to [facility]  Hearing Exceptions: Hard of hearing/hearing concerns    Oral Motor Deficits  Oral/Motor  Oral Motor:  Within functional limits          Prognosis  Prognosis  Prognosis for safe diet advancement: good  Individuals consulted  Consulted and agree with results and recommendations: Patient;RN    Education  Patient Education: Role of SLP  Patient Education Response: Verbalizes understanding;Needs reinforcement  Safety Devices in place: Yes  Type of devices: Call light within reach       Therapy Time  SLP Individual Minutes  Time In: 0740  Time Out: 0810  Minutes: 30          Pt's goal:pt denied difficulty with communication and swallowing so did not state goal       Plan:  Continue goals per POC  Recommended diet: Easy to [employer] with thin liquids-Make NPO if s/s aspiration emerge and alert SLP  Total treatment time:30  Pt's discharge plan:back to SNF  Discharge Plan: To be determined closer to discharge  Discussed with RN, Anne  Needs within reach.        Lu Franco, [address], [facility]- [address] [place] [address]  Pg # [phone]  This document will serve as a discharge summary if pt discharge before next treatment   session

## 2021-10-07 NOTE — PROGRESS NOTES
Occupational Therapy   Occupational Therapy Initial Assessment/Treatment/Discharge  Date: 10/7/2021   Patient Name: Edgar Galicia  MRN: [id number]     : 1950    Date of Service: 10/7/2021    Discharge Recommendations:      Edgar Galicia scored a 17/24 on the AM-PAC ADL Inpatient form. Current research shows that an AM-PAC score of 17 or less is typically not associated with a discharge to the patient's home setting. Based on the patient's AM-PAC score and their current ADL deficits, it is recommended that the patient have 3-5 sessions per week of Occupational Therapy at d/c to increase the patient's independence. Please see assessment section for further patient specific details. OT Equipment Recommendations  Equipment Needed: No    Assessment   Assessment: Pt was referred to therapy after admission from nursing home for RUE weakness. At baseline, pt requires assistance for ADLs and ambulates in manual w/c. At [facility], pt completed ADLs with some assist and complete chair to toilet transfer with Brittany Li, tolerated well. Pt appears to be currently functioning at baseline, no acute OT needs at this time. Pt provided with foam block and educated on HEP to increase R hand strength. Pt stated that he will continue to receive therapy at nursing home. Prognosis: Good  Decision Making: Medium Complexity  OT Education: OT Role;Plan of Care;Home Exercise Program;ADL Adaptive Strategies;Transfer Training  Patient Education: Pt was given sponge and educated on grasp and release exercises to strengthen R hand. Pt demonstrated understanding of education. No Skilled OT: At baseline function  REQUIRES OT FOLLOW UP: No  Activity Tolerance  Activity Tolerance: Patient Tolerated treatment well  Safety Devices  Safety Devices in place: Yes  Type of devices: Call light within reach; Chair alarm in place; Left in chair;Nurse notified (Tray table positioned next to pt)           Restrictions  Position Activity Restriction  Other position/activity restrictions: Up as tolerated    Subjective   General  Chart Reviewed: Yes  Patient assessed for rehabilitation services?: Yes  Additional Pertinent Hx: Pt is 72 yo male admitted from nursing home for RUE weakness. PMH includes ataxia, bipolar 1 disoorder, BPH, cerebral infarct, CKD, dementia, depression, DMII, HTN, and schizophrenia. Family / Caregiver Present: No  Referring Practitioner: Dr. Jesenia Hoang  Diagnosis: Right arm weakness  Subjective  Subjective: Pt was in chair upon arrival. Pt stated he wanted to get dressed. Patient Currently in Pain: Denies    Social/Functional History  Social/Functional History  Lives With: Alone  Type of Home: Facility  Bathroom Shower/Tub: Walk-in shower  Bathroom Toilet: Standard  Bathroom Equipment: Grab bars in shower, Grab bars around toilet, Shower chair  Bathroom Accessibility: Wheelchair accessible  Home Equipment: Rolling walker, [address] Help From:  (Facility staff)  ADL Assistance: Needs assistance (Assist for dressing, toileting, and bathing)  Homemaking Responsibilities: No  Ambulation Assistance: Needs assistance (Uses manual w/c. Can self-propel with feet and hands. Facility staff pushes him for long distances. )  Transfer Assistance: Needs assistance  Active : No  Leisure & Hobbies: Watching TV (Let's Make a Deal, Marie Cronin and Holli Ferris is Right)  Additional Comments: Pt reported 2 falls in the past 6 months. First fall, staff helped him get up. Second fall, he helped himself up.      Objective   Vision: Impaired  Vision Exceptions: Wears glasses for reading  Hearing: Exceptions to [facility]  Hearing Exceptions: Hard of hearing/hearing concerns    Orientation  Overall Orientation Status: Within Normal Limits     Balance  Sitting Balance: Stand by assistance (Sitting in chair and Ozie Guillermo)  Standing Balance: Contact guard assistance (Standing with Ozie Guillermo and when holding onto chair)  Standing Balance  Time: ~4min total. Longest stand was ~1.5 minutes. Activity: ADLs, stand to Martin Reynoso  Functional Mobility  Functional Mobility Comments: Transferred from chair to toilet with Martin Reynoso. Toilet Transfers  Toilet Transfers Comments: Toilet transfer with Martin Reynoso, CGA for sit to stand, Min A for stand to sit. ADL  Grooming: Contact guard assistance (Washed hands at sink while standing with CGA)  UE Dressing: Minimal assistance (Pt donned sweater with min A to pull down over torso)  LE Dressing: Moderate assistance (Pt donned pants with CGA and required total A to don socks. Min A to change brief.)  Toileting: Minimal assistance (Completed toilet hygiene with CGA, clothing management min A)  Tone RUE  RUE Tone: Normotonic  Tone LUE  LUE Tone: Normotonic  Coordination  Movements Are Fluid And Coordinated: No  Coordination and Movement description: Decreased speed;Fine motor impairments     Bed mobility  Scooting: Stand by assistance (Scooting in chair)  Transfers  Sit to stand: Contact guard assistance  Stand to sit: Minimal assistance     Cognition  Overall Cognitive Status: Exceptions  Initiation: Requires cues for some  Sequencing: Requires cues for some     LUE AROM (degrees)  LUE AROM : WFL  RUE AROM (degrees)  RUE AROM : Exceptions  R Shoulder Flexion 0-180: WFL  R Shoulder Extension 0-45: WFL  R Elbow Flexion 0-145: WFL  R Elbow Extension 145-0: WFL  R Wrist Flexion 0-80: WFL  R Wrist Extension 0-70: ~5 degrees  LUE Strength  Gross LUE Strength: WFL  L Hand General: 5/5  RUE Strength  Gross RUE Strength: Exceptions to WFL;WFL  R Wrist Ext: 2/5  R Hand General: 3/5     Pt seen for OT evaluation, treatment, and discharge including ADL training and functional mobility.      Plan   Plan  Plan Comment: Discharge from acute OT services    AM-PAC Score        AM-[facility] Inpatient Daily Activity Raw Score: 17 (10/07/21 1546)  AM-PAC Inpatient ADL T-Scale Score : 37.26 (10/07/21 1546)  ADL Inpatient CMS 0-100% Score: 50.11 (10/07/21 1546)  ADL Inpatient CMS G-Code Modifier : CK (10/07/21 1546)    Goals  Patient Goals   Patient goals : No goals indicated       Therapy Time   Individual Concurrent Group Co-treatment   Time In 1430         Time Out 1512         Minutes 42         Timed Code Treatment Minutes: 32 Minutes    Total Treatment Time: [facility]  A licensed therapist was present, directed the patient's care, made skilled judgement, and was responsible for assessment and treatment of the patient.

## 2021-10-07 NOTE — PROGRESS NOTES
4 Eyes Admission Assessment     I agree as the admission nurse that 2 RN's have performed a thorough Head to Toe Skin Assessment on the patient. ALL assessment sites listed below have been assessed on admission. Areas assessed by both nurses:   [x]   Head, Face, and Ears   [x]   Shoulders, Back, and Chest  [x]   Arms, Elbows, and Hands   [x]   Coccyx, Sacrum, and Ischium  [x]   Legs, Feet, and Heels        Does the Patient have Skin Breakdown?   Yes a wound was noted on the Admission Assessment and an LDA was Initiated documentation include the Franca-wound, Wound Assessment, Measurements, Dressing Treatment, Drainage, and Color\",       Red swollen BLE, skin tear right posterior upper leg, scattered abrasions,   Pio Prevention initiated:  Yes   Wound Care Orders initiated:  Yes      [address]  nurse consulted for Pressure Injury (Stage 3,4, Unstageable, DTI, NWPT, and Complex wounds) or Pio score 18 or lower:  Yes      Nurse 1 eSignature: Electronically signed by Bonnie Cai RN on 10/6/21 at 8:45 PM EDT    **SHARE this note so that the co-signing nurse is able to place an eSignature**    Nurse 2 eSignature: Electronically signed by Evan Gutierrez RN on 10/7/21 at 6:55 AM EDT

## 2021-10-07 NOTE — PLAN OF CARE
Ok to [employer], MRI negative for acute CVA, agree with neurology not TIA, peripheral process and will need outpatient EMG/NCS. Review of outpatient records show good control of BP actually BP systolic 664K. I suspect the elevated BP here are due to anxiety of being in the hospital,  Ok to dc back to long term care facility, no changes in med.  Will need outpatient neurology follow up and EMG/NCS  Formal Dc summary to follow

## 2021-10-07 NOTE — PROGRESS NOTES
Stroke Admission    I agree as the admission nurse that I have completed a thorough stroke assessment and completed the admission on the patient. ALL assessment areas listed below have been addressed and completed. Presentation: Ischemic    Handoff assessment completed with ED ,RN.     Current NIHSS 1    [x]   Education Assessment  [x]   Individualized Stroke/TIA Education template added, including patient specific risk factors: Hypertension, High Cholesterol, Overweight, Lack of exercise and Diabetes  [x]   Individualized Stroke/TIA Care Plan template added  [x]   Bedside swallow screen completed using the [place] Swallow Protocol, and documented PRIOR to any PO meds, food or drink: Pass  [x]   VTE Prophylaxis: SCDs ordered/addressed; SCDs: Lovenox          (As a reminder, ASA, Plavix and TPA are not VTE prophylaxis.)  [x]   Stroke education booklet given, and education initiated with patient and/or caregiver      Nurse eSignature: Electronically signed by Melinda Dye RN on 10/7/21 at 12:04 AM EDT

## 2021-10-07 NOTE — PROGRESS NOTES
Physical Therapy  Discharge  Per OT, pt is functioning at baseline. No inpatient PT indicated.  D/C PT.    Petra Quiles, MPT [id number]

## 2021-10-07 NOTE — PROGRESS NOTES
Clinical Pharmacy Progress Note    IV in NS - Management by Pharmacy    Consult Date(s): 10/6  Consulting Provider(s): Dr. Naseem Harris / Pedro Pablo Griffin Weakness - All IVs in Normal Saline   Drips will be adjusted to normal saline as appropriate based on compatibility, in an effort to avoid fluid shifts, as D5W is osmotically active.  The following intermittent IV drips / infusions have been adjusted to saline:  o None currently   Note: Patient has D5W ordered as part of hypoglycemia orderset.  Total IV fluid delivered to patient over last 24 hrs: ~500   RPh will follow daily to ensure all IVPBs / drips are in NS where possible. Please call with questions--  Alyssa Alcocer PharmD, [facility]  Wireless: [id number]   10/7/2021 1:28 PM        Subjective/Objective: Mr. Sadia Santana is a 73 y.o. male admitted for possible stroke. Pharmacy has been consulted to change IVs to NS.     Height:   Ht Readings from Last 1 Encounters:   10/06/21 5' 8\" (1.727 m)     Weight:   Wt Readings from Last 1 Encounters:   10/06/21 227 lb (103 kg)

## 2021-10-07 NOTE — PROGRESS NOTES
NIH Stroke Scale  Interval: Hand-off/Transfer  Level of Consciousness (1a. ): Alert  LOC Questions (1b. ):  Answers both correctly  LOC Commands (1c. ): Performs both tasks correctly  Best Gaze (2. ): Normal  Visual (3. ): No visual loss  Facial Palsy (4. ): Normal symmetrical movement  Motor Arm, Left (5a. ): No drift  Motor Arm, Right (5b. ): Drift, but does not hit bed (also mininal strength in the hand)  Motor Leg, Left (6a. ): No drift  Motor Leg, Right (6b. ): No drift  Limb Ataxia (7. ): Absent  Sensory (8. ): Normal  Best Language (9. ): No aphasia  Dysarthria (10. ): Normal  Extinction and Inattention (11): No abnormality  Total: 1

## 2021-10-08 LAB
ESTIMATED AVERAGE GLUCOSE: 203 MG/DL
HBA1C MFR BLD: 8.7 %

## 2021-10-15 LAB
EKG ATRIAL RATE: 76 BPM
EKG DIAGNOSIS: NORMAL
EKG P AXIS: 44 DEGREES
EKG P-R INTERVAL: 182 MS
EKG Q-T INTERVAL: 392 MS
EKG QRS DURATION: 84 MS
EKG QTC CALCULATION (BAZETT): 441 MS
EKG R AXIS: 3 DEGREES
EKG T AXIS: 41 DEGREES
EKG VENTRICULAR RATE: 76 BPM

## 2022-02-26 ENCOUNTER — HOSPITAL ENCOUNTER (INPATIENT)
Age: 72
LOS: 3 days | Discharge: NURSING, REHAB OR LONG-TERM CARE | DRG: 871 | End: 2022-03-01
Attending: STUDENT IN AN ORGANIZED HEALTH CARE EDUCATION/TRAINING PROGRAM | Admitting: INTERNAL MEDICINE
Payer: MEDICARE

## 2022-02-26 ENCOUNTER — APPOINTMENT (OUTPATIENT)
Dept: GENERAL RADIOLOGY | Age: 72
DRG: 871 | End: 2022-02-26
Payer: MEDICARE

## 2022-02-26 ENCOUNTER — APPOINTMENT (OUTPATIENT)
Dept: CT IMAGING | Age: 72
DRG: 871 | End: 2022-02-26
Payer: MEDICARE

## 2022-02-26 DIAGNOSIS — R73.9 HYPERGLYCEMIA: ICD-10-CM

## 2022-02-26 DIAGNOSIS — N30.01 ACUTE CYSTITIS WITH HEMATURIA: ICD-10-CM

## 2022-02-26 DIAGNOSIS — R41.82 ALTERED MENTAL STATUS, UNSPECIFIED ALTERED MENTAL STATUS TYPE: Primary | ICD-10-CM

## 2022-02-26 DIAGNOSIS — R65.10 SIRS (SYSTEMIC INFLAMMATORY RESPONSE SYNDROME) (HCC): ICD-10-CM

## 2022-02-26 PROBLEM — A41.9 SEPSIS (HCC): Status: ACTIVE | Noted: 2022-02-26

## 2022-02-26 LAB
A/G RATIO: 0.9 (ref 1.1–2.2)
ALBUMIN SERPL-MCNC: 3.7 G/DL (ref 3.4–5)
ALP BLD-CCNC: 143 U/L (ref 40–129)
ALT SERPL-CCNC: 15 U/L (ref 10–40)
ANION GAP SERPL CALCULATED.3IONS-SCNC: 12 MMOL/L (ref 3–16)
AST SERPL-CCNC: 30 U/L (ref 15–37)
BACTERIA: ABNORMAL /HPF
BASE EXCESS VENOUS: 5.5 MMOL/L (ref -2–3)
BASOPHILS ABSOLUTE: 0.1 K/UL (ref 0–0.2)
BASOPHILS RELATIVE PERCENT: 0.8 %
BILIRUB SERPL-MCNC: <0.2 MG/DL (ref 0–1)
BILIRUBIN URINE: NEGATIVE
BLOOD, URINE: ABNORMAL
BUN BLDV-MCNC: 33 MG/DL (ref 7–20)
CALCIUM SERPL-MCNC: 10.9 MG/DL (ref 8.3–10.6)
CARBOXYHEMOGLOBIN: 0.7 % (ref 0–1.5)
CHLORIDE BLD-SCNC: 99 MMOL/L (ref 99–110)
CLARITY: ABNORMAL
CO2: 28 MMOL/L (ref 21–32)
COLOR: YELLOW
CREAT SERPL-MCNC: 1.8 MG/DL (ref 0.8–1.3)
EOSINOPHILS ABSOLUTE: 0.1 K/UL (ref 0–0.6)
EOSINOPHILS RELATIVE PERCENT: 0.9 %
GFR AFRICAN AMERICAN: 45
GFR NON-AFRICAN AMERICAN: 38
GLUCOSE BLD-MCNC: 210 MG/DL (ref 70–99)
GLUCOSE BLD-MCNC: 278 MG/DL (ref 70–99)
GLUCOSE BLD-MCNC: 329 MG/DL (ref 70–99)
GLUCOSE URINE: NEGATIVE MG/DL
HCO3 VENOUS: 32.5 MMOL/L (ref 24–28)
HCT VFR BLD CALC: 33.3 % (ref 40.5–52.5)
HEMOGLOBIN, VEN, REDUCED: 19.9 %
HEMOGLOBIN: 10.6 G/DL (ref 13.5–17.5)
KETONES, URINE: NEGATIVE MG/DL
LACTIC ACID: 1.9 MMOL/L (ref 0.4–2)
LACTIC ACID: 2.4 MMOL/L (ref 0.4–2)
LACTIC ACID: 3.1 MMOL/L (ref 0.4–2)
LEUKOCYTE ESTERASE, URINE: ABNORMAL
LYMPHOCYTES ABSOLUTE: 1 K/UL (ref 1–5.1)
LYMPHOCYTES RELATIVE PERCENT: 10.1 %
MCH RBC QN AUTO: 26.5 PG (ref 26–34)
MCHC RBC AUTO-ENTMCNC: 31.8 G/DL (ref 31–36)
MCV RBC AUTO: 83.5 FL (ref 80–100)
METHEMOGLOBIN VENOUS: 0.3 % (ref 0–1.5)
MICROSCOPIC EXAMINATION: YES
MONOCYTES ABSOLUTE: 0.5 K/UL (ref 0–1.3)
MONOCYTES RELATIVE PERCENT: 4.8 %
NEUTROPHILS ABSOLUTE: 7.9 K/UL (ref 1.7–7.7)
NEUTROPHILS RELATIVE PERCENT: 83.4 %
NITRITE, URINE: NEGATIVE
O2 SAT, VEN: 80 %
PCO2, VEN: 58.7 MMHG (ref 41–51)
PDW BLD-RTO: 17.6 % (ref 12.4–15.4)
PERFORMED ON: ABNORMAL
PERFORMED ON: ABNORMAL
PH UA: 5.5 (ref 5–8)
PH VENOUS: 7.35 (ref 7.35–7.45)
PLATELET # BLD: 283 K/UL (ref 135–450)
PMV BLD AUTO: 8.2 FL (ref 5–10.5)
PO2, VEN: 48.4 MMHG (ref 25–40)
POTASSIUM REFLEX MAGNESIUM: 4.7 MMOL/L (ref 3.5–5.1)
PROTEIN UA: ABNORMAL MG/DL
RBC # BLD: 3.99 M/UL (ref 4.2–5.9)
RBC UA: ABNORMAL /HPF (ref 0–4)
SODIUM BLD-SCNC: 139 MMOL/L (ref 136–145)
SPECIFIC GRAVITY UA: >=1.03 (ref 1–1.03)
TCO2 CALC VENOUS: 34 MMOL/L
TOTAL PROTEIN: 8 G/DL (ref 6.4–8.2)
TROPONIN: 0.02 NG/ML
TROPONIN: 0.02 NG/ML
URINE REFLEX TO CULTURE: YES
URINE TYPE: ABNORMAL
UROBILINOGEN, URINE: 0.2 E.U./DL
WBC # BLD: 9.5 K/UL (ref 4–11)
WBC UA: >100 /HPF (ref 0–5)

## 2022-02-26 PROCEDURE — 2060000000 HC ICU INTERMEDIATE R&B

## 2022-02-26 PROCEDURE — 6370000000 HC RX 637 (ALT 250 FOR IP): Performed by: STUDENT IN AN ORGANIZED HEALTH CARE EDUCATION/TRAINING PROGRAM

## 2022-02-26 PROCEDURE — 2580000003 HC RX 258: Performed by: STUDENT IN AN ORGANIZED HEALTH CARE EDUCATION/TRAINING PROGRAM

## 2022-02-26 PROCEDURE — 87040 BLOOD CULTURE FOR BACTERIA: CPT

## 2022-02-26 PROCEDURE — 82803 BLOOD GASES ANY COMBINATION: CPT

## 2022-02-26 PROCEDURE — 81001 URINALYSIS AUTO W/SCOPE: CPT

## 2022-02-26 PROCEDURE — 93005 ELECTROCARDIOGRAM TRACING: CPT | Performed by: INTERNAL MEDICINE

## 2022-02-26 PROCEDURE — 82010 KETONE BODYS QUAN: CPT

## 2022-02-26 PROCEDURE — 74176 CT ABD & PELVIS W/O CONTRAST: CPT

## 2022-02-26 PROCEDURE — 96365 THER/PROPH/DIAG IV INF INIT: CPT

## 2022-02-26 PROCEDURE — 83605 ASSAY OF LACTIC ACID: CPT

## 2022-02-26 PROCEDURE — 87086 URINE CULTURE/COLONY COUNT: CPT

## 2022-02-26 PROCEDURE — 87077 CULTURE AEROBIC IDENTIFY: CPT

## 2022-02-26 PROCEDURE — 84484 ASSAY OF TROPONIN QUANT: CPT

## 2022-02-26 PROCEDURE — 85025 COMPLETE CBC W/AUTO DIFF WBC: CPT

## 2022-02-26 PROCEDURE — 99284 EMERGENCY DEPT VISIT MOD MDM: CPT

## 2022-02-26 PROCEDURE — 6360000002 HC RX W HCPCS: Performed by: STUDENT IN AN ORGANIZED HEALTH CARE EDUCATION/TRAINING PROGRAM

## 2022-02-26 PROCEDURE — 36415 COLL VENOUS BLD VENIPUNCTURE: CPT

## 2022-02-26 PROCEDURE — 71045 X-RAY EXAM CHEST 1 VIEW: CPT

## 2022-02-26 PROCEDURE — 70450 CT HEAD/BRAIN W/O DYE: CPT

## 2022-02-26 PROCEDURE — 80053 COMPREHEN METABOLIC PANEL: CPT

## 2022-02-26 PROCEDURE — 87186 SC STD MICRODIL/AGAR DIL: CPT

## 2022-02-26 RX ORDER — INSULIN LISPRO 100 [IU]/ML
8 INJECTION, SOLUTION INTRAVENOUS; SUBCUTANEOUS ONCE
Status: DISCONTINUED | OUTPATIENT
Start: 2022-02-26 | End: 2022-02-26

## 2022-02-26 RX ORDER — 0.9 % SODIUM CHLORIDE 0.9 %
500 INTRAVENOUS SOLUTION INTRAVENOUS ONCE
Status: COMPLETED | OUTPATIENT
Start: 2022-02-26 | End: 2022-02-27

## 2022-02-26 RX ORDER — SENNA AND DOCUSATE SODIUM 50; 8.6 MG/1; MG/1
2 TABLET, FILM COATED ORAL ONCE
Status: COMPLETED | OUTPATIENT
Start: 2022-02-26 | End: 2022-02-26

## 2022-02-26 RX ORDER — 0.9 % SODIUM CHLORIDE 0.9 %
1000 INTRAVENOUS SOLUTION INTRAVENOUS ONCE
Status: COMPLETED | OUTPATIENT
Start: 2022-02-26 | End: 2022-02-26

## 2022-02-26 RX ADMIN — DEXTROSE MONOHYDRATE 1000 MG: 5 INJECTION INTRAVENOUS at 20:40

## 2022-02-26 RX ADMIN — DOCUSATE SODIUM 50 MG AND SENNOSIDES 8.6 MG 2 TABLET: 8.6; 5 TABLET, FILM COATED ORAL at 21:47

## 2022-02-26 RX ADMIN — SODIUM CHLORIDE 1000 ML: 0.9 INJECTION, SOLUTION INTRAVENOUS at 21:38

## 2022-02-26 RX ADMIN — SODIUM CHLORIDE 1000 ML: 0.9 INJECTION, SOLUTION INTRAVENOUS at 20:11

## 2022-02-26 RX ADMIN — SODIUM CHLORIDE 500 ML: 9 INJECTION, SOLUTION INTRAVENOUS at 23:55

## 2022-02-26 ASSESSMENT — ENCOUNTER SYMPTOMS
ABDOMINAL PAIN: 1
SHORTNESS OF BREATH: 0
DIARRHEA: 0

## 2022-02-27 LAB
ALBUMIN SERPL-MCNC: 3 G/DL (ref 3.4–5)
ANION GAP SERPL CALCULATED.3IONS-SCNC: 10 MMOL/L (ref 3–16)
BASOPHILS ABSOLUTE: 0.1 K/UL (ref 0–0.2)
BASOPHILS RELATIVE PERCENT: 1.1 %
BETA-HYDROXYBUTYRATE: 0.09 MMOL/L (ref 0–0.27)
BUN BLDV-MCNC: 31 MG/DL (ref 7–20)
CALCIUM SERPL-MCNC: 9.8 MG/DL (ref 8.3–10.6)
CHLORIDE BLD-SCNC: 107 MMOL/L (ref 99–110)
CO2: 25 MMOL/L (ref 21–32)
CREAT SERPL-MCNC: 1.8 MG/DL (ref 0.8–1.3)
EKG ATRIAL RATE: 69 BPM
EKG DIAGNOSIS: NORMAL
EKG P AXIS: 49 DEGREES
EKG P-R INTERVAL: 182 MS
EKG Q-T INTERVAL: 408 MS
EKG QRS DURATION: 70 MS
EKG QTC CALCULATION (BAZETT): 437 MS
EKG R AXIS: 16 DEGREES
EKG T AXIS: -46 DEGREES
EKG VENTRICULAR RATE: 69 BPM
EOSINOPHILS ABSOLUTE: 0 K/UL (ref 0–0.6)
EOSINOPHILS RELATIVE PERCENT: 0.3 %
GFR AFRICAN AMERICAN: 45
GFR NON-AFRICAN AMERICAN: 38
GLUCOSE BLD-MCNC: 101 MG/DL (ref 70–99)
GLUCOSE BLD-MCNC: 111 MG/DL (ref 70–99)
GLUCOSE BLD-MCNC: 122 MG/DL (ref 70–99)
GLUCOSE BLD-MCNC: 136 MG/DL (ref 70–99)
GLUCOSE BLD-MCNC: 143 MG/DL (ref 70–99)
GLUCOSE BLD-MCNC: 156 MG/DL (ref 70–99)
GLUCOSE BLD-MCNC: 174 MG/DL (ref 70–99)
GLUCOSE BLD-MCNC: 94 MG/DL (ref 70–99)
HCT VFR BLD CALC: 31.5 % (ref 40.5–52.5)
HEMOGLOBIN: 10.2 G/DL (ref 13.5–17.5)
LYMPHOCYTES ABSOLUTE: 0.8 K/UL (ref 1–5.1)
LYMPHOCYTES RELATIVE PERCENT: 8.1 %
MCH RBC QN AUTO: 26.8 PG (ref 26–34)
MCHC RBC AUTO-ENTMCNC: 32.3 G/DL (ref 31–36)
MCV RBC AUTO: 82.9 FL (ref 80–100)
MONOCYTES ABSOLUTE: 0.5 K/UL (ref 0–1.3)
MONOCYTES RELATIVE PERCENT: 5.3 %
NEUTROPHILS ABSOLUTE: 8.1 K/UL (ref 1.7–7.7)
NEUTROPHILS RELATIVE PERCENT: 85.2 %
PARATHYROID HORMONE INTACT: 41.8 PG/ML (ref 14–72)
PDW BLD-RTO: 18 % (ref 12.4–15.4)
PERFORMED ON: ABNORMAL
PERFORMED ON: NORMAL
PHOSPHORUS: 3 MG/DL (ref 2.5–4.9)
PLATELET # BLD: 274 K/UL (ref 135–450)
PMV BLD AUTO: 8.5 FL (ref 5–10.5)
POTASSIUM SERPL-SCNC: 4.7 MMOL/L (ref 3.5–5.1)
RBC # BLD: 3.8 M/UL (ref 4.2–5.9)
SODIUM BLD-SCNC: 142 MMOL/L (ref 136–145)
VITAMIN D 25-HYDROXY: 52.5 NG/ML
WBC # BLD: 9.5 K/UL (ref 4–11)

## 2022-02-27 PROCEDURE — 82652 VIT D 1 25-DIHYDROXY: CPT

## 2022-02-27 PROCEDURE — 83970 ASSAY OF PARATHORMONE: CPT

## 2022-02-27 PROCEDURE — 6360000002 HC RX W HCPCS: Performed by: INTERNAL MEDICINE

## 2022-02-27 PROCEDURE — 84156 ASSAY OF PROTEIN URINE: CPT

## 2022-02-27 PROCEDURE — 6370000000 HC RX 637 (ALT 250 FOR IP): Performed by: INTERNAL MEDICINE

## 2022-02-27 PROCEDURE — 84166 PROTEIN E-PHORESIS/URINE/CSF: CPT

## 2022-02-27 PROCEDURE — 36415 COLL VENOUS BLD VENIPUNCTURE: CPT

## 2022-02-27 PROCEDURE — 2060000000 HC ICU INTERMEDIATE R&B

## 2022-02-27 PROCEDURE — 82306 VITAMIN D 25 HYDROXY: CPT

## 2022-02-27 PROCEDURE — 84165 PROTEIN E-PHORESIS SERUM: CPT

## 2022-02-27 PROCEDURE — 85025 COMPLETE CBC W/AUTO DIFF WBC: CPT

## 2022-02-27 PROCEDURE — 2580000003 HC RX 258: Performed by: INTERNAL MEDICINE

## 2022-02-27 PROCEDURE — 84155 ASSAY OF PROTEIN SERUM: CPT

## 2022-02-27 PROCEDURE — 6360000002 HC RX W HCPCS: Performed by: STUDENT IN AN ORGANIZED HEALTH CARE EDUCATION/TRAINING PROGRAM

## 2022-02-27 PROCEDURE — 83883 ASSAY NEPHELOMETRY NOT SPEC: CPT

## 2022-02-27 PROCEDURE — 82570 ASSAY OF URINE CREATININE: CPT

## 2022-02-27 PROCEDURE — 2580000003 HC RX 258: Performed by: STUDENT IN AN ORGANIZED HEALTH CARE EDUCATION/TRAINING PROGRAM

## 2022-02-27 PROCEDURE — 80069 RENAL FUNCTION PANEL: CPT

## 2022-02-27 RX ORDER — NYSTATIN 100000 [USP'U]/G
POWDER TOPICAL
COMMUNITY

## 2022-02-27 RX ORDER — SODIUM CHLORIDE 9 MG/ML
25 INJECTION, SOLUTION INTRAVENOUS PRN
Status: DISCONTINUED | OUTPATIENT
Start: 2022-02-27 | End: 2022-03-01 | Stop reason: HOSPADM

## 2022-02-27 RX ORDER — SODIUM CHLORIDE 0.9 % (FLUSH) 0.9 %
5-40 SYRINGE (ML) INJECTION PRN
Status: DISCONTINUED | OUTPATIENT
Start: 2022-02-27 | End: 2022-03-01 | Stop reason: HOSPADM

## 2022-02-27 RX ORDER — POLYETHYLENE GLYCOL 3350 17 G/17G
17 POWDER, FOR SOLUTION ORAL
COMMUNITY

## 2022-02-27 RX ORDER — DIVALPROEX SODIUM 125 MG/1
250 CAPSULE, COATED PELLETS ORAL NIGHTLY
COMMUNITY

## 2022-02-27 RX ORDER — DIVALPROEX SODIUM 125 MG/1
125 CAPSULE, COATED PELLETS ORAL EVERY MORNING
COMMUNITY

## 2022-02-27 RX ORDER — ONDANSETRON 2 MG/ML
4 INJECTION INTRAMUSCULAR; INTRAVENOUS EVERY 6 HOURS PRN
Status: DISCONTINUED | OUTPATIENT
Start: 2022-02-27 | End: 2022-03-01 | Stop reason: HOSPADM

## 2022-02-27 RX ORDER — POLYETHYLENE GLYCOL 3350 17 G/17G
17 POWDER, FOR SOLUTION ORAL DAILY PRN
Status: DISCONTINUED | OUTPATIENT
Start: 2022-02-27 | End: 2022-03-01 | Stop reason: HOSPADM

## 2022-02-27 RX ORDER — INSULIN LISPRO 100 [IU]/ML
0-12 INJECTION, SOLUTION INTRAVENOUS; SUBCUTANEOUS EVERY 4 HOURS
Status: DISCONTINUED | OUTPATIENT
Start: 2022-02-27 | End: 2022-03-01 | Stop reason: HOSPADM

## 2022-02-27 RX ORDER — SODIUM CHLORIDE 0.9 % (FLUSH) 0.9 %
5-40 SYRINGE (ML) INJECTION EVERY 12 HOURS SCHEDULED
Status: DISCONTINUED | OUTPATIENT
Start: 2022-02-27 | End: 2022-03-01 | Stop reason: HOSPADM

## 2022-02-27 RX ORDER — ONDANSETRON 4 MG/1
4 TABLET, ORALLY DISINTEGRATING ORAL EVERY 8 HOURS PRN
Status: DISCONTINUED | OUTPATIENT
Start: 2022-02-27 | End: 2022-03-01 | Stop reason: HOSPADM

## 2022-02-27 RX ORDER — ACETAMINOPHEN 650 MG/1
650 SUPPOSITORY RECTAL EVERY 6 HOURS PRN
Status: DISCONTINUED | OUTPATIENT
Start: 2022-02-27 | End: 2022-03-01 | Stop reason: HOSPADM

## 2022-02-27 RX ORDER — ACETAMINOPHEN 325 MG/1
650 TABLET ORAL EVERY 6 HOURS PRN
Status: DISCONTINUED | OUTPATIENT
Start: 2022-02-27 | End: 2022-03-01 | Stop reason: HOSPADM

## 2022-02-27 RX ORDER — SODIUM CHLORIDE 9 MG/ML
INJECTION, SOLUTION INTRAVENOUS CONTINUOUS
Status: DISCONTINUED | OUTPATIENT
Start: 2022-02-27 | End: 2022-03-01

## 2022-02-27 RX ADMIN — ACETAMINOPHEN 650 MG: 650 SUPPOSITORY RECTAL at 10:24

## 2022-02-27 RX ADMIN — MINERAL OIL 330 ML: 1000 LIQUID ORAL at 01:12

## 2022-02-27 RX ADMIN — INSULIN GLARGINE 10 UNITS: 100 INJECTION, SOLUTION SUBCUTANEOUS at 20:29

## 2022-02-27 RX ADMIN — SODIUM CHLORIDE: 9 INJECTION, SOLUTION INTRAVENOUS at 01:28

## 2022-02-27 RX ADMIN — MEROPENEM 1000 MG: 1 INJECTION, POWDER, FOR SOLUTION INTRAVENOUS at 12:00

## 2022-02-27 RX ADMIN — INSULIN GLARGINE 10 UNITS: 100 INJECTION, SOLUTION SUBCUTANEOUS at 01:12

## 2022-02-27 RX ADMIN — INSULIN LISPRO 2 UNITS: 100 INJECTION, SOLUTION INTRAVENOUS; SUBCUTANEOUS at 12:02

## 2022-02-27 RX ADMIN — INSULIN LISPRO 2 UNITS: 100 INJECTION, SOLUTION INTRAVENOUS; SUBCUTANEOUS at 01:12

## 2022-02-27 RX ADMIN — MEROPENEM 1000 MG: 1 INJECTION, POWDER, FOR SOLUTION INTRAVENOUS at 00:02

## 2022-02-27 RX ADMIN — ENOXAPARIN SODIUM 40 MG: 100 INJECTION SUBCUTANEOUS at 08:59

## 2022-02-27 ASSESSMENT — PAIN SCALES - WONG BAKER
WONGBAKER_NUMERICALRESPONSE: 0

## 2022-02-27 ASSESSMENT — PAIN SCALES - GENERAL
PAINLEVEL_OUTOF10: 0

## 2022-02-27 NOTE — PROGRESS NOTES
4 Eyes Skin Assessment     The patient is being assess for  Admission    I agree that 2 RN's have performed a thorough Head to Toe Skin Assessment on the patient. ALL assessment sites listed below have been assessed. Areas assessed by both nurses:   [x]   Head, Face, and Ears   [x]   Shoulders, Back, and Chest  [x]   Arms, Elbows, and Hands   [x]   Coccyx, Sacrum, and Ischum  [x]   Legs, Feet, and Heels        Does the Patient have Skin Breakdown?   Yes a wound was noted on the Admission Assessment and an WOUND LDA was Initiated documentation include the Franca-wound, Wound Assessment, Measurements, Dressing Treatment, Drainage, and Color\",   BLE redness with scabbing/scaly skin        Pio Prevention initiated:  YES   Wound Care Orders initiated:  No      WOC nurse consulted for Pressure Injury (Stage 3,4, Unstageable, DTI, NWPT, and Complex wounds):  No      Nurse 1 eSignature: Electronically signed by Jane Tomas RN on 2/26/22 at 11:42 PM EST    **SHARE this note so that the co-signing nurse is able to place an eSignature**    Nurse 2 eSignature: Electronically signed by Deloris Vo RN on 6/13/34 at 12:43 AM EST

## 2022-02-27 NOTE — PROGRESS NOTES
Speech Language Pathology  Assessment attempt  HOLD    Referral rec'd, chart reviewed. Attempted to initiate swallowing evaluation but pt too lethargic and unable to participate. Also of note, respiratory rate fluctuated between 22-31, a RR >25 carries a heightened aspiration risk. Per hard chart, pt was on a regular diet with thin liquids at [facility]. Recommend con't NPO with oral care with suction and small amounts of ice chips when adequately alert to encourage swallow function. Will attempt to see again tomorrow. Discussed with RN, [facility], [address] [place] [address] Shaquille Corona 42  Speech-Language Pathologist  Pager [phone]

## 2022-02-27 NOTE — PROGRESS NOTES
Pharmacy Note - Renal Dosing    Meropenem 1g IV q8 hours  ordered for treatment of UTI. Per [facility] Renal Dose Adjustment Policy, dosing will be changed to meropenem 1g IV q12 hours. Estimated Creatinine Clearance: Estimated Creatinine Clearance: 44 mL/min (A) (based on SCr of 1.8 mg/dL (H)). Dialysis Status, KIMBERLY, CKD: CKD  BMI: Body mass index is 32.85 kg/m². Rationale for Adjustment: Agent is renally eliminated. Pharmacy will continue to monitor renal function and adjust dose as necessary. Please call with any questions.     Shiraz Raymond [facility] 2/27/2022, 1:04 AM

## 2022-02-27 NOTE — CONSULTS
Clinical Pharmacy Progress Note  Medication History     Admit Date: 2/26/2022    List of of current medications patient is taking is complete. Home Medication list in EPIC updated to reflect changes noted below. Source of information: [facility] Paperwork    Changes made to medication list:   Medications removed: (include reason, ex: therapy completed, inactive medication)   Valproic Acid   Liquifilm Tears   Polytrim  Medications added:    Nystatin  Medication doses adjusted:    Levemir- NH paperwork states 10 units QHS   Patient is on Miralax [id number] and Daily PRN.  Depakote-- uses Sprinkle Caps rather than oral solution. Patient takes 125mg QAM and 250mg QHS   Melatonin 1g QHS  Other notes:    Medication list is now complete based on NH paperwork. Please call with any questions. Josefa Chapa, PharmD  PGY-1 Pharmacy Resident  [id number]  2/27/2022 12:39 PM    No current facility-administered medications on file prior to encounter. Current Outpatient Medications on File Prior to Encounter   Medication Sig Dispense Refill    insulin aspart (NOVOLOG) 100 UNIT/ML injection vial Inject 20 Units into the skin 3 times daily (before meals)      nystatin (MYCOSTATIN) 916378 UNIT/GM powder Apply topically 4 times daily Apply topically 4 times daily.  polyethylene glycol (MIRALAX) 17 g PACK packet Take 17 g by mouth every 48 hours      divalproex (DEPAKOTE SPRINKLES) 125 MG capsule Take 250 mg by mouth at bedtime      divalproex (DEPAKOTE SPRINKLES) 125 MG capsule Take 125 mg by mouth every morning      furosemide (LASIX) 20 MG tablet 20 mg daily       latanoprost (XALATAN) 0.005 % ophthalmic solution Place 1 drop into the right eye nightly       gabapentin (NEURONTIN) 300 MG capsule Take 1 capsule by mouth 2 times daily for 30 days.  60 capsule 0    acetaminophen (TYLENOL) 500 MG tablet Take 500 mg by mouth every 6 hours as needed for Pain      allopurinol (ZYLOPRIM) 100 MG tablet Take 100 mg by mouth daily      amLODIPine (NORVASC) 5 MG tablet Take 5 mg by mouth daily Hold for SBP <110 or Pulse <60      ARIPiprazole (ABILIFY) 15 MG tablet Take 15 mg by mouth daily      aspirin 81 MG EC tablet Take 81 mg by mouth daily      atorvastatin (LIPITOR) 40 MG tablet Take 40 mg by mouth nightly      bisacodyl (DULCOLAX) 10 MG suppository Place 10 mg rectally daily as needed for Constipation      Cranberry 425 MG CAPS Take 425 mg by mouth daily      docusate sodium (COLACE) 100 MG capsule Take 100 mg by mouth 2 times daily      estradiol (ESTRACE) 1 MG tablet Take 1 mg by mouth daily      finasteride (PROSCAR) 5 MG tablet Take 5 mg by mouth daily      FLUoxetine (PROZAC) 20 MG capsule Take 40 mg by mouth daily      insulin aspart (NOVOLOG) 100 UNIT/ML injection vial Inject 0-21 Units into the skin 3 times daily (before meals) Sliding scale   201-250 = 5 units  251-300 = 8 units  301-350 = 12 units  351-400 = 15 units  401-450 = 18 units  451-500 = 21 units  500+ notify MD      ondansetron (ZOFRAN-ODT) 4 MG disintegrating tablet Take 4 mg by mouth every 8 hours as needed for Nausea or Vomiting      calcium-vitamin D (OSCAL-500) 500-200 MG-UNIT per tablet Take 1 tablet by mouth 2 times daily      polyethylene glycol (GLYCOLAX) 17 g packet Take 17 g by mouth daily as needed for Constipation      risperiDONE (RISPERDAL) 0.5 MG tablet Take 0.5 mg by mouth every morning (before breakfast)       risperiDONE (RISPERDAL) 1 MG tablet Take 1.5 mg by mouth nightly       tamsulosin (FLOMAX) 0.4 MG capsule Take 0.8 mg by mouth nightly      Cholecalciferol (VITAMIN D3) 1.25 MG (14691 UT) CAPS Take 50,000 Units by mouth once a week Fridays      melatonin 1 MG tablet Take 1 mg by mouth nightly       [DISCONTINUED] trimethoprim-polymyxin b (POLYTRIM) 04185-1.1 UNIT/ML-% ophthalmic solution Polytrim 10,000 unit-1 mg/mL eye drops   INSTILL 1 DROP INTO AFFECTED EYE(S) BY OPHTHALMIC ROUTE EVERY 6 HOURS  insulin detemir (LEVEMIR) 100 UNIT/ML injection vial Inject 10 Units into the skin nightly 1 vial 3    Skin Protectants, Misc.  (HYDROCERIN) CREA cream Apply topically 2 times daily 1 Container 0    Multiple Vitamins-Minerals (THERAPEUTIC MULTIVITAMIN-MINERALS) tablet Take 1 tablet by mouth daily

## 2022-02-27 NOTE — PROGRESS NOTES
Hospitalist Progress Note      PCP: Bandar Barksdale MD    Date of Admission: 2/26/2022    Chief Complaint: Altered mental status    Hospital Course: Sepsis and acute metabolic encephalopathy secondary to UTI. On IV fluids and antibiotics. Patient also noted to have hypercalcemia. Nephrology following. Subjective: Patient lethargic. Able to open eyes to verbal command but unable to follow commands or answer questions. Medications:  Reviewed    Infusion Medications    sodium chloride 100 mL/hr at 02/27/22 0128    sodium chloride       Scheduled Medications    meropenem  1,000 mg IntraVENous Q12H    insulin glargine  10 Units SubCUTAneous Nightly    insulin lispro  0-12 Units SubCUTAneous Q4H    sodium chloride flush  5-40 mL IntraVENous 2 times per day    enoxaparin  40 mg SubCUTAneous Daily     PRN Meds: sodium chloride flush, sodium chloride, ondansetron **OR** ondansetron, polyethylene glycol, acetaminophen **OR** acetaminophen      Intake/Output Summary (Last 24 hours) at 2/27/2022 0902  Last data filed at 2/27/2022 0516  Gross per 24 hour   Intake 0 ml   Output --   Net 0 ml       Physical Exam Performed:    BP (!) 117/49   Pulse 96   Temp 98 °F (36.7 °C) (Axillary)   Resp 23   Ht 5' 8\" (1.727 m)   Wt 216 lb 0.8 oz (98 kg)   SpO2 92%   BMI 32.85 kg/m²     General appearance: Chronically ill-appearing. HEENT: Pupils equal, round, and reactive to light. Conjunctivae/corneas clear. Neck: Supple, with full range of motion. No jugular venous distention. Trachea midline. Respiratory:  Normal respiratory effort. Clear to auscultation, bilaterally without Rales/Wheezes/Rhonchi. Cardiovascular: Regular rate and rhythm with normal S1/S2 without murmurs, rubs or gallops. Abdomen: Soft, non-tender, non-distended with normal bowel sounds. Musculoskeletal: No clubbing, cyanosis or edema bilaterally. Full range of motion without deformity.   Skin: Bilateral lower extremity chronic venous stasis dermatitis  Neurologic: Open eyes to verbal command but unable to answer questions or follow commands. Lethargic  Capillary Refill: Brisk,< 3 seconds   Peripheral Pulses: +2 palpable, equal bilaterally       Labs:   Recent Labs     02/26/22 1926 02/27/22  0536   WBC 9.5 9.5   HGB 10.6* 10.2*   HCT 33.3* 31.5*    274     Recent Labs     02/26/22 1926 02/27/22  0536    142   K 4.7 4.7   CL 99 107   CO2 28 25   BUN 33* 31*   CREATININE 1.8* 1.8*   CALCIUM 10.9* 9.8   PHOS  --  3.0     Recent Labs     02/26/22 1926   AST 30   ALT 15   BILITOT <0.2   ALKPHOS 143*     No results for input(s): INR in the last 72 hours. Recent Labs     02/26/22 1938 02/26/22  2308   TROPONINI 0.02* 0.02*       Urinalysis:      Lab Results   Component Value Date    NITRU Negative 02/26/2022    WBCUA >100 02/26/2022    BACTERIA 4+ 02/26/2022    RBCUA 0-2 02/26/2022    BLOODU SMALL 02/26/2022    SPECGRAV >=1.030 02/26/2022    GLUCOSEU Negative 02/26/2022       Radiology:  CT ABDOMEN PELVIS WO CONTRAST Additional Contrast? None   Final Result      1. Diffuse bladder wall thickening appreciated of uncertain etiology up to 11 mm.   2. Chronic renal atrophy left kidney and benign-appearing cyst measuring up to 3.1 x 2.1 cm with more normal appearing right kidney without hydronephrosis or hydroureter appreciated      3. Moderate stool noted throughout the colon and rectum which may indicate constipation. 4. No free fluid or free air or adenopathy appreciated. 5. Degenerative spondylosis in the spine without acute fracture      CT HEAD WO CONTRAST   Final Result      Normal noncontrast CT the brain without acute hemorrhage, edema or hydrocephalus. Mild symmetric cerebral atrophic change      XR CHEST PORTABLE   Final Result      1.  No acute process or consolidation                          Assessment/Plan:    Sepsis likely due to urinary tract infection:  S/p merrem and angely hugger  Monitor for urinary retention  Follow-up blood and urine cultures  Lactic acid normalized  Continue IV hydration and merrem    Acute metabolic encephalopathy due to sepsis:  Head CT was nonacute  Holding sedatives  NPO per SLP eval  Cont Antibiotics, fluids, supportive care     CKD stage III with hypercalcemia:  Does not look like patient had previous work-up  Creatinine is at baseline around 1.8  Monitor calcium level, improving  Continue IV fluids  Nephrology following    Hyperglycemia/DM type II:  Monitor blood glucose  Continue Lantus and sliding scale insulin    HTN:  Monitor blood pressure, currently on lower side  Holding home medication    Dementia/schizophrenia:  Holding home medication due to mental status and n.p.o. status  Will resume once able     DVT Prophylaxis: Lovenox  Diet: Diet NPO  Code Status: Full Code    PT/OT Eval Status: Ordered    Dispo -pending sepsis resolution/mental status improved, culture result/antibiotic determination    Samantha Grier MD

## 2022-02-27 NOTE — ED PROVIDER NOTES
[id number] RESIDENT NOTE       Date of evaluation: 2/26/2022    Chief Complaint     Hyperglycemia and Altered Mental Status      History of Present Illness     Rosamaria Kehr is a 73 y.o. male past medical history of diabetes type 2, schizophrenia, bipolar, CKD, TIA who presents from his nursing facility with hyperglycemia and altered mentation. Per EMS report patient was hyperglycemic to 351 at nursing facility and got 10 units of insulin. EMS reported blood glucose was 509 and they gave him 300 mL of normal saline with repeat blood glucose 411. They also noted patient seems to be altered from his baseline. Nursing home was called to obtain collateral but was unsuccessful. Patient sister was called who reports that patient at baseline and is interactive and oriented. He usually complains of inability to walk and has been using a wheelchair for the past year. She reports that he has not been feeling well for the past 3 days but is unable to provide a more detailed history. He was tested for covid on 2/24 and was negative. Patient is able to tell me his birthday and he is in the hospital.  However he tells me he is in the hospital because he is unable to walk. He denies any dyspnea, chest pain, abdominal pain, dizziness, fevers and chills. Review of Systems     Review of Systems   Unable to perform ROS: Mental status change   Constitutional: Negative for chills and fever. Respiratory: Negative for shortness of breath. Cardiovascular: Negative for chest pain. Gastrointestinal: Positive for abdominal pain. Negative for diarrhea. Neurological: Negative for headaches.        Past Medical, Surgical, Family, and Social History     He has a past medical history of Ataxia, Bipolar 1 disorder ([address] BPH (benign prostatic hyperplasia), Cerebral infarct ([address] Chronic kidney disease, Dementia ([facility] Depression, Diabetes mellitus type 2, uncomplicated ([address] Gout, Hyperlipidemia, Hypertension, and Schizophrenia ([facility] He has no past surgical history on file. His family history is not on file. He reports that he has never smoked. He has never used smokeless tobacco. He reports previous alcohol use. Medications     Previous Medications    ACETAMINOPHEN (TYLENOL) 500 MG TABLET    Take 500 mg by mouth every 6 hours as needed for Pain    ALLOPURINOL (ZYLOPRIM) 100 MG TABLET    Take 100 mg by mouth daily    AMLODIPINE (NORVASC) 5 MG TABLET    Take 5 mg by mouth daily Hold for SBP <110 or Pulse <60    ARIPIPRAZOLE (ABILIFY) 15 MG TABLET    Take 15 mg by mouth daily    ASPIRIN 81 MG EC TABLET    Take 81 mg by mouth daily    ATORVASTATIN (LIPITOR) 40 MG TABLET    Take 40 mg by mouth nightly    BISACODYL (DULCOLAX) 10 MG SUPPOSITORY    Place 10 mg rectally daily as needed for Constipation    CALCIUM-VITAMIN D (OSCAL-500) 500-200 MG-UNIT PER TABLET    Take 1 tablet by mouth 2 times daily    CHOLECALCIFEROL (VITAMIN D3) 1.25 MG (65239 UT) CAPS    Take 50,000 Units by mouth once a week Fridays    CRANBERRY 425 MG CAPS    Take 425 mg by mouth daily    DOCUSATE SODIUM (COLACE) 100 MG CAPSULE    Take 100 mg by mouth 2 times daily    ESTRADIOL (ESTRACE) 1 MG TABLET    Take 1 mg by mouth daily    FINASTERIDE (PROSCAR) 5 MG TABLET    Take 5 mg by mouth daily    FLUOXETINE (PROZAC) 20 MG CAPSULE    Take 40 mg by mouth daily    FUROSEMIDE (LASIX) 20 MG TABLET    furosemide 20 mg tablet    GABAPENTIN (NEURONTIN) 300 MG CAPSULE    Take 1 capsule by mouth 2 times daily for 30 days.     INSULIN ASPART (NOVOLOG) 100 UNIT/ML INJECTION VIAL    Inject 0-21 Units into the skin 3 times daily (before meals) Sliding scale   201-250 = 5 units  251-300 = 8 units  301-350 = 12 units  351-400 = 15 units  401-450 = 18 units  451-500 = 21 units  500+ notify MD    INSULIN DETEMIR (LEVEMIR) 100 UNIT/ML INJECTION VIAL    Inject 10 Units into the skin nightly    LATANOPROST (XALATAN) 0.005 % OPHTHALMIC SOLUTION    Inject 1 drop into the eye nightly    MELATONIN 5 MG TABS TABLET    Take 5 mg by mouth nightly    MULTIPLE VITAMINS-MINERALS (THERAPEUTIC MULTIVITAMIN-MINERALS) TABLET    Take 1 tablet by mouth daily    ONDANSETRON (ZOFRAN-ODT) 4 MG DISINTEGRATING TABLET    Take 4 mg by mouth every 8 hours as needed for Nausea or Vomiting    POLYETHYLENE GLYCOL (GLYCOLAX) 17 G PACKET    Take 17 g by mouth daily as needed for Constipation    POLYVINYL ALCOHOL (LIQUIFILM TEARS) 1.4 % OPHTHALMIC SOLUTION    Place 1 drop into both eyes as needed for Dry Eyes    RISPERIDONE (RISPERDAL) 0.5 MG TABLET    Take 0.5 mg by mouth every morning (before breakfast)     RISPERIDONE (RISPERDAL) 1 MG TABLET    Take 1.5 mg by mouth nightly     SKIN PROTECTANTS, MISC. (HYDROCERIN) CREA CREAM    Apply topically 2 times daily    TAMSULOSIN (FLOMAX) 0.4 MG CAPSULE    Take 0.8 mg by mouth nightly    TRIMETHOPRIM-POLYMYXIN B (POLYTRIM) 05176-7.1 UNIT/ML-% OPHTHALMIC SOLUTION    Polytrim 10,000 unit-1 mg/mL eye drops   INSTILL 1 DROP INTO AFFECTED EYE(S) BY OPHTHALMIC ROUTE EVERY 6 HOURS    VALPROIC ACID (DEPACON) 250 MG/5ML SOLN ORAL SOLUTION    Take 250 mg by mouth 2 times daily       Allergies     He is allergic to codeine, cogentin [benztropine], and penicillins. Physical Exam     INITIAL VITALS: BP: 116/63, Temp: (!) 91.6 °F (33.1 °C), Pulse: 70, Resp: 18, SpO2: 96 %   Physical Exam  Constitutional:       General: He is not in acute distress. HENT:      Head: Normocephalic. Mouth/Throat:      Mouth: Mucous membranes are moist.   Cardiovascular:      Rate and Rhythm: Normal rate and regular rhythm. Heart sounds: Normal heart sounds. Pulmonary:      Effort: Pulmonary effort is normal.      Breath sounds: Normal breath sounds. Abdominal:      General: Bowel sounds are normal. There is no distension. Tenderness: There is abdominal tenderness. Skin:     General: Skin is dry.       Comments: LE chronic skin changes that do not appear infected    Neurological:      Mental Status: He is alert. He is disoriented and confused. Comments: L hand resting tremor    Psychiatric:         Mood and Affect: Mood normal.         Diagnostic Results     EKG   Interpreted inconjunction with emergency department physician Christ Martinez MD  Rhythm: normal sinus   Rate: normal  Axis: normal  Ectopy: none  Conduction: normal  ST Segments: nonspecific changes  T Waves: non specific changes  Q Waves: none  Clinical Impression: NSR  Comparison: none    RADIOLOGY:  CT ABDOMEN PELVIS WO CONTRAST Additional Contrast? None   Final Result      1. Diffuse bladder wall thickening appreciated of uncertain etiology up to 11 mm.   2. Chronic renal atrophy left kidney and benign-appearing cyst measuring up to 3.1 x 2.1 cm with more normal appearing right kidney without hydronephrosis or hydroureter appreciated      3. Moderate stool noted throughout the colon and rectum which may indicate constipation. 4. No free fluid or free air or adenopathy appreciated. 5. Degenerative spondylosis in the spine without acute fracture      CT HEAD WO CONTRAST   Final Result      Normal noncontrast CT the brain without acute hemorrhage, edema or hydrocephalus. Mild symmetric cerebral atrophic change      XR CHEST PORTABLE   Final Result      1.  No acute process or consolidation                      LABS:   Results for orders placed or performed during the hospital encounter of 02/26/22   Comprehensive Metabolic Panel w/ Reflex to MG   Result Value Ref Range    Sodium 139 136 - 145 mmol/L    Potassium reflex Magnesium 4.7 3.5 - 5.1 mmol/L    Chloride 99 99 - 110 mmol/L    CO2 28 21 - 32 mmol/L    Anion Gap 12 3 - 16    Glucose 278 (H) 70 - 99 mg/dL    BUN 33 (H) 7 - 20 mg/dL    CREATININE 1.8 (H) 0.8 - 1.3 mg/dL    GFR Non- 38 (A) >60    GFR  45 (A) >60    Calcium 10.9 (H) 8.3 - 10.6 mg/dL    Total Protein 8.0 6.4 - 8.2 g/dL    Albumin 3.7 3.4 - 5.0 g/dL    Albumin/Globulin Ratio 0.9 (L) 1.1 - 2.2    Total Bilirubin <0.2 0.0 - 1.0 mg/dL    Alkaline Phosphatase 143 (H) 40 - 129 U/L    ALT 15 10 - 40 U/L    AST 30 15 - 37 U/L   CBC with Auto Differential   Result Value Ref Range    WBC 9.5 4.0 - 11.0 K/uL    RBC 3.99 (L) 4.20 - 5.90 M/uL    Hemoglobin 10.6 (L) 13.5 - 17.5 g/dL    Hematocrit 33.3 (L) 40.5 - 52.5 %    MCV 83.5 80.0 - 100.0 fL    MCH 26.5 26.0 - 34.0 pg    MCHC 31.8 31.0 - 36.0 g/dL    RDW 17.6 (H) 12.4 - 15.4 %    Platelets 372 461 - 174 K/uL    MPV 8.2 5.0 - 10.5 fL    Neutrophils % 83.4 %    Lymphocytes % 10.1 %    Monocytes % 4.8 %    Eosinophils % 0.9 %    Basophils % 0.8 %    Neutrophils Absolute 7.9 (H) 1.7 - 7.7 K/uL    Lymphocytes Absolute 1.0 1.0 - 5.1 K/uL    Monocytes Absolute 0.5 0.0 - 1.3 K/uL    Eosinophils Absolute 0.1 0.0 - 0.6 K/uL    Basophils Absolute 0.1 0.0 - 0.2 K/uL   Urinalysis with Reflex to Culture    Specimen: Urine   Result Value Ref Range    Color, UA Yellow Straw/Yellow    Clarity, UA SL CLOUDY (A) Clear    Glucose, Ur Negative Negative mg/dL    Bilirubin Urine Negative Negative    Ketones, Urine Negative Negative mg/dL    Specific Gravity, UA >=1.030 1.005 - 1.030    Blood, Urine SMALL (A) Negative    pH, UA 5.5 5.0 - 8.0    Protein, UA TRACE (A) Negative mg/dL    Urobilinogen, Urine 0.2 <2.0 E.U./dL    Nitrite, Urine Negative Negative    Leukocyte Esterase, Urine LARGE (A) Negative    Microscopic Examination YES     Urine Type NotGiven     Urine Reflex to Culture Yes    Lactic Acid   Result Value Ref Range    Lactic Acid 3.1 (H) 0.4 - 2.0 mmol/L   Blood gas, venous (Lab)   Result Value Ref Range    pH, Daquan 7.351 7.350 - 7.450    pCO2, Daquan 58.7 (H) 41.0 - 51.0 mmHg    pO2, Daquan 48.4 (H) 25.0 - 40.0 mmHg    HCO3, Venous 32.5 (H) 24.0 - 28.0 mmol/L    Base Excess, Daquan 5.5 (H) -2.0 - 3.0 mmol/L    O2 Sat, Daquan 80 Not established %    Carboxyhemoglobin 0.7 0.0 - 1.5 %    MetHgb, Daquan 0.3 0.0 - 1.5 %    TC02 (Calc), Daquan 34 mmol/L    Hemoglobin, Daquan, Reduced 19.90 %   Troponin   Result Value Ref Range    Troponin 0.02 (H) <0.01 ng/mL   Microscopic Urinalysis   Result Value Ref Range    WBC, UA >100 (A) 0 - 5 /HPF    RBC, UA 0-2 0 - 4 /HPF    Bacteria, UA 4+ (A) None Seen /HPF   Lactic Acid   Result Value Ref Range    Lactic Acid 2.4 (H) 0.4 - 2.0 mmol/L   POCT Glucose   Result Value Ref Range    POC Glucose 329 (H) 70 - 99 mg/dl    Performed on ACCU-CHEK    POCT Glucose   Result Value Ref Range    POC Glucose 210 (H) 70 - 99 mg/dl    Performed on ACCU-CHEK        ED BEDSIDE ULTRASOUND:    RECENT VITALS:  BP: (!) 102/45, Temp: (!) 91.6 °F (33.1 °C),Pulse: 70, Resp: 12, SpO2: 99 %     Procedures       ED Course     Nursing Notes, Past Medical Hx, Past Surgical Hx, Social Hx, Allergies, and FamilyHx were reviewed. The patient was giventhe following medications:  Orders Placed This Encounter   Medications    0.9 % sodium chloride bolus    DISCONTD: insulin lispro (1 Unit Dial) 8 Units    cefTRIAXone (ROCEPHIN) 1000 mg IVPB in 50 mL D5W minibag     Order Specific Question:   Antimicrobial Indications     Answer:   Urinary Tract Infection    0.9 % sodium chloride bolus    sennosides-docusate sodium (SENOKOT-S) 8.6-50 MG tablet 2 tablet    meropenem (MERREM) 1,000 mg in sodium chloride 0.9 % 100 mL IVPB (mini-bag)     Order Specific Question:   Antimicrobial Indications     Answer:   Urinary Tract Infection     Order Specific Question:   Antimicrobial Indications     Answer:   Sepsis of Unknown Etiology    0.9 % sodium chloride bolus       CONSULTS:  IP CONSULT TO HOSPITALIST  IP CONSULT TO HOSPITALIST  IP CONSULT TO NEPHROLOGY  IP CONSULT  W [address] [place] [address] / [facility] / Ericka Kb is a 73 y.o. male with PMH of DM2, HTN presenting from SNF with altered mentation and hyperglycemia. On my examination patient appears hemodynamically stable.   He is not tachycardic, hypoxic, tachypneic or hypotensive. Temperature could not be obtained therefore core temperature was attempted and was found to be 91.6. He is a pleasant gentleman who is oriented to his birthdate and that he is in the hospital but unable to tell me why he is here and does not tell me his name. However he follows simple commands. He does appear to be confused and unable to answer other questions appropriately. Per his sister at baseline patient is interactive and able to answer questions clearly although he does have some dementia. On examination patient does not appear volume overloaded or dry. Lungs sound clear to auscultation. Abdomen is mildly tender in the right quadrant. No focal deficits. POC glucose is 326 on arrival. CBC does not show any leukocytosis but he does have a left shift with neutrophilia, hemoglobin appears to be stable. CMP is significant for creatinine of 1.8 which appears to be his baseline, no electrolyte abnormalities, alk phos of 143 with normal LFTs. Lactate is elevated to 3.1. UA significant for blood and protein with leuk esterase pyuria and bacteria suggestive of UTI but no ketones. VBG with pH 7.35. Urine cultures pending and blood cultures were obtained. EKG appears normal sinus without any acute ST changes. Troponin is 0.02 and appears to be at his baseline given his CKD. Chest x-ray was obtained to look for infiltrates but did not show any acute processes. CT head was without acute pathology. CT abdomen was obtained to assess for possible intra-abdominal pathology or pyelonephritis. CT demonstrated diffuse bladder wall thickening, benign kidney cyst, moderate stool. He meets SIRS criteria for hypothermia, left shift, and a lactic acid with known source of infection in his urine. Given his altered mentation and UA findings we will treat patient with Merrem and he was given 2.5 L normal saline bolus.   Initially patient did receive 1 g of ceftriaxone before lactate resulted and concern for sepsis was established. Repeat lactate and troponin are pending. He is given Senokot and an enema for his constipation. Repeat blood glucose was checked and was 210. Given patient's altered mental status, hyperglycemia and sepsis secondary to UTI he will require inpatient admission. Case has been discussed with hospitalist Dr. Shobha Obregon who has accepted. This patient was also evaluated by the attending physician. All care plans were discussed and agreed upon. Clinical Impression     1. Altered mental status, unspecified altered mental status type    2. SIRS (systemic inflammatory response syndrome) (HCC)    3. Hyperglycemia    4. Acute cystitis with hematuria        Disposition     PATIENT REFERRED TO:  No follow-up provider specified.     DISCHARGE MEDICATIONS:  New Prescriptions    No medications on file       DISPOSITION Admitted 02/26/2022 10:18:21 PM            Lula Veliz MD  Resident  02/26/22 5231

## 2022-02-27 NOTE — ED TRIAGE NOTES
Pt to ed from [facility] for high blood sugar and altered mental status. Pt Blood sugar at [facility] pt received 10 units insuline at n.[facility]. when Blood sugar was 351. Ems found BS to be 509 and after 300 ml saline .

## 2022-02-27 NOTE — ED NOTES
Blood culture drawn. Specimen labeled using patient name, date of birth, medical number as identifiers, and staff initials. Patient tolerated well and left on stretcher locked in lowest position, with side rails up and call light within reach.       Klever Longoria RN  02/26/22 2039

## 2022-02-27 NOTE — CONSULTS
Interval History and plan:      Start IV fluid. Hold Lasix, calcium supplements and vitamin D. Recheck vitamin D level. Check SPEP, UPEP and serum for free light chains. Assessment :     Stage IIIb chronic kidney disease: His creatinine fluctuates between 1.5-1.9. He is currently at his baseline GFR. Urinalysis shows trace protein and WBC. CT scan showed right and left kidney with left renal atrophy and a cyst.  There was no hydronephrosis. He likely has chronic kidney disease stage IIIb from hypertensive nephrosclerosis/vascular disease. He has mild hypercalcemia. Phosphorus is normal.  PTH is 42. Vitamin D is 61    Patient is on vitamin D supplements, calcium and Lasix. LDL is within target range. Echocardiogram showed EF of 55% with LVH. He has mild anemia: No need for Procrit. I will check iron studies, SPEP, UPEP and serum for free light chains. I will also check B12 and folate. [facility] would like to thank Carrie Suarez MD   for opportunity to serve this patient      Please call with questions at-   24 Hrs Answering service [phone] or  7 am- 5 pm via Perfect serve or cell phone  Bruce Alva MD          CC/reason for consult :     Acute kidney injury/chronic kidney disease     HPI :     Juan Marte is a 73 y.o. male presented to   the hospital on 2/26/2022 with altered mental status    He has past medical history of schizophrenia, bipolar disorder, history of CVA, dementia, type 2 diabetes, gout, hyperlipidemia. There is a mention that he has stage III chronic kidney disease. He was sent from a nursing facility with hypoglycemia and altered mental status. He presented with a sugar of 500. He was given 10 units of insulin arrival.  He was also hypothermic. It is worth mentioning that most of the history was obtained from the chart and speaking with staff. Patient cannot give any reliable history.     He is now admitted with UTI and sepsis. He is on IV hydration and antibiotics. Creatinine is around 1.8 associated with hypercalcemia. I was called for further management of chronic kidney disease and hypercalcemia. ROS:     Seen with-resident    Cannot be reliably obtained from the patient                      All other remaining systems are negative or unable to obtain        PMH/PSH/SH/Family History:     Past Medical History:   Diagnosis Date    Ataxia     [facility]     BPH (benign prostatic hyperplasia)     [facility]     Chronic kidney disease     D[facility]     Depression     Diabetes mellitus type 2, uncomplicated ([facility]     Gout     Hyperlipidemia     Hypertension     Schizophrenia ([facility]        No past surgical history on file. reports that he has never smoked. He has never used smokeless tobacco. He reports previous alcohol use.    family history is not on file.          Medication:     Current Facility-Administered Medications: meropenem (MERREM) 1,000 mg in sodium chloride 0.9 % 100 mL IVPB (mini-bag), 1,000 mg, IntraVENous, Q12H  0.9 % sodium chloride infusion, , IntraVENous, Continuous  insulin glargine (LANTUS;BASAGLAR) injection pen 10 Units, 10 Units, SubCUTAneous, Nightly  insulin lispro (1 Unit Dial) 0-12 Units, 0-12 Units, SubCUTAneous, Q4H  sodium chloride flush 0.9 % injection 5-40 mL, 5-40 mL, IntraVENous, 2 times per day  sodium chloride flush 0.9 % injection 5-40 mL, 5-40 mL, IntraVENous, PRN  0.9 % sodium chloride infusion, 25 mL, IntraVENous, PRN  enoxaparin (LOVENOX) injection 40 mg, 40 mg, SubCUTAneous, Daily  ondansetron (ZOFRAN-ODT) disintegrating tablet 4 mg, 4 mg, Oral, Q8H PRN **OR** ondansetron (ZOFRAN) injection 4 mg, 4 mg, IntraVENous, Q6H PRN  polyethylene glycol (GLYCOLAX) packet 17 g, 17 g, Oral, Daily PRN  acetaminophen (TYLENOL) tablet 650 mg, 650 mg, Oral, Q6H PRN **OR** acetaminophen (TYLENOL) suppository 650 mg, 650 mg, Rectal, Q6H PRN       Vitals :     Vitals:    02/27/22 0400   BP: (!) 106/51   Pulse: 98   Resp: 18   Temp: 98 °F (36.7 °C)   SpO2: 100%       I & O :       Intake/Output Summary (Last 24 hours) at 2/27/2022 0743  Last data filed at 2/27/2022 0516  Gross per 24 hour   Intake 0 ml   Output --   Net 0 ml        Physical Examination :     General appearance: Awake and in no acute distress  HEENT: Lips- normal, teeth- ok , oral mucosa- moist  Neck : Mass- no, appears symmetrical, JVD- not visible  Respiratory: Respiratory effort-none, wheeze- no, crackles -none  Cardiovascular:  Ausculation- No M/R/G, Edema ++  Abdomen: visible mass- no, distention- no, scar- no, tenderness- no                            hepatosplenomegaly-  no  Musculoskeletal:  clubbing no,cyanosis- no , digital ischemia- no                           muscle strength- grossly normal , tone - grossly normal  Skin: rashes- no , ulcers- no, induration- no, tightening - no  Psychiatric: Cannot be evaluated     LABS:     Recent Labs     02/26/22 1926 02/27/22  0536   WBC 9.5 9.5   HGB 10.6* 10.2*   HCT 33.3* 31.5*    274     Recent Labs     02/26/22 1926 02/27/22  0536    142   K 4.7 4.7   CL 99 107   CO2 28 25   BUN 33* 31*   CREATININE 1.8* 1.8*   GLUCOSE 278* 101*   PHOS  --  3.0      Nephrology  [address] [place] [address]  Office: [phone]  Fax: [phone]

## 2022-02-27 NOTE — PROGRESS NOTES
Talked with sister kinjal on plan of care for patient. Spoke with her about IV antibiotics for UTI and fluid replacement per sepsis protocol. All questions answered.

## 2022-02-27 NOTE — H&P
Hospital Medicine History & Physical      PCP: Darryn Butler MD    Date of Admission: 2/26/2022    Date of Service: Pt seen/examined on 2/26/2022 and Admitted to Inpatient with expected LOS greater than two midnights due to medical therapy. Chief Complaint: Altered mental status      History Of Present Illness: The patient is a 73 y.o. male with medical history as below, most notable for HTN, CKD stage III, dementia, schizophrenia, diabetes mellitus who presents to [facility] from his nursing facility with elevated blood sugar and altered mental status. Upon arrival to emergency room he was found to be hypothermic. EMS checked his glucose it was in the 500s, he was given 10 units of insulin prior to presentation. In the ER work-up was significant for elevated lactic acid, calcium level and abnormal urinalysis suggestive of UTI. Patient was started on Sharyn hugger for hypothermia. Upon my evaluation patient is able to tell his name and states that he is feeling better but unable to provide any further history. He is able to follow simple commands. Past Medical History:        Diagnosis Date    Ataxia     [facility]     BPH (benign prostatic hyperplasia)     Cerebral infarct ([facility])     Chronic kidney disease     Dementia ([facility]     Depression     Diabetes mellitus type 2, uncomplicated ([facility]     Gout     Hyperlipidemia     Hypertension     Schizophrenia ([facility]        Past Surgical History:    No past surgical history on file. Medications Prior to Admission:    Prior to Admission medications    Medication Sig Start Date End Date Taking?  Authorizing Provider   furosemide (LASIX) 20 MG tablet furosemide 20 mg tablet    Historical Provider, MD   melatonin 5 MG TABS tablet Take 5 mg by mouth nightly    Historical Provider, MD   latanoprost (XALATAN) 0.005 % ophthalmic solution Inject 1 drop into the eye nightly    Historical Provider, MD   trimethoprim-polymyxin b (POLYTRIM) 88821-7.1 UNIT/ML-% ophthalmic solution Polytrim 10,000 unit-1 mg/mL eye drops   INSTILL 1 DROP INTO AFFECTED EYE(S) BY OPHTHALMIC ROUTE EVERY 6 HOURS    Historical Provider, MD   gabapentin (NEURONTIN) 300 MG capsule Take 1 capsule by mouth 2 times daily for 30 days. Patient taking differently: Take 300 mg by mouth three times daily. 2/26/21 10/6/21  Nithya Feldman MD   insulin detemir (LEVEMIR) 100 UNIT/ML injection vial Inject 10 Units into the skin nightly  Patient taking differently: Inject 77 Units into the skin nightly  2/26/21   Nihtya Feldman MD   Skin Protectants, Misc.  (HYDROCERIN) CREA cream Apply topically 2 times daily 2/26/21   Nithya Feldman MD   acetaminophen (TYLENOL) 500 MG tablet Take 500 mg by mouth every 6 hours as needed for Pain    Historical Provider, MD   allopurinol (ZYLOPRIM) 100 MG tablet Take 100 mg by mouth daily    Historical Provider, MD   amLODIPine (NORVASC) 5 MG tablet Take 5 mg by mouth daily Hold for SBP <110 or Pulse <60    Historical Provider, MD   ARIPiprazole (ABILIFY) 15 MG tablet Take 15 mg by mouth daily    Historical Provider, MD   polyvinyl alcohol (LIQUIFILM TEARS) 1.4 % ophthalmic solution Place 1 drop into both eyes as needed for Dry Eyes    Historical Provider, MD   aspirin 81 MG EC tablet Take 81 mg by mouth daily    Historical Provider, MD   atorvastatin (LIPITOR) 40 MG tablet Take 40 mg by mouth nightly    Historical Provider, MD   bisacodyl (DULCOLAX) 10 MG suppository Place 10 mg rectally daily as needed for Constipation    Historical Provider, MD   Cranberry 425 MG CAPS Take 425 mg by mouth daily    Historical Provider, MD   docusate sodium (COLACE) 100 MG capsule Take 100 mg by mouth 2 times daily    Historical Provider, MD   estradiol (ESTRACE) 1 MG tablet Take 1 mg by mouth daily    Historical Provider, MD   finasteride (PROSCAR) 5 MG tablet Take 5 mg by mouth daily    Historical Provider, MD   FLUoxetine (PROZAC) 20 MG capsule Take 40 mg by mouth daily    Historical Provider, MD   Multiple Vitamins-Minerals (THERAPEUTIC MULTIVITAMIN-MINERALS) tablet Take 1 tablet by mouth daily    Historical Provider, MD   insulin aspart (NOVOLOG) 100 UNIT/ML injection vial Inject 0-21 Units into the skin 3 times daily (before meals) Sliding scale   201-250 = 5 units  251-300 = 8 units  301-350 = 12 units  351-400 = 15 units  401-450 = 18 units  451-500 = 21 units  500+ notify MD    Historical Provider, MD   ondansetron (ZOFRAN-ODT) 4 MG disintegrating tablet Take 4 mg by mouth every 8 hours as needed for Nausea or Vomiting    Historical Provider, MD   calcium-vitamin D (OSCAL-500) 500-200 MG-UNIT per tablet Take 1 tablet by mouth 2 times daily    Historical Provider, MD   polyethylene glycol (GLYCOLAX) 17 g packet Take 17 g by mouth daily as needed for Constipation    Historical Provider, MD   risperiDONE (RISPERDAL) 0.5 MG tablet Take 0.5 mg by mouth every morning (before breakfast)     Historical Provider, MD   risperiDONE (RISPERDAL) 1 MG tablet Take 1.5 mg by mouth nightly     Historical Provider, MD   tamsulosin (FLOMAX) 0.4 MG capsule Take 0.8 mg by mouth nightly    Historical Provider, MD   valproic acid (DEPACON) 250 MG/5ML SOLN oral solution Take 250 mg by mouth 2 times daily    Historical Provider, MD   Cholecalciferol (VITAMIN D3) 1.25 MG (52759 UT) CAPS Take 50,000 Units by mouth once a week Fridays    Historical Provider, MD       Allergies:  Codeine, Cogentin [benztropine], and Penicillins    Social History:  The patient currently lives at the nursing facility    TOBACCO:   reports that he has never smoked. He has never used smokeless tobacco.  ETOH:   reports previous alcohol use. Family History:  Reviewed in detail and negative for DM, Early CAD, Cancer, CVA. Positive as follows:    Family history reviewed and noncontributory    REVIEW OF SYSTEMS:   Positive and negative as noted in the HPI. All other systems reviewed and negative.     PHYSICAL EXAM:    BP (!) 99/53   Pulse 72   Temp 92.5 °F (33.6 °C) (Rectal)   Resp 18   Wt 216 lb (98 kg)   SpO2 100%   BMI 32.84 kg/m²     General appearance: No apparent distress appears stated age and cooperative. HEENT Normal cephalic, atraumatic without obvious deformity. Pupils equal, round, and reactive to light. Extra ocular muscles intact. Conjunctivae/corneas clear. Neck: Supple, No jugular venous distention/bruits. Trachea midline without thyromegaly or adenopathy with full range of motion. Lungs: Clear to auscultation, bilaterally without Rales/Wheezes/Rhonchi with good respiratory effort. Heart: Regular rate and rhythm with Normal S1/S2 without murmurs, rubs or gallops, point of maximum impulse non-displaced  Abdomen: Soft, minimally tender in lower abdomen, mildly distended without rigidity or guarding and positive bowel sounds all four quadrants. Extremities: No clubbing, cyanosis, chronic leg edema  Skin: Chronic venous stasis dermatitis  Neurologic: Alert and oriented to self only, grossly non-focal.  Mental status: Alert, oriented, thought content appropriate. Capillary refill is brisk  Peripheral pulses 2+  CT abdomen pelvis:    1.   Diffuse bladder wall thickening appreciated of uncertain etiology up to 11 mm.   2. Chronic renal atrophy left kidney and benign-appearing cyst measuring up to 3.1 x 2.1 cm with more normal appearing right kidney without hydronephrosis or hydroureter appreciated     3. Moderate stool noted throughout the colon and rectum which may indicate constipation. 4. No free fluid or free air or adenopathy appreciated.    5. Degenerative spondylosis in the spine without acute fracture  EKG:  I have reviewed the EKG with the following interpretation: No acute ST-T wave changes    CBC   Recent Labs     02/26/22 1926   WBC 9.5   HGB 10.6*   HCT 33.3*         RENAL  Recent Labs     02/26/22 1926      K 4.7   CL 99   CO2 28   BUN 33*   CREATININE 1.8* LFT'S  Recent Labs     02/26/22 1926   AST 30   ALT 15   BILITOT <0.2   ALKPHOS 143*     COAG  No results for input(s): INR in the last 72 hours. CARDIAC ENZYMES  Recent Labs     02/26/22 1938   TROPONINI 0.02*       U/A:    Lab Results   Component Value Date    COLORU Yellow 02/26/2022    WBCUA >100 02/26/2022    RBCUA 0-2 02/26/2022    BACTERIA 4+ 02/26/2022    CLARITYU SL CLOUDY 02/26/2022    SPECGRAV >=1.030 02/26/2022    LEUKOCYTESUR LARGE 02/26/2022    BLOODU SMALL 02/26/2022    GLUCOSEU Negative 02/26/2022       ABG  No results found for: OZQ7LOV, BEART, G5ZAOQPG, PHART, THGBART, GKA4QPF, PO2ART, PLY5GCA        Active Hospital Problems    Diagnosis Date Noted    Sepsis ([facility] [A41.9] 02/26/2022         ASSESSMENT/PLAN:    Sepsis likely due to urinary tract infection:  Admit to stepdown  Follow-up blood and urine cultures  Meropenem was started in the emergency room-we will continue  Monitor for urinary retention, monitor serial lactic acids  Continue IV hydration  Continue Sharyn hugger until temperature returns to normal range    Acute metabolic encephalopathy due to sepsis:  Head CT was nonacute  Will need swallow eval prior to oral intake  Hold sedatives  Antibiotics, fluids, supportive care    CKD stage III with hypercalcemia:  Does not look like patient had previous work-up  Creatinine is at baseline around 1.8  Will follow up calcium levels of the hydration  Consult nephrology    HTN, dementia, schizophrenia, DM type II:  Will ask pharmacy service to reconcile home medications  Insulin sliding scale for now, low-dose Lantus  Resume other medications when mentation improved unable to swallow safely      DVT Prophylaxis: Lovenox  Diet: No diet orders on file  Code Status: Prior  PT/OT Eval Status: Ordered    Dispo - inpatient     Franklyn Loera MD    Thank you Thompson Gates MD for the opportunity to be involved in this patient's care.  If you have any questions or concerns please feel free to contact me at [phone].

## 2022-02-28 LAB
ALBUMIN SERPL-MCNC: 2.7 G/DL (ref 3.1–4.9)
ALBUMIN SERPL-MCNC: 3 G/DL (ref 3.4–5)
ALPHA-1-GLOBULIN: 0.3 G/DL (ref 0.2–0.4)
ALPHA-2-GLOBULIN: 0.8 G/DL (ref 0.4–1.1)
ANION GAP SERPL CALCULATED.3IONS-SCNC: 9 MMOL/L (ref 3–16)
BETA GLOBULIN: 1 G/DL (ref 0.9–1.6)
BUN BLDV-MCNC: 32 MG/DL (ref 7–20)
CALCIUM SERPL-MCNC: 9.6 MG/DL (ref 8.3–10.6)
CHLORIDE BLD-SCNC: 110 MMOL/L (ref 99–110)
CO2: 24 MMOL/L (ref 21–32)
CREAT SERPL-MCNC: 1.8 MG/DL (ref 0.8–1.3)
CREATININE URINE: 145.9 MG/DL (ref 39–259)
FERRITIN: 483.6 NG/ML (ref 30–400)
FOLATE: 13.34 NG/ML (ref 4.78–24.2)
GAMMA GLOBULIN: 1.8 G/DL (ref 0.6–1.8)
GFR AFRICAN AMERICAN: 45
GFR NON-AFRICAN AMERICAN: 38
GLUCOSE BLD-MCNC: 105 MG/DL (ref 70–99)
GLUCOSE BLD-MCNC: 118 MG/DL (ref 70–99)
GLUCOSE BLD-MCNC: 125 MG/DL (ref 70–99)
GLUCOSE BLD-MCNC: 127 MG/DL (ref 70–99)
GLUCOSE BLD-MCNC: 129 MG/DL (ref 70–99)
GLUCOSE BLD-MCNC: 164 MG/DL (ref 70–99)
GLUCOSE BLD-MCNC: 167 MG/DL (ref 70–99)
IRON SATURATION: 19 % (ref 20–50)
IRON: 36 UG/DL (ref 59–158)
PERFORMED ON: ABNORMAL
PHOSPHORUS: 2.1 MG/DL (ref 2.5–4.9)
POTASSIUM SERPL-SCNC: 4.8 MMOL/L (ref 3.5–5.1)
PROTEIN PROTEIN: 0.04 G/DL
PROTEIN PROTEIN: 45 MG/DL
PROTEIN PROTEIN: 46 MG/DL
PROTEIN/CREAT RATIO: 0.3 MG/DL
SODIUM BLD-SCNC: 143 MMOL/L (ref 136–145)
SPE/IFE INTERPRETATION: NORMAL
TOTAL IRON BINDING CAPACITY: 187 UG/DL (ref 260–445)
TOTAL PROTEIN: 6.6 G/DL (ref 6.4–8.2)
VITAMIN B-12: 1168 PG/ML (ref 211–911)

## 2022-02-28 PROCEDURE — 2060000000 HC ICU INTERMEDIATE R&B

## 2022-02-28 PROCEDURE — 36415 COLL VENOUS BLD VENIPUNCTURE: CPT

## 2022-02-28 PROCEDURE — 82607 VITAMIN B-12: CPT

## 2022-02-28 PROCEDURE — 97530 THERAPEUTIC ACTIVITIES: CPT

## 2022-02-28 PROCEDURE — 97166 OT EVAL MOD COMPLEX 45 MIN: CPT

## 2022-02-28 PROCEDURE — 92610 EVALUATE SWALLOWING FUNCTION: CPT

## 2022-02-28 PROCEDURE — 83550 IRON BINDING TEST: CPT

## 2022-02-28 PROCEDURE — 80069 RENAL FUNCTION PANEL: CPT

## 2022-02-28 PROCEDURE — 97162 PT EVAL MOD COMPLEX 30 MIN: CPT

## 2022-02-28 PROCEDURE — 2580000003 HC RX 258: Performed by: INTERNAL MEDICINE

## 2022-02-28 PROCEDURE — 82728 ASSAY OF FERRITIN: CPT

## 2022-02-28 PROCEDURE — 82746 ASSAY OF FOLIC ACID SERUM: CPT

## 2022-02-28 PROCEDURE — 6360000002 HC RX W HCPCS: Performed by: INTERNAL MEDICINE

## 2022-02-28 PROCEDURE — 83540 ASSAY OF IRON: CPT

## 2022-02-28 PROCEDURE — 97535 SELF CARE MNGMENT TRAINING: CPT

## 2022-02-28 RX ORDER — LIDOCAINE 4 G/G
1 PATCH TOPICAL DAILY
Status: DISCONTINUED | OUTPATIENT
Start: 2022-02-28 | End: 2022-03-01 | Stop reason: HOSPADM

## 2022-02-28 RX ADMIN — MEROPENEM 1000 MG: 1 INJECTION, POWDER, FOR SOLUTION INTRAVENOUS at 12:39

## 2022-02-28 RX ADMIN — MEROPENEM 1000 MG: 1 INJECTION, POWDER, FOR SOLUTION INTRAVENOUS at 00:06

## 2022-02-28 RX ADMIN — INSULIN LISPRO 2 UNITS: 100 INJECTION, SOLUTION INTRAVENOUS; SUBCUTANEOUS at 00:10

## 2022-02-28 RX ADMIN — INSULIN LISPRO 2 UNITS: 100 INJECTION, SOLUTION INTRAVENOUS; SUBCUTANEOUS at 13:16

## 2022-02-28 RX ADMIN — ENOXAPARIN SODIUM 40 MG: 100 INJECTION SUBCUTANEOUS at 08:26

## 2022-02-28 RX ADMIN — INSULIN GLARGINE 10 UNITS: 100 INJECTION, SOLUTION SUBCUTANEOUS at 22:00

## 2022-02-28 RX ADMIN — SODIUM CHLORIDE: 9 INJECTION, SOLUTION INTRAVENOUS at 12:37

## 2022-02-28 RX ADMIN — SODIUM CHLORIDE, PRESERVATIVE FREE 10 ML: 5 INJECTION INTRAVENOUS at 22:00

## 2022-02-28 RX ADMIN — SODIUM CHLORIDE, PRESERVATIVE FREE 10 ML: 5 INJECTION INTRAVENOUS at 08:26

## 2022-02-28 RX ADMIN — SODIUM CHLORIDE: 9 INJECTION, SOLUTION INTRAVENOUS at 00:03

## 2022-02-28 ASSESSMENT — PAIN SCALES - WONG BAKER
WONGBAKER_NUMERICALRESPONSE: 0
WONGBAKER_NUMERICALRESPONSE: 0

## 2022-02-28 ASSESSMENT — PAIN SCALES - GENERAL
PAINLEVEL_OUTOF10: 0

## 2022-02-28 NOTE — PROGRESS NOTES
Physical Therapy    Facility/Department: [place] [address] [facility]  Initial Assessment/Treatment Note    NAME: Yasmany Farrell  : 1950  MRN: [id number]    Date of Service: 2022    Discharge Recommendations:  Yasmany Farrell scored a 6/24 on the AM-PAC short mobility form. Current research shows that an AM-PAC score of 17 or less is typically not associated with a discharge to the patient's home setting. Based on the patient's AM-PAC score and their current functional mobility deficits, it is recommended that the patient have 3-5 sessions per week of Physical Therapy at d/c to increase the patient's independence. Please see assessment section for further patient specific details. If patient discharges prior to next session this note will serve as a discharge summary. Please see below for the latest assessment towards goals. PT Equipment Recommendations  Other: defer    Assessment   Body structures, Functions, Activity limitations: Decreased functional mobility ; Decreased balance;Decreased coordination;Decreased cognition;Decreased ROM; Decreased endurance;Decreased strength; Increased pain  Assessment: Pt is a 73 y.o. male with diagnosis of hyperglycemia presenting with decreased functional mobility. Pt is currently requiring assist of two for all mobility, and use of adrianna steady for transfers, which is a decline from reported baseline. Safety concerns for return home due to heavy assist required. Pt will benefit from skilled therapy to maximize safety and independence. Treatment Diagnosis: decreased functional mobility due to hyperglycemia  Prognosis: Fair  Decision Making: Medium Complexity  Patient Education: Role of PT, goals, d/c recommendations, will require reinforcement  REQUIRES PT FOLLOW UP: Yes  Activity Tolerance  Activity Tolerance: Patient limited by fatigue;Patient limited by pain; Patient limited by endurance; Patient limited by cognitive status       Patient Diagnosis(es): The primary encounter diagnosis was Altered mental status, unspecified altered mental status type. Diagnoses of SIRS (systemic inflammatory response syndrome) ([facility] Hyperglycemia, and Acute cystitis with hematuria were also pertinent to this visit. has a past medical history of Ataxia, Bipolar 1 disorder ([facility] BPH (benign prostatic hyperplasia), Cerebral infarct ([facility] Chronic kidney disease, Dementia ([facility] Depression, Diabetes mellitus type 2, uncomplicated ([facility] Gout, Hyperlipidemia, Hypertension, and Schizophrenia ([facility] has no past surgical history on file. Restrictions  Position Activity Restriction  Other position/activity restrictions: up as tolerated, up with assist  Vision/Hearing        Subjective  General  Chart Reviewed: Yes  Patient assessed for rehabilitation services?: Yes  Additional Pertinent Hx: Pt is a 73 y.o. male admitted to [facility] on 2/26/22 from NH with reported hyperglycemia and AMS. Pt has been using a w/c for the past year. XR chest: neg. CT head: neg acute. CT abdomen/pelvis: chronic renal atrophy L kidney. PMH: DM2, HLD, HTN, bipolar, BPH, dementia, schoizophrenia. Family / Caregiver Present: No  Referring Practitioner: Maury Middleton MD  Diagnosis: hyperglycemia  Follows Commands: Impaired  Other (Comment): encouragement and repetition required  General Comment  Comments: Pt founs supine in bed upon PT arrival.  Pt agreeable to therapy session. Pt with confusion throughout session and some emotional lability regarding situation. Pt also requesting that blinds and door remain closed throughout session.   Subjective  Subjective: \"What's wrong with me?\"  Pain Screening  Patient Currently in Pain: Yes (unrated back pain, RN reporting pt is not due for pain medicine)  Vital Signs  Patient Currently in Pain: Yes (unrated back pain, RN reporting pt is not due for pain medicine)       Orientation  Orientation  Overall Orientation Status: Impaired  Orientation Level: Oriented to person;Disoriented to situation;Disoriented to place  Social/Functional History  Social/Functional History  Lives With: Alone  Type of Home: Facility  Home Layout: One level  Home Access: Level entry  Bathroom Shower/Tub: Walk-in shower,Shower chair with back  Bathroom Toilet: Handicap height  Bathroom Equipment:  (unable to state)  Home Equipment: [employer]  ADL Assistance: Needs assistance  Homemaking Assistance: Needs assistance  Ambulation Assistance: Needs assistance (in w/c)  Transfer Assistance: Needs assistance  Additional Comments: Unclear of exact assist being provided to pt at facility and true functional status prior to admission due to confusion. Cognition        Objective     Observation/Palpation  Observation: ACE to R foot and IV intact throughout session    AROM RLE (degrees)  RLE General AROM: grossly decreased due to pain  AROM LLE (degrees)  LLE General AROM: grossly decreased due to pain  Strength RLE  Comment: unable to formally assess due to poor command following, pt able to perform partial LAQ in sitting, unable to perform active movement of RLE while in supine  Strength LLE  Comment: unable to formally assess due to poor command following, pt able to perform partial LAQ in sitting, partial heel slide and DF in supine        Bed mobility  Rolling to Right: Dependent/Total (ModAx2, HOHA for use of bedrail, increased time, heavy cues for technique)  Supine to Sit: Dependent/Total (MaxAx2, HOB flat, HOHA for use of bedrail, assist for BLE and trunk placement, increased time and effort)  Scooting: Maximal assistance (to scoot to EOB in sitting)  Transfers  Sit to Stand: Dependent/Total (unsuccessful attempt from EOB with MaxAx2, unable to clear bottom.   ModAx2 from EOB and adrianna steady to adrianna steady, cues for hand placement and technique, faciliation for anterior weight shift, increased time)  Stand to sit: Dependent/Total (ModAx2, increased time)  Bed to Chair: Dependent/Total (EOB to recliner via adrianna steady)  Ambulation  Ambulation?: No (unsafe to attempt, pt is non-ambulatory at baseline)  Stairs/Curb  Stairs?: No     Balance  Comments: CGA for sitting balance EOB with unilateral UE support to prep for transfer and don gait belt. Plan   Plan  Times per week: 2-5  Times per day: Daily  Current Treatment Recommendations: Dipti Situ Re-education,Patient/Caregiver Education & Training,ROM,Wheelchair Mobility Training,Equipment Evaluation, Education, & procurement,Balance Training,Home Exercise Program,Functional Mobility Training,Safety Education & Training  Safety Devices  Type of devices: All fall risk precautions in place,Call light within reach,Chair alarm in place,Gait belt,Left in chair,Nurse notified    G-Code       OutComes Score                                                  AM-PAC Score  AM-PAC Inpatient Mobility Raw Score : 6 (02/28/22 1611)  AM-PAC Inpatient T-Scale Score : 23.55 (02/28/22 1611)  Mobility Inpatient CMS 0-100% Score: 100 (02/28/22 1611)  Mobility Inpatient CMS G-Code Modifier : CN (02/28/22 1611)          Goals  Short term goals  Time Frame for Short term goals: Discharge  Short term goal 1: Pt will perform all bed mobility with MaxA  Short term goal 2: Pt will perform sit to/from stand with MaxA  Short term goal 3: Pt will perform stand pivot with MaxA  Patient Goals   Patient goals : pt did not state       Therapy Time   Individual Concurrent Group Co-treatment   Time In 1450         Time Out 1545         Minutes 55              Timed Code Treatment Minutes:   40    Total Treatment Minutes:  [address], PT    This note to serve as discharge summary if patient discharged before next session.

## 2022-02-28 NOTE — PLAN OF CARE
Problem: Neurological  Intervention: Speech Evaluation/treatment  SLP completed evaluation. Please refer to notes in EMR. DISPLAY PLAN FREE TEXT

## 2022-02-28 NOTE — PROGRESS NOTES
Called POC, Aida Goldsmith. Answered all question at this time.   Electronically signed by Alysia Foster RN on 2/28/2022 at 6:01 PM

## 2022-02-28 NOTE — PROGRESS NOTES
Interval History and plan:      Creatinine is stable 1.8  Calcium is better   Continue  IV fluid. Hold Lasix, calcium supplements and vitamin D. Recheck vitamin D level. Check SPEP, UPEP and serum for free light chains. Assessment :     Stage IIIb chronic kidney disease: His creatinine fluctuates between 1.5-1.9. He is currently at his baseline GFR. Urinalysis shows trace protein and WBC. CT scan showed right and left kidney with left renal atrophy and a cyst.  There was no hydronephrosis. He likely has chronic kidney disease stage IIIb from hypertensive nephrosclerosis/vascular disease. He has mild hypercalcemia. Phosphorus is normal.  PTH is 42. Vitamin D is 48    Patient is on vitamin D supplements, calcium and Lasix. LDL is within target range. Echocardiogram showed EF of 55% with LVH. He has mild anemia: No need for Procrit. I will check iron studies, SPEP, UPEP and serum for free light chains. I will also check B12 and folate. [facility] would like to thank Hanane Humphreys MD   for opportunity to serve this patient      Please call with questions at-   24 Hrs Answering service [phone] or  7 am- 5 pm via Perfect serve or cell phone  Doron Mak MD          CC/reason for consult :     Acute kidney injury/chronic kidney disease     HPI :     Florecita Umaña is a 73 y.o. male presented to   the hospital on 2/26/2022 with altered mental status    He has past medical history of schizophrenia, bipolar disorder, history of CVA, dementia, type 2 diabetes, gout, hyperlipidemia. There is a mention that he has stage III chronic kidney disease. He was sent from a nursing facility with hypoglycemia and altered mental status. He presented with a sugar of 500. He was given 10 units of insulin arrival.  He was also hypothermic. It is worth mentioning that most of the history was obtained from the chart and speaking with staff.   Patient cannot give any reliable history. He is now admitted with UTI and sepsis. He is on IV hydration and antibiotics. Creatinine is around 1.8 associated with hypercalcemia. I was called for further management of chronic kidney disease and hypercalcemia. ROS:     Seen with-resident    Cannot be reliably obtained from the patient                      All other remaining systems are negative or unable to obtain        PMH/PSH/SH/Family History:     Past Medical History:   Diagnosis Date    Ataxia     [facility]     BPH (benign prostatic hyperplasia)     Cerebral infarct ([facility]     Chronic kidney disease     Dementia ([facility]     Depression     Diabetes mellitus type 2, uncomplicated ([facility]     Gout     Hyperlipidemia     Hypertension     Schizophrenia ([facility]        No past surgical history on file. reports that he has never smoked. He has never used smokeless tobacco. He reports previous alcohol use.    family history is not on file.          Medication:     Current Facility-Administered Medications: meropenem (MERREM) 1,000 mg in sodium chloride 0.9 % 100 mL IVPB (mini-bag), 1,000 mg, IntraVENous, Q12H  0.9 % sodium chloride infusion, , IntraVENous, Continuous  insulin glargine (LANTUS;BASAGLAR) injection pen 10 Units, 10 Units, SubCUTAneous, Nightly  insulin lispro (1 Unit Dial) 0-12 Units, 0-12 Units, SubCUTAneous, Q4H  sodium chloride flush 0.9 % injection 5-40 mL, 5-40 mL, IntraVENous, 2 times per day  sodium chloride flush 0.9 % injection 5-40 mL, 5-40 mL, IntraVENous, PRN  0.9 % sodium chloride infusion, 25 mL, IntraVENous, PRN  enoxaparin (LOVENOX) injection 40 mg, 40 mg, SubCUTAneous, Daily  ondansetron (ZOFRAN-ODT) disintegrating tablet 4 mg, 4 mg, Oral, Q8H PRN **OR** ondansetron (ZOFRAN) injection 4 mg, 4 mg, IntraVENous, Q6H PRN  polyethylene glycol (GLYCOLAX) packet 17 g, 17 g, Oral, Daily PRN  acetaminophen (TYLENOL) tablet 650 mg, 650 mg, Oral, Q6H PRN **OR** acetaminophen (TYLENOL) suppository 650 mg, 650 mg, Rectal, Q6H PRN       Vitals :     Vitals:    02/28/22 0823   BP: 139/61   Pulse: 77   Resp: 18   Temp: 98 °F (36.7 °C)   SpO2: 93%       I & O :       Intake/Output Summary (Last 24 hours) at 2/28/2022 0851  Last data filed at 2/28/2022 2940  Gross per 24 hour   Intake 0 ml   Output 500 ml   Net -500 ml        Physical Examination :     General appearance: Awake and in no acute distress  HEENT: Lips- normal, teeth- ok , oral mucosa- moist  Neck : Mass- no, appears symmetrical, JVD- not visible  Respiratory: Respiratory effort-none, wheeze- no, crackles -none  Cardiovascular:  Ausculation- No M/R/G, Edema ++  Abdomen: visible mass- no, distention- no, scar- no, tenderness- no                            hepatosplenomegaly-  no  Musculoskeletal:  clubbing no,cyanosis- no , digital ischemia- no                           muscle strength- grossly normal , tone - grossly normal  Skin: rashes- no , ulcers- no, induration- no, tightening - no  Psychiatric: Cannot be evaluated     LABS:     Recent Labs     02/26/22 1926 02/27/22  0536   WBC 9.5 9.5   HGB 10.6* 10.2*   HCT 33.3* 31.5*    274     Recent Labs     02/26/22 1926 02/27/22  0536 02/28/22  0500    142 143   K 4.7 4.7 4.8   CL 99 107 110   CO2 28 25 24   BUN 33* 31* 32*   CREATININE 1.8* 1.8* 1.8*   GLUCOSE 278* 101* 125*   PHOS  --  3.0 2.1*      Nephrology  [address] [facility], [address] [facility]  Office: [phone]  Fax: [phone]

## 2022-02-28 NOTE — PROGRESS NOTES
Pharmacy Note - Extended Infusion Beta-Lactam Adjustment    Meropenem ordered for treatment of Sepsis d/t UTI. Per [facility] Extended Infusion Beta-Lactam Policy, Meropenem will be changed to 2000 mg IV q12h. Estimated Creatinine Clearance: Estimated Creatinine Clearance: 45 mL/min (A) (based on SCr of 1.8 mg/dL (H)). Dialysis Status, KIMBERLY, CKD: CKD  Baseline SCr 1.5-1.9  BMI: Body mass index is 33.79 kg/m². Rationale for Adjustment: Agent is renally eliminated and demonstrates time-dependent effect on bacterial eradication. Extended-infusion dosing strategy aims to enhance microbiologic and clinical efficacy. Based on indication and renal function, will adjust dose to Meropenem 2000mg IV q12h. Pharmacy will continue to monitor renal function and adjust dose as necessary. Please call with any questions.     Carlos Eduardo Cevallos PharmD, BCPS  Wireless: [id number]   2/28/2022 1:19 PM

## 2022-02-28 NOTE — PROGRESS NOTES
Pt placed on specialty mattress at this time.     Electronically signed by Skylar Wallace RN on 0/78/5874 at 10:21 PM

## 2022-02-28 NOTE — PROGRESS NOTES
Speech Language Pathology  Facility/Department: [place] [address] [facility]   CLINICAL BEDSIDE SWALLOW EVALUATION    NAME: Babs Jessica  : 1950  MRN: [id number]    ADMISSION DATE: 2022  ADMITTING DIAGNOSIS: has Other fatigue; Right hand weakness; Numbness of right hand; Schizophrenia ([facility] Dementia ([facility] Essential hypertension; and Sepsis ([facility] on their problem list.  ONSET DATE: 10/26/22    Recent Chest Xray 22     1. No acute process or consolidation       CT of head   Impression       Normal noncontrast CT the brain without acute hemorrhage, edema or hydrocephalus.        Mild symmetric cerebral atrophic change     Date of Eval: 2022  Evaluating Therapist: RICHIE Elkins    Current Diet level:  Current Diet : NPO  Current Liquid Diet : NPO    Primary Complaint  Patient Complaint: none stated    Pain:  Pain Assessment  Pain Assessment: none reported    Reason for Referral  Babs Jessica was referred for a bedside swallow evaluation to assess the efficiency of his swallow function, identify signs and symptoms of aspiration and make recommendations regarding safe dietary consistencies, effective compensatory strategies, and safe eating environment. History Of Present Illness:    Per MD notes:  \" The patient is a 73 y.o. male with medical history as below, most notable for HTN, CKD stage III, dementia, schizophrenia, diabetes mellitus who presents to [facility] from his nursing facility with elevated blood sugar and altered mental status. Upon arrival to emergency room he was found to be hypothermic. EMS checked his glucose it was in the 500s, he was given 10 units of insulin prior to presentation. In the ER work-up was significant for elevated lactic acid, calcium level and abnormal urinalysis suggestive of UTI. Patient was started on Sharyn hugger for hypothermia.   Upon my evaluation patient is able to tell his name and states that he is feeling better but unable to provide any further history. He is able to follow simple commands. \"    Impression  RN states pt appropriate for swallow eval at this time. Pt awakened to verbal stim, however required cues throughout session to maintain alertness. Speech intelligibility is reduced appears partially r/t lethargy. Pt followed simple commands inconsistently, oral structures appear grossly intact. Pt presented with ice chips, water via tsp/cup and straw and puree. Pt inconsistently able to achieve labial seal and suck from straw. Initially no overt signs of aspiration emerged, however pt exhibited significant coughing after last trial of water. What appears to be delayed swallow movement felt upon palpation of anterior neck. Due to above possible s/s of aspiration and inability to maintain alertness recommend NPO, oral care, ice chips/tsps of water per RN only. Plan to re-assess 3/1/22  Dysphagia Diagnosis: Suspected needs further assessment  Dysphagia Outcome Severity Scale: Level 3: Moderate dysphagia- Total assisstance, supervision or strategies. Two or more diet consistencies restricted     Treatment Plan  Requires SLP Intervention: Yes  Duration/Frequency of Treatment: 3-4 x  D/C Recommendations: To be determined     Recommended Diet and Intervention  Diet Solids Recommendation: NPO  Liquid Consistency Recommendation: NPO  Recommended Form of Meds: Via alternative means of nutrition  Therapeutic Interventions: Oral care; Patient/Family education    Compensatory Swallowing Strategies  Upright with ice /  water    Treatment/Goals  1-The patient will tolerate repeat BSE when able.      General  Chart Reviewed: Yes  Behavior/Cognition: Cooperative;Lethargic  Respiratory Status: Room air  Communication Observation:  (intelligibility reduced, question r/t lethargy)  Follows Directions: Simple (inconsistent)  Dentition: Adequate  Patient Positioning: Upright in bed  Baseline Vocal Quality: Weak  Volitional Cough:  (did not follow command)  Prior Dysphagia History: pt had BSE 10/7/21 with ETC/Thin liquid recommended  Consistencies Administered: Dysphagia Pureed (Dysphagia I); Thin - cup; Thin - teaspoon; Thin - straw; Ice Chips    Vision/Hearing  Vision  Vision: Within Functional Limits  Hearing  Hearing: Within functional limits    Oral Motor Deficits  Oral/Motor  Oral Motor:  (grossly intact, pt did not follow commands for full assessment)    Prognosis  Prognosis  Prognosis for safe diet advancement: fair  Barriers to reach goals: cognitive deficits  Individuals consulted  Consulted and agree with results and recommendations: Patient;RN    Education  Patient Education: Pt educated to purpose of visit  Patient Education Response: Needs reinforcement  Safety Devices in place: Yes  Type of devices: Call light within reach       Therapy Time  SLP Individual Minutes  Time In: 1015  Time Out: 1030  Minutes: 15     Plan:  Recommended diet: NPO  Oral care / small amounts of ice chips if alert enough for the comfort of pt, health and hydration of oropharyngeal mucosa and swallow stimulation  Re-assessment bedside 3/1/22  Pt therapy goal: not stated  Pt dc goal: not stated  Karlo Ann M.S./[facility]-SLP #[id number]  Pg.  # [phone]  Needs met prior to leaving room, call light within reach, d/w NGHIA Shaikh  This document will serve as a dc summary if pt dc prior to next visit  2/28/2022 11:13 AM

## 2022-02-28 NOTE — PLAN OF CARE
Problem: Skin Integrity:  Goal: Will show no infection signs and symptoms  Description: Will show no infection signs and symptoms  Outcome: Ongoing  Note: Pt has scattered bruising present. 2+ BLE edema present. Pt encouraged to elevated legs while in chair but pt refused. PO Lasix being given daily as ordered. No further skin issues present. Will continue to monitor. Turn q2, specialty matressisadora precautions in place.

## 2022-02-28 NOTE — PROGRESS NOTES
Provided shift updates to Parviz Branham, pt's sister, at this time.     Electronically signed by Adam Dunaway RN on 6/00/3455 at 6:42 AM

## 2022-02-28 NOTE — PLAN OF CARE
Problem: Falls - Risk of:  Goal: Will remain free from falls  Description: Will remain free from falls  9/67/4136 9629 by Wade Elise, RN  Outcome: Ongoing  Note: Pt is a Fall Risk. See Hank Median Fall Risk Score. Pt bed in low position and side rails up. Call light and belongings in reach. Pt encouraged to call for assistance. Will continue with hourly rounds for PO intake, pain needs, toileting, and repositioning as needed. Problem: Skin Integrity:  Goal: Absence of new skin breakdown  Description: Absence of new skin breakdown  Outcome: Ongoing  Note: No new skin breakdown noted. Pt repositioned in bed q2h. Heels elevated off bed. Pt placed on specialty bed tonight. Russell Cardinal in place for incontinence. Problem: Gas Exchange - Impaired:  Goal: Levels of oxygenation will improve  Description: Levels of oxygenation will improve  1/42/8476 1493 by Wade Elise, RN  Outcome: Ongoing  Note: SpO2 > 90% on RA. Lung sounds clear.

## 2022-02-28 NOTE — PROGRESS NOTES
Hospitalist Progress Note      PCP: Bandar Barksdale MD    Date of Admission: 2/26/2022    Chief Complaint: Altered mental status    Hospital Course: Sepsis and acute metabolic encephalopathy secondary to UTI. On IV fluids and antibiotics. Patient also noted to have hypercalcemia. Nephrology following. Subjective: Patient was lethargic this morning, failed swallow eval. More awake in the evening, asking to go home. Family is not at bedside. Called sister and left voicemail. Called micro lab for urine cx results. Medications:  Reviewed    Infusion Medications    sodium chloride 100 mL/hr at 02/28/22 1237    sodium chloride       Scheduled Medications    [START ON 3/1/2022] meropenem  2,000 mg IntraVENous Q12H    lidocaine  1 patch TransDERmal Daily    insulin glargine  10 Units SubCUTAneous Nightly    insulin lispro  0-12 Units SubCUTAneous Q4H    sodium chloride flush  5-40 mL IntraVENous 2 times per day    enoxaparin  40 mg SubCUTAneous Daily     PRN Meds: sodium chloride flush, sodium chloride, ondansetron **OR** ondansetron, polyethylene glycol, acetaminophen **OR** acetaminophen      Intake/Output Summary (Last 24 hours) at 2/28/2022 1814  Last data filed at 2/28/2022 1732  Gross per 24 hour   Intake 0 ml   Output 800 ml   Net -800 ml       Physical Exam Performed:    BP (!) 147/76   Pulse 72   Temp 98.2 °F (36.8 °C) (Axillary)   Resp 13   Ht 5' 8\" (1.727 m)   Wt 222 lb 3.6 oz (100.8 kg)   SpO2 93%   BMI 33.79 kg/m²     General appearance: Chronically ill-appearing. Respiratory:  Diminished breath sounds b/l  Cardiovascular: Regular rate and rhythm with normal S1/S2 without murmurs, rubs or gallops. Abdomen: Soft, non-tender, non-distended with normal bowel sounds. Musculoskeletal: No clubbing, cyanosis or edema bilaterally. Full range of motion without deformity.   Skin: Bilateral lower extremity chronic venous stasis dermatitis  Neurologic: awake, able to say his name, knows he is in the hospital  Capillary Refill: Brisk,< 3 seconds   Peripheral Pulses: +2 palpable, equal bilaterally       Labs:   Recent Labs     02/26/22 1926 02/27/22  0536   WBC 9.5 9.5   HGB 10.6* 10.2*   HCT 33.3* 31.5*    274     Recent Labs     02/26/22  1926 02/27/22  0536 02/28/22  0500    142 143   K 4.7 4.7 4.8   CL 99 107 110   CO2 28 25 24   BUN 33* 31* 32*   CREATININE 1.8* 1.8* 1.8*   CALCIUM 10.9* 9.8 9.6   PHOS  --  3.0 2.1*     Recent Labs     02/26/22 1926   AST 30   ALT 15   BILITOT <0.2   ALKPHOS 143*     No results for input(s): INR in the last 72 hours. Recent Labs     02/26/22  1938 02/26/22  2308   TROPONINI 0.02* 0.02*       Urinalysis:      Lab Results   Component Value Date    NITRU Negative 02/26/2022    WBCUA >100 02/26/2022    BACTERIA 4+ 02/26/2022    RBCUA 0-2 02/26/2022    BLOODU SMALL 02/26/2022    SPECGRAV >=1.030 02/26/2022    GLUCOSEU Negative 02/26/2022       Radiology:  CT ABDOMEN PELVIS WO CONTRAST Additional Contrast? None   Final Result      1. Diffuse bladder wall thickening appreciated of uncertain etiology up to 11 mm.   2. Chronic renal atrophy left kidney and benign-appearing cyst measuring up to 3.1 x 2.1 cm with more normal appearing right kidney without hydronephrosis or hydroureter appreciated      3. Moderate stool noted throughout the colon and rectum which may indicate constipation. 4. No free fluid or free air or adenopathy appreciated. 5. Degenerative spondylosis in the spine without acute fracture      CT HEAD WO CONTRAST   Final Result      Normal noncontrast CT the brain without acute hemorrhage, edema or hydrocephalus. Mild symmetric cerebral atrophic change      XR CHEST PORTABLE   Final Result      1.  No acute process or consolidation                          Assessment/Plan:    Sepsis likely due to urinary tract infection:  S/p merrem and angely hugger  Monitor for urinary retention  Follow-up blood and urine cultures- called micro labs and culture was just being started.    Blood cx NGTD  Lactic acid normalized  Continue IV hydration and merrem    Acute metabolic encephalopathy due to sepsis:  Head CT was nonacute  Holding sedatives  NPO per SLP eval  Cont Antibiotics, fluids, supportive care     CKD stage III with hypercalcemia:  Does not look like patient had previous work-up  Creatinine is at baseline around 1.8  Monitor calcium level, improving  Continue IV fluids  Nephrology following    Hyperglycemia/DM type II:  Monitor blood glucose  Continue Lantus and sliding scale insulin    HTN:  Monitor blood pressure, currently on lower side  Holding home medication    Dementia/schizophrenia:  Holding home medication due to mental status and n.p.o. status  Will resume once able     DVT Prophylaxis: Lovenox  Diet: Diet NPO  Code Status: Full Code    PT/OT Eval Status: Ordered    Dispo -pending sepsis resolution/mental status improved, culture result/antibiotic determination    Giovanni Davis MD

## 2022-02-28 NOTE — CARE COORDINATION
Case Management Assessment           Initial Evaluation                Date / Time of Evaluation: 2/28/2022 10:05 AM                 Assessment Completed by: Khanh Whitney RN    Patient Name: Juan Marte     YOB: 1950  Diagnosis: Hyperglycemia [R73.9]  SIRS (systemic inflammatory response syndrome) ([facility] [R65.10]  Acute cystitis with hematuria [N30.01]  Sepsis ([facility] [A41.9]  Altered mental status, unspecified altered mental status type [R41.82]     Date / Time: 2/26/2022  6:45 PM    Patient Admission Status: Inpatient    If patient is discharged prior to next notation, then this note serves as note for discharge by case management. Current PCP: Murray Mackey MD  Clinic Patient: No    Chart Reviewed: Yes  Patient/ Family Interviewed: Yes    Initial assessment completed at bedside with: patient's sister    Hospitalization in the last 30 days: No    Emergency Contacts:  Extended Emergency Contact Information  Primary Emergency Contact: [address] [place] [address], [address] [place] [address] Phone: [phone]  Mobile Phone: [phone]  Relation: Brother/Sister  Secondary Emergency Contact: [place] [address] Phone: [phone]  Relation: Brother/Sister    Advance Directives:   Code Status: Full [address]: No      Financial  Payor: MEDICARE / Plan: MEDICARE PART A AND B / Product Type: *No Product type* /     Pre-cert required for SNF: No    Pharmacy    [address], [address]  Phone: [phone] Fax: [phone]      Potential assistance Purchasing Medications: Potential Assistance Purchasing Medications: No  Does Patient want to participate in local refill/ meds to beds program?:      Meds To Beds General Rules:  1. Can ONLY be done Monday- Friday between 8:30am-5pm  2. Prescription(s) must be in pharmacy by 3pm to be filled same day  3. Copy of patient's insurance/ prescription drug card and patient face sheet must be sent along with the prescription(s)  4. Cost of Rx cannot be added to hospital bill. If financial assistance is needed, please contact unit  or ;  or  CANNOT provide pharmacy voucher for patients co-pays  5. Patients can then  the prescription on their way out of the hospital at discharge, or pharmacy can deliver to the bedside if staff is available. (payment due at time of pick-up or delivery - cash, check, or card accepted)     Able to afford home medications/ co-pay costs: Yes    ADLS  Support Systems: Family Members    PT AM-PAC:   /24  OT AM-PAC:   /24    [place]: from [facility] at [address]  Steps: 0    Plans to RETURN to current housing: Yes  Barriers to RETURNING to current housing: none    S[address] Rangel [address]  Currently ACTIVE with [address]: No  Home Care Agency: Not [address]  DME Provider: n/a  Equipment: wheelchair    Home Oxygen and [address] prior to admission: No  [address]: Not Applicable      Dialysis  Active with HD/PD prior to admission: No  Nephrologist:     HD Center:  Not Applicable    DISCHARGE PLAN:  Disposition: [facility] ([facility]): [address]  Phone: [phone]  Fax: [phone]    Transportation PLAN for discharge: EMS transportation     Factors facilitating achievement of predicted outcomes: Family support    Barriers to discharge: Medical complications    Additional Case Management Notes:  Patient is from [place] [address], family plans for him to return there at discharge. Patient is normally wheelchair bound at facility. Patient will likely need medical transport at discharge.     The Plan for Transition of Care is related to the following treatment goals of Hyperglycemia [R73.9]  SIRS (systemic inflammatory response syndrome) (HCC) [R65.10]  Acute cystitis with hematuria [N30.01]  Sepsis ([facility] [A41.9]  Altered mental status, unspecified altered mental status type [R41.82]    The Patient and/or patient representative Ross Gray and his family were provided with a choice of provider and agrees with the discharge plan Yes    Freedom of choice list was provided with basic dialogue that supports the patient's individualized plan of care/goals and shares the quality data associated with the providers.  Yes    Care Transition patient: No    Ted Gee RN  [facility]  Case Management Department  Ph: [phone]   Fax: [phone]

## 2022-02-28 NOTE — PROGRESS NOTES
Occupational Therapy   Occupational Therapy Initial Assessment/treatment  Date: 2022   Patient Name: Rajendra Lieberman  MRN: [id number]     : 1950    Date of Service: 2022    Discharge Recommendations:  Rajendra Lieberman scored a 8/24 on the AM-PAC ADL Inpatient form. Current research shows that an AM-PAC score of 17 or less is typically not associated with a discharge to the patient's home setting. Based on the patient's AM-PAC score and their current ADL deficits, it is recommended that the patient have 3-5 sessions per week of Occupational Therapy at d/c to increase the patient's independence. Please see assessment section for further patient specific details. If patient discharges prior to next session this note will serve as a discharge summary. Please see below for the latest assessment towards goals. OT Equipment Recommendations  Other: defer    Assessment   Performance deficits / Impairments: Decreased functional mobility ; Decreased endurance;Decreased ADL status; Decreased balance;Decreased strength;Decreased ROM; Decreased cognition;Decreased safe awareness;Decreased posture  Assessment: Pt is a 73 y.o. male with diagnosis of hyperglycemia presenting w/ decreased functional mobility/transfers and performance of ADLs. Unclear of pt's exact PLOF as pt is a poor historian. Pt does reqport recieving some assist for transfers and ADLs and using a wc at baseline. Pt is currently requiring assist of two for all mobility, and use of adrianna steady for transfers, which is a decline from reported baseline. Pt also total A for toileting and LE dressing this date. Safety concerns for return home due to heavy assist required. Pt will benefit from cont inpt OT services upon dc to maximize safety and independence.   Treatment Diagnosis: impaired ADLs and functional mobility/transfers  Prognosis: Fair;Guarded  Decision Making: Medium Complexity  OT Education: OT Role;Plan of Care  Patient Education: would benefit from reinforcement  Barriers to Learning: cog  REQUIRES OT FOLLOW UP: Yes  Activity Tolerance  Activity Tolerance: Patient Tolerated treatment well;Patient limited by fatigue;Patient limited by pain;Treatment limited secondary to decreased cognition  Safety Devices  Safety Devices in place: Yes  Type of devices: Left in chair;Nurse notified;Call light within reach; Chair alarm in place           Patient Diagnosis(es): The primary encounter diagnosis was Altered mental status, unspecified altered mental status type. Diagnoses of SIRS (systemic inflammatory response syndrome) ([facility] Hyperglycemia, and Acute cystitis with hematuria were also pertinent to this visit. has a past medical history of Ataxia, Bipolar 1 disorder ([facility] BPH (benign prostatic hyperplasia), Cerebral infarct ([facility] Chronic kidney disease, Dementia ([facility] Depression, Diabetes mellitus type 2, uncomplicated ([facility] Gout, Hyperlipidemia, Hypertension, and Schizophrenia ([facility] has no past surgical history on file. Treatment Diagnosis: impaired ADLs and functional mobility/transfers      Restrictions  Position Activity Restriction  Other position/activity restrictions: up as tolerated, up with assist    Subjective   General  Chart Reviewed: Yes  Patient assessed for rehabilitation services?: Yes  Additional Pertinent Hx: 73 y.o. male with medical history most notable for HTN, CKD stage III, dementia, schizophrenia, diabetes mellitus who presents to [facility] from his nursing facility with elevated blood sugar and altered mental status. Upon arrival to emergency room he was found to be hypothermic.   Family / Caregiver Present: No  Referring Practitioner: Ted Boateng MD  Diagnosis: Hyperglycemia  Subjective  Subjective: Pt supine in bed upon arrival and agreeble to therapy eval and treat  Patient Currently in Pain: Yes (unrated back pain, RN reporting pt is not due for pain medicine)  Vital Signs  Patient Currently in Pain: Yes (unrated back pain, RN reporting pt is not due for pain medicine)  Social/Functional History  Social/Functional History  Lives With: Alone  Type of Home: Facility  Home Layout: One level  Home Access: Level entry  Bathroom Shower/Tub: Walk-in shower,Shower chair with back  Bathroom Toilet: Handicap height  Bathroom Equipment:  (unable to state)  Home Equipment: [employer]  ADL Assistance: Needs assistance  Homemaking Assistance: Needs assistance  Ambulation Assistance: Needs assistance (in w/c)  Transfer Assistance: Needs assistance  Additional Comments: Unclear of exact assist being provided to pt at facility and true functional status prior to admission due to confusion. Objective        Orientation  Overall Orientation Status: Impaired  Orientation Level: Disoriented to situation;Disoriented to time;Disoriented to place;Oriented to person  Observation/Palpation  Observation: ACE to R foot and IV intact throughout session  Balance  Sitting Balance: Contact guard assistance (seated EOB for ~10min)  Standing Balance: Dependent/Total (Max A x2 for adrianna stedy)  ADL  LE Dressing: Dependent/Total (to don socks)  Toileting: Dependent/Total (external cath present w/ diaper)  Coordination  Movements Are Fluid And Coordinated: No  Coordination and Movement description: Decreased speed;Decreased accuracy; Right UE;Left UE     Bed mobility  Rolling to Right: Dependent/Total (ModAx2, HOHA for use of bedrail, increased time, heavy cues for technique)  Supine to Sit: Dependent/Total (MaxAx2, HOB flat, HOHA for use of bedrail, assist for BLE and trunk placement, increased time and effort)  Scooting: Maximal assistance (to scoot to EOB in sitting)  Transfers  Sit to stand:  (attempted x1 trial w/ Max A x2 from EOB to RW, pt unable to clear bottom)  Transfer Comments: used adrianna stedy for EOb to chair w/ Mod Ax2     Cognition  Overall Cognitive Status: Exceptions  Arousal/Alertness: Delayed responses to stimuli  Following Commands: Follows one step commands with increased time; Follows one step commands with repetition  Attention Span: Difficulty attending to directions  Memory: Decreased short term memory;Decreased recall of recent events  Safety Judgement: Decreased awareness of need for assistance;Decreased awareness of need for safety  Problem Solving: Decreased awareness of errors  Insights: Decreased awareness of deficits  Initiation: Requires cues for all  Sequencing: Requires cues for all                                        Plan   Plan  Times per week: 2-5  Times per day: Daily  Current Treatment Recommendations: Strengthening,Balance Training,Functional Mobility Training,Endurance Training,Wheelchair Mobility Training,Safety Education & Training,Self-Care / ADL,Cognitive Reorientation,ROM    G-Code     OutComes Score                                                  AM-PAC Score        AM-[facility] Inpatient Daily Activity Raw Score: 8 (02/28/22 1645)  AM-PAC Inpatient ADL T-Scale Score : 22.86 (02/28/22 1645)  ADL Inpatient CMS 0-100% Score: 85.69 (02/28/22 1645)  ADL Inpatient CMS G-Code Modifier : CM (02/28/22 1645)    Goals  Short term goals  Time Frame for Short term goals: by d/c  Short term goal 1: Pt will complete BSC/toilet transfer w/ Min Ax2  Short term goal 2: Pt will complete grooming task seated EOB w/ Mod A       Therapy Time   Individual Concurrent Group Co-treatment   Time In 1450         Time Out 1545         Minutes 55                   Timed Code Treatment Minutes:  40    Total Treatment Minutes:  [address]  [facility]

## 2022-03-01 VITALS
HEART RATE: 73 BPM | RESPIRATION RATE: 17 BRPM | HEIGHT: 68 IN | TEMPERATURE: 98.4 F | SYSTOLIC BLOOD PRESSURE: 134 MMHG | BODY MASS INDEX: 34.72 KG/M2 | WEIGHT: 229.06 LBS | DIASTOLIC BLOOD PRESSURE: 63 MMHG | OXYGEN SATURATION: 95 %

## 2022-03-01 PROBLEM — N39.0 UTI (URINARY TRACT INFECTION): Status: ACTIVE | Noted: 2022-03-01

## 2022-03-01 PROBLEM — N18.9 CKD (CHRONIC KIDNEY DISEASE): Status: ACTIVE | Noted: 2022-03-01

## 2022-03-01 LAB
ALBUMIN SERPL-MCNC: 3.3 G/DL (ref 3.4–5)
ANION GAP SERPL CALCULATED.3IONS-SCNC: 10 MMOL/L (ref 3–16)
BUN BLDV-MCNC: 31 MG/DL (ref 7–20)
CALCIUM SERPL-MCNC: 9.6 MG/DL (ref 8.3–10.6)
CHLORIDE BLD-SCNC: 107 MMOL/L (ref 99–110)
CO2: 23 MMOL/L (ref 21–32)
CREAT SERPL-MCNC: 1.4 MG/DL (ref 0.8–1.3)
GFR AFRICAN AMERICAN: >60
GFR NON-AFRICAN AMERICAN: 50
GLUCOSE BLD-MCNC: 108 MG/DL (ref 70–99)
GLUCOSE BLD-MCNC: 129 MG/DL (ref 70–99)
GLUCOSE BLD-MCNC: 156 MG/DL (ref 70–99)
GLUCOSE BLD-MCNC: 79 MG/DL (ref 70–99)
KAPPA, FREE LIGHT CHAINS, SERUM: 107.94 MG/L (ref 3.3–19.4)
KAPPA/LAMBDA RATIO: 2.2 (ref 0.26–1.65)
KAPPA/LAMBDA TEST COMMENT: ABNORMAL
LAMBDA, FREE LIGHT CHAINS, SERUM: 49.1 MG/L (ref 5.71–26.3)
PERFORMED ON: ABNORMAL
PERFORMED ON: ABNORMAL
PERFORMED ON: NORMAL
PHOSPHORUS: 2 MG/DL (ref 2.5–4.9)
POTASSIUM SERPL-SCNC: 4.6 MMOL/L (ref 3.5–5.1)
SODIUM BLD-SCNC: 140 MMOL/L (ref 136–145)

## 2022-03-01 PROCEDURE — 36415 COLL VENOUS BLD VENIPUNCTURE: CPT

## 2022-03-01 PROCEDURE — 6370000000 HC RX 637 (ALT 250 FOR IP): Performed by: INTERNAL MEDICINE

## 2022-03-01 PROCEDURE — 2580000003 HC RX 258: Performed by: INTERNAL MEDICINE

## 2022-03-01 PROCEDURE — 80069 RENAL FUNCTION PANEL: CPT

## 2022-03-01 PROCEDURE — 92526 ORAL FUNCTION THERAPY: CPT

## 2022-03-01 PROCEDURE — 6360000002 HC RX W HCPCS: Performed by: INTERNAL MEDICINE

## 2022-03-01 RX ORDER — DIVALPROEX SODIUM 125 MG/1
125 CAPSULE, COATED PELLETS ORAL EVERY MORNING
Status: DISCONTINUED | OUTPATIENT
Start: 2022-03-01 | End: 2022-03-01 | Stop reason: HOSPADM

## 2022-03-01 RX ORDER — DIVALPROEX SODIUM 125 MG/1
250 CAPSULE, COATED PELLETS ORAL NIGHTLY
Status: DISCONTINUED | OUTPATIENT
Start: 2022-03-01 | End: 2022-03-01 | Stop reason: HOSPADM

## 2022-03-01 RX ORDER — ARIPIPRAZOLE 5 MG/1
15 TABLET ORAL DAILY
Status: DISCONTINUED | OUTPATIENT
Start: 2022-03-01 | End: 2022-03-01 | Stop reason: HOSPADM

## 2022-03-01 RX ORDER — RISPERIDONE 0.25 MG/1
0.5 TABLET, FILM COATED ORAL
Status: DISCONTINUED | OUTPATIENT
Start: 2022-03-02 | End: 2022-03-01 | Stop reason: HOSPADM

## 2022-03-01 RX ORDER — CEFUROXIME AXETIL 250 MG/1
250 TABLET ORAL 2 TIMES DAILY
Qty: 10 TABLET | Refills: 0 | Status: ON HOLD | OUTPATIENT
Start: 2022-03-01 | End: 2022-03-08 | Stop reason: HOSPADM

## 2022-03-01 RX ORDER — FLUOXETINE HYDROCHLORIDE 20 MG/1
40 CAPSULE ORAL DAILY
Status: DISCONTINUED | OUTPATIENT
Start: 2022-03-01 | End: 2022-03-01 | Stop reason: HOSPADM

## 2022-03-01 RX ADMIN — DIVALPROEX SODIUM 125 MG: 125 CAPSULE ORAL at 12:51

## 2022-03-01 RX ADMIN — FLUOXETINE 40 MG: 20 CAPSULE ORAL at 12:51

## 2022-03-01 RX ADMIN — SODIUM CHLORIDE 25 ML: 9 INJECTION, SOLUTION INTRAVENOUS at 13:02

## 2022-03-01 RX ADMIN — MEROPENEM 2000 MG: 1 INJECTION, POWDER, FOR SOLUTION INTRAVENOUS at 13:03

## 2022-03-01 RX ADMIN — INSULIN LISPRO 2 UNITS: 100 INJECTION, SOLUTION INTRAVENOUS; SUBCUTANEOUS at 13:00

## 2022-03-01 RX ADMIN — SODIUM CHLORIDE, PRESERVATIVE FREE 10 ML: 5 INJECTION INTRAVENOUS at 09:25

## 2022-03-01 RX ADMIN — MEROPENEM 2000 MG: 1 INJECTION, POWDER, FOR SOLUTION INTRAVENOUS at 04:27

## 2022-03-01 RX ADMIN — IRON SUCROSE 200 MG: 20 INJECTION, SOLUTION INTRAVENOUS at 09:24

## 2022-03-01 RX ADMIN — SODIUM CHLORIDE 25 ML: 9 INJECTION, SOLUTION INTRAVENOUS at 09:22

## 2022-03-01 RX ADMIN — ENOXAPARIN SODIUM 40 MG: 100 INJECTION SUBCUTANEOUS at 09:25

## 2022-03-01 RX ADMIN — ARIPIPRAZOLE 15 MG: 5 TABLET ORAL at 12:51

## 2022-03-01 ASSESSMENT — PAIN SCALES - WONG BAKER
WONGBAKER_NUMERICALRESPONSE: 0

## 2022-03-01 ASSESSMENT — PAIN SCALES - GENERAL
PAINLEVEL_OUTOF10: 0

## 2022-03-01 NOTE — DISCHARGE INSTR - COC
Continuity of Care Form    Patient Name: Yoli Sanchez   :  1950  MRN:  [id number]    Admit date:  2022  Discharge date:  ***    Code Status Order: Full Code   Advance Directives:      Admitting Physician:  Peter Carroll MD  PCP: Juan Vasquez MD    Discharging Nurse: [facility] Unit/Room#: 1457/5536-37  Discharging Unit Phone Number: ***    Emergency Contact:   Extended Emergency Contact Information  Primary Emergency Contact: [facility], [address] Phone: [phone]  Mobile Phone: [phone]  Relation: Brother/Sister  Secondary Emergency Contact: [address] [place] [address] Phone: [phone]  Relation: Brother/Sister    Past Surgical History:  History reviewed. No pertinent surgical history. Immunization History: There is no immunization history on file for this patient.     Active Problems:  Patient Active Problem List   Diagnosis Code    Other fatigue R53.83    Right hand weakness R29.898    Numbness of right hand R20.0    Schizophrenia (HCC) F20.9    Dementia ([facility] F03.90    Essential hypertension I10    Sepsis ([facility] A41.9    UTI (urinary tract infection) N39.0    CKD (chronic kidney disease) N18.9       Isolation/Infection:   Isolation            No Isolation          Patient Infection Status       Infection Onset Added Last Indicated Last Indicated By Review Planned Expiration Resolved Resolved By    None active    Resolved    COVID-19 (Rule Out) 21 COVID-19 (Ordered)   21 Rule-Out Test Resulted            Nurse Assessment:  Last Vital Signs: /67   Pulse 75   Temp 98.4 °F (36.9 °C) (Axillary)   Resp 24   Ht 5' 8\" (1.727 m)   Wt 229 lb 0.9 oz (103.9 kg)   SpO2 96%   BMI 34.83 kg/m²     Last documented pain score (0-10 scale): Pain Level: 0  Last Weight:   Wt Readings from Last 1 Encounters:   22 229 lb 0.9 oz (103.9 kg)     Mental Status:  oriented, alert, and thought processes intact    IV Access:  - None    Nursing Mobility/ADLs:  Walking   Dependent  Transfer  Dependent  Bathing  Dependent  Dressing  Dependent  Toileting  Dependent  Feeding  Dependent  Med Admin  Assisted  Med Delivery   whole    Wound Care Documentation and Therapy:  Wound 02/24/21 Buttocks Inner (Active)   Number of days: 369        Elimination:  Continence: Bowel: No  Bladder: No  Urinary Catheter: None   Colostomy/Ileostomy/Ileal Conduit: Yes       Date of Last BM: ***    Intake/Output Summary (Last 24 hours) at 3/1/2022 0930  Last data filed at 3/1/2022 0732  Gross per 24 hour   Intake 4069.57 ml   Output 700 ml   Net 3369.57 ml     I/O last 3 completed shifts: In: 4069.6 [I.V.:3770.1; IV Piggyback:299.5]  Out: 1100 [Urine:1100]    Safety Concerns:     Aspiration Risk    Impairments/Disabilities:      Speech    Nutrition Therapy:  Current Nutrition Therapy:   - Oral Diet:  Dysphagia 2 mechanically altered    Routes of Feeding: Oral  Liquids: Thin Liquids  Daily Fluid Restriction: no  Last Modified Barium Swallow with Video (Video Swallowing Test): done on ***/***    Treatments at the Time of Hospital Discharge:   Respiratory Treatments: ***  Oxygen Therapy:  is not on home oxygen therapy.   Ventilator:    - No ventilator support    Rehab Therapies: Speech/Language Therapy  Weight Bearing Status/Restrictions: No weight bearing restirctions  Other Medical Equipment (for information only, NOT a DME order):  wheelchair  Other Treatments: ***    Patient's personal belongings (please select all that are sent with patient):  None    RN SIGNATURE:  Electronically signed by Josey Oviedo RN on 3/1/22 at 1:51 PM EST    CASE MANAGEMENT/SOCIAL WORK SECTION    Inpatient Status Date: ***    Readmission Risk Assessment Score:  Readmission Risk              Risk of Unplanned Readmission:  24           Discharging to Facility/ Agency   Name:   [address]  [address] [facility] [address]       Phone: [phone]       Fax: [phone]            / signature: Electronically signed by Aftab Mello RN on 3/1/22 at 10:21 AM EST    PHYSICIAN SECTION    Prognosis: Good    Condition at Discharge: Stable    Rehab Potential (if transferring to Rehab): Good    Recommended Labs or Other Treatments After Discharge:     Resume regular nursing care    Complete course of cefuroxime     Stop vit D and calcium due to hypercalcemia. Repeat renal panel, vit D level in 4 weeks and follow up with nephrology    Physician Certification: I certify the above information and transfer of Tim López  is necessary for the continuing treatment of the diagnosis listed and that he requires [facility] for greater 30 days.      Update Admission H&P: No change in H&P    PHYSICIAN SIGNATURE:  Electronically signed by Francesco Wolf MD on 3/1/22 at 9:31 AM EST

## 2022-03-01 NOTE — CARE COORDINATION
Case Management Assessment            Discharge Note                    Date / Time of Note: 3/1/2022 10:10 AM                  Discharge Note Completed by: Cristi Brown RN    Patient Name: Kenny Coats   YOB: 1950  Diagnosis: Hyperglycemia [R73.9]  SIRS (systemic inflammatory response syndrome) ([facility] [R65.10]  Acute cystitis with hematuria [N30.01]  Sepsis ([facility] [A41.9]  Altered mental status, unspecified altered mental status type [R41.82]   Date / Time: 2/26/2022  6:45 PM    Current PCP: Abby Jewell MD  Clinic patient: No    Hospitalization in the last 30 days: No    Advance Directives:  Code Status: Full Code  [address] DNR form completed and on chart: Not Indicated    Financial:  Payor: MEDICARE / Plan: MEDICARE PART A AND B / Product Type: *No Product type* /      Pharmacy:    [address] [place] [address], [address] [address]  Phone: [phone] Fax: [phone]      Assistance purchasing medications?: Potential Assistance Purchasing Medications: No  Assistance provided by Case Management: None at this time    Does patient want to participate in local refill/ meds to beds program?:      Meds To Beds General Rules:  1. Can ONLY be done Monday- Friday between 8:30am-5pm  2. Prescription(s) must be in pharmacy by 3pm to be filled same day  3. Copy of patient's insurance/ prescription drug card and patient face sheet must be sent along with the prescription(s)  4. Cost of Rx cannot be added to hospital bill. If financial assistance is needed, please contact unit  or ;  or  CANNOT provide pharmacy voucher for patients co-pays  5.  Patients can then  the prescription on their way out of the hospital at discharge, or pharmacy can deliver to the bedside if staff is available. (payment due at time of pick-up or delivery - cash, check, or card accepted)     Able to afford home medications/ co-pay costs: Yes    ADLS:  Current PT AM-PAC Score: 6 /24  Current OT AM-PAC Score: 8 /24      DISCHARGE Disposition: [place] (LTC): [address] [place]  Phone: [phone]  Fax: [phone]. LOC at discharge: Raquel Calzada Completed: Not Indicated    Notification completed in HENS/PAS?:  Not Applicable    IMM Completed:   Not Indicated    Transportation:  Transportation PLAN for discharge: EMS transportation   Mode of Transport: Ambulance stretcher - BLS  Reason for medical transport: Bed confined: Meets the following criteria 1) unable to get out of bed without assistance or ambulate, 2) unable to safely sit up in a wheelchair, 3) unable to maintain erect seating position in a chair for time needed for transport  Name of Transport Company: [employer]  Phone: [phone]  Time of Transport: 1500    Transport form completed: Yes    Home Care:  [address] ordered at discharge: Not [address]: Not Applicable  Orders faxed: No    Durable Medical Equipment:  DME Provider: n/a  Equipment obtained during hospitalization: n/a    Home Oxygen and Respiratory Equipment:  Oxygen needed at discharge?: Not [address]: Not Applicable  Portable tank available for discharge?: Not Indicated          Additional CM Notes:   Patient will return to [facility]. CM spoke with a nurse at the facility to alert the time of transport. Orders faxed to [phone], nurse to call report to [phone]. Patient is scheduled for transport via [employer] at 1500.       The Plan for Transition of Care is related to the following treatment goals of Hyperglycemia [R73.9]  SIRS (systemic inflammatory response syndrome) (HCC) [R65.10]  Acute cystitis with hematuria [N30.01]  Sepsis ([address] [A41.9]  Altered mental status, unspecified altered mental status type [R41.82]    The Patient and/or patient representative Marifer Boles and his family were provided with a choice of provider and agrees with the discharge plan Yes    Freedom of choice list was provided with basic dialogue that supports the patient's individualized plan of care/goals and shares the quality data associated with the providers.  Yes    Care Transitions patient: No    Anthony Crain RN  [facility] [employer] [employer]  Case Management Department  Ph: [phone]  Fax: [phone]

## 2022-03-01 NOTE — PROGRESS NOTES
Discharge note: Patient has been seen by doctor. Discharge order obtained, and discharge instructions reviewed. Patient educated, using the teach back method, about follow up instructions and discharge instructions. A completed copy of the AVS instructions given to patient and all questions answered. IV catheter removed without complaints, catheter intact, site WNL. Called to give report to nurse at [facility]. Both time rang and rang. Will try again later.   Electronically signed by Pop Doyle RN on 3/1/2022 at 3:11 PM

## 2022-03-01 NOTE — DISCHARGE SUMMARY
Hospital Medicine Discharge Summary      Patient ID: Jose Gillis      Patient's PCP: Aleshia Sanchez MD    Admit Date: 2/26/2022     Discharge Date:   3/1/2022    Admitting Physician: Delia Tomas MD    Discharge Physician: Delia Tomas MD     Discharge Diagnoses: Active Hospital Problems    Diagnosis Date Noted    UTI (urinary tract infection) [N39.0] 03/01/2022    CKD (chronic kidney disease) [N18.9] 03/01/2022    Sepsis ([address] [A41.9] 02/26/2022         The patient was seen and examined on day of discharge and this discharge summary is in conjunction with any daily progress note from day of discharge. Hospital Course: The patient is a 73 y.o. male with medical history most notable for HTN, CKD stage III, dementia, schizophrenia, diabetes mellitus who presented to [facility] from his nursing facility with elevated blood sugar and altered mental status. Upon arrival to emergency room he was found to be hypothermic. EMS checked his glucose it was in the 500s, he was given 10 units of insulin prior to presentation. In the ER work-up was significant for elevated lactic acid, calcium level and abnormal urinalysis suggestive of UTI. Hospital course:  1. Sepsis due to urinary tract infection:  S/p merrem and angely doer  Blood cx NGTD. Urine cx still in progress  No history of resistant UTIs- discharged on cefuroxime for 5 more days.      2. Acute metabolic encephalopathy due to sepsis:  Head CT was nonacute  Mentation returned to baseline  Wheelchair bound at baseline     3. CKD stage III with hypercalcemia:  Creatinine is at baseline around 1.8  Calcium normalized with IVF  Stopped calcium Vit D supplements on discharge  Follow up with nephrology in 1 month with repeat labs     4. Hyperglycemia/DM type II:  Home medications were resumed     5.  Dementia/schizophrenia:  Resumed home medications on dc    Consults:     IP CONSULT TO HOSPITALIST  IP CONSULT TO HOSPITALIST  IP CONSULT TO NEPHROLOGY  IP CONSULT TO PHARMACY    Disposition: ECF    Discharged Condition: Stable    Code Status: Full Code    Activity: activity as tolerated    Diet: diabetic diet    Follow Up: Primary Care Physician in one week    Exam:     General appearance: no acute distress, Chronically ill-appearing. Respiratory:  Diminished breath sounds b/l  Cardiovascular: Regular rate and rhythm with normal S1/S2 without murmurs, rubs or gallops. Abdomen: Soft, non-tender, non-distended with normal bowel sounds. Musculoskeletal: No clubbing, cyanosis or edema bilaterally. Full range of motion without deformity. Skin: Bilateral lower extremity chronic venous stasis dermatitis  Neurologic: awake, able to say his name, knows he is in the hospital, follows simple commands  Capillary Refill: Brisk,< 3 seconds   Peripheral Pulses: +2 palpable, equal bilaterally       Labs: For convenience and continuity at follow-up the following most recent labs are provided:    CBC:   Lab Results   Component Value Date    WBC 9.5 02/27/2022    HGB 10.2 02/27/2022    HCT 31.5 02/27/2022     02/27/2022       RENAL:   Lab Results   Component Value Date     03/01/2022    K 4.6 03/01/2022    K 4.7 02/26/2022     03/01/2022    CO2 23 03/01/2022    BUN 31 03/01/2022    CREATININE 1.4 03/01/2022           Discharge Medications:   Current Discharge Medication List           Details   cefUROXime (CEFTIN) 250 MG tablet Take 1 tablet by mouth 2 times daily for 5 days  Qty: 10 tablet, Refills: 0              Details   !! insulin aspart (NOVOLOG) 100 UNIT/ML injection vial Inject 20 Units into the skin 3 times daily (before meals)      nystatin (MYCOSTATIN) 767032 UNIT/GM powder Apply topically 4 times daily Apply topically 4 times daily.       !! polyethylene glycol (MIRALAX) 17 g PACK packet Take 17 g by mouth every 48 hours      !! divalproex (DEPAKOTE SPRINKLES) 125 MG capsule Take 250 mg by mouth at bedtime      !! divalproex (DEPAKOTE SPRINKLES) 125 MG capsule Take 125 mg by mouth every morning      furosemide (LASIX) 20 MG tablet 20 mg daily       latanoprost (XALATAN) 0.005 % ophthalmic solution Place 1 drop into the right eye nightly       gabapentin (NEURONTIN) 300 MG capsule Take 1 capsule by mouth 2 times daily for 30 days. Qty: 60 capsule, Refills: 0      acetaminophen (TYLENOL) 500 MG tablet Take 500 mg by mouth every 6 hours as needed for Pain      allopurinol (ZYLOPRIM) 100 MG tablet Take 100 mg by mouth daily      amLODIPine (NORVASC) 5 MG tablet Take 5 mg by mouth daily Hold for SBP <110 or Pulse <60      ARIPiprazole (ABILIFY) 15 MG tablet Take 15 mg by mouth daily      aspirin 81 MG EC tablet Take 81 mg by mouth daily      atorvastatin (LIPITOR) 40 MG tablet Take 40 mg by mouth nightly      bisacodyl (DULCOLAX) 10 MG suppository Place 10 mg rectally daily as needed for Constipation      Cranberry 425 MG CAPS Take 425 mg by mouth daily      docusate sodium (COLACE) 100 MG capsule Take 100 mg by mouth 2 times daily      estradiol (ESTRACE) 1 MG tablet Take 1 mg by mouth daily      finasteride (PROSCAR) 5 MG tablet Take 5 mg by mouth daily      FLUoxetine (PROZAC) 20 MG capsule Take 40 mg by mouth daily      !! insulin aspart (NOVOLOG) 100 UNIT/ML injection vial Inject 0-21 Units into the skin 3 times daily (before meals) Sliding scale   201-250 = 5 units  251-300 = 8 units  301-350 = 12 units  351-400 = 15 units  401-450 = 18 units  451-500 = 21 units  500+ notify MD      ondansetron (ZOFRAN-ODT) 4 MG disintegrating tablet Take 4 mg by mouth every 8 hours as needed for Nausea or Vomiting      ! ! polyethylene glycol (GLYCOLAX) 17 g packet Take 17 g by mouth daily as needed for Constipation      !! risperiDONE (RISPERDAL) 0.5 MG tablet Take 0.5 mg by mouth every morning (before breakfast)       !! risperiDONE (RISPERDAL) 1 MG tablet Take 1.5 mg by mouth nightly       tamsulosin (FLOMAX) 0.4 MG capsule Take 0.8 mg by mouth nightly melatonin 1 MG tablet Take 1 mg by mouth nightly       insulin detemir (LEVEMIR) 100 UNIT/ML injection vial Inject 10 Units into the skin nightly  Qty: 1 vial, Refills: 3      Skin Protectants, Misc. (HYDROCERIN) CREA cream Apply topically 2 times daily  Qty: 1 Container, Refills: 0      Multiple Vitamins-Minerals (THERAPEUTIC MULTIVITAMIN-MINERALS) tablet Take 1 tablet by mouth daily       ! ! - Potential duplicate medications found. Please discuss with provider. Time Spent on discharge is more than 30 minutes in the examination, evaluation, counseling and review of medications and discharge plan. Signed:  Lucillie Simmonds, MD   3/1/2022      Thank you Yanira Valentin MD for the opportunity to be involved in this patient's care. If you have any questions or concerns please feel free to contact me at [phone].

## 2022-03-01 NOTE — PROGRESS NOTES
Late entry: 0855        Speech Language Pathology  Facility/Department: [facility] 4 PCU  Dysphagia Daily Treatment Note    NAME: Estelita Alberto  : 1950  MRN: [id number]    Patient Diagnosis(es):   Patient Active Problem List    Diagnosis Date Noted    UTI (urinary tract infection) 2022    CKD (chronic kidney disease) 2022    Sepsis ([address] 2022    Numbness of right hand 10/07/2021    Schizophrenia ([address]     Dementia ([address]     Essential hypertension     Right hand weakness 10/06/2021    Other fatigue      Allergies: Allergies   Allergen Reactions    Codeine     Cogentin [Benztropine]     Penicillins      Onset Date: 2022    Recent Chest Xray 22      1. No acute process or consolidation         CT of head   Impression       Normal noncontrast CT the brain without acute hemorrhage, edema or hydrocephalus.        Mild symmetric cerebral atrophic change       Previous MBS (date)    Chart reviewed. Medical Diagnosis: Hyperglycemia [R73.9]  SIRS (systemic inflammatory response syndrome) (HCC) [R65.10]  Acute cystitis with hematuria [N30.01]  Sepsis ([address] [A41.9]  Altered mental status, unspecified altered mental status type [R41.82]   Treatment Diagnosis: Dysphagia    BSE Impression (2022)-  RN states pt appropriate for swallow eval at this time. Pt awakened to verbal stim, however required cues throughout session to maintain alertness. Speech intelligibility is reduced appears partially r/t lethargy. Pt followed simple commands inconsistently, oral structures appear grossly intact. Pt presented with ice chips, water via tsp/cup and straw and puree. Pt inconsistently able to achieve labial seal and suck from straw. Initially no overt signs of aspiration emerged, however pt exhibited significant coughing after last trial of water. What appears to be delayed swallow movement felt upon palpation of anterior neck.     Due to above possible s/s of aspiration and inability to maintain alertness recommend NPO, oral care, ice chips/tsps of water per RN only. Plan to re-assess 3/1/22  Dysphagia Diagnosis: Suspected needs further assessment    MBS results (n/a)-    Pain: none indicated by any means    Current Diet : ADULT DIET; Dysphagia - Soft and Bite Sized   Recommended Form of Meds: Via alternative means of nutrition        Treatment:  Pt seen bedside to address the following goals:  Dysphagia Goals: The patient will tolerate repeat BSE when able. 3/1: Cleared by RN to see pt. Received pt awake alert resting in bed, on room air. Repositioned bed upright. Assessed tolerance thins via straw (pt did not fully follow directions for 3 oz screen, instead only completed 2 oz sequentially),  Puree, and soft solid (pt states he avoids hard solids in setting of reduced dentition). Pt with positive oral acceptance, adequate mastication, positive swallow movement, good oral clearance, no overt signs of aspiration. Recommend upgrade to dysphagia soft & bite-sized diet with thin liquids, as tolerated  Goal met; d/c goal    New goal:  Goal 1: Patient will tolerate least restrictive diet without overt signs of aspiration or associated decline in respiratory status. Goal 2: Patient/caregiver will demonstrate understanding of swallowing concerns/recommendations. 3/1: Educated pt to purpose of visit, swallow function, diet recommendations, safety strategies (positioning, bolus size/rate). Suspect needs reinforcement. Cont goal    Patient/Family/Caregiver Education:  See goal  above    Compensatory Strategies:  Upright position, small bites/sips, slow rate, alternate liquids/solids     Plan:  Continue dysphagia treatment with goals per plan of care. Diet recommendations: dysphagia soft & bite-sized / thin liquids / meds PO; as tolerated    If signs of aspiration or associated decline in respiratory status arise, recommend withhold PO and contact speech.     DC recommendation: TBD  Treatment: 8  D/W nursing, Marie Shaikh  Needs met prior to leaving room, call button in reach. Ham Iglesias M.A., [address] [facility].[id number]  Speech-Language Pathologist  Pg.  # [phone]    If patient is discharged prior to next treatment, this note will serve as the discharge summary

## 2022-03-01 NOTE — PLAN OF CARE
Problem: Falls - Risk of:  Goal: Will remain free from falls  Description: Will remain free from falls  Outcome: Met This Shift  Goal: Absence of physical injury  Description: Absence of physical injury  Outcome: Met This Shift     Problem: Discharge Planning:  Goal: Discharged to appropriate level of care  Description: Discharged to appropriate level of care  Outcome: Not Met This Shift     Problem: Gas Exchange - Impaired:  Goal: Levels of oxygenation will improve  Description: Levels of oxygenation will improve  Outcome: Ongoing     Problem: Infection, Septic Shock:  Goal: Will show no infection signs and symptoms  Description: Will show no infection signs and symptoms  Outcome: Ongoing     Problem: Infection - Ventilator-Associated Pneumonia:  Goal: Absence of pulmonary infection  Description: Absence of pulmonary infection  Outcome: Completed     Problem: Serum Glucose Level - Abnormal:  Goal: Ability to maintain appropriate glucose levels will improve  Description: Ability to maintain appropriate glucose levels will improve  Outcome: Ongoing     Problem: Tissue Perfusion, Altered:  Goal: Circulatory function within specified parameters  Description: Circulatory function within specified parameters  Outcome: Ongoing     Problem: Venous Thromboembolism:  Goal: Will show no signs or symptoms of venous thromboembolism  Description: Will show no signs or symptoms of venous thromboembolism  Outcome: Met This Shift  Goal: Absence of signs or symptoms of impaired coagulation  Description: Absence of signs or symptoms of impaired coagulation  Outcome: Ongoing     Problem: Skin Integrity:  Goal: Will show no infection signs and symptoms  Description: Will show no infection signs and symptoms  3/1/2022 0405 by Esther Tesfaye RN  Outcome: Ongoing  2/28/2022 1856 by Thad Shanks RN  Outcome: Ongoing  Note: Pt has scattered bruising present. 2+ BLE edema present.  Pt encouraged to elevated legs while in chair but pt refused. PO Lasix being given daily as ordered. No further skin issues present. Will continue to monitor. Turn q2, specialty isadora vines precautions in place.    Goal: Absence of new skin breakdown  Description: Absence of new skin breakdown  Outcome: Met This Shift

## 2022-03-01 NOTE — PROGRESS NOTES
Interval History and plan:      Creatinine is better at 1.4  Calcium is better   Stop IVF  Hold Lasix, calcium supplements and vitamin D. Recheck vitamin D level. Check SPEP, UPEP and serum for free light chains. Assessment :     Stage IIIb chronic kidney disease: His creatinine fluctuates between 1.5-1.9. He is currently at his baseline GFR. Urinalysis shows trace protein and WBC. CT scan showed right and left kidney with left renal atrophy and a cyst.  There was no hydronephrosis. He likely has chronic kidney disease stage IIIb from hypertensive nephrosclerosis/vascular disease. He has mild hypercalcemia. Phosphorus is normal.  PTH is 42. Vitamin D is 48    Patient is on vitamin D supplements, calcium and Lasix. LDL is within target range. Echocardiogram showed EF of 55% with LVH. He has mild anemia: No need for Procrit. I will check iron studies, SPEP, UPEP and serum for free light chains. I will also check B12 and folate. [facility] would like to thank Oriana Townsend MD   for opportunity to serve this patient      Please call with questions at-   24 Hrs Answering service [phone] or  7 am- 5 pm via Perfect serve or cell phone  Buck Arvizu MD          CC/reason for consult :     Acute kidney injury/chronic kidney disease     HPI :     Steven Hopkins is a 73 y.o. male presented to   the hospital on 2/26/2022 with altered mental status    He has past medical history of schizophrenia, bipolar disorder, history of CVA, dementia, type 2 diabetes, gout, hyperlipidemia. There is a mention that he has stage III chronic kidney disease. He was sent from a nursing facility with hypoglycemia and altered mental status. He presented with a sugar of 500. He was given 10 units of insulin arrival.  He was also hypothermic. It is worth mentioning that most of the history was obtained from the chart and speaking with staff.   Patient cannot give any reliable history. He is now admitted with UTI and sepsis. He is on IV hydration and antibiotics. Creatinine is around 1.8 associated with hypercalcemia. I was called for further management of chronic kidney disease and hypercalcemia. ROS:     Seen with-resident    Cannot be reliably obtained from the patient                      All other remaining systems are negative or unable to obtain        PMH/PSH/SH/Family History:     Past Medical History:   Diagnosis Date    Ataxia     B[facility] 1 disorder ([facility]     BPH (benign prostatic hyperplasia)     [facility]bral infarct ([facility]     Chronic kidney disease     Dementia ([facility]     Depression     Diabetes mellitus type 2, uncomplicated ([facility]     Gout     Hyperlipidemia     Hypertension     Schizophrenia ([facility]        History reviewed. No pertinent surgical history. reports that he has never smoked. He has never used smokeless tobacco. He reports previous alcohol use.    family history is not on file.          Medication:     Current Facility-Administered Medications: iron sucrose (VENOFER) 200 mg in sodium chloride 0.9 % 100 mL IVPB, 200 mg, IntraVENous, Once  meropenem (MERREM) 2,000 mg in sodium chloride 0.9 % 100 mL IVPB, 2,000 mg, IntraVENous, Q12H  lidocaine 4 % external patch 1 patch, 1 patch, TransDERmal, Daily  0.9 % sodium chloride infusion, , IntraVENous, Continuous  insulin glargine (LANTUS;BASAGLAR) injection pen 10 Units, 10 Units, SubCUTAneous, Nightly  insulin lispro (1 Unit Dial) 0-12 Units, 0-12 Units, SubCUTAneous, Q4H  sodium chloride flush 0.9 % injection 5-40 mL, 5-40 mL, IntraVENous, 2 times per day  sodium chloride flush 0.9 % injection 5-40 mL, 5-40 mL, IntraVENous, PRN  0.9 % sodium chloride infusion, 25 mL, IntraVENous, PRN  enoxaparin (LOVENOX) injection 40 mg, 40 mg, SubCUTAneous, Daily  ondansetron (ZOFRAN-ODT) disintegrating tablet 4 mg, 4 mg, Oral, Q8H PRN **OR** ondansetron (ZOFRAN) injection 4 mg, 4 mg, IntraVENous, Q6H PRN  polyethylene glycol (GLYCOLAX) packet 17 g, 17 g, Oral, Daily PRN  acetaminophen (TYLENOL) tablet 650 mg, 650 mg, Oral, Q6H PRN **OR** acetaminophen (TYLENOL) suppository 650 mg, 650 mg, Rectal, Q6H PRN       Vitals :     Vitals:    03/01/22 0732   BP: 129/67   Pulse: 75   Resp: 24   Temp: 98.4 °F (36.9 °C)   SpO2: 96%       I & O :       Intake/Output Summary (Last 24 hours) at 3/1/2022 0844  Last data filed at 3/1/2022 0732  Gross per 24 hour   Intake 4069.57 ml   Output 700 ml   Net 3369.57 ml        Physical Examination :     General appearance: Awake and in no acute distress  HEENT: Lips- normal, teeth- ok , oral mucosa- moist  Neck : Mass- no, appears symmetrical, JVD- not visible  Respiratory: Respiratory effort-none, wheeze- no, crackles -none  Cardiovascular:  Ausculation- No M/R/G, Edema ++  Abdomen: visible mass- no, distention- no, scar- no, tenderness- no                            hepatosplenomegaly-  no  Musculoskeletal:  clubbing no,cyanosis- no , digital ischemia- no                           muscle strength- grossly normal , tone - grossly normal  Skin: rashes- no , ulcers- no, induration- no, tightening - no  Psychiatric: Cannot be evaluated     LABS:     Recent Labs     02/26/22  1926 02/27/22  0536   WBC 9.5 9.5   HGB 10.6* 10.2*   HCT 33.3* 31.5*    274     Recent Labs     02/27/22  0536 02/28/22  0500 03/01/22  0533    143 140   K 4.7 4.8 4.6    110 107   CO2 25 24 23   BUN 31* 32* 31*   CREATININE 1.8* 1.8* 1.4*   GLUCOSE 101* 125* 108*   PHOS 3.0 2.1* 2.0*      Nephrology  [address] [facility], [address]  Office: [phone]  Fax: [phone]

## 2022-03-02 LAB
BLOOD CULTURE, ROUTINE: NORMAL
CULTURE, BLOOD 2: NORMAL

## 2022-03-03 LAB
ORGANISM: ABNORMAL
ORGANISM: ABNORMAL
URINE CULTURE, ROUTINE: ABNORMAL
URINE CULTURE, ROUTINE: ABNORMAL
URINE ELECTROPHORESIS INTERP: NORMAL
VITAMIN D 1,25-DIHYDROXY: 37.2 PG/ML (ref 19.9–79.3)

## 2022-03-05 ENCOUNTER — APPOINTMENT (OUTPATIENT)
Dept: GENERAL RADIOLOGY | Age: 72
DRG: 728 | End: 2022-03-05
Payer: MEDICARE

## 2022-03-05 ENCOUNTER — APPOINTMENT (OUTPATIENT)
Dept: CT IMAGING | Age: 72
DRG: 728 | End: 2022-03-05
Payer: MEDICARE

## 2022-03-05 ENCOUNTER — HOSPITAL ENCOUNTER (INPATIENT)
Age: 72
LOS: 3 days | Discharge: NURSING, REHAB OR LONG-TERM CARE | DRG: 728 | End: 2022-03-08
Attending: STUDENT IN AN ORGANIZED HEALTH CARE EDUCATION/TRAINING PROGRAM | Admitting: INTERNAL MEDICINE
Payer: MEDICARE

## 2022-03-05 DIAGNOSIS — R74.01 ELEVATED TRANSAMINASE LEVEL: ICD-10-CM

## 2022-03-05 DIAGNOSIS — N39.0 URINARY TRACT INFECTION WITHOUT HEMATURIA, SITE UNSPECIFIED: ICD-10-CM
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
## 2022-03-05 PROBLEM — L03.90 CELLULITIS: Status: ACTIVE | Noted: 2022-03-05

## 2022-03-05 LAB
ALBUMIN SERPL-MCNC: 3.2 G/DL (ref 3.4–5)
ALP BLD-CCNC: 190 U/L (ref 40–129)
ALT SERPL-CCNC: 46 U/L (ref 10–40)
ANION GAP SERPL CALCULATED.3IONS-SCNC: 10 MMOL/L (ref 3–16)
AST SERPL-CCNC: 73 U/L (ref 15–37)
BASE EXCESS VENOUS: 2.2 MMOL/L (ref -2–3)
BASOPHILS ABSOLUTE: 0.1 K/UL (ref 0–0.2)
BASOPHILS RELATIVE PERCENT: 0.5 %
BILIRUB SERPL-MCNC: 0.3 MG/DL (ref 0–1)
BILIRUBIN DIRECT: <0.2 MG/DL (ref 0–0.3)
BILIRUBIN URINE: NEGATIVE
BILIRUBIN, INDIRECT: ABNORMAL MG/DL (ref 0–1)
BLOOD, URINE: NEGATIVE
BUN BLDV-MCNC: 29 MG/DL (ref 7–20)
CALCIUM SERPL-MCNC: 9.4 MG/DL (ref 8.3–10.6)
CARBOXYHEMOGLOBIN: 0.9 % (ref 0–1.5)
CHLORIDE BLD-SCNC: 103 MMOL/L (ref 99–110)
CLARITY: CLEAR
CO2: 26 MMOL/L (ref 21–32)
COLOR: YELLOW
CREAT SERPL-MCNC: 1.3 MG/DL (ref 0.8–1.3)
EOSINOPHILS ABSOLUTE: 0.3 K/UL (ref 0–0.6)
EOSINOPHILS RELATIVE PERCENT: 2.7 %
EPITHELIAL CELLS, UA: ABNORMAL /HPF (ref 0–5)
GFR AFRICAN AMERICAN: >60
GFR NON-AFRICAN AMERICAN: 55
GLUCOSE BLD-MCNC: 107 MG/DL (ref 70–99)
GLUCOSE BLD-MCNC: 122 MG/DL (ref 70–99)
GLUCOSE URINE: NEGATIVE MG/DL
HCO3 VENOUS: 28.4 MMOL/L (ref 24–28)
HCT VFR BLD CALC: 31.7 % (ref 40.5–52.5)
HEMOGLOBIN, VEN, REDUCED: 14.7 %
HEMOGLOBIN: 10.3 G/DL (ref 13.5–17.5)
KETONES, URINE: NEGATIVE MG/DL
LACTIC ACID: 1.3 MMOL/L (ref 0.4–2)
LEUKOCYTE ESTERASE, URINE: ABNORMAL
LYMPHOCYTES ABSOLUTE: 1.3 K/UL (ref 1–5.1)
LYMPHOCYTES RELATIVE PERCENT: 12.4 %
MCH RBC QN AUTO: 26.8 PG (ref 26–34)
MCHC RBC AUTO-ENTMCNC: 32.5 G/DL (ref 31–36)
MCV RBC AUTO: 82.4 FL (ref 80–100)
METHEMOGLOBIN VENOUS: 0.3 % (ref 0–1.5)
MICROSCOPIC EXAMINATION: YES
MONOCYTES ABSOLUTE: 1.4 K/UL (ref 0–1.3)
MONOCYTES RELATIVE PERCENT: 12.8 %
NEUTROPHILS ABSOLUTE: 7.6 K/UL (ref 1.7–7.7)
NEUTROPHILS RELATIVE PERCENT: 71.6 %
NITRITE, URINE: NEGATIVE
O2 SAT, VEN: 85 %
PCO2, VEN: 51 MMHG (ref 41–51)
PDW BLD-RTO: 18.1 % (ref 12.4–15.4)
PERFORMED ON: ABNORMAL
PH UA: 5.5 (ref 5–8)
PH VENOUS: 7.35 (ref 7.35–7.45)
PLATELET # BLD: 285 K/UL (ref 135–450)
PMV BLD AUTO: 8.2 FL (ref 5–10.5)
PO2, VEN: 53.1 MMHG (ref 25–40)
POTASSIUM REFLEX MAGNESIUM: 4.3 MMOL/L (ref 3.5–5.1)
PRO-BNP: 63 PG/ML (ref 0–124)
PROTEIN UA: ABNORMAL MG/DL
RBC # BLD: 3.85 M/UL (ref 4.2–5.9)
RBC UA: ABNORMAL /HPF (ref 0–4)
SODIUM BLD-SCNC: 139 MMOL/L (ref 136–145)
SPECIFIC GRAVITY UA: 1.02 (ref 1–1.03)
TCO2 CALC VENOUS: 30 MMOL/L
TOTAL PROTEIN: 7.5 G/DL (ref 6.4–8.2)
TROPONIN: 0.02 NG/ML
URINE REFLEX TO CULTURE: YES
URINE TYPE: ABNORMAL
UROBILINOGEN, URINE: 0.2 E.U./DL
WBC # BLD: 10.6 K/UL (ref 4–11)
WBC UA: ABNORMAL /HPF (ref 0–5)
YEAST: PRESENT /HPF

## 2022-03-05 PROCEDURE — 85025 COMPLETE CBC W/AUTO DIFF WBC: CPT

## 2022-03-05 PROCEDURE — 2580000003 HC RX 258: Performed by: STUDENT IN AN ORGANIZED HEALTH CARE EDUCATION/TRAINING PROGRAM

## 2022-03-05 PROCEDURE — 2060000000 HC ICU INTERMEDIATE R&B

## 2022-03-05 PROCEDURE — 93005 ELECTROCARDIOGRAM TRACING: CPT | Performed by: STUDENT IN AN ORGANIZED HEALTH CARE EDUCATION/TRAINING PROGRAM

## 2022-03-05 PROCEDURE — 82803 BLOOD GASES ANY COMBINATION: CPT

## 2022-03-05 PROCEDURE — 83880 ASSAY OF NATRIURETIC PEPTIDE: CPT

## 2022-03-05 PROCEDURE — 96374 THER/PROPH/DIAG INJ IV PUSH: CPT

## 2022-03-05 PROCEDURE — 73630 X-RAY EXAM OF FOOT: CPT

## 2022-03-05 PROCEDURE — 80048 BASIC METABOLIC PNL TOTAL CA: CPT

## 2022-03-05 PROCEDURE — 87086 URINE CULTURE/COLONY COUNT: CPT

## 2022-03-05 PROCEDURE — 84484 ASSAY OF TROPONIN QUANT: CPT

## 2022-03-05 PROCEDURE — 74177 CT ABD & PELVIS W/CONTRAST: CPT

## 2022-03-05 PROCEDURE — 99285 EMERGENCY DEPT VISIT HI MDM: CPT

## 2022-03-05 PROCEDURE — 80076 HEPATIC FUNCTION PANEL: CPT

## 2022-03-05 PROCEDURE — 73502 X-RAY EXAM HIP UNI 2-3 VIEWS: CPT

## 2022-03-05 PROCEDURE — 70450 CT HEAD/BRAIN W/O DYE: CPT

## 2022-03-05 PROCEDURE — 6360000004 HC RX CONTRAST MEDICATION: Performed by: STUDENT IN AN ORGANIZED HEALTH CARE EDUCATION/TRAINING PROGRAM

## 2022-03-05 PROCEDURE — 6360000002 HC RX W HCPCS: Performed by: STUDENT IN AN ORGANIZED HEALTH CARE EDUCATION/TRAINING PROGRAM

## 2022-03-05 PROCEDURE — 84443 ASSAY THYROID STIM HORMONE: CPT

## 2022-03-05 PROCEDURE — 36415 COLL VENOUS BLD VENIPUNCTURE: CPT

## 2022-03-05 PROCEDURE — 84439 ASSAY OF FREE THYROXINE: CPT

## 2022-03-05 PROCEDURE — 81001 URINALYSIS AUTO W/SCOPE: CPT

## 2022-03-05 PROCEDURE — 80074 ACUTE HEPATITIS PANEL: CPT

## 2022-03-05 PROCEDURE — 83605 ASSAY OF LACTIC ACID: CPT

## 2022-03-05 PROCEDURE — 71045 X-RAY EXAM CHEST 1 VIEW: CPT

## 2022-03-05 RX ORDER — FINASTERIDE 5 MG/1
5 TABLET, FILM COATED ORAL DAILY
Status: DISCONTINUED | OUTPATIENT
Start: 2022-03-06 | End: 2022-03-08 | Stop reason: HOSPADM

## 2022-03-05 RX ORDER — ACETAMINOPHEN 325 MG/1
650 TABLET ORAL EVERY 6 HOURS PRN
Status: DISCONTINUED | OUTPATIENT
Start: 2022-03-05 | End: 2022-03-06

## 2022-03-05 RX ORDER — RISPERIDONE 0.25 MG/1
0.5 TABLET, FILM COATED ORAL
Status: DISCONTINUED | OUTPATIENT
Start: 2022-03-06 | End: 2022-03-08 | Stop reason: HOSPADM

## 2022-03-05 RX ORDER — SODIUM CHLORIDE 9 MG/ML
25 INJECTION, SOLUTION INTRAVENOUS PRN
Status: DISCONTINUED | OUTPATIENT
Start: 2022-03-05 | End: 2022-03-07 | Stop reason: SDUPTHER

## 2022-03-05 RX ORDER — ONDANSETRON 2 MG/ML
4 INJECTION INTRAMUSCULAR; INTRAVENOUS EVERY 6 HOURS PRN
Status: DISCONTINUED | OUTPATIENT
Start: 2022-03-05 | End: 2022-03-08 | Stop reason: HOSPADM

## 2022-03-05 RX ORDER — POLYETHYLENE GLYCOL 3350 17 G/17G
17 POWDER, FOR SOLUTION ORAL DAILY PRN
Status: DISCONTINUED | OUTPATIENT
Start: 2022-03-05 | End: 2022-03-08 | Stop reason: HOSPADM

## 2022-03-05 RX ORDER — ACETAMINOPHEN 650 MG/1
650 SUPPOSITORY RECTAL EVERY 6 HOURS PRN
Status: DISCONTINUED | OUTPATIENT
Start: 2022-03-05 | End: 2022-03-06

## 2022-03-05 RX ORDER — DEXTROSE MONOHYDRATE 100 MG/ML
12.5 INJECTION, SOLUTION INTRAVENOUS PRN
Status: DISCONTINUED | OUTPATIENT
Start: 2022-03-05 | End: 2022-03-08 | Stop reason: HOSPADM

## 2022-03-05 RX ORDER — SODIUM CHLORIDE 0.9 % (FLUSH) 0.9 %
5-40 SYRINGE (ML) INJECTION EVERY 12 HOURS SCHEDULED
Status: DISCONTINUED | OUTPATIENT
Start: 2022-03-05 | End: 2022-03-07 | Stop reason: SDUPTHER

## 2022-03-05 RX ORDER — DIVALPROEX SODIUM 125 MG/1
250 CAPSULE, COATED PELLETS ORAL NIGHTLY
Status: DISCONTINUED | OUTPATIENT
Start: 2022-03-05 | End: 2022-03-08 | Stop reason: HOSPADM

## 2022-03-05 RX ORDER — INSULIN LISPRO 100 [IU]/ML
0-6 INJECTION, SOLUTION INTRAVENOUS; SUBCUTANEOUS
Status: DISCONTINUED | OUTPATIENT
Start: 2022-03-06 | End: 2022-03-08 | Stop reason: HOSPADM

## 2022-03-05 RX ORDER — 0.9 % SODIUM CHLORIDE 0.9 %
1000 INTRAVENOUS SOLUTION INTRAVENOUS ONCE
Status: COMPLETED | OUTPATIENT
Start: 2022-03-05 | End: 2022-03-06

## 2022-03-05 RX ORDER — ONDANSETRON 4 MG/1
4 TABLET, ORALLY DISINTEGRATING ORAL EVERY 8 HOURS PRN
Status: DISCONTINUED | OUTPATIENT
Start: 2022-03-05 | End: 2022-03-08 | Stop reason: HOSPADM

## 2022-03-05 RX ORDER — ASPIRIN 81 MG/1
81 TABLET ORAL DAILY
Status: DISCONTINUED | OUTPATIENT
Start: 2022-03-06 | End: 2022-03-08 | Stop reason: HOSPADM

## 2022-03-05 RX ORDER — SODIUM CHLORIDE 0.9 % (FLUSH) 0.9 %
5-40 SYRINGE (ML) INJECTION PRN
Status: DISCONTINUED | OUTPATIENT
Start: 2022-03-05 | End: 2022-03-07 | Stop reason: SDUPTHER

## 2022-03-05 RX ORDER — ARIPIPRAZOLE 5 MG/1
15 TABLET ORAL DAILY
Status: DISCONTINUED | OUTPATIENT
Start: 2022-03-06 | End: 2022-03-08 | Stop reason: HOSPADM

## 2022-03-05 RX ORDER — INSULIN LISPRO 100 [IU]/ML
0-3 INJECTION, SOLUTION INTRAVENOUS; SUBCUTANEOUS NIGHTLY
Status: DISCONTINUED | OUTPATIENT
Start: 2022-03-05 | End: 2022-03-08 | Stop reason: HOSPADM

## 2022-03-05 RX ORDER — DEXTROSE MONOHYDRATE 50 MG/ML
100 INJECTION, SOLUTION INTRAVENOUS PRN
Status: DISCONTINUED | OUTPATIENT
Start: 2022-03-05 | End: 2022-03-08 | Stop reason: HOSPADM

## 2022-03-05 RX ORDER — TAMSULOSIN HYDROCHLORIDE 0.4 MG/1
0.8 CAPSULE ORAL NIGHTLY
Status: DISCONTINUED | OUTPATIENT
Start: 2022-03-05 | End: 2022-03-08 | Stop reason: HOSPADM

## 2022-03-05 RX ORDER — FLUOXETINE HYDROCHLORIDE 20 MG/1
40 CAPSULE ORAL DAILY
Status: DISCONTINUED | OUTPATIENT
Start: 2022-03-06 | End: 2022-03-08 | Stop reason: HOSPADM

## 2022-03-05 RX ORDER — DIVALPROEX SODIUM 125 MG/1
125 CAPSULE, COATED PELLETS ORAL EVERY MORNING
Status: DISCONTINUED | OUTPATIENT
Start: 2022-03-06 | End: 2022-03-08 | Stop reason: HOSPADM

## 2022-03-05 RX ORDER — LANOLIN ALCOHOL/MO/W.PET/CERES
3 CREAM (GRAM) TOPICAL NIGHTLY
Status: DISCONTINUED | OUTPATIENT
Start: 2022-03-05 | End: 2022-03-08 | Stop reason: HOSPADM

## 2022-03-05 RX ADMIN — IOPAMIDOL 80 ML: 755 INJECTION, SOLUTION INTRAVENOUS at 19:49

## 2022-03-05 RX ADMIN — SODIUM CHLORIDE 1000 ML: 9 INJECTION, SOLUTION INTRAVENOUS at 21:28

## 2022-03-05 RX ADMIN — MEROPENEM 1000 MG: 1 INJECTION, POWDER, FOR SOLUTION INTRAVENOUS at 21:27

## 2022-03-05 ASSESSMENT — PAIN SCALES - GENERAL: PAINLEVEL_OUTOF10: 0

## 2022-03-05 ASSESSMENT — PAIN SCALES - WONG BAKER: WONGBAKER_NUMERICALRESPONSE: 0

## 2022-03-05 NOTE — ED PROVIDER NOTES
ED Attending Attestation Note     Date of evaluation: 3/5/2022    This patient was seen by the resident. I have seen and examined the patient, agree with the workup, evaluation, management and diagnosis. The care plan has been discussed. My assessment reveals a gentleman who is nontoxic appearing though appears mildly lethargic who complains of a fall after he felt dizzy. He has head and hip. Abdomen is soft and nontender to palpation. No deformities of the lower extremities. Significant pelvic tenderness with palpation.   He is able to follow simple commands, tell me his name, and knows he is in the hospital.  No significant respiratory distress     Miguelina Johnson MD  03/05/22 5494

## 2022-03-05 NOTE — ED TRIAGE NOTES
Pt came from [facility] after fall. Pt was standing and fell into wheelchair. Staff states pt hit head when falling back on wall. Pt has no visible lacerations or injuries to head.

## 2022-03-05 NOTE — ED PROVIDER NOTES
[id number] [facility] RESIDENT NOTE       Date of evaluation: 3/5/2022    Chief Complaint     Fall      History of Present Illness     Villa Gillis is a 73 y.o. male with past medical history of MDRO UTI, schizophrenia clash bipolar, diabetes, CKD 3 who presents from his nursing facility with a fall. Patient had a fall into his wheelchair where he hit his head. Patient has generalized weakness and is wheelchair-bound. On questioning of patient he reports he was standing up felt dizzy and fell into his wheelchair twice. He reports generalized pain everywhere since the fall but significantly in his left hip. He denies any fevers, chest pain, difficulty breathing, diarrhea or abdominal pain. Attempts were made to obtain collateral information from nursing facility but was unsuccessful. Of note [facility] after admission for sepsis secondary to UTI. Urine culture grew Morganella morganii and strep agalactiae. The Morganella was resistant to all antibiotics except Merrem, Zosyn, ertapenem. Patient had been discharged on cefuroximen. Review of Systems     Review of Systems   Unable to perform ROS: Mental status change   Constitutional: Negative for fever. Respiratory: Negative for shortness of breath. Cardiovascular: Negative for chest pain. Gastrointestinal: Negative for abdominal pain, diarrhea and nausea. Genitourinary: Negative for dysuria. Neurological: Positive for dizziness and light-headedness. Psychiatric/Behavioral: Positive for confusion. Past Medical, Surgical, Family, and Social History     He has a past medical history of Ataxia, Bipolar 1 disorder ([address] BPH (benign prostatic hyperplasia), Cerebral infarct ([address] Chronic kidney disease, Dementia ([address] Depression, Diabetes mellitus type 2, uncomplicated ([address] Gout, Hyperlipidemia, Hypertension, and Schizophrenia ([address]   He has no past surgical history on file. His family history is not on file. He reports that he has never smoked. He has never used smokeless tobacco. He reports previous alcohol use. Medications     Current Discharge Medication List      CONTINUE these medications which have NOT CHANGED    Details   cefUROXime (CEFTIN) 250 MG tablet Take 1 tablet by mouth 2 times daily for 5 days  Qty: 10 tablet, Refills: 0      !! insulin aspart (NOVOLOG) 100 UNIT/ML injection vial Inject 20 Units into the skin 3 times daily (before meals)      nystatin (MYCOSTATIN) 903276 UNIT/GM powder Apply topically 4 times daily Apply topically 4 times daily. !! polyethylene glycol (MIRALAX) 17 g PACK packet Take 17 g by mouth every 48 hours      !! divalproex (DEPAKOTE SPRINKLES) 125 MG capsule Take 250 mg by mouth at bedtime      !! divalproex (DEPAKOTE SPRINKLES) 125 MG capsule Take 125 mg by mouth every morning      furosemide (LASIX) 20 MG tablet 20 mg daily       melatonin 1 MG tablet Take 1 mg by mouth nightly       latanoprost (XALATAN) 0.005 % ophthalmic solution Place 1 drop into the right eye nightly       insulin detemir (LEVEMIR) 100 UNIT/ML injection vial Inject 10 Units into the skin nightly  Qty: 1 vial, Refills: 3      Skin Protectants, Misc.  (HYDROCERIN) CREA cream Apply topically 2 times daily  Qty: 1 Container, Refills: 0      acetaminophen (TYLENOL) 500 MG tablet Take 500 mg by mouth every 6 hours as needed for Pain      allopurinol (ZYLOPRIM) 100 MG tablet Take 100 mg by mouth daily      amLODIPine (NORVASC) 5 MG tablet Take 5 mg by mouth daily Hold for SBP <110 or Pulse <60      ARIPiprazole (ABILIFY) 15 MG tablet Take 15 mg by mouth daily      aspirin 81 MG EC tablet Take 81 mg by mouth daily      atorvastatin (LIPITOR) 40 MG tablet Take 40 mg by mouth nightly      bisacodyl (DULCOLAX) 10 MG suppository Place 10 mg rectally daily as needed for Constipation      Cranberry 425 MG CAPS Take 425 mg by mouth daily docusate sodium (COLACE) 100 MG capsule Take 100 mg by mouth 2 times daily      estradiol (ESTRACE) 1 MG tablet Take 1 mg by mouth daily      finasteride (PROSCAR) 5 MG tablet Take 5 mg by mouth daily      FLUoxetine (PROZAC) 20 MG capsule Take 40 mg by mouth daily      Multiple Vitamins-Minerals (THERAPEUTIC MULTIVITAMIN-MINERALS) tablet Take 1 tablet by mouth daily      !! insulin aspart (NOVOLOG) 100 UNIT/ML injection vial Inject 0-21 Units into the skin 3 times daily (before meals) Sliding scale   201-250 = 5 units  251-300 = 8 units  301-350 = 12 units  351-400 = 15 units  401-450 = 18 units  451-500 = 21 units  500+ notify MD      ondansetron (ZOFRAN-ODT) 4 MG disintegrating tablet Take 4 mg by mouth every 8 hours as needed for Nausea or Vomiting      ! ! polyethylene glycol (GLYCOLAX) 17 g packet Take 17 g by mouth daily as needed for Constipation      !! risperiDONE (RISPERDAL) 0.5 MG tablet Take 0.5 mg by mouth every morning (before breakfast)       !! risperiDONE (RISPERDAL) 1 MG tablet Take 1.5 mg by mouth nightly       tamsulosin (FLOMAX) 0.4 MG capsule Take 0.8 mg by mouth nightly      gabapentin (NEURONTIN) 300 MG capsule Take 1 capsule by mouth 2 times daily for 30 days. Qty: 60 capsule, Refills: 0       !! - Potential duplicate medications found. Please discuss with provider. Allergies     He is allergic to codeine, cogentin [benztropine], and penicillins. Physical Exam     INITIAL VITALS: BP: (!) 112/53, Temp: 93.2 °F (34 °C), Pulse: 58, Resp: 16, SpO2: 100 %   Physical Exam  Constitutional:       General: He is not in acute distress. HENT:      Head: Normocephalic and atraumatic. Mouth/Throat:      Mouth: Mucous membranes are moist.   Cardiovascular:      Rate and Rhythm: Normal rate and regular rhythm. Pulses: Normal pulses. Pulmonary:      Effort: Pulmonary effort is normal.   Abdominal:      General: Bowel sounds are normal. There is no distension.       Palpations: Abdomen is soft. Tenderness: There is no abdominal tenderness. Genitourinary:     Comments: Scrotum appears erythematous, with curdy/white discharge  Musculoskeletal:         General: Tenderness (L hip ) present. Right lower leg: No edema. Left lower leg: No edema. Comments: Right lower extremity appears with chronic skin changes but no signs of infection   Skin:     General: Skin is dry. Neurological:      General: No focal deficit present. Mental Status: He is alert. Comments: Oriented to self, answers some questions appropriately but is unable to give much of a history. Psychiatric:         Mood and Affect: Mood normal.         Diagnostic Results     EKG   Interpreted inconjunction with emergency department physician Blaine Dixon MD  Rhythm: normal sinus   Rate: normal  Axis: normal  Ectopy: none  Conduction: normal  ST Segments: no acute change  T Waves: no acute change  Q Waves: nonspecific  Clinical Impression: NSR  Comparison:      RADIOLOGY:  XR CHEST PORTABLE   Final Result      No acute chest process. CT ABDOMEN PELVIS W IV CONTRAST Additional Contrast? None   Final Result      No acute intra-abdominopelvic abnormality. New trace pleural effusions with associated bibasilar atelectasis. XR FOOT RIGHT (MIN 3 VIEWS)   Final Result      No acute injury. XR HIP 2-3 VW W PELVIS LEFT   Final Result      No acute injury. CT HEAD WO CONTRAST   Final Result      No acute intracranial process.           LABS:   Results for orders placed or performed during the hospital encounter of 03/05/22   CBC with Auto Differential   Result Value Ref Range    WBC 10.6 4.0 - 11.0 K/uL    RBC 3.85 (L) 4.20 - 5.90 M/uL    Hemoglobin 10.3 (L) 13.5 - 17.5 g/dL    Hematocrit 31.7 (L) 40.5 - 52.5 %    MCV 82.4 80.0 - 100.0 fL    MCH 26.8 26.0 - 34.0 pg    MCHC 32.5 31.0 - 36.0 g/dL    RDW 18.1 (H) 12.4 - 15.4 %    Platelets 206 635 - 637 K/uL    MPV 8.2 5.0 - 10.5 fL Neutrophils % 71.6 %    Lymphocytes % 12.4 %    Monocytes % 12.8 %    Eosinophils % 2.7 %    Basophils % 0.5 %    Neutrophils Absolute 7.6 1.7 - 7.7 K/uL    Lymphocytes Absolute 1.3 1.0 - 5.1 K/uL    Monocytes Absolute 1.4 (H) 0.0 - 1.3 K/uL    Eosinophils Absolute 0.3 0.0 - 0.6 K/uL    Basophils Absolute 0.1 0.0 - 0.2 K/uL   Basic Metabolic Panel w/ Reflex to MG   Result Value Ref Range    Sodium 139 136 - 145 mmol/L    Potassium reflex Magnesium 4.3 3.5 - 5.1 mmol/L    Chloride 103 99 - 110 mmol/L    CO2 26 21 - 32 mmol/L    Anion Gap 10 3 - 16    Glucose 122 (H) 70 - 99 mg/dL    BUN 29 (H) 7 - 20 mg/dL    CREATININE 1.3 0.8 - 1.3 mg/dL    GFR Non-African American 55 (A) >60    GFR African American >60 >60    Calcium 9.4 8.3 - 10.6 mg/dL   Urinalysis with Reflex to Culture    Specimen: Urine   Result Value Ref Range    Color, UA Yellow Straw/Yellow    Clarity, UA Clear Clear    Glucose, Ur Negative Negative mg/dL    Bilirubin Urine Negative Negative    Ketones, Urine Negative Negative mg/dL    Specific Gravity, UA 1.025 1.005 - 1.030    Blood, Urine Negative Negative    pH, UA 5.5 5.0 - 8.0    Protein, UA TRACE (A) Negative mg/dL    Urobilinogen, Urine 0.2 <2.0 E.U./dL    Nitrite, Urine Negative Negative    Leukocyte Esterase, Urine MODERATE (A) Negative    Microscopic Examination YES     Urine Type NotGiven     Urine Reflex to Culture Yes    Troponin   Result Value Ref Range    Troponin 0.02 (H) <0.01 ng/mL   Lactic Acid   Result Value Ref Range    Lactic Acid 1.3 0.4 - 2.0 mmol/L   Blood gas, venous (Lab)   Result Value Ref Range    pH, Da[place] 7.354 7.350 - 7.450    pCO2, [place] 51.0 41.0 - 51.0 mmHg    pO2, [place] 53.1 (H) 25.0 - 40.0 mmHg    HCO3, Venous 28.4 (H) 24.0 - 28.0 mmol/L    Base Excess, Daquan 2.2 -2.0 - 3.0 mmol/L    O2 Sat, Daquan 85 Not established %    Carboxyhemoglobin 0.9 0.0 - 1.5 %    MetHgb, Daquan 0.3 0.0 - 1.5 %    TC02 (Calc), Daquan 30 mmol/L    Hemoglobin, Daqu[place], Reduced 14.70 %   Microscopic Urinalysis Result Value Ref Range    WBC, UA 21-50 (A) 0 - 5 /HPF    RBC, UA 3-4 0 - 4 /HPF    Epithelial Cells, UA 6-10 (A) 0 - 5 /HPF    Yeast, UA Present (A) None Seen /HPF   Hepatic Function Panel   Result Value Ref Range    Total Protein 7.5 6.4 - 8.2 g/dL    Albumin 3.2 (L) 3.4 - 5.0 g/dL    Alkaline Phosphatase 190 (H) 40 - 129 U/L    ALT 46 (H) 10 - 40 U/L    AST 73 (H) 15 - 37 U/L    Total Bilirubin 0.3 0.0 - 1.0 mg/dL    Bilirubin, Direct <0.2 0.0 - 0.3 mg/dL    Bilirubin, Indirect see below 0.0 - 1.0 mg/dL   Brain Natriuretic Peptide   Result Value Ref Range    Pro-BNP 63 0 - 124 pg/mL   POCT Glucose   Result Value Ref Range    POC Glucose 107 (H) 70 - 99 mg/dl    Performed on ACCU-CHEK        ED BEDSIDE ULTRASOUND:    RECENT VITALS:  BP: (!) 140/70, Temp: 94.3 °F (34.6 °C),Pulse: 72, Resp: 18, SpO2: 95 %     Procedures     ED Course     Nursing Notes, Past Medical Hx, Past Surgical Hx, Social Hx, Allergies, and FamilyHx were reviewed.     The patient was giventhe following medications:  Orders Placed This Encounter   Medications    iopamidol (ISOVUE-370) 76 % injection 80 mL    iopamidol (ISOVUE-370) 76 % injection 80 mL    meropenem (MERREM) 1,000 mg in sodium chloride 0.9 % 100 mL IVPB (mini-bag)     Order Specific Question:   Antimicrobial Indications     Answer:   Urinary Tract Infection    0.9 % sodium chloride bolus    ARIPiprazole (ABILIFY) tablet 15 mg    aspirin EC tablet 81 mg    divalproex (DEPAKOTE SPRINKLE) capsule 250 mg    divalproex (DEPAKOTE SPRINKLE) capsule 125 mg    finasteride (PROSCAR) tablet 5 mg    FLUoxetine (PROZAC) capsule 40 mg    melatonin tablet 3 mg    risperiDONE (RISPERDAL) tablet 0.5 mg    risperiDONE (RISPERDAL) tablet 1.5 mg    tamsulosin (FLOMAX) capsule 0.8 mg    glucose chewable tablet 4 each    dextrose 10 % infusion    glucagon (rDNA) injection 1 mg    dextrose 5 % solution    sodium chloride flush 0.9 % injection 5-40 mL    sodium chloride flush 0.9 % injection 5-40 mL    0.9 % sodium chloride infusion    enoxaparin (LOVENOX) injection 40 mg    OR Linked Order Group     ondansetron (ZOFRAN-ODT) disintegrating tablet 4 mg     ondansetron (ZOFRAN) injection 4 mg    polyethylene glycol (GLYCOLAX) packet 17 g    OR Linked Order Group     acetaminophen (TYLENOL) tablet 650 mg     acetaminophen (TYLENOL) suppository 650 mg    meropenem (MERREM) 1,000 mg in sodium chloride 0.9 % 100 mL IVPB (mini-bag)     Order Specific Question:   Antimicrobial Indications     Answer:   Urinary Tract Infection    insulin lispro (1 Unit Dial) 0-6 Units    insulin lispro (1 Unit Dial) 0-3 Units    miconazole (MICOTIN) 2 % powder       CONSULTS:  IP CONSULT TO HOSPITALIST  IP CONSULT TO CASE MANAGEMENT    MEDICAL DECISION MAKING / ASSESSMENT / Pat Julieta is a 73 y.o. male with recent admission for sepsis and UTI presenting with fall from his nursing facility. On my examination patient appears comfortable. He is not hypotensive or tachycardic. Difficulty obtaining a temperature recurred therefore core temperature was obtained which is found to be 34. Bear hugger was applied. He had no abdominal tenderness and had clear lungs on auscultation. He had tenderness in the left hip. Scrotum appeared erythematous with cellulitic changes and white discharge. Patient was oriented to himself and is able to answer some questions. CBC did not show any leukocytosis or anemia worse than baseline. BMP had no electrolyte of normalities and creatinine appeared at baseline. He had mild elevation in his transaminases. UA had moderate leukoesterase with pyuria and yeast and mild epithelial cells. Lactate was 1.3 and VBG pH showed 7.35. EKG showed normal sinus rhythm. Troponin was 0.02 which appears to be chronically elevated. CT head without contrast was obtained given his fall onto his head. Did not show any acute bleed or pathology.  X-ray of the left hip was obtained to assess for any fractures given tenderness, which did not show any acute fractures. Patient has had persistent hypothermia which may likely be from his sepsis, however TSH was obtained. Patient's UA may be contaminated with epithelial cells and yeast, however he does have significant pyuria and appears to have only been treated with Zosyn for 1 day before discharge with cefuroxime to which the Morganella is resistant. He was given 1 L bolus of normal saline and started on Merrem. He was admitted for possible sepsis given hypothermia secondary to MDRO UTI. This patient was also evaluated by the attending physician. All care plans were discussed and agreed upon. Clinical Impression     1. Fall, initial encounter    2. Elevated transaminase level    3. Urinary tract infection without hematuria, site unspecified        Disposition     PATIENT REFERRED TO:  No follow-up provider specified.     DISCHARGE MEDICATIONS:  Current Discharge Medication List          DISPOSITION Admitted 03/05/2022 10:11:11 PM          Niels Mahmood MD  Resident  03/06/22 [id number]

## 2022-03-06 LAB
ANION GAP SERPL CALCULATED.3IONS-SCNC: 11 MMOL/L (ref 3–16)
BASOPHILS ABSOLUTE: 0.1 K/UL (ref 0–0.2)
BASOPHILS RELATIVE PERCENT: 0.9 %
BUN BLDV-MCNC: 26 MG/DL (ref 7–20)
CALCIUM SERPL-MCNC: 9.3 MG/DL (ref 8.3–10.6)
CHLORIDE BLD-SCNC: 103 MMOL/L (ref 99–110)
CO2: 24 MMOL/L (ref 21–32)
CREAT SERPL-MCNC: 1.2 MG/DL (ref 0.8–1.3)
EKG ATRIAL RATE: 58 BPM
EKG DIAGNOSIS: NORMAL
EKG P AXIS: 32 DEGREES
EKG P-R INTERVAL: 210 MS
EKG Q-T INTERVAL: 468 MS
EKG QRS DURATION: 96 MS
EKG QTC CALCULATION (BAZETT): 459 MS
EKG R AXIS: 23 DEGREES
EKG T AXIS: 35 DEGREES
EKG VENTRICULAR RATE: 58 BPM
EOSINOPHILS ABSOLUTE: 0.2 K/UL (ref 0–0.6)
EOSINOPHILS RELATIVE PERCENT: 2.5 %
GFR AFRICAN AMERICAN: >60
GFR NON-AFRICAN AMERICAN: 60
GLUCOSE BLD-MCNC: 129 MG/DL (ref 70–99)
GLUCOSE BLD-MCNC: 146 MG/DL (ref 70–99)
GLUCOSE BLD-MCNC: 147 MG/DL (ref 70–99)
GLUCOSE BLD-MCNC: 163 MG/DL (ref 70–99)
GLUCOSE BLD-MCNC: 170 MG/DL (ref 70–99)
GLUCOSE BLD-MCNC: 219 MG/DL (ref 70–99)
GLUCOSE BLD-MCNC: 226 MG/DL (ref 70–99)
HAV IGM SER IA-ACNC: NORMAL
HCT VFR BLD CALC: 30.3 % (ref 40.5–52.5)
HEMOGLOBIN: 9.8 G/DL (ref 13.5–17.5)
HEPATITIS B CORE IGM ANTIBODY: NORMAL
HEPATITIS B SURFACE ANTIGEN INTERPRETATION: NORMAL
HEPATITIS C ANTIBODY INTERPRETATION: NORMAL
LACTIC ACID: 0.7 MMOL/L (ref 0.4–2)
LYMPHOCYTES ABSOLUTE: 1.4 K/UL (ref 1–5.1)
LYMPHOCYTES RELATIVE PERCENT: 14.1 %
MCH RBC QN AUTO: 26.5 PG (ref 26–34)
MCHC RBC AUTO-ENTMCNC: 32.5 G/DL (ref 31–36)
MCV RBC AUTO: 81.6 FL (ref 80–100)
MONOCYTES ABSOLUTE: 1 K/UL (ref 0–1.3)
MONOCYTES RELATIVE PERCENT: 10.2 %
NEUTROPHILS ABSOLUTE: 7.3 K/UL (ref 1.7–7.7)
NEUTROPHILS RELATIVE PERCENT: 72.3 %
PDW BLD-RTO: 17.7 % (ref 12.4–15.4)
PERFORMED ON: ABNORMAL
PLATELET # BLD: 282 K/UL (ref 135–450)
PMV BLD AUTO: 8.5 FL (ref 5–10.5)
POTASSIUM REFLEX MAGNESIUM: 4.4 MMOL/L (ref 3.5–5.1)
RBC # BLD: 3.71 M/UL (ref 4.2–5.9)
SODIUM BLD-SCNC: 138 MMOL/L (ref 136–145)
T4 FREE: 1.4 NG/DL (ref 0.9–1.8)
TSH REFLEX: 5.8 UIU/ML (ref 0.27–4.2)
URINE CULTURE, ROUTINE: NORMAL
WBC # BLD: 10.1 K/UL (ref 4–11)

## 2022-03-06 PROCEDURE — 2500000003 HC RX 250 WO HCPCS: Performed by: INTERNAL MEDICINE

## 2022-03-06 PROCEDURE — 2060000000 HC ICU INTERMEDIATE R&B

## 2022-03-06 PROCEDURE — 80048 BASIC METABOLIC PNL TOTAL CA: CPT

## 2022-03-06 PROCEDURE — 36415 COLL VENOUS BLD VENIPUNCTURE: CPT

## 2022-03-06 PROCEDURE — 85025 COMPLETE CBC W/AUTO DIFF WBC: CPT

## 2022-03-06 PROCEDURE — 83605 ASSAY OF LACTIC ACID: CPT

## 2022-03-06 PROCEDURE — 6370000000 HC RX 637 (ALT 250 FOR IP): Performed by: INTERNAL MEDICINE

## 2022-03-06 PROCEDURE — 6360000002 HC RX W HCPCS: Performed by: INTERNAL MEDICINE

## 2022-03-06 PROCEDURE — 2580000003 HC RX 258: Performed by: INTERNAL MEDICINE

## 2022-03-06 RX ORDER — ACETAMINOPHEN 650 MG/1
650 SUPPOSITORY RECTAL EVERY 6 HOURS PRN
Status: DISCONTINUED | OUTPATIENT
Start: 2022-03-06 | End: 2022-03-08 | Stop reason: HOSPADM

## 2022-03-06 RX ORDER — ACETAMINOPHEN 500 MG
1000 TABLET ORAL EVERY 6 HOURS PRN
Status: DISCONTINUED | OUTPATIENT
Start: 2022-03-06 | End: 2022-03-08 | Stop reason: HOSPADM

## 2022-03-06 RX ADMIN — SODIUM CHLORIDE, PRESERVATIVE FREE 10 ML: 5 INJECTION INTRAVENOUS at 20:40

## 2022-03-06 RX ADMIN — DIVALPROEX SODIUM 125 MG: 125 CAPSULE, COATED PELLETS ORAL at 08:30

## 2022-03-06 RX ADMIN — Medication 3 MG: at 20:38

## 2022-03-06 RX ADMIN — ACETAMINOPHEN 1000 MG: 500 TABLET ORAL at 20:38

## 2022-03-06 RX ADMIN — MEROPENEM 1000 MG: 1 INJECTION, POWDER, FOR SOLUTION INTRAVENOUS at 22:52

## 2022-03-06 RX ADMIN — ASPIRIN 81 MG: 81 TABLET, COATED ORAL at 08:23

## 2022-03-06 RX ADMIN — INSULIN LISPRO 1 UNITS: 100 INJECTION, SOLUTION INTRAVENOUS; SUBCUTANEOUS at 17:58

## 2022-03-06 RX ADMIN — FINASTERIDE 5 MG: 5 TABLET, FILM COATED ORAL at 08:23

## 2022-03-06 RX ADMIN — INSULIN LISPRO 1 UNITS: 100 INJECTION, SOLUTION INTRAVENOUS; SUBCUTANEOUS at 20:41

## 2022-03-06 RX ADMIN — SODIUM CHLORIDE, PRESERVATIVE FREE 10 ML: 5 INJECTION INTRAVENOUS at 08:24

## 2022-03-06 RX ADMIN — MICONAZOLE NITRATE: 20 POWDER TOPICAL at 01:53

## 2022-03-06 RX ADMIN — TAMSULOSIN HYDROCHLORIDE 0.8 MG: 0.4 CAPSULE ORAL at 01:52

## 2022-03-06 RX ADMIN — TAMSULOSIN HYDROCHLORIDE 0.8 MG: 0.4 CAPSULE ORAL at 20:38

## 2022-03-06 RX ADMIN — INSULIN LISPRO 2 UNITS: 100 INJECTION, SOLUTION INTRAVENOUS; SUBCUTANEOUS at 12:53

## 2022-03-06 RX ADMIN — ENOXAPARIN SODIUM 40 MG: 100 INJECTION SUBCUTANEOUS at 08:30

## 2022-03-06 RX ADMIN — RISPERIDONE 1.5 MG: 1 TABLET ORAL at 01:52

## 2022-03-06 RX ADMIN — DIVALPROEX SODIUM 250 MG: 125 CAPSULE ORAL at 20:38

## 2022-03-06 RX ADMIN — ARIPIPRAZOLE 15 MG: 5 TABLET ORAL at 08:23

## 2022-03-06 RX ADMIN — RISPERIDONE 0.5 MG: 0.25 TABLET ORAL at 06:39

## 2022-03-06 RX ADMIN — INSULIN LISPRO 1 UNITS: 100 INJECTION, SOLUTION INTRAVENOUS; SUBCUTANEOUS at 10:13

## 2022-03-06 RX ADMIN — SODIUM CHLORIDE 25 ML: 9 INJECTION, SOLUTION INTRAVENOUS at 06:40

## 2022-03-06 RX ADMIN — Medication 3 MG: at 01:52

## 2022-03-06 RX ADMIN — DIVALPROEX SODIUM 250 MG: 125 CAPSULE ORAL at 01:52

## 2022-03-06 RX ADMIN — RISPERIDONE 1.5 MG: 1 TABLET ORAL at 20:38

## 2022-03-06 RX ADMIN — MICONAZOLE NITRATE: 20 POWDER TOPICAL at 10:06

## 2022-03-06 RX ADMIN — MICONAZOLE NITRATE: 20 POWDER TOPICAL at 20:39

## 2022-03-06 RX ADMIN — FLUOXETINE 40 MG: 20 CAPSULE ORAL at 08:23

## 2022-03-06 RX ADMIN — ACETAMINOPHEN 650 MG: 325 TABLET ORAL at 02:01

## 2022-03-06 RX ADMIN — MEROPENEM 1000 MG: 1 INJECTION, POWDER, FOR SOLUTION INTRAVENOUS at 06:41

## 2022-03-06 RX ADMIN — SODIUM CHLORIDE, PRESERVATIVE FREE 10 ML: 5 INJECTION INTRAVENOUS at 01:54

## 2022-03-06 RX ADMIN — ACETAMINOPHEN 650 MG: 325 TABLET ORAL at 12:53

## 2022-03-06 RX ADMIN — SODIUM CHLORIDE 25 ML: 9 INJECTION, SOLUTION INTRAVENOUS at 22:49

## 2022-03-06 RX ADMIN — MEROPENEM 1000 MG: 1 INJECTION, POWDER, FOR SOLUTION INTRAVENOUS at 14:41

## 2022-03-06 ASSESSMENT — PAIN SCALES - WONG BAKER
WONGBAKER_NUMERICALRESPONSE: 0
WONGBAKER_NUMERICALRESPONSE: 0

## 2022-03-06 ASSESSMENT — ENCOUNTER SYMPTOMS
NAUSEA: 0
DIARRHEA: 0
SHORTNESS OF BREATH: 0
ABDOMINAL PAIN: 0

## 2022-03-06 ASSESSMENT — PAIN SCALES - GENERAL
PAINLEVEL_OUTOF10: 0
PAINLEVEL_OUTOF10: 6
PAINLEVEL_OUTOF10: 0
PAINLEVEL_OUTOF10: 4
PAINLEVEL_OUTOF10: 6
PAINLEVEL_OUTOF10: 4
PAINLEVEL_OUTOF10: 4

## 2022-03-06 ASSESSMENT — PAIN DESCRIPTION - DESCRIPTORS
DESCRIPTORS: ACHING
DESCRIPTORS: ACHING

## 2022-03-06 ASSESSMENT — PAIN DESCRIPTION - LOCATION
LOCATION: GENERALIZED

## 2022-03-06 ASSESSMENT — PAIN DESCRIPTION - FREQUENCY
FREQUENCY: INTERMITTENT
FREQUENCY: INTERMITTENT

## 2022-03-06 ASSESSMENT — PAIN DESCRIPTION - PROGRESSION
CLINICAL_PROGRESSION: NOT CHANGED
CLINICAL_PROGRESSION: NOT CHANGED

## 2022-03-06 ASSESSMENT — PAIN DESCRIPTION - PAIN TYPE
TYPE: CHRONIC PAIN
TYPE: CHRONIC PAIN

## 2022-03-06 ASSESSMENT — PAIN - FUNCTIONAL ASSESSMENT
PAIN_FUNCTIONAL_ASSESSMENT: PREVENTS OR INTERFERES SOME ACTIVE ACTIVITIES AND ADLS
PAIN_FUNCTIONAL_ASSESSMENT: ACTIVITIES ARE NOT PREVENTED

## 2022-03-06 ASSESSMENT — PAIN DESCRIPTION - ONSET: ONSET: ON-GOING

## 2022-03-06 NOTE — H&P
Hospital Medicine History & Physical      PCP: Ponce Castellanos MD    Date of Admission: 3/5/2022    Date of Service: Pt seen/examined on 3/5/2022 and Admitted to Inpatient with expected LOS greater than two midnights due to medical therapy. Chief Complaint: Frequent falls at the nursing home      History Of Present Illness:     73 y.o. male who was sent from the nursing home for frequent falls and generalized weakness. Patient was found to be hypothermic upon admission requiring Sharyn hugger. Patient has history of dementia and schizophrenia and confused at baseline. Patient is unable to give reliable history. Patient was hospitalized here on 2/26 for hypothermia, sepsis due to UTI and acute metabolic encephalopathy. Patient was discharged on cefuroxime twice daily for 5 days pending his urine cultures. While inpatient patient was given meropenem. His urine cultures resulted  from 2/26/2022 grew Morganella morganii, strep agalactiae which are resistant to most antibiotics except Zosyn, meropenem and ertapenem. Past Medical History:          Diagnosis Date    Ataxia     Bipolar 1 disorder ([facility]     BPH (benign prostatic hyperplasia)     Cerebral infarct (HCC)     Chronic kidney disease     Dementia ([facility]     Depression     Diabetes mellitus type 2, uncomplicated ([facility]     Gout     Hyperlipidemia     Hypertension     Schizophrenia ([facility]        Past Surgical History:      History reviewed. No pertinent surgical history. Medications Prior to Admission:      Prior to Admission medications    Medication Sig Start Date End Date Taking?  Authorizing Provider   cefUROXime (CEFTIN) 250 MG tablet Take 1 tablet by mouth 2 times daily for 5 days 3/1/22 3/6/22  Aldo Ibanez MD   insulin aspart (NOVOLOG) 100 UNIT/ML injection vial Inject 20 Units into the skin 3 times daily (before meals)    Historical Provider, MD   nystatin (MYCOSTATIN) 089519 UNIT/GM powder Apply topically 4 times daily Apply topically 4 times daily. Historical Provider, MD   polyethylene glycol (MIRALAX) 17 g PACK packet Take 17 g by mouth every 48 hours    Historical Provider, MD   divalproex (DEPAKOTE SPRINKLES) 125 MG capsule Take 250 mg by mouth at bedtime    Historical Provider, MD   divalproex (DEPAKOTE SPRINKLES) 125 MG capsule Take 125 mg by mouth every morning    Historical Provider, MD   furosemide (LASIX) 20 MG tablet 20 mg daily     Historical Provider, MD   melatonin 1 MG tablet Take 1 mg by mouth nightly     Historical Provider, MD   latanoprost (XALATAN) 0.005 % ophthalmic solution Place 1 drop into the right eye nightly     Historical Provider, MD   gabapentin (NEURONTIN) 300 MG capsule Take 1 capsule by mouth 2 times daily for 30 days. 2/26/21 2/27/22  Jolanta Velasquez MD   insulin detemir (LEVEMIR) 100 UNIT/ML injection vial Inject 10 Units into the skin nightly 2/26/21   Jolanta Velasquez MD   Skin Protectants, Misc.  (HYDROCERIN) CREA cream Apply topically 2 times daily 2/26/21   Jolanta Velasquez MD   acetaminophen (TYLENOL) 500 MG tablet Take 500 mg by mouth every 6 hours as needed for Pain    Historical Provider, MD   allopurinol (ZYLOPRIM) 100 MG tablet Take 100 mg by mouth daily    Historical Provider, MD   amLODIPine (NORVASC) 5 MG tablet Take 5 mg by mouth daily Hold for SBP <110 or Pulse <60    Historical Provider, MD   ARIPiprazole (ABILIFY) 15 MG tablet Take 15 mg by mouth daily    Historical Provider, MD   aspirin 81 MG EC tablet Take 81 mg by mouth daily    Historical Provider, MD   atorvastatin (LIPITOR) 40 MG tablet Take 40 mg by mouth nightly    Historical Provider, MD   bisacodyl (DULCOLAX) 10 MG suppository Place 10 mg rectally daily as needed for Constipation    Historical Provider, MD   Cranberry 425 MG CAPS Take 425 mg by mouth daily    Historical Provider, MD   docusate sodium (COLACE) 100 MG capsule Take 100 mg by mouth 2 times daily    Historical Provider, MD   estradiol (ESTRACE) 1 MG tablet Take 1 mg by mouth daily    Historical Provider, MD   finasteride (PROSCAR) 5 MG tablet Take 5 mg by mouth daily    Historical Provider, MD   FLUoxetine (PROZAC) 20 MG capsule Take 40 mg by mouth daily    Historical Provider, MD   Multiple Vitamins-Minerals (THERAPEUTIC MULTIVITAMIN-MINERALS) tablet Take 1 tablet by mouth daily    Historical Provider, MD   insulin aspart (NOVOLOG) 100 UNIT/ML injection vial Inject 0-21 Units into the skin 3 times daily (before meals) Sliding scale   201-250 = 5 units  251-300 = 8 units  301-350 = 12 units  351-400 = 15 units  401-450 = 18 units  451-500 = 21 units  500+ notify MD    Historical Provider, MD   ondansetron (ZOFRAN-ODT) 4 MG disintegrating tablet Take 4 mg by mouth every 8 hours as needed for Nausea or Vomiting    Historical Provider, MD   polyethylene glycol (GLYCOLAX) 17 g packet Take 17 g by mouth daily as needed for Constipation    Historical Provider, MD   risperiDONE (RISPERDAL) 0.5 MG tablet Take 0.5 mg by mouth every morning (before breakfast)     Historical Provider, MD   risperiDONE (RISPERDAL) 1 MG tablet Take 1.5 mg by mouth nightly     Historical Provider, MD   tamsulosin (FLOMAX) 0.4 MG capsule Take 0.8 mg by mouth nightly    Historical Provider, MD       Allergies:  Codeine, Cogentin [benztropine], and Penicillins    Social History:      TOBACCO:   reports that he has never smoked. He has never used smokeless tobacco.  ETOH:   reports previous alcohol use. E-Cigarettes/Vaping Use     Questions Responses    E-Cigarette/Vaping Use Never User    Start Date     Passive Exposure     Quit Date     Counseling Given     Comments         Family History:     Reviewed in detail and negative for DM, CAD, Cancer, CVA.      REVIEW OF SYSTEMS COMPLETED:     History is limited given baseline dementia and psychiatric illness/schizophrenia    PHYSICAL EXAM PERFORMED:    /66   Pulse 62   Temp 93.2 °F (34 °C) (Rectal)   Resp 17   Ht 5' 7\" (1.702 m)   Wt 229 lb (103.9 kg)   SpO2 97%   BMI 35.87 kg/m²     General appearance:  No apparent distress, appears stated age and cooperative. Hypothermic, on Sharyn hugger, confused, not interactive  HEENT:  Normal cephalic, atraumatic without obvious deformity. Pupils equal, round, and reactive to light. Extra ocular muscles intact. Conjunctivae/corneas clear. Neck: Supple, with full range of motion. No jugular venous distention. Trachea midline. Respiratory:  Normal respiratory effort. Clear to auscultation, bilaterally without Rales/Wheezes/Rhonchi. Cardiovascular:  Regular rate and rhythm with normal S1/S2 without murmurs, rubs or gallops. Abdomen: Soft, non-tender, non-distended with normal bowel sounds. Musculoskeletal:  No clubbing, cyanosis or edema bilaterally. Full range of motion without deformity. Skin: Skin color, texture, turgor normal.  Scrotal erythema +  Neurologic: Follows simple commands, moves all 4 extremities spontaneously. Psychiatric:  Alert and awake oriented to self only. Capillary Refill: Brisk,3 seconds, normal  Peripheral Pulses: +2 palpable, equal bilaterally       Labs:     Recent Labs     03/05/22 1900   WBC 10.6   HGB 10.3*   HCT 31.7*        Recent Labs     03/05/22 1900      K 4.3      CO2 26   BUN 29*   CREATININE 1.3   CALCIUM 9.4     Recent Labs     03/05/22 1900   AST 73*   ALT 46*   BILIDIR <0.2   BILITOT 0.3   ALKPHOS 190*     No results for input(s): INR in the last 72 hours. Recent Labs     03/05/22 1900   TROPONINI 0.02*       Urinalysis:      Lab Results   Component Value Date    NITRU Negative 03/05/2022    WBCUA 21-50 03/05/2022    BACTERIA 4+ 02/26/2022    RBCUA 3-4 03/05/2022    BLOODU Negative 03/05/2022    SPECGRAV 1.025 03/05/2022    GLUCOSEU Negative 03/05/2022       Radiology:   EKG:  I have reviewed the EKG with the following interpretation: NA    XR CHEST PORTABLE   Final Result      No acute chest process.       CT ABDOMEN PELVIS W IV CONTRAST Additional Contrast? None   Final Result      No acute intra-abdominopelvic abnormality. New trace pleural effusions with associated bibasilar atelectasis. XR FOOT RIGHT (MIN 3 VIEWS)   Final Result      No acute injury. XR HIP 2-3 VW W PELVIS LEFT   Final Result      No acute injury. CT HEAD WO CONTRAST   Final Result      No acute intracranial process. ASSESSMENT:    Active Hospital Problems    Diagnosis Date Noted    Cellulitis [L03.90] 03/05/2022   #Hypothermia requiring Sharyn hugger. Likely secondary to sepsis due to MDRO UTI. Recently admitted for similar symptoms  With sepsis due to UTI, hypothermia. CT abdomen and pelvis without acute abnormalities. Patient was discharged on cefuroxime for 5 days at discharge from his previous hospitalization as there were no history of resistant UTIs. But his urine cultures from 2/26/2022 grew Morganella morganii, strep agalactiae which are resistant to most antibiotics except Zosyn, meropenem and ertapenem  #Cellulitis of the scrotum more likely fungal  #UTI-likely ineffective discharge antibiotics given patient received meropenem in house given MDRO  #Acute metabolic encephalopathy, CT head-with no acute abnormalities. Seems patient is at baseline mental status. Wheelchair-bound at nursing home  #Elevated LFTs-mild.   Patient is on statins which will be held  #CKD stage III, serum creatinine at baseline  #DM-2  #Dementia/schizophrenia  #Mild elevation in troponin-chronic    PLAN:  -Fluid bolus of normal saline 500 cc given  -Will start on meropenem  -Follow blood and urine cultures  -Nystatin powder topically to scrotal area  -Gentle IV hydration  -Hold statins and continue to monitor LFTs,Acute hepatitis panel ordered by ED pending  -Monitor BMP, CBC and lactic acid levels  -Continue Sharyn hugger, monitor temperature closely  -Continue on current low-dose SSI  -Bedside swallow evaluation and continue his nursing home medications appropriately  -Supportive therapy  -PT/OT  -Social service consult    DVT Prophylaxis: Lovenox  Diet: ADULT DIET; Regular; 4 carb choices (60 gm/meal)  Code Status: Full Code    PT/OT Eval Status: As tolerated with fall precautions    Dispo -PCU with telemetry       Velma Alvarado MD    Thank you Dre Cage MD for the opportunity to be involved in this patient's care. If you have any questions or concerns please feel free to contact me at [phone].

## 2022-03-06 NOTE — PROGRESS NOTES
Hospitalist Progress Note      PCP: Ponce Castellanos MD    Date of Admission: 3/5/2022    Hospital Course: 68-year-old male was sent from nursing home for frequent falls and generalized weakness and hypothermia. Discharged on cefuroxime twice daily for 5 days on 2/26. His urine culture resulted from 2/26 grew Morganella , strepagalactae which are resistant to most antibiotics except Zosyn meropenem and ertapenem    Subjective: Seen and examined   no new complaints      Medications:  Reviewed    Infusion Medications    dextrose      dextrose      sodium chloride 25 mL (03/06/22 0640)     Scheduled Medications    ARIPiprazole  15 mg Oral Daily    aspirin  81 mg Oral Daily    divalproex  250 mg Oral Nightly    divalproex  125 mg Oral QAM    finasteride  5 mg Oral Daily    FLUoxetine  40 mg Oral Daily    melatonin  3 mg Oral Nightly    risperiDONE  0.5 mg Oral QAM AC    risperiDONE  1.5 mg Oral Nightly    tamsulosin  0.8 mg Oral Nightly    sodium chloride flush  5-40 mL IntraVENous 2 times per day    enoxaparin  40 mg SubCUTAneous Daily    meropenem  1,000 mg IntraVENous Q8H    insulin lispro  0-6 Units SubCUTAneous TID WC    insulin lispro  0-3 Units SubCUTAneous Nightly    miconazole   Topical BID     PRN Meds: iopamidol, glucose, dextrose, glucagon (rDNA), dextrose, sodium chloride flush, sodium chloride, ondansetron **OR** ondansetron, polyethylene glycol, acetaminophen **OR** acetaminophen    No intake or output data in the 24 hours ending 03/06/22 1053    Physical Exam Performed:    BP (!) 106/57   Pulse 69   Temp 98.3 °F (36.8 °C) (Oral)   Resp 20   Ht 5' 8\" (1.727 m)   Wt 215 lb 13.3 oz (97.9 kg)   SpO2 96%   BMI 32.82 kg/m²     General appearance:  No apparent distress, appears stated age and cooperative. Hypothermic, on Sharyn hugger, confused, not interactive  HEENT:  Normal cephalic, atraumatic without obvious deformity. Pupils equal, round, and reactive to light.   Extra ocular muscles intact. Conjunctivae/corneas clear. Neck: Supple, with full range of motion. No jugular venous distention. Trachea midline. Respiratory:  Normal respiratory effort. Clear to auscultation, bilaterally without Rales/Wheezes/Rhonchi. Cardiovascular:  Regular rate and rhythm with normal S1/S2 without murmurs, rubs or gallops. Abdomen: Soft, non-tender, non-distended with normal bowel sounds. Musculoskeletal:  No clubbing, cyanosis or edema bilaterally. Full range of motion without deformity. Skin: Skin color, texture, turgor normal.  Scrotal erythema +  Neurologic: Follows simple commands, moves all 4 extremities spontaneously. Psychiatric:  Alert and awake oriented to self only. Capillary Refill: Brisk,3 seconds, normal  Peripheral Pulses: +2 palpable, equal bilaterally     Labs:   Recent Labs     03/05/22 1900 03/06/22  0453   WBC 10.6 10.1   HGB 10.3* 9.8*   HCT 31.7* 30.3*    282     Recent Labs     03/05/22 1900 03/06/22  0453    138   K 4.3 4.4    103   CO2 26 24   BUN 29* 26*   CREATININE 1.3 1.2   CALCIUM 9.4 9.3     Recent Labs     03/05/22  1900   AST 73*   ALT 46*   BILIDIR <0.2   BILITOT 0.3   ALKPHOS 190*     No results for input(s): INR in the last 72 hours. Recent Labs     03/05/22 1900   TROPONINI 0.02*       Urinalysis:      Lab Results   Component Value Date    NITRU Negative 03/05/2022    WBCUA 21-50 03/05/2022    BACTERIA 4+ 02/26/2022    RBCUA 3-4 03/05/2022    BLOODU Negative 03/05/2022    SPECGRAV 1.025 03/05/2022    GLUCOSEU Negative 03/05/2022       Radiology:  XR CHEST PORTABLE   Final Result      No acute chest process. CT ABDOMEN PELVIS W IV CONTRAST Additional Contrast? None   Final Result      No acute intra-abdominopelvic abnormality. New trace pleural effusions with associated bibasilar atelectasis. XR FOOT RIGHT (MIN 3 VIEWS)   Final Result      No acute injury.       XR HIP 2-3 VW W PELVIS LEFT   Final Result      No acute injury. CT HEAD WO CONTRAST   Final Result      No acute intracranial process. Assessment/Plan:    Active Hospital Problems    Diagnosis     Cellulitis [L03.90]      #Hypothermia requiring Sharyn hugger. Likely secondary to sepsis due to MDRO UTI. Recently admitted for similar symptoms  With sepsis due to UTI, hypothermia. CT abdomen and pelvis without acute abnormalities. Patient was discharged on cefuroxime for 5 days at discharge from his previous hospitalization as there were no history of resistant UTIs. But his urine cultures from 2/26/2022 grew Morganella morganii, strep agalactiae which are resistant to most antibiotics except Zosyn, meropenem and ertapenem  --Fluid bolus of normal saline 500 cc given  -Will start on meropenem  -Follow blood and urine cultures  -Nystatin powder topically to scrotal area  -Gentle IV hydration    #Cellulitis of the scrotum more likely fungal    #UTI-likely ineffective discharge antibiotics given patient received meropenem in house given MDRO    #Acute metabolic encephalopathy, CT head-with no acute abnormalities. Seems patient is at baseline mental status. Wheelchair-bound at nursing home    #Elevated LFTs-mild. Patient is on statins which will be held  Hold statins and continue to monitor LFTs,Acute hepatitis panel ordered     #CKD stage III, serum creatinine at baseline    #DM-2  -Continue on current low-dose SSI    #Dementia/schizophrenia    #Mild elevation in troponin-chronic     chronic venous stasis of legs   Wound care consult    DVT Prophylaxis: Lovenox  Diet: ADULT DIET; Regular; 4 carb choices (60 gm/meal)  Code Status: Full Code    PT/OT Eval Status:  Will order    Dispo -inpatient 1 to 2 days    Shira Rao MD

## 2022-03-06 NOTE — PROGRESS NOTES
4 Eyes Skin Assessment     The patient is being assess for  {Blank single:23065::\"Admission\",\"Transfer to New Unit\",\"Post-Op Surgical\",\"Cath Lab Post-Op\",\"Shift Handoff\"}    I agree that 2 RN's have performed a thorough Head to Toe Skin Assessment on the patient. ALL assessment sites listed below have been assessed. Areas assessed by both nurses: ***  []   Head, Face, and Ears   []   Shoulders, Back, and Chest  []   Arms, Elbows, and Hands   []   Coccyx, Sacrum, and Ischum  []   Legs, Feet, and Heels        Does the Patient have Skin Breakdown?   {Blank single:30398::\"No\",\"Yes a wound was noted on the Admission Assessment and an WOUND LDA was Initiated documentation include the Franca-wound, Wound Assessment, Measurements, Dressing Treatment, Drainage, and Color\",\"}         Pio Prevention initiated:  {YES/NO:19732}   Wound Care Orders initiated:  {YES/NO:19732}      [facility] nurse consulted for Pressure Injury (Stage 3,4, Unstageable, DTI, NWPT, and Complex wounds):  {YES/NO:19732}      Nurse 1 eSignature: {Esignature:[id number]}    **SHARE this note so that the co-signing nurse is able to place an eSignature**    Nurse 2 eSignature: Electronically signed by Devon Gallego RN on 3/7/79 at 12:11 AM EST

## 2022-03-07 LAB
ALBUMIN SERPL-MCNC: 3.1 G/DL (ref 3.4–5)
ALP BLD-CCNC: 288 U/L (ref 40–129)
ALT SERPL-CCNC: 82 U/L (ref 10–40)
AST SERPL-CCNC: 125 U/L (ref 15–37)
BILIRUB SERPL-MCNC: 0.3 MG/DL (ref 0–1)
BILIRUBIN DIRECT: <0.2 MG/DL (ref 0–0.3)
BILIRUBIN, INDIRECT: ABNORMAL MG/DL (ref 0–1)
GLUCOSE BLD-MCNC: 142 MG/DL (ref 70–99)
GLUCOSE BLD-MCNC: 172 MG/DL (ref 70–99)
GLUCOSE BLD-MCNC: 237 MG/DL (ref 70–99)
GLUCOSE BLD-MCNC: 264 MG/DL (ref 70–99)
PERFORMED ON: ABNORMAL
TOTAL PROTEIN: 7.9 G/DL (ref 6.4–8.2)

## 2022-03-07 PROCEDURE — 2580000003 HC RX 258: Performed by: INTERNAL MEDICINE

## 2022-03-07 PROCEDURE — 97535 SELF CARE MNGMENT TRAINING: CPT

## 2022-03-07 PROCEDURE — 97162 PT EVAL MOD COMPLEX 30 MIN: CPT

## 2022-03-07 PROCEDURE — 36569 INSJ PICC 5 YR+ W/O IMAGING: CPT

## 2022-03-07 PROCEDURE — 2060000000 HC ICU INTERMEDIATE R&B

## 2022-03-07 PROCEDURE — 6360000002 HC RX W HCPCS: Performed by: INTERNAL MEDICINE

## 2022-03-07 PROCEDURE — 97530 THERAPEUTIC ACTIVITIES: CPT

## 2022-03-07 PROCEDURE — 2500000003 HC RX 250 WO HCPCS: Performed by: INTERNAL MEDICINE

## 2022-03-07 PROCEDURE — 02HV33Z INSERTION OF INFUSION DEVICE INTO SUPERIOR VENA CAVA, PERCUTANEOUS APPROACH: ICD-10-PCS | Performed by: INTERNAL MEDICINE

## 2022-03-07 PROCEDURE — 97166 OT EVAL MOD COMPLEX 45 MIN: CPT

## 2022-03-07 PROCEDURE — 6370000000 HC RX 637 (ALT 250 FOR IP): Performed by: INTERNAL MEDICINE

## 2022-03-07 PROCEDURE — C1751 CATH, INF, PER/CENT/MIDLINE: HCPCS

## 2022-03-07 PROCEDURE — 36415 COLL VENOUS BLD VENIPUNCTURE: CPT

## 2022-03-07 PROCEDURE — 80076 HEPATIC FUNCTION PANEL: CPT

## 2022-03-07 RX ORDER — LIDOCAINE HYDROCHLORIDE 10 MG/ML
5 INJECTION, SOLUTION EPIDURAL; INFILTRATION; INTRACAUDAL; PERINEURAL ONCE
Status: COMPLETED | OUTPATIENT
Start: 2022-03-07 | End: 2022-03-07

## 2022-03-07 RX ORDER — SODIUM CHLORIDE 0.9 % (FLUSH) 0.9 %
5-40 SYRINGE (ML) INJECTION PRN
Status: DISCONTINUED | OUTPATIENT
Start: 2022-03-07 | End: 2022-03-08 | Stop reason: HOSPADM

## 2022-03-07 RX ORDER — SODIUM CHLORIDE 0.9 % (FLUSH) 0.9 %
5-40 SYRINGE (ML) INJECTION EVERY 12 HOURS SCHEDULED
Status: DISCONTINUED | OUTPATIENT
Start: 2022-03-07 | End: 2022-03-08 | Stop reason: HOSPADM

## 2022-03-07 RX ORDER — SODIUM CHLORIDE 9 MG/ML
25 INJECTION, SOLUTION INTRAVENOUS PRN
Status: DISCONTINUED | OUTPATIENT
Start: 2022-03-07 | End: 2022-03-08 | Stop reason: HOSPADM

## 2022-03-07 RX ADMIN — SODIUM CHLORIDE 25 ML: 9 INJECTION, SOLUTION INTRAVENOUS at 06:23

## 2022-03-07 RX ADMIN — SODIUM CHLORIDE, PRESERVATIVE FREE 10 ML: 5 INJECTION INTRAVENOUS at 21:00

## 2022-03-07 RX ADMIN — ENOXAPARIN SODIUM 40 MG: 100 INJECTION SUBCUTANEOUS at 09:42

## 2022-03-07 RX ADMIN — ARIPIPRAZOLE 15 MG: 5 TABLET ORAL at 09:41

## 2022-03-07 RX ADMIN — INSULIN LISPRO 2 UNITS: 100 INJECTION, SOLUTION INTRAVENOUS; SUBCUTANEOUS at 21:01

## 2022-03-07 RX ADMIN — DIVALPROEX SODIUM 250 MG: 125 CAPSULE ORAL at 20:59

## 2022-03-07 RX ADMIN — ASPIRIN 81 MG: 81 TABLET, COATED ORAL at 09:42

## 2022-03-07 RX ADMIN — RISPERIDONE 1.5 MG: 1 TABLET ORAL at 20:59

## 2022-03-07 RX ADMIN — MEROPENEM 1000 MG: 1 INJECTION, POWDER, FOR SOLUTION INTRAVENOUS at 22:37

## 2022-03-07 RX ADMIN — MEROPENEM 1000 MG: 1 INJECTION, POWDER, FOR SOLUTION INTRAVENOUS at 15:02

## 2022-03-07 RX ADMIN — SODIUM CHLORIDE 25 ML: 900 INJECTION, SOLUTION INTRAVENOUS at 22:37

## 2022-03-07 RX ADMIN — ACETAMINOPHEN 1000 MG: 500 TABLET ORAL at 09:42

## 2022-03-07 RX ADMIN — INSULIN LISPRO 2 UNITS: 100 INJECTION, SOLUTION INTRAVENOUS; SUBCUTANEOUS at 12:07

## 2022-03-07 RX ADMIN — SODIUM CHLORIDE, PRESERVATIVE FREE 10 ML: 5 INJECTION INTRAVENOUS at 09:42

## 2022-03-07 RX ADMIN — INSULIN LISPRO 1 UNITS: 100 INJECTION, SOLUTION INTRAVENOUS; SUBCUTANEOUS at 09:46

## 2022-03-07 RX ADMIN — INSULIN LISPRO 1 UNITS: 100 INJECTION, SOLUTION INTRAVENOUS; SUBCUTANEOUS at 17:54

## 2022-03-07 RX ADMIN — MICONAZOLE NITRATE: 20 POWDER TOPICAL at 09:45

## 2022-03-07 RX ADMIN — DIVALPROEX SODIUM 125 MG: 125 CAPSULE, COATED PELLETS ORAL at 09:41

## 2022-03-07 RX ADMIN — TAMSULOSIN HYDROCHLORIDE 0.8 MG: 0.4 CAPSULE ORAL at 20:59

## 2022-03-07 RX ADMIN — Medication 3 MG: at 20:59

## 2022-03-07 RX ADMIN — MEROPENEM 1000 MG: 1 INJECTION, POWDER, FOR SOLUTION INTRAVENOUS at 06:24

## 2022-03-07 RX ADMIN — LIDOCAINE HYDROCHLORIDE 5 ML: 10 INJECTION, SOLUTION EPIDURAL; INFILTRATION; INTRACAUDAL; PERINEURAL at 16:55

## 2022-03-07 RX ADMIN — MICONAZOLE NITRATE: 20 POWDER TOPICAL at 21:00

## 2022-03-07 RX ADMIN — RISPERIDONE 0.5 MG: 0.25 TABLET ORAL at 06:20

## 2022-03-07 RX ADMIN — FLUOXETINE 40 MG: 20 CAPSULE ORAL at 09:41

## 2022-03-07 RX ADMIN — FINASTERIDE 5 MG: 5 TABLET, FILM COATED ORAL at 09:42

## 2022-03-07 ASSESSMENT — PAIN SCALES - WONG BAKER
WONGBAKER_NUMERICALRESPONSE: 0

## 2022-03-07 ASSESSMENT — PAIN DESCRIPTION - FREQUENCY
FREQUENCY: CONTINUOUS
FREQUENCY: INTERMITTENT

## 2022-03-07 ASSESSMENT — PAIN DESCRIPTION - DESCRIPTORS
DESCRIPTORS: DISCOMFORT;HEADACHE
DESCRIPTORS: ACHING;SORE

## 2022-03-07 ASSESSMENT — PAIN DESCRIPTION - PROGRESSION
CLINICAL_PROGRESSION: NOT CHANGED
CLINICAL_PROGRESSION: GRADUALLY WORSENING

## 2022-03-07 ASSESSMENT — PAIN - FUNCTIONAL ASSESSMENT
PAIN_FUNCTIONAL_ASSESSMENT: ACTIVITIES ARE NOT PREVENTED

## 2022-03-07 ASSESSMENT — PAIN SCALES - GENERAL
PAINLEVEL_OUTOF10: 0
PAINLEVEL_OUTOF10: 3
PAINLEVEL_OUTOF10: 2
PAINLEVEL_OUTOF10: 0
PAINLEVEL_OUTOF10: 3
PAINLEVEL_OUTOF10: 0

## 2022-03-07 ASSESSMENT — PAIN DESCRIPTION - PAIN TYPE
TYPE: CHRONIC PAIN
TYPE: ACUTE PAIN

## 2022-03-07 ASSESSMENT — PAIN DESCRIPTION - ONSET
ONSET: ON-GOING
ONSET: ON-GOING

## 2022-03-07 ASSESSMENT — PAIN DESCRIPTION - ORIENTATION: ORIENTATION: RIGHT;LEFT

## 2022-03-07 ASSESSMENT — PAIN DESCRIPTION - LOCATION
LOCATION: GENERALIZED
LOCATION: HEAD;FOOT

## 2022-03-07 NOTE — PROCEDURES
TRIMMABLE POWER MIDLINE PROCEDURE NOTE  Chart reviewed for allergies, diagnosis, labs, known contraindications, reason for line placement and planned length of treatment. Insertion procedure discussed with patient/family member. Informed consent not required for Midline placement. Three patient identifiers - Patient name,   and MRN -  completed &  confirmed verbally. Hat, mask and eye shield donned. Midline site scrubbed with Chloraprep  One-Step applicator  for 30 seconds x 1. Hand Hygiene  performed with 3% Chlorhexidine surgical scrub x1 min prior to  Sterile gloves, sterile gown being donned. Patient draped using maximal sterile barrier technique ( head to toe ). Midline site scrubbed a 2nd time with Chloraprep One-Step applicator x 30 sec. Vein located by Ultra Sound and site marked with sterile pen. 1% Lidocaine 5 ml injected intradermal pre-insertion. Midline inserted. Positive brisk blood return obtained Midline flushed with 10 mls  0.9% Sterile Sodium Chloride. Midline flushes easily with no resistance. Valve placed on all lumens followed by Alcohol Swab Caps on end of each. Skin prep applied to site. CHG dressing in place. Catheter secured with non-sutured locking device per hospital protocol. Sterile Tegaderm applied over Midline site. Sterile field maintained during procedure. Positioning wire accounted for post procedure. Pt. Response to procedure, tolerated well. Appearance of site: Clean dry and intact without bleeding or edema. All edges of Tegaderm occlusive. Site marked with date and initials of RN placing line. Top 2 side rails in up position, call button in reach, RN notified of all of the above. A Trimmable Power Midline 15 CM placed in the GARY Basilic vein. 0 cm showing from insertion site. Pt needs one more week of IV therapy. Facility okay with midline vs picc. Perfect serve message sent to don Verdin for midline vs picc.

## 2022-03-07 NOTE — PROGRESS NOTES
Occupational Therapy   Occupational Therapy Initial Assessment and Tx  Date: 3/7/2022   Patient Name: Juana Olivia  MRN: [id number]     : 1950    Date of Service: 3/7/2022    Discharge Recommendations: Juana Olivia scored a  on the AM-PAC ADL Inpatient form. Current research shows that an AM-PAC score of 17 or less is typically not associated with a discharge to the patient's home setting. Based on the patient's AM-PAC score and their current ADL deficits, it is recommended that the patient have 3-5 sessions per week of Occupational Therapy at d/c to increase the patient's independence. Please see assessment section for further patient specific details. If patient discharges prior to next session this note will serve as a discharge summary. Please see below for the latest assessment towards goals. OT Equipment Recommendations  Equipment Needed: No    Assessment   Performance deficits / Impairments: Decreased functional mobility ; Decreased ADL status; Decreased cognition;Decreased safe awareness;Decreased endurance;Decreased posture  Assessment: From facility, admit with dizziness, falls, weakness, primarily WC bound, reporting takes \"3 nurses to get me to the chair. \" Pt requiring assist with ADLs this date. Assist with bed mobility, MaxA x2 stand to [address] jude for bed to chair transfer. Would benefit from cont OT while in acute care rec rtn to facility. Treatment Diagnosis: impaired mobility, transfers, ADL  Decision Making: Medium Complexity  OT Education: OT Role;Plan of Care;ADL Adaptive Strategies;Transfer Training  Patient Education: cont to reinforce  Barriers to Learning: cognition  REQUIRES OT FOLLOW UP: Yes  Activity Tolerance  Activity Tolerance: Patient limited by fatigue;Treatment limited secondary to decreased cognition;Patient Tolerated treatment well  Safety Devices  Safety Devices in place: Yes  Type of devices: All fall risk precautions in place;Call light within reach; Chair alarm in place; Patient at risk for falls;Gait belt;Nurse notified; Left in chair           Patient Diagnosis(es): The primary encounter diagnosis was Fall, initial encounter. Diagnoses of Elevated transaminase level and Urinary tract infection without hematuria, site unspecified were also pertinent to this visit. has a past medical history of Ataxia, Bipolar 1 disorder ([facility] [address] BPH (benign prostatic hyperplasia), Cerebral infarct ([facility] [address] Chronic kidney disease, Dementia ([facility] [address] Depression, Diabetes mellitus type 2, uncomplicated ([facility] [address] Gout, Hyperlipidemia, Hypertension, and Sch[facility] has no past surgical history on file. Treatment Diagnosis: impaired mobility, transfers, ADL      Restrictions  Position Activity Restriction  Other position/activity restrictions: up as tolerated, up with assist, contact, fall precautions    Subjective   General  Chart Reviewed: Yes  Additional Pertinent Hx: 73 y.o. male who was sent from the nursing home for frequent falls and generalized weakness. Patient was found to be hypothermic upon admission requiring Sharyn hugger. Patient has history of dementia and schizophrenia and confused at baseline. Patient is unable to give reliable history. Patient was hospitalized here on 2/26 for hypothermia, sepsis due to UTI and acute metabolic encephalopathy. Referring Practitioner: Suyapa Xavier MD  Diagnosis: dizziness  Subjective  Subjective:  In bed, agreeable to session, reporting no pain  Patient Currently in Pain: Denies  Pain Assessment  Pain Assessment: 0-10  Pain Level: 0  Christianson-Baker Pain Rating: No hurt  Clinical Progression: Not changed  Functional Pain Assessment: Activities are not prevented  Vital Signs  Temp: 97.4 °F (36.3 °C)  Temp Source: Oral  Pulse: 73  Heart Rate Source: Monitor  Resp: 18  BP: (!) 153/73  BP Location: Left upper arm  MAP (mmHg): 100  Patient Position: Sitting  Level of Consciousness: Alert (0)  MEWS Score: 1  Patient Currently in Pain: Denies  Oxygen Therapy  SpO2: 95 %  Pulse Oximeter Device Mode: Intermittent  Pulse Oximeter Device Location: Finger  Social/Functional History  Social/Functional History  Lives With: Other (comment) (long term care)  Type of Home: Facility  Home Layout: One level  Home Access: Level entry,Elevator  Bathroom Shower/Tub: Walk-in shower  Bathroom Toilet: Handicap height  Bathroom Equipment: Shower chair,Grab bars in shower,Grab bars around toilet  Bathroom Accessibility: Wheelchair accessible  Home Equipment: [address] Help From: Personal care attendant,Family  ADL Assistance: Needs assistance  Homemaking Assistance: Needs assistance  Homemaking Responsibilities: No  Ambulation Assistance: Needs assistance  Transfer Assistance: Needs assistance  Active : No  Mode of Transportation: Other (will need EMS transport)  Occupation: Retired  Additional Comments: 2 falls in last week, unclear of level of assist facility staff provides for pt. reportedly pt is wc bound and uses assist for ADLs as needed from staff       Objective              Balance  Sitting Balance: Moderate assistance (to CGA)  Standing Balance: Maximum assistance  Standing Balance  Activity: MaxA x2 stand to JALEN roque, stedy to chair  ADL  Feeding: Setup;Verbal cueing; Increased time to complete;Contact guard assistance  Grooming: Setup;Minimal assistance  LE Dressing: Dependent/Total  Toileting: Dependent/Total (incont urine, brief change)        Bed mobility  Rolling to Left: Moderate assistance  Rolling to Right: Moderate assistance  Supine to Sit: 2 Person assistance;Dependent/Total  Scootin Person assistance;Dependent/Total  Transfers  Sit to stand: Maximum assistance;2 Person assistance  Stand to sit: Maximum assistance;2 Person assistance  Transfer Comments: Stand to JALEN roque     Cognition  Overall Cognitive Status: Exceptions  Arousal/Alertness: Delayed responses to stimuli  Following Commands:  Follows one step commands with increased time; Follows one step commands with repetition  Attention Span: Attends with cues to redirect  Memory: Decreased recall of biographical Information  Safety Judgement: Decreased awareness of need for assistance;Decreased awareness of need for safety  Problem Solving: Decreased awareness of errors  Insights: Decreased awareness of deficits  Initiation: Requires cues for some  Sequencing: Requires cues for some                 LUE AROM (degrees)  LUE AROM : WFL  Left Hand AROM (degrees)  Left Hand AROM: WFL  RUE AROM (degrees)  RUE AROM : WFL  Right Hand AROM (degrees)  Right Hand AROM: WFL  LUE Strength  Gross LUE Strength: WFL  RUE Strength  Gross RUE Strength: WFL                   Plan   Plan  Times per week: 2-5  Times per day: Daily      AM-PAC Score        AM-[facility] Inpatient Daily Activity Raw Score: 12 (03/07/22 1221)  AM-PAC Inpatient ADL T-Scale Score : 30.6 (03/07/22 1221)  ADL Inpatient CMS 0-100% Score: 66.57 (03/07/22 1221)  ADL Inpatient CMS G-Code Modifier : CL (03/07/22 1221)    Goals  Short term goals  Time Frame for Short term goals: dc  Short term goal 1: supine to sit Twila  Short term goal 2: sit<>stand Twila  Short term goal 3: sit balance SBA  Short term goal 4: grooming setup/SPVN       Therapy Time   Individual Concurrent Group Co-treatment   Time In 0835         Time Out 0913         Minutes 38              Timed Code Treatment Minutes:   23    Total Treatment Minutes:  [address] Davis, OT

## 2022-03-07 NOTE — PROGRESS NOTES
HEENT:  Normal cephalic, atraumatic without obvious deformity. Pupils equal, round, and reactive to light. Extra ocular muscles intact. Conjunctivae/corneas clear. Neck: Supple, with full range of motion. No jugular venous distention. Trachea midline. Respiratory:  Normal respiratory effort. Clear to auscultation, bilaterally without Rales/Wheezes/Rhonchi. Cardiovascular:  Regular rate and rhythm with normal S1/S2 without murmurs, rubs or gallops. Abdomen: Soft, non-tender, non-distended with normal bowel sounds. Musculoskeletal:  No clubbing, cyanosis or edema bilaterally. Full range of motion without deformity. Skin:  Bilateral lower extremity with chronic venous stasis changes. Scrotal erythema improving  Neurologic: Follows simple commands, moves all 4 extremities spontaneously. Psychiatric:  Alert and awake, oriented X2  Capillary Refill: Brisk,3 seconds, normal  Peripheral Pulses: +2 palpable, equal bilaterally     Labs:   Recent Labs     03/05/22 1900 03/06/22  0453   WBC 10.6 10.1   HGB 10.3* 9.8*   HCT 31.7* 30.3*    282     Recent Labs     03/05/22 1900 03/06/22 0453    138   K 4.3 4.4    103   CO2 26 24   BUN 29* 26*   CREATININE 1.3 1.2   CALCIUM 9.4 9.3     Recent Labs     03/05/22 1900   AST 73*   ALT 46*   BILIDIR <0.2   BILITOT 0.3   ALKPHOS 190*     No results for input(s): INR in the last 72 hours. Recent Labs     03/05/22 1900   TROPONINI 0.02*       Urinalysis:      Lab Results   Component Value Date    NITRU Negative 03/05/2022    WBCUA 21-50 03/05/2022    BACTERIA 4+ 02/26/2022    RBCUA 3-4 03/05/2022    BLOODU Negative 03/05/2022    SPECGRAV 1.025 03/05/2022    GLUCOSEU Negative 03/05/2022       Radiology:  XR CHEST PORTABLE   Final Result      No acute chest process. CT ABDOMEN PELVIS W IV CONTRAST Additional Contrast? None   Final Result      No acute intra-abdominopelvic abnormality.         New trace pleural effusions with associated bibasilar atelectasis. XR FOOT RIGHT (MIN 3 VIEWS)   Final Result      No acute injury. XR HIP 2-3 VW W PELVIS LEFT   Final Result      No acute injury. CT HEAD WO CONTRAST   Final Result      No acute intracranial process. Assessment/Plan:    #MDRO UTI, sepsis ruled out  #Hypothermia requiring Sharyn hugger. Recently admitted for similar symptoms of sepsis due to UTI, hypothermia. CT abdomen and pelvis without acute abnormalities. Patient was discharged on cefuroxime for 5 days at discharge from his previous hospitalization as there were no history of resistant UTIs. But his urine cultures from 2/26/2022 grew Morganella morganii, strep agalactiae which are resistant to most antibiotics except Zosyn, meropenem and ertapenem  S/p fluid bolus and sharyn hugger  Urine culture with urogenital braeden but given patient recent hospitalization and readmission secondary to MDRO UTI, will continue Merrem for total 10 days. Continue Merrem, day #3  PICC line to be placed  Discussed with RN and social work    #Acute metabolic encephalopathy ruled out  CT head-with no acute abnormalities  Patient at baseline mental status. Wheelchair-bound at nursing home    #Cellulitis of the scrotum   Most likely fungal  Cont Nystatin powder topically to scrotal area    #Elevated LFTs-mild  Hepatitis panel negative   Holding statin  Monitor LFTs    #CKD stage III  Serum creatinine at baseline    #Mild elevation in troponin-chronic    #DM-2  Monitor blood glucose  Continue carb controlled diet with low-dose SSI    #Chronic venous stasis of legs   Continue wound care    #Dementia/schizophrenia  Continue home medication    DVT Prophylaxis: Lovenox  Diet: ADULT DIET; Regular; 4 carb choices (60 gm/meal)  Code Status: Full Code    PT/OT Eval Status:  Following    Dispo -Pending clinical improvement, PICC line placement/abx    Anabel Michael MD

## 2022-03-07 NOTE — PROGRESS NOTES
Physician Progress Note      Faizan Escobar  [facility] #:                  [id number]  :                       1950  ADMIT DATE:       3/5/2022 4:59 PM  DISCH DATE:  RESPONDING  PROVIDER #:        Tea Ma MD          QUERY TEXT:    Patient admitted with UTI. Noted documentation of sepsis in H&P. In order to   support the diagnosis of sepsis, please include additional clinical indicators   in your documentation. Or please document if the diagnosis of sepsis has   been ruled out after further study    The medical record reflects the following:  Risk Factors: 74 yo w/ recent sepsis due to UTI  Clinical Indicators: Per H&P: Likely secondary to sepsis due to MDRO UTI. WBC   10.6, temp 94.3, RR 18, HR 72.  lactic acid 1.3. No Procalcitonin. No blood   cultures. Treatment: Lactic acid, urine culture, IVF bolus, IV Merrem  Options provided:  -- Sepsis present as evidenced by, Please document evidence. -- Sepsis was ruled out after study  -- Other - I will add my own diagnosis  -- Disagree - Not applicable / Not valid  -- Disagree - Clinically unable to determine / Unknown  -- Refer to Clinical Documentation Reviewer    PROVIDER RESPONSE TEXT:    Sepsis was ruled out after study. Query created by: oJhana De La Cruz on 3/7/2022 7:12 AM      QUERY TEXT:    Patient admitted with UTI. Noted documentation of metabolic encephalopathy. In   order to support the diagnosis of metabolic encephalopathy, please include   additional clinical indicators in your documentation. Or please document if   the diagnosis of metabolic encephalopathy has been ruled out after further   study. The medical record reflects the following:  Risk Factors: 74 yo  Clinical Indicators: Per H&P: Acute metabolic encephalopathy, CT head-with no   acute abnormalities. Seems patient is at baseline mental status. Follows   simple commands, moves all 4 extremities spontaneously.   Psychiatric:  Alert   and awake oriented to self only. Per ED: Positive for confusion. gentleman who   is nontoxic appearing though appears mildly lethargic. Treatment: fall precautions, bed alarm, Head CT  Options provided:  -- Metabolic encephalopathy present as evidenced by, Please document evidence. -- Metabolic encephalopathy was ruled out  -- Other - I will add my own diagnosis  -- Disagree - Not applicable / Not valid  -- Disagree - Clinically unable to determine / Unknown  -- Refer to Clinical Documentation Reviewer    PROVIDER RESPONSE TEXT:    Metabolic encephalopathy was ruled out after study.     Query created by: Kinza Bran on 3/7/2022 7:20 AM      Electronically signed by:  Mak Ochoa MD 3/7/2022 1:10 PM

## 2022-03-07 NOTE — PROGRESS NOTES
Physical Therapy    Facility/Department: [place] [address] [facility]  Initial Assessment    NAME: Olman Vickers  : 1950  MRN: [id number]    Date of Service: 3/7/2022    Discharge Recommendations: Olman Vickers scored a  on the AM-PAC short mobility form. Current research shows that an AM-PAC score of 17 or less is typically not associated with a discharge to the patient's home setting. Based on the patient's AM-PAC score and their current functional mobility deficits, it is recommended that the patient have 3-5 sessions per week of Physical Therapy at d/c to increase the patient's independence. Please see assessment section for further patient specific details. If patient discharges prior to next session this note will serve as a discharge summary. Please see below for the latest assessment towards goals. PT Equipment Recommendations  Equipment Needed: No    Assessment   Body structures, Functions, Activity limitations: Decreased functional mobility ; Decreased strength;Decreased balance;Decreased safe awareness;Decreased endurance  Assessment: pt is a 74 yo male presenting from nursing home after fall and is reportedly wheelchair bound at baseline requiring assist for all ADLs and mobility. pt currently requires a heavy assist of 2 for bed mobility and transfers using adrianna steady. pt limited by strength, endurance, and balance deficits. recommend staff use steady and assist of 2 for OOB transfers. Pt plans to return to SNF at [place] and will benefit from contiued PT to maximize indepedendence. will follow throughout stay  Treatment Diagnosis: decreased functional mobility  Prognosis: Fair  Decision Making: Medium Complexity  PT Education: Goals;PT Role;Plan of Care;General Safety; Functional Mobility Training  Patient Education: pt will benefit from reinforcement  REQUIRES PT FOLLOW UP: Yes  Activity Tolerance  Activity Tolerance: Patient limited by fatigue;Patient limited by endurance  Activity Tolerance: frequent rest breaks       Patient Diagnosis(es): The primary encounter diagnosis was Fall, initial encounter. Diagnoses of Elevated transaminase level and Urinary tract infection without hematuria, site unspecified were also pertinent to this visit. has a past medical history of Ataxia, Bipolar 1 disorder ([facility] [address] BPH (benign prostatic hyperplasia), Cerebral infarct ([facility] [address] Chronic kidney disease, Dementia ([facility] [address] Depression, Diabetes mellitus type 2, uncomplicated ([facility] [address] Gout, Hyperlipidemia, Hypertension, and Schizophrenia ([facility] [address] has no past surgical history on file. Restrictions  Position Activity Restriction  Other position/activity restrictions: up as tolerated, up with assist, contact, fall precautions  Vision/Hearing  Vision: Impaired  Vision Exceptions: Wears glasses for reading  Hearing: Exceptions to [facility]  Hearing Exceptions: Hard of hearing/hearing concerns     Subjective  General  Chart Reviewed:  Yes  Additional Pertinent Hx: pt is a 74 yo male from nursing home after fall out of wheelchair and hitting his head  Referring Practitioner: Alberto Muñoz MD  Diagnosis: Cellulitis  Follows Commands: Within Functional Limits  Subjective  Subjective: pt supine in  bed and agreeable to PT  Pain Screening  Patient Currently in Pain: Denies  Vital Signs  Patient Currently in Pain: Denies       Orientation  Orientation  Overall Orientation Status: Within Functional Limits  Social/Functional History  Social/Functional History  Lives With: Other (comment) (long term care)  Type of Home: Facility  Home Layout: One level  Home Access: Level entry,Elevator  Bathroom Shower/Tub: Walk-in shower  Bathroom Toilet: Handicap height  Bathroom Equipment: Shower chair,Grab bars in shower,Grab bars around toilet  Bathroom Accessibility: Wheelchair accessible  Home Equipment: [address] Help From: Personal care attendant,Family  ADL Assistance: Needs assistance  Homemaking Assistance: Needs assistance  Homemaking Responsibilities: No  Ambulation Assistance: Needs assistance  Transfer Assistance: Needs assistance  Active : No  Mode of Transportation: Other (will need EMS transport)  Occupation: Retired  Additional Comments: 2 falls in last week, unclear of level of assist facility staff provides for pt. reportedly pt is wc bound and uses assist for ADLs as needed from staff  Cognition        Objective             Strength RLE  Comment: grossly weak 3+/5  Strength LLE  Comment: grossly weak 3+/5        Bed mobility  Rolling to Left: Moderate assistance  Rolling to Right: Moderate assistance  Supine to Sit: 2 Person assistance;Dependent/Total (max A of 2)  Scootin Person assistance;Dependent/Total (max A of 2 in chair)  Transfers  Sit to Stand: Dependent/Total;2 Person Assistance (max A of 2 from EOB in steady, max A of 1 from steady paddles)  Stand to sit: Dependent/Total;2 Person Assistance  Bed to Chair: Dependent/Total (via steady)  Ambulation  Ambulation?: No (pt wheelchair bound at baseline)     Balance  Sitting - Static: Fair  Sitting - Dynamic: Fair  Standing - Static: Poor  Standing - Dynamic: Poor  Comments: sitting balance mod A initially progressing to CGA, standing balance required max A in steady for very brief period        Plan   Plan  Times per week: 2-5  Current Treatment Recommendations: Strengthening,Transfer Training,Endurance Training,Neuromuscular Re-education,Patient/Caregiver Education & Mall[place] Real Exercise Program,Safety Education & Training,Functional Mobility Training  Safety Devices  Type of devices: Call light within reach,Left in chair,Gait belt,Nurse notified,Chair alarm in place    G-Code       OutComes Score                                                  AM-PAC Score  AM-PAC Inpatient Mobility Raw Score : 7 (22 114)  AM-PAC Inpatient T-Scale Score : 26.42 (22)  Mobility Inpatient CMS 0-100% Score: 92.36 (22)  Mobility Inpatient CMS G-Code Modifier : CM (03/07/22 1148)          Goals  Short term goals  Time Frame for Short term goals: by dc  Short term goal 1: pt will perform rolling with CGA  Short term goal 2: pt will perform sup <> sit with mod  A  Short term goal 3: pt will perform transfers with max A of 1  Patient Goals   Patient goals : not stated       Therapy Time   Individual Concurrent Group Co-treatment   Time In 0830         Time Out 0912         Minutes 42             Timed Code Treatment Minutes:  27 min     Total Treatment Minutes:  42 min      Mildred Redd, PT

## 2022-03-07 NOTE — CARE COORDINATION
Case Management Assessment           Initial Evaluation                Date / Time of Evaluation: 3/7/2022 10:37 AM                 Assessment Completed by: JONAS Doyle, LSW    Patient Name: Karuna Orozco     YOB: 1950  Diagnosis: Dizziness [R42]  Cellulitis [L03.90]  Elevated transaminase level [R74.01]  Fall, initial encounter [W19. XXXA]     Date / Time: 3/5/2022  4:59 PM    Patient Admission Status: Inpatient    If patient is discharged prior to next notation, then this note serves as note for discharge by case management. Current PCP: Brynn Hayes MD  Clinic Patient: No    Chart Reviewed: Yes  Patient/ Family Interviewed: Yes    Initial assessment completed at bedside with: sister    Hospitalization in the last 30 days: Yes    Emergency Contacts:  Extended Emergency Contact Information  [facility] [phone]  Mobile Phone: [phone]  Relation: Brother/Sister  Secondary Emergency Contact: [address] [place] [address] Phone: [phone]  Relation: Brother/Sister    Advance Directives:   Code Status: Full [address]: Yes    Copy present: No     In paper Chart: No    Scanned into EMR Yes    Financial  Payor: MEDICARE / Plan: MEDICARE PART A AND B / Product Type: *No Product type* /     Pre-cert required for SNF: No    Pharmacy    [address], [address] [place] [address]  Phone: [phone] Fax: [phone]      Potential assistance Purchasing Medications: Potential Assistance Purchasing Medications: No  Does Patient want to participate in local refill/ meds to beds program?:      Meds To Beds General Rules:  1. Can ONLY be done Monday- Friday between 8:30am-5pm  2. Prescription(s) must be in pharmacy by 3pm to be filled same day  3. Copy of patient's insurance/ prescription drug card and patient face sheet must be sent along with the prescription(s)  4. Cost of Rx cannot be added to hospital bill. If financial assistance is needed, please contact unit  or ;  or  CANNOT provide pharmacy voucher for patients co-pays  5. Patients can then  the prescription on their way out of the hospital at discharge, or pharmacy can deliver to the bedside if staff is available. (payment due at time of pick-up or delivery - cash, check, or card accepted)     Able to afford home medications/ co-pay costs: Yes    ADLS  Support Systems: Family Members,ECF/Assisted Living    PT AM-PAC:   /24  OT AM-PAC:   /24    [place]: long term care  Steps: no    Plans to RETURN to current housing: Yes  Barriers to RETURNING to current housing: medical stability     Home Care Information  Currently ACTIVE with [address]: No    Currently ACTIVE with [place] [facility]: No    Durable Medical Equipment  DME Provider: none  Equipment: wheelchair    Home Oxygen and [address] prior to admission: No    Dialysis  Active with HD/PD prior to admission: No  Nephrologist:     DISCHARGE PLAN:  Disposition: [facility] (LT):   Phone: [phone]  Fax: [phone]    Transportation PLAN for discharge: EMS transportation     Factors facilitating achievement of predicted outcomes: Caregiver support    Barriers to discharge: Pain, Confusion, Cognitive deficit and Medical complications    Additional Case Management Notes:   DAVION spoke to Pt's sister Trish Stanley via phone. Pt is from Long term care at  and Pt is currently WC bound. Pt's family wants him to return to [facility] [place]. Pt will need EMS transport at [place]. DAVION spoke w/, Pt can return once medically stable. DAVION following for DCP.     The Plan for Transition of Care is related to the following treatment goals of Dizziness [R42]  Cellulitis [L03.90]  Elevated transaminase level [R74.01]  Fall, initial encounter [W19. XXXA]    The Patient and/or patient representative Adrian Hoang and his family were provided with a choice of provider and agrees with the discharge plan Yes    Freedom of choice list was provided with basic dialogue that supports the patient's individualized plan of care/goals and shares the quality data associated with the providers.  Yes    Care Transition patient: No    JONAS Kimball, [facility]  Case Management Department  Ph: [phone]   Fax: [phone]

## 2022-03-07 NOTE — PLAN OF CARE
Problem: Pain:  Goal: Patient's pain/discomfort is manageable  Description: Patient's pain/discomfort is manageable  Outcome: Ongoing  Note: PRN pain medication given with some relief      Problem: Skin Integrity:  Goal: Absence of new skin breakdown  Description: Absence of new skin breakdown  Outcome: Ongoing  Note: Preventive measures to protect patients skin are in use. Pillows used to reposition patient. Patient has been turned and repositioned Q2h. Skin integrity has been assessed for any breakdown. Problem: Falls - Risk of:  Goal: Will remain free from falls  Description: Will remain free from falls  Outcome: Ongoing  Note: Bed is in the lowest position, room is free from clutter, bed alarm is in use. Non slip socks are in place and fall sign is posted. Fall contract reviewed with patient.

## 2022-03-07 NOTE — PLAN OF CARE
Problem: Pain:  Goal: Patient's pain/discomfort is manageable  Description: Patient's pain/discomfort is manageable  3/7/2022 1121 by Susan Pacheco RN  Outcome: Met This Shift     Problem: Pain:  Goal: Pain level will decrease  Description: Pain level will decrease  3/7/2022 1121 by Susan Pacheco RN  Outcome: Met This Shift     Problem: Pain:  Goal: Control of acute pain  Description: Control of acute pain  3/7/2022 1121 by Susan Pacheco RN  Outcome: Met This Shift     Problem: Pain:  Goal: Control of chronic pain  Description: Control of chronic pain  3/7/2022 1121 by Susan Pacheco RN  Outcome: Met This Shift   PRN pain medication controls patients pain well  Problem: Infection:  Goal: Will remain free from infection  Description: Will remain free from infection  3/7/2022 1121 by Susan Pacheco RN  Outcome: Ongoing   Standard infection control  Problem: Safety:  Goal: Free from accidental physical injury  Description: Free from accidental physical injury  3/7/2022 1121 by Susan Pacheco RN  Outcome: Ongoing     Problem: Safety:  Goal: Free from intentional harm  Description: Free from intentional harm  3/7/2022 1121 by Susan Pacheco RN  Outcome: Ongoing  Standard fall interventions, chair/bed alarm use, purposeful hourly rounding     Problem: Daily Care:  Goal: Daily care needs are met  Description: Daily care needs are met  3/7/2022 1121 by Susan Pacheco RN  Outcome: Ongoing     Problem: Skin Integrity:  Goal: Skin integrity will stabilize  Description: Skin integrity will stabilize  3/7/2022 1121 by Susan Pacheco RN  Outcome: Ongoing     Problem: Skin Integrity:  Goal: Will show no infection signs and symptoms  Description: Will show no infection signs and symptoms  3/7/2022 1121 by Susan Pacheco RN  Outcome: Ongoing     Problem: Skin Integrity:  Goal: Absence of new skin breakdown  Description: Absence of new skin breakdown  3/7/2022 1121 by Susan Pacheco RN  Outcome: Ongoing  Ordered medications, incontinence care      Problem: Falls - Risk of:  Goal: Will remain free from falls  Description: Will remain free from falls  3/7/2022 1121 by Bj Jaramillo RN  Outcome: Ongoing     Problem: Falls - Risk of:  Goal: Absence of physical injury  Description: Absence of physical injury  3/7/2022 1121 by Bj Jaramillo RN  Outcome: Ongoing

## 2022-03-08 VITALS
HEIGHT: 68 IN | SYSTOLIC BLOOD PRESSURE: 153 MMHG | WEIGHT: 215.83 LBS | BODY MASS INDEX: 32.71 KG/M2 | DIASTOLIC BLOOD PRESSURE: 80 MMHG | RESPIRATION RATE: 18 BRPM | OXYGEN SATURATION: 94 % | TEMPERATURE: 97.4 F | HEART RATE: 67 BPM

## 2022-03-08 LAB
ALBUMIN SERPL-MCNC: 3.2 G/DL (ref 3.4–5)
ALP BLD-CCNC: 270 U/L (ref 40–129)
ALT SERPL-CCNC: 72 U/L (ref 10–40)
AMMONIA: 20 UMOL/L (ref 16–60)
AST SERPL-CCNC: 88 U/L (ref 15–37)
BILIRUB SERPL-MCNC: 0.4 MG/DL (ref 0–1)
BILIRUBIN DIRECT: <0.2 MG/DL (ref 0–0.3)
BILIRUBIN, INDIRECT: ABNORMAL MG/DL (ref 0–1)
GLUCOSE BLD-MCNC: 131 MG/DL (ref 70–99)
GLUCOSE BLD-MCNC: 191 MG/DL (ref 70–99)
PERFORMED ON: ABNORMAL
PERFORMED ON: ABNORMAL
TOTAL PROTEIN: 7.8 G/DL (ref 6.4–8.2)
VALPROIC ACID LEVEL: <2.8 UG/ML (ref 50–100)

## 2022-03-08 PROCEDURE — 2580000003 HC RX 258: Performed by: INTERNAL MEDICINE

## 2022-03-08 PROCEDURE — 82140 ASSAY OF AMMONIA: CPT

## 2022-03-08 PROCEDURE — 6360000002 HC RX W HCPCS: Performed by: INTERNAL MEDICINE

## 2022-03-08 PROCEDURE — 80076 HEPATIC FUNCTION PANEL: CPT

## 2022-03-08 PROCEDURE — 97110 THERAPEUTIC EXERCISES: CPT

## 2022-03-08 PROCEDURE — 80164 ASSAY DIPROPYLACETIC ACD TOT: CPT

## 2022-03-08 PROCEDURE — 6370000000 HC RX 637 (ALT 250 FOR IP): Performed by: INTERNAL MEDICINE

## 2022-03-08 RX ORDER — INSULIN DETEMIR 100 [IU]/ML
5 INJECTION, SOLUTION SUBCUTANEOUS NIGHTLY
Qty: 10 ML | Refills: 1 | Status: ON HOLD | OUTPATIENT
Start: 2022-03-08 | End: 2022-05-18 | Stop reason: HOSPADM

## 2022-03-08 RX ADMIN — ARIPIPRAZOLE 15 MG: 5 TABLET ORAL at 09:00

## 2022-03-08 RX ADMIN — MEROPENEM 1000 MG: 1 INJECTION, POWDER, FOR SOLUTION INTRAVENOUS at 14:27

## 2022-03-08 RX ADMIN — ENOXAPARIN SODIUM 40 MG: 100 INJECTION SUBCUTANEOUS at 08:59

## 2022-03-08 RX ADMIN — FINASTERIDE 5 MG: 5 TABLET, FILM COATED ORAL at 09:00

## 2022-03-08 RX ADMIN — SODIUM CHLORIDE, PRESERVATIVE FREE 10 ML: 5 INJECTION INTRAVENOUS at 08:59

## 2022-03-08 RX ADMIN — DIVALPROEX SODIUM 125 MG: 125 CAPSULE, COATED PELLETS ORAL at 09:00

## 2022-03-08 RX ADMIN — MEROPENEM 1000 MG: 1 INJECTION, POWDER, FOR SOLUTION INTRAVENOUS at 06:24

## 2022-03-08 RX ADMIN — FLUOXETINE 40 MG: 20 CAPSULE ORAL at 09:00

## 2022-03-08 RX ADMIN — MICONAZOLE NITRATE: 20 POWDER TOPICAL at 09:00

## 2022-03-08 RX ADMIN — RISPERIDONE 0.5 MG: 0.25 TABLET ORAL at 06:24

## 2022-03-08 RX ADMIN — INSULIN LISPRO 1 UNITS: 100 INJECTION, SOLUTION INTRAVENOUS; SUBCUTANEOUS at 12:15

## 2022-03-08 RX ADMIN — ASPIRIN 81 MG: 81 TABLET, COATED ORAL at 09:00

## 2022-03-08 ASSESSMENT — PAIN SCALES - GENERAL
PAINLEVEL_OUTOF10: 0

## 2022-03-08 ASSESSMENT — PAIN SCALES - WONG BAKER
WONGBAKER_NUMERICALRESPONSE: 0

## 2022-03-08 NOTE — DISCHARGE INSTR - COC
Continuity of Care Form    Patient Name: Fya Arshad   :  1950  MRN:  [id number]    Admit date:  3/5/2022  Discharge date:  ***    Code Status Order: Full Code   Advance Directives:      Admitting Physician:  Tawanda Marie MD  PCP: Justyn Brush MD    Discharging Nurse: [facility] Unit/Room#: 5802/3814-07  Discharging Unit Phone Number: ***    Emergency Contact:   Extended Emergency Contact Information  Primary Emergency Contact: [address] [place] [address], [address] [place] [address] Phone: [phone]  Mobile Phone: [phone]  Relation: Brother/Sister  Secondary Emergency Contact: [address] [place] [address] Phone: [phone]  Relation: Brother/Sister    Past Surgical History:  History reviewed. No pertinent surgical history. Immunization History: There is no immunization history on file for this patient.     Active Problems:  Patient Active Problem List   Diagnosis Code    Other fatigue R53.83    Right hand weakness R29.898    Numbness of right hand R20.0    Schizophrenia (HCC) F20.9    Dementia ([facility] F03.90    Essential hypertension I10    Sepsis ([facility] A41.9    UTI (urinary tract infection) N39.0    CKD (chronic kidney disease) N18.9    Cellulitis L03.90       Isolation/Infection:   Isolation            Contact          Patient Infection Status       Infection Onset Added Last Indicated Last Indicated By Review Planned Expiration Resolved Resolved By    MDRO (multi-drug resistant organism) 22 Culture, Urine        Resolved    COVID-19 (Rule Out) 21 COVID-19 (Ordered)   21 Rule-Out Test Resulted            Nurse Assessment:  Last Vital Signs: /76   Pulse 73   Temp 98.2 °F (36.8 °C) (Oral)   Resp 20   Ht 5' 8\" (1.727 m)   Wt 215 lb 13.3 oz (97.9 kg)   SpO2 97%   BMI 32.82 kg/m²     Last documented pain score (0-10 scale): Pain Level: 0  Last Weight:   Wt Readings from Last 1 Encounters:   22 215 lb 13.3 oz (97.9 kg)     Mental Status: {IP PT MENTAL STATUS:}    IV Access:  { AIDAN IV ACCESS:912843946}    Nursing Mobility/ADLs:  Walking   {CHP DME LZZA:975824044}  Transfer  {CHP DME PVDI:910827536}  Bathing  {CHP DME UYMN:338038113}  Dressing  {CHP DME FWJZ:958588468}  Toileting  {CHP DME EJML:433560389}  Feeding  {[facility] DME BFTC:100463933}  Med Admin  {P DME BQTZ:850066431}  Med Delivery   { AIDAN MED Delivery:392655449}    Wound Care Documentation and Therapy:  Wound 21 Buttocks Inner (Active)   Number of days: 376        Elimination:  Continence: Bowel: {YES / OI:18870}  Bladder: {YES / FM:05658}  Urinary Catheter: {Urinary Catheter:043822804}   Colostomy/Ileostomy/Ileal Conduit: {YES / IBRAHIM:36672}       Date of Last BM: ***    Intake/Output Summary (Last 24 hours) at 3/8/2022 1413  Last data filed at 3/8/2022 0939  Gross per 24 hour   Intake 610 ml   Output 950 ml   Net -340 ml     I/O last 3 completed shifts: In: 56 [P.O.:600;  I.V.:10]  Out: 958 [Urine:958]    Safety Concerns:     [address] Safety Concerns:120002424}    Impairments/Disabilities:      [address] Impairments/Disabilities:290005232}    Nutrition Therapy:  Current Nutrition Therapy:   [address] Diet List:051460560}    Routes of Feeding: {P DME Other Feedings:840474391}  Liquids: {Slp liquid thickness:13677}  Daily Fluid Restriction: {CHP DME Yes amt example:822482156}  Last Modified Barium Swallow with Video (Video Swallowing Test): {Done Not Done AKGW:509445342}    Treatments at the Time of Hospital Discharge:   Respiratory Treatments: ***  Oxygen Therapy:  {Therapy; copd oxygen:16041}  Ventilator:    {[facility] Vent CGEO:398021471}    Rehab Therapies: {THERAPEUTIC INTERVENTION:7164305486}  Weight Bearing Status/Restrictions: [address]  Weight Bearin}  Other Medical Equipment (for information only, NOT a DME order):  {EQUIPMENT:477191757}  Other Treatments: ***    Patient's personal belongings (please select all that are sent with patient):  {[facility] DME Belongings:791964623}    RN SIGNATURE:  {Esignature:841499485}    CASE MANAGEMENT/SOCIAL WORK SECTION    Inpatient Status Date: 3/5/22    Readmission Risk Assessment Score:  Readmission Risk              Risk of Unplanned Readmission:  26           Discharging to Facility/ Agency   [facility]             [address], [address] [facility] [address]       Phone: [phone]       Fax: [phone]          / signature: Electronically signed by Sam Reyes RN on 3/8/22 at 2:13 PM EST    PHYSICIAN SECTION    Prognosis: Fair    Condition at Discharge: Stable    Rehab Potential (if transferring to Rehab): NA    Recommended Labs or Other Treatments After Discharge: Monitor blood glucose and adjust insulin as needed. Repeat LFT's in 2 weeks (statin and estrace discontinued). If LFT's not improved, schedule an appt with GI/hepatologist. Follow up with PCP. PT/OT. Complete abx course  Merrem 1g Q8 hr through 03/14/21 (last day of treatment)    Physician Certification: I certify the above information and transfer of Shama Mckinney  is necessary for the continuing treatment of the diagnosis listed and that he requires Jaison Chance for less 30 days.      Update Admission H&P: No change in H&P    PHYSICIAN SIGNATURE:  Electronically signed by Carolyn Lord MD on 3/8/22 at 2:38 PM EST

## 2022-03-08 NOTE — CARE COORDINATION
Score: 12 /24      DISCHARGE Disposition: [place] (LTC): [facility]   Phone: [phone]  Fax: [phone]    LOC at discharge: [address] Completed: Yes    Notification completed in HENS/PAS?:  Not Applicable    IMM Completed:   Not Indicated    Transportation:  Transportation PLAN for discharge: EMS transportation   Mode of Transport: Ambulance stretcher - BLS  Reason for medical transport: bed bound  Name of Transport Company: [employer] [facility]   Time of Transport: 18:00pm    Transport form completed: Yes    Additional CM Notes: Pt to discharge back to [facility]. Nurse notified , facility notified. AVS faxed. The Plan for Transition of Care is related to the following treatment goals of Dizziness [R42]  Cellulitis [L03.90]  Elevated transaminase level [R74.01]  Fall, initial encounter [W19. XXXA]    The Patient and/or patient representative Juaquin Graham and his family were provided with a choice of provider and agrees with the discharge plan Yes    Freedom of choice list was provided with basic dialogue that supports the patient's individualized plan of care/goals and shares the quality data associated with the providers.  Yes    Care Transitions patient: No    Donna Bledsoe RN  [facility]  Case Management Department  Ph: [phone]  Fax: [phone]

## 2022-03-08 NOTE — PROGRESS NOTES
Physical Therapy  Facility/Department: [place] [address] [facility]  Daily Treatment Note  NAME: Korin Grace  : 1950  MRN: [id number]    Date of Service: 3/8/2022    Discharge Recommendations: Korin Grace scored a 7/24 on the AM-PAC short mobility form. Current research shows that an AM-PAC score of 17 or less is typically not associated with a discharge to the patient's home setting. Based on the patient's AM-PAC score and their current functional mobility deficits, it is recommended that the patient have 3-5 sessions per week of Physical Therapy at d/c to increase the patient's independence. Please see assessment section for further patient specific details. If patient discharges prior to next session this note will serve as a discharge summary. Please see below for the latest assessment towards goals. PT Equipment Recommendations  Equipment Needed: No    Assessment   Body structures, Functions, Activity limitations: Decreased functional mobility ; Decreased strength;Decreased balance;Decreased safe awareness;Decreased endurance  Assessment: pt tolerated session fair participating in LE exercises while seated in chair with frequent rest breaks due to fatigue. unable to assess transfers on this date due to limited access to a second person. pt was up to chair with nursing staff using steady and RN reported he did well. pt plans to return to SNF at [place] and will benefit from continued PT to maximize indepedence. continue POC  Treatment Diagnosis: decreased functional mobility  Prognosis: Fair  Decision Making: Medium Complexity  PT Education: Goals;PT Role;Plan of Care;General Safety; Functional Mobility Training  Patient Education: pt will benefit from reinforcement  REQUIRES PT FOLLOW UP: Yes  Activity Tolerance  Activity Tolerance: Patient limited by fatigue;Patient limited by endurance  Activity Tolerance: frequent rest breaks     Patient Diagnosis(es): The primary encounter diagnosis was Fall, initial encounter. Diagnoses of Elevated transaminase level and Urinary tract infection without hematuria, site unspecified were also pertinent to this visit. has a past medical history of Ataxia, Bipolar 1 disorder ([facility] BPH (benign prostatic hyperplasia), Cerebral infarct ([facility] Chronic kidney disease, Dementia ([facility] Depression, Diabetes mellitus type 2, uncomplicated ([facility] Gout, Hyperlipidemia, Hypertension, and Schizophrenia ([facility] has no past surgical history on file. Restrictions  Position Activity Restriction  Other position/activity restrictions: up as tolerated, up with assist, contact, fall precautions  Subjective   General  Chart Reviewed:  Yes  Additional Pertinent Hx: pt is a 72 yo male from nursing home after fall out of wheelchair and hitting his head  Referring Practitioner: Ruslan Poe MD  Subjective  Subjective: pt up in chair and agreeable to PT  Pain Screening  Patient Currently in Pain: Denies  Vital Signs  Patient Currently in Pain: Denies       Orientation  Orientation  Overall Orientation Status: Within Functional Limits  Cognition      Objective      Transfers  Comment: unable to assess on this date due to no access to second person for two person assist transfer           Exercises  Hip Flexion: 2x10 B  Hip Abduction: 1x10 B (inc pain with exercise)  Knee Long Arc Quad: 2x10 B  Ankle Pumps: 2x15 B  Comments: all exercises performed in sitting, inc rest breaks between sets  Other exercises  Other exercises?: Yes  Other exercises 1: 2x10 B hip adduction with pillow                        G-Code     OutComes Score                                                     AM-PAC Score  AM-PAC Inpatient Mobility Raw Score : 7 (03/08/22 1416)  AM-PAC Inpatient T-Scale Score : 26.42 (03/08/22 1416)  Mobility Inpatient CMS 0-100% Score: 92.36 (03/08/22 1416)  Mobility Inpatient CMS G-Code Modifier : CM (03/08/22 1416)          Goals  Short term goals  Time Frame for Short term goals: by racquel (all ongoing)  Short term goal 1: pt will perform rolling with CGA  Short term goal 2: pt will perform sup <> sit with mod  A  Short term goal 3: pt will perform transfers with max A of 1  Patient Goals   Patient goals : not stated    Plan    Plan  Times per week: 2-5  Current Treatment Recommendations: Roxana Romero Re-education,Patient/Caregiver Education & Brooke Medina Exercise Program,Safety Education & Training,Functional Mobility Training  Safety Devices  Type of devices: Call light within reach,Left in chair,Gait belt,Nurse notified,Chair alarm in place     Therapy Time   Individual Concurrent Group Co-treatment   Time In 1345         Time Out 1410         Minutes 25                 Mildred Gandhi, PT

## 2022-03-08 NOTE — PLAN OF CARE
Problem: Pain:  Goal: Patient's pain/discomfort is manageable  Description: Patient's pain/discomfort is manageable  Outcome: Ongoing   Pt reported pain at beginning of shift. Pt was cleaned up and repositioned and has denied pain since. Problem: Skin Integrity:  Goal: Absence of new skin breakdown  Description: Absence of new skin breakdown  Outcome: Ongoing   Pt is being turned every two hours to decrease risk of new skin breakdown. Problem: Falls - Risk of:  Goal: Will remain free from falls  Description: Will remain free from falls  Outcome: Ongoing   Pt's bed is in lowest position, brake and bed exit alarm are on, call light and bedside table are within reach. Problem: Urinary Elimination:  Goal: Signs and symptoms of infection will decrease  Description: Signs and symptoms of infection will decrease  Outcome: Ongoing   Pt has been afebrile during shift. Goal: Complications related to the disease process, condition or treatment will be avoided or minimized  Description: Complications related to the disease process, condition or treatment will be avoided or minimized  Outcome: Ongoing   Pt had midline placed for outpt IV abx. Problem: Physical Regulation:  Goal: Prevent transmision of infection  Description: Prevent transmision of infection  Outcome: Ongoing   Pt is in contact precautions r/t MDRO.

## 2022-03-08 NOTE — PROGRESS NOTES
This RN called [facility] 4 times and was unable to reach the nurse who will be assuming care of patient on his arrival. This RN was unable to leave a message or call back number due to no voicemail services. Will re-attempt as able.

## 2022-03-08 NOTE — PLAN OF CARE
Problem: Infection:  Goal: Will remain free from infection  Description: Will remain free from infection  Outcome: Ongoing     Problem: Safety:  Goal: Free from accidental physical injury  Description: Free from accidental physical injury  Outcome: Ongoing  Goal: Free from intentional harm  Description: Free from intentional harm  Outcome: Ongoing     Problem: Daily Care:  Goal: Daily care needs are met  Description: Daily care needs are met  Outcome: Ongoing     Problem: Pain:  Goal: Patient's pain/discomfort is manageable  Description: Patient's pain/discomfort is manageable  3/8/2022 1207 by Gregor Garcia RN  Outcome: Ongoing  3/8/2022 0309 by Paulina Flores  Outcome: Ongoing  Goal: Pain level will decrease  Description: Pain level will decrease  Outcome: Ongoing  Goal: Control of acute pain  Description: Control of acute pain  Outcome: Ongoing  Goal: Control of chronic pain  Description: Control of chronic pain  Outcome: Ongoing     Problem: Skin Integrity:  Goal: Skin integrity will stabilize  Description: Skin integrity will stabilize  Outcome: Ongoing     Problem: Discharge Planning:  Goal: Patients continuum of care needs are met  Description: Patients continuum of care needs are met  Outcome: Ongoing     Problem: Skin Integrity:  Goal: Will show no infection signs and symptoms  Description: Will show no infection signs and symptoms  Outcome: Ongoing  Goal: Absence of new skin breakdown  Description: Absence of new skin breakdown  3/8/2022 1207 by Gregor Garcia RN  Outcome: Ongoing  3/8/2022 0309 by Paulina Flores  Outcome: Ongoing     Problem: Falls - Risk of:  Goal: Will remain free from falls  Description: Will remain free from falls  3/8/2022 1207 by Gregor Garcia RN  Outcome: Ongoing  3/8/2022 0309 by Paulina Flores  Outcome: Ongoing  Goal: Absence of physical injury  Description: Absence of physical injury  Outcome: Ongoing     Problem: Sensory:  Goal: General experience of comfort will improve  Description: General experience of comfort will improve  Outcome: Ongoing     Problem: Urinary Elimination:  Goal: Signs and symptoms of infection will decrease  Description: Signs and symptoms of infection will decrease  3/8/2022 1207 by Ruben Roque RN  Outcome: Ongoing  3/8/2022 0309 by Bridgette Escobar  Outcome: Ongoing  Goal: Ability to reestablish a normal urinary elimination pattern will improve - after catheter removal  Description: Ability to reestablish a normal urinary elimination pattern will improve  Outcome: Ongoing  Goal: Complications related to the disease process, condition or treatment will be avoided or minimized  Description: Complications related to the disease process, condition or treatment will be avoided or minimized  3/8/2022 1207 by Ruben Roque RN  Outcome: Ongoing  3/8/2022 0309 by Bridgette Escobar  Outcome: Ongoing     Problem: Serum Glucose Level - Abnormal:  Goal: Ability to maintain appropriate glucose levels will improve  Description: Ability to maintain appropriate glucose levels will improve  3/8/2022 1207 by Ruben Roque RN  Outcome: Ongoing  3/8/2022 0310 by Bridgette Escobar  Outcome: Ongoing     Problem: Physical Regulation:  Goal: Prevent transmision of infection  Description: Prevent transmision of infection  3/8/2022 1207 by Ruben Roque RN  Outcome: Ongoing  3/8/2022 0310 by Bridgette Escobar  Outcome: Ongoing

## 2022-03-31 PROBLEM — N39.0 UTI (URINARY TRACT INFECTION): Status: RESOLVED | Noted: 2022-03-01 | Resolved: 2022-03-31

## 2022-04-13 ENCOUNTER — HOSPITAL ENCOUNTER (INPATIENT)
Age: 72
LOS: 5 days | Discharge: HOME | DRG: 871 | End: 2022-04-18
Attending: EMERGENCY MEDICINE | Admitting: INTERNAL MEDICINE
Payer: MEDICARE

## 2022-04-13 ENCOUNTER — APPOINTMENT (OUTPATIENT)
Dept: CT IMAGING | Age: 72
DRG: 871 | End: 2022-04-13
Payer: MEDICARE

## 2022-04-13 ENCOUNTER — APPOINTMENT (OUTPATIENT)
Dept: GENERAL RADIOLOGY | Age: 72
DRG: 871 | End: 2022-04-13
Payer: MEDICARE

## 2022-04-13 DIAGNOSIS — R41.82 ALTERED MENTAL STATUS, UNSPECIFIED ALTERED MENTAL STATUS TYPE: Primary | ICD-10-CM

## 2022-04-13 DIAGNOSIS — N30.00 ACUTE CYSTITIS WITHOUT HEMATURIA: ICD-10-CM

## 2022-04-13 DIAGNOSIS — N17.9 ACUTE RENAL FAILURE SUPERIMPOSED ON CHRONIC KIDNEY DISEASE, UNSPECIFIED CKD STAGE, UNSPECIFIED ACUTE RENAL FAILURE TYPE (HCC): ICD-10-CM

## 2022-04-13 DIAGNOSIS — N18.9 ACUTE RENAL FAILURE SUPERIMPOSED ON CHRONIC KIDNEY DISEASE, UNSPECIFIED CKD STAGE, UNSPECIFIED ACUTE RENAL FAILURE TYPE (HCC): ICD-10-CM

## 2022-04-13 PROBLEM — N39.0 UTI (URINARY TRACT INFECTION): Status: ACTIVE | Noted: 2022-04-13

## 2022-04-13 LAB
ANION GAP SERPL CALCULATED.3IONS-SCNC: 15 MMOL/L (ref 3–16)
ANISOCYTOSIS: ABNORMAL
BACTERIA: ABNORMAL /HPF
BANDED NEUTROPHILS RELATIVE PERCENT: 8 % (ref 0–7)
BASE EXCESS VENOUS: 0.8 MMOL/L (ref -2–3)
BASOPHILS ABSOLUTE: 0 K/UL (ref 0–0.2)
BASOPHILS RELATIVE PERCENT: 0 %
BILIRUBIN URINE: NEGATIVE
BLOOD, URINE: ABNORMAL
BUN BLDV-MCNC: 31 MG/DL (ref 7–20)
CALCIUM SERPL-MCNC: 9.9 MG/DL (ref 8.3–10.6)
CARBOXYHEMOGLOBIN: 1.1 % (ref 0–1.5)
CHLORIDE BLD-SCNC: 99 MMOL/L (ref 99–110)
CLARITY: CLEAR
CO2: 22 MMOL/L (ref 21–32)
COLOR: YELLOW
CREAT SERPL-MCNC: 1.6 MG/DL (ref 0.8–1.3)
EKG ATRIAL RATE: 118 BPM
EKG DIAGNOSIS: NORMAL
EKG P AXIS: 49 DEGREES
EKG P-R INTERVAL: 176 MS
EKG Q-T INTERVAL: 316 MS
EKG QRS DURATION: 70 MS
EKG QTC CALCULATION (BAZETT): 442 MS
EKG R AXIS: 25 DEGREES
EKG T AXIS: 102 DEGREES
EKG VENTRICULAR RATE: 118 BPM
EOSINOPHILS ABSOLUTE: 0 K/UL (ref 0–0.6)
EOSINOPHILS RELATIVE PERCENT: 0 %
GFR AFRICAN AMERICAN: 52
GFR NON-AFRICAN AMERICAN: 43
GLUCOSE BLD-MCNC: 214 MG/DL (ref 70–99)
GLUCOSE URINE: NEGATIVE MG/DL
HCO3 VENOUS: 26.9 MMOL/L (ref 24–28)
HCT VFR BLD CALC: 32.3 % (ref 40.5–52.5)
HEMOGLOBIN, VEN, REDUCED: 26.7 %
HEMOGLOBIN: 10.5 G/DL (ref 13.5–17.5)
KETONES, URINE: NEGATIVE MG/DL
LACTIC ACID: 2 MMOL/L (ref 0.4–2)
LEUKOCYTE ESTERASE, URINE: ABNORMAL
LYMPHOCYTES ABSOLUTE: 0.5 K/UL (ref 1–5.1)
LYMPHOCYTES RELATIVE PERCENT: 4 %
MCH RBC QN AUTO: 26.6 PG (ref 26–34)
MCHC RBC AUTO-ENTMCNC: 32.5 G/DL (ref 31–36)
MCV RBC AUTO: 82 FL (ref 80–100)
METAMYELOCYTES RELATIVE PERCENT: 2 %
METHEMOGLOBIN VENOUS: 0.6 % (ref 0–1.5)
MICROSCOPIC EXAMINATION: YES
MONOCYTES ABSOLUTE: 0 K/UL (ref 0–1.3)
MONOCYTES RELATIVE PERCENT: 0 %
MYELOCYTE PERCENT: 1 %
NEUTROPHILS ABSOLUTE: 11.6 K/UL (ref 1.7–7.7)
NEUTROPHILS RELATIVE PERCENT: 85 %
NITRITE, URINE: POSITIVE
O2 SAT, VEN: 73 %
PCO2, VEN: 48.5 MMHG (ref 41–51)
PDW BLD-RTO: 18.8 % (ref 12.4–15.4)
PH UA: 7.5 (ref 5–8)
PH VENOUS: 7.35 (ref 7.35–7.45)
PLATELET # BLD: 345 K/UL (ref 135–450)
PMV BLD AUTO: 7.8 FL (ref 5–10.5)
PO2, VEN: 42.6 MMHG (ref 25–40)
POLYCHROMASIA: ABNORMAL
POTASSIUM REFLEX MAGNESIUM: 5.1 MMOL/L (ref 3.5–5.1)
PRO-BNP: 25 PG/ML (ref 0–124)
PROTEIN UA: ABNORMAL MG/DL
RBC # BLD: 3.94 M/UL (ref 4.2–5.9)
RBC UA: ABNORMAL /HPF (ref 0–4)
SODIUM BLD-SCNC: 136 MMOL/L (ref 136–145)
SPECIFIC GRAVITY UA: 1.01 (ref 1–1.03)
TCO2 CALC VENOUS: 28 MMOL/L
TROPONIN: 0.03 NG/ML
TROPONIN: 0.04 NG/ML
URINE TYPE: ABNORMAL
UROBILINOGEN, URINE: 0.2 E.U./DL
WBC # BLD: 12.1 K/UL (ref 4–11)
WBC UA: >100 /HPF (ref 0–5)

## 2022-04-13 PROCEDURE — 1200000000 HC SEMI PRIVATE

## 2022-04-13 PROCEDURE — 83880 ASSAY OF NATRIURETIC PEPTIDE: CPT

## 2022-04-13 PROCEDURE — 81001 URINALYSIS AUTO W/SCOPE: CPT

## 2022-04-13 PROCEDURE — 6370000000 HC RX 637 (ALT 250 FOR IP): Performed by: INTERNAL MEDICINE

## 2022-04-13 PROCEDURE — 6360000002 HC RX W HCPCS: Performed by: EMERGENCY MEDICINE

## 2022-04-13 PROCEDURE — 82803 BLOOD GASES ANY COMBINATION: CPT

## 2022-04-13 PROCEDURE — 36415 COLL VENOUS BLD VENIPUNCTURE: CPT

## 2022-04-13 PROCEDURE — 84484 ASSAY OF TROPONIN QUANT: CPT

## 2022-04-13 PROCEDURE — 85025 COMPLETE CBC W/AUTO DIFF WBC: CPT

## 2022-04-13 PROCEDURE — 6360000004 HC RX CONTRAST MEDICATION: Performed by: EMERGENCY MEDICINE

## 2022-04-13 PROCEDURE — 6360000002 HC RX W HCPCS: Performed by: INTERNAL MEDICINE

## 2022-04-13 PROCEDURE — 2580000003 HC RX 258: Performed by: EMERGENCY MEDICINE

## 2022-04-13 PROCEDURE — 87086 URINE CULTURE/COLONY COUNT: CPT

## 2022-04-13 PROCEDURE — 99284 EMERGENCY DEPT VISIT MOD MDM: CPT

## 2022-04-13 PROCEDURE — 93005 ELECTROCARDIOGRAM TRACING: CPT | Performed by: EMERGENCY MEDICINE

## 2022-04-13 PROCEDURE — 71260 CT THORAX DX C+: CPT

## 2022-04-13 PROCEDURE — 87077 CULTURE AEROBIC IDENTIFY: CPT

## 2022-04-13 PROCEDURE — 83605 ASSAY OF LACTIC ACID: CPT

## 2022-04-13 PROCEDURE — 2580000003 HC RX 258: Performed by: INTERNAL MEDICINE

## 2022-04-13 PROCEDURE — 87186 SC STD MICRODIL/AGAR DIL: CPT

## 2022-04-13 PROCEDURE — 80048 BASIC METABOLIC PNL TOTAL CA: CPT

## 2022-04-13 PROCEDURE — 71045 X-RAY EXAM CHEST 1 VIEW: CPT

## 2022-04-13 PROCEDURE — 87040 BLOOD CULTURE FOR BACTERIA: CPT

## 2022-04-13 RX ORDER — INSULIN LISPRO 100 [IU]/ML
0-6 INJECTION, SOLUTION INTRAVENOUS; SUBCUTANEOUS NIGHTLY
Status: DISCONTINUED | OUTPATIENT
Start: 2022-04-13 | End: 2022-04-18 | Stop reason: HOSPADM

## 2022-04-13 RX ORDER — DIVALPROEX SODIUM 125 MG/1
125 CAPSULE, COATED PELLETS ORAL EVERY MORNING
Status: DISCONTINUED | OUTPATIENT
Start: 2022-04-14 | End: 2022-04-18 | Stop reason: HOSPADM

## 2022-04-13 RX ORDER — ASPIRIN 81 MG/1
81 TABLET ORAL DAILY
Status: DISCONTINUED | OUTPATIENT
Start: 2022-04-14 | End: 2022-04-18 | Stop reason: HOSPADM

## 2022-04-13 RX ORDER — ATORVASTATIN CALCIUM 40 MG/1
40 TABLET, FILM COATED ORAL NIGHTLY
Status: DISCONTINUED | OUTPATIENT
Start: 2022-04-13 | End: 2022-04-18 | Stop reason: HOSPADM

## 2022-04-13 RX ORDER — ONDANSETRON 4 MG/1
4 TABLET, ORALLY DISINTEGRATING ORAL EVERY 8 HOURS PRN
Status: DISCONTINUED | OUTPATIENT
Start: 2022-04-13 | End: 2022-04-18 | Stop reason: HOSPADM

## 2022-04-13 RX ORDER — ACETAMINOPHEN 325 MG/1
650 TABLET ORAL EVERY 6 HOURS PRN
Status: DISCONTINUED | OUTPATIENT
Start: 2022-04-13 | End: 2022-04-18 | Stop reason: HOSPADM

## 2022-04-13 RX ORDER — RISPERIDONE 0.5 MG/1
0.5 TABLET, FILM COATED ORAL
Status: DISCONTINUED | OUTPATIENT
Start: 2022-04-14 | End: 2022-04-14 | Stop reason: ALTCHOICE

## 2022-04-13 RX ORDER — ARIPIPRAZOLE 5 MG/1
15 TABLET ORAL DAILY
Status: DISCONTINUED | OUTPATIENT
Start: 2022-04-14 | End: 2022-04-18 | Stop reason: HOSPADM

## 2022-04-13 RX ORDER — ONDANSETRON 2 MG/ML
4 INJECTION INTRAMUSCULAR; INTRAVENOUS EVERY 6 HOURS PRN
Status: DISCONTINUED | OUTPATIENT
Start: 2022-04-13 | End: 2022-04-18 | Stop reason: HOSPADM

## 2022-04-13 RX ORDER — RISPERIDONE 0.5 MG/1
1.5 TABLET, FILM COATED ORAL NIGHTLY
Status: DISCONTINUED | OUTPATIENT
Start: 2022-04-13 | End: 2022-04-14 | Stop reason: ALTCHOICE

## 2022-04-13 RX ORDER — SODIUM CHLORIDE 0.9 % (FLUSH) 0.9 %
5-40 SYRINGE (ML) INJECTION EVERY 12 HOURS SCHEDULED
Status: DISCONTINUED | OUTPATIENT
Start: 2022-04-13 | End: 2022-04-18 | Stop reason: HOSPADM

## 2022-04-13 RX ORDER — ACETAMINOPHEN 650 MG/1
650 SUPPOSITORY RECTAL EVERY 6 HOURS PRN
Status: DISCONTINUED | OUTPATIENT
Start: 2022-04-13 | End: 2022-04-18 | Stop reason: HOSPADM

## 2022-04-13 RX ORDER — SODIUM CHLORIDE, SODIUM LACTATE, POTASSIUM CHLORIDE, CALCIUM CHLORIDE 600; 310; 30; 20 MG/100ML; MG/100ML; MG/100ML; MG/100ML
INJECTION, SOLUTION INTRAVENOUS CONTINUOUS
Status: DISCONTINUED | OUTPATIENT
Start: 2022-04-13 | End: 2022-04-14

## 2022-04-13 RX ORDER — INSULIN LISPRO 100 [IU]/ML
0-12 INJECTION, SOLUTION INTRAVENOUS; SUBCUTANEOUS
Status: DISCONTINUED | OUTPATIENT
Start: 2022-04-14 | End: 2022-04-18 | Stop reason: HOSPADM

## 2022-04-13 RX ORDER — SODIUM CHLORIDE 0.9 % (FLUSH) 0.9 %
5-40 SYRINGE (ML) INJECTION PRN
Status: DISCONTINUED | OUTPATIENT
Start: 2022-04-13 | End: 2022-04-18 | Stop reason: HOSPADM

## 2022-04-13 RX ORDER — POLYETHYLENE GLYCOL 3350 17 G/17G
17 POWDER, FOR SOLUTION ORAL DAILY PRN
Status: DISCONTINUED | OUTPATIENT
Start: 2022-04-13 | End: 2022-04-18 | Stop reason: HOSPADM

## 2022-04-13 RX ORDER — FLUOXETINE HYDROCHLORIDE 20 MG/1
40 CAPSULE ORAL DAILY
Status: DISCONTINUED | OUTPATIENT
Start: 2022-04-14 | End: 2022-04-14 | Stop reason: ALTCHOICE

## 2022-04-13 RX ORDER — TAMSULOSIN HYDROCHLORIDE 0.4 MG/1
0.8 CAPSULE ORAL NIGHTLY
Status: DISCONTINUED | OUTPATIENT
Start: 2022-04-13 | End: 2022-04-18 | Stop reason: HOSPADM

## 2022-04-13 RX ORDER — SODIUM CHLORIDE, SODIUM LACTATE, POTASSIUM CHLORIDE, AND CALCIUM CHLORIDE .6; .31; .03; .02 G/100ML; G/100ML; G/100ML; G/100ML
30 INJECTION, SOLUTION INTRAVENOUS ONCE
Status: COMPLETED | OUTPATIENT
Start: 2022-04-13 | End: 2022-04-13

## 2022-04-13 RX ORDER — DIVALPROEX SODIUM 125 MG/1
250 CAPSULE, COATED PELLETS ORAL NIGHTLY
Status: DISCONTINUED | OUTPATIENT
Start: 2022-04-13 | End: 2022-04-18 | Stop reason: HOSPADM

## 2022-04-13 RX ORDER — SODIUM CHLORIDE 9 MG/ML
INJECTION, SOLUTION INTRAVENOUS PRN
Status: DISCONTINUED | OUTPATIENT
Start: 2022-04-13 | End: 2022-04-18 | Stop reason: HOSPADM

## 2022-04-13 RX ADMIN — ONDANSETRON 4 MG: 2 INJECTION INTRAMUSCULAR; INTRAVENOUS at 23:44

## 2022-04-13 RX ADMIN — SODIUM CHLORIDE, POTASSIUM CHLORIDE, SODIUM LACTATE AND CALCIUM CHLORIDE: 600; 310; 30; 20 INJECTION, SOLUTION INTRAVENOUS at 23:39

## 2022-04-13 RX ADMIN — MEROPENEM 1000 MG: 1 INJECTION, POWDER, FOR SOLUTION INTRAVENOUS at 19:34

## 2022-04-13 RX ADMIN — SODIUM CHLORIDE, POTASSIUM CHLORIDE, SODIUM LACTATE AND CALCIUM CHLORIDE 2700 ML: 600; 310; 30; 20 INJECTION, SOLUTION INTRAVENOUS at 20:18

## 2022-04-13 RX ADMIN — ACETAMINOPHEN 650 MG: 325 TABLET ORAL at 23:46

## 2022-04-13 RX ADMIN — IOPAMIDOL 80 ML: 755 INJECTION, SOLUTION INTRAVENOUS at 16:53

## 2022-04-13 ASSESSMENT — PAIN DESCRIPTION - PAIN TYPE: TYPE: CHRONIC PAIN

## 2022-04-13 ASSESSMENT — PAIN DESCRIPTION - LOCATION: LOCATION: LEG

## 2022-04-13 ASSESSMENT — PAIN DESCRIPTION - DESCRIPTORS: DESCRIPTORS: ACHING

## 2022-04-13 ASSESSMENT — PAIN DESCRIPTION - FREQUENCY: FREQUENCY: CONTINUOUS

## 2022-04-13 ASSESSMENT — PAIN SCALES - GENERAL
PAINLEVEL_OUTOF10: 8
PAINLEVEL_OUTOF10: 2

## 2022-04-13 ASSESSMENT — PAIN - FUNCTIONAL ASSESSMENT: PAIN_FUNCTIONAL_ASSESSMENT: 0-10

## 2022-04-13 ASSESSMENT — PAIN DESCRIPTION - ORIENTATION: ORIENTATION: LEFT

## 2022-04-13 NOTE — ED NOTES
PT from [address] for altered mental status. Per squad pt was getting physical therapy when this happened by the time squad arrived pt was at baseline.      Duncan Jorge RN  04/13/22 [id number]

## 2022-04-13 NOTE — H&P
HOSPITALISTS HISTORY AND PHYSICAL    4/13/2022 6:35 PM    Patient Information:  Mc Ramey is a 73 y.o. male [id number]  PCP:  Brett Maharaj MD (Tel: [phone] )    Chief complaint:    Chief Complaint   Patient presents with    Altered Mental Status       Problem List  Altered mental status  UTI  Mild KIMBERLY  History of multiple recent UTI  History of mild mental retardation  Significant leg edema  Assessment/Plan:   Altered mental status with concerns of underlying infection probably UTI with a history of multidrug-resistant UTI in the past plan we will cover the patient with the Merrem till we get the cultures back, on Flomax, she would like patient having history of recurrent UTI    Mild KIMBERLY  Gentle hydration    Significant leg edema  On talking to the family does seem to have chronic issue, patient is on Norvasc will hold that, will need leg elevation, patient did had an echo done last year in February normal EF with normal right ventricular size and function, will check venous Doppler    Mention of history of schizophrenia and bipolar. Sister does confirm bipolar  On Depakote, risperidone    Diabetes mellitus  Lantus and insulin sliding scale    DVT prophylaxis Lovenox  Code status full  Diet regular  IV access peripheral  Kumar Catheter No    Admit as inpatient/. I anticipate hospitalization spanning more than two midnights for investigation and treatment of the above medically necessary diagnoses. History of Present Illness:  Tim López is a 73 y.o. male who presented with history of mild mental retardation, bipolar disorder, diabetes mellitus, history of multidrug-resistant UTI, in the nursing home resident  sent to the hospital with worsening mental status. Patient is a very poor historian not told by many good history.   Patient on arrival in the ER was pretty tachypneic and tachycardic that led to investigation for pulmonary embolism CT PE was negative. Patient was found to have abnormal UA. Patient during the last admission was found to have multidrug-resistant UTI. Also called patient's sister who was not aware that the patient is in the hospital at this time. She was aware that patient was recently admitted to the hospital.  She does mention that patient does have a history of mild mild retardation. She denies any history of stroke though it is mention in the chart. REVIEW OF SYSTEMS:   Unable to obtain from the patient because of altered mental status  Past Medical History:   has a past medical history of Ataxia, Bipolar 1 disorder ([facility] [address] BPH (benign prostatic hyperplasia), Cerebral infarct ([facility] [address] Chronic kidney disease, Dementia ([facility] [address] Depression, Diabetes mellitus type 2, uncomplicated ([facility] [address] Gout, Hyperlipidemia, Hypertension, and Schizophrenia ([facility] [address] Past Surgical History:   has no past surgical history on file. Medications:  No current facility-administered medications on file prior to encounter. Current Outpatient Medications on File Prior to Encounter   Medication Sig Dispense Refill    insulin detemir (LEVEMIR) 100 UNIT/ML injection vial Inject 5 Units into the skin nightly 10 mL 1    insulin aspart (NOVOLOG) 100 UNIT/ML injection vial Inject 3 Units into the skin 3 times daily (before meals) 10 mL 3    miconazole (MICOTIN) 2 % powder Apply topically 2 times daily. 45 g 1    nystatin (MYCOSTATIN) 396379 UNIT/GM powder Apply topically 4 times daily Apply topically 4 times daily.       polyethylene glycol (MIRALAX) 17 g PACK packet Take 17 g by mouth every 48 hours      divalproex (DEPAKOTE SPRINKLES) 125 MG capsule Take 250 mg by mouth at bedtime      divalproex (DEPAKOTE SPRINKLES) 125 MG capsule Take 125 mg by mouth every morning      furosemide (LASIX) 20 MG tablet 20 mg daily       melatonin 1 MG tablet Take 1 mg by mouth nightly       latanoprost (XALATAN) 0.005 % ophthalmic solution Place 1 drop into the right eye nightly       Skin Protectants, Misc. (HYDROCERIN) CREA cream Apply topically 2 times daily 1 Container 0    acetaminophen (TYLENOL) 500 MG tablet Take 500 mg by mouth every 6 hours as needed for Pain      allopurinol (ZYLOPRIM) 100 MG tablet Take 100 mg by mouth daily      amLODIPine (NORVASC) 5 MG tablet Take 5 mg by mouth daily Hold for SBP <110 or Pulse <60      ARIPiprazole (ABILIFY) 15 MG tablet Take 15 mg by mouth daily      aspirin 81 MG EC tablet Take 81 mg by mouth daily      atorvastatin (LIPITOR) 40 MG tablet Take 40 mg by mouth nightly      bisacodyl (DULCOLAX) 10 MG suppository Place 10 mg rectally daily as needed for Constipation      Cranberry 425 MG CAPS Take 425 mg by mouth daily      docusate sodium (COLACE) 100 MG capsule Take 100 mg by mouth 2 times daily      finasteride (PROSCAR) 5 MG tablet Take 5 mg by mouth daily      FLUoxetine (PROZAC) 20 MG capsule Take 40 mg by mouth daily      Multiple Vitamins-Minerals (THERAPEUTIC MULTIVITAMIN-MINERALS) tablet Take 1 tablet by mouth daily      insulin aspart (NOVOLOG) 100 UNIT/ML injection vial Inject 0-21 Units into the skin 3 times daily (before meals) Sliding scale   201-250 = 5 units  251-300 = 8 units  301-350 = 12 units  351-400 = 15 units  401-450 = 18 units  451-500 = 21 units  500+ notify MD      ondansetron (ZOFRAN-ODT) 4 MG disintegrating tablet Take 4 mg by mouth every 8 hours as needed for Nausea or Vomiting      polyethylene glycol (GLYCOLAX) 17 g packet Take 17 g by mouth daily as needed for Constipation      risperiDONE (RISPERDAL) 0.5 MG tablet Take 0.5 mg by mouth every morning (before breakfast)       risperiDONE (RISPERDAL) 1 MG tablet Take 1.5 mg by mouth nightly       tamsulosin (FLOMAX) 0.4 MG capsule Take 0.8 mg by mouth nightly         Allergies:   Allergies   Allergen Reactions    Codeine     Cogentin [Benztropine]     Penicillins Social History:  Patient Lives in a nursing home   reports that he has never smoked. He has never used smokeless tobacco. He reports previous alcohol use. Family History:  family history is not on file. ,  Unable to obtain from the patient  Physical Exam:  BP (!) 135/56   Pulse 119   Temp 98.4 °F (36.9 °C) (Oral)   Resp (!) 48   Wt 198 lb 6.6 oz (90 kg)   SpO2 93%   BMI 30.17 kg/m²   Patient does mention that at present he in the hospital, he is very difficult to understand  He does try to follow commands  He has significant swelling of the legs with some leg ulcerations it does have some chronicity to it  Oral mucosa appears slightly dry  Patient does move all 4 extremities. Eyes: Sclera clear, pupils equal  ENT: Moist mucus membranes, no thrush. Trachea midline. Cardiovascular: Regular rhythm, normal S1, S2. No murmur, gallop, rub. No edema in lower extremities  Respiratory: Clear to auscultation bilaterally, no wheeze, good inspiratory effort  Gastrointestinal: Abdomen soft, non-tender, not distended, normal bowel sounds  Musculoskeletal: No cyanosis in digits, neck supple  Skin: Warm, dry, normal turgor, no rash  Brisk capillary refill, peripheral pulses palpable   Labs:  CBC:   Lab Results   Component Value Date    WBC 12.1 04/13/2022    RBC 3.94 04/13/2022    HGB 10.5 04/13/2022    HCT 32.3 04/13/2022    MCV 82.0 04/13/2022    MCH 26.6 04/13/2022    MCHC 32.5 04/13/2022    RDW 18.8 04/13/2022     04/13/2022    MPV 7.8 04/13/2022     BMP:    Lab Results   Component Value Date     04/13/2022    K 5.1 04/13/2022    CL 99 04/13/2022    CO2 22 04/13/2022    BUN 31 04/13/2022    CREATININE 1.6 04/13/2022    CALCIUM 9.9 04/13/2022    GFRAA 52 04/13/2022    LABGLOM 43 04/13/2022    GLUCOSE 214 04/13/2022     CT CHEST PULMONARY EMBOLISM W CONTRAST   Final Result      No CT evidence of pulmonary embolism. Relative elevation of the right diaphragm.       Mild patchy peripheral airspace density in the bilateral lower lobes, consistent with possible early infiltrates or atelectasis. No other acute findings in the chest.      XR CHEST PORTABLE   Final Result      Limited inspiration. Mild bibasilar increased density consistent with possible early infiltrate/atelectasis. EKG showing sinus tachycardia  Discussed case  with ER provider  Please note that some part of this chart was generated using Dragon dictation software. Although every effort was made to ensure the accuracy of this automated transcription, some errors in transcription may have occurred inadvertently. If you may need any clarification, please do not hesitate to contact me through [facility].        Brigitte Nunes MD    4/13/2022 6:35 PM

## 2022-04-13 NOTE — ED PROVIDER NOTES
[address]          ATTENDING PHYSICIAN NOTE       Date of evaluation: 4/13/2022    Chief Complaint     Altered Mental Status      History of Present Illness     Yasmany Farrell is a 73 y.o. male with a PMH as described below who presents to the ED today with a chief complaint of: Altered mental status. Patient has an extensive medical history including history of stroke with dementia, bipolar disorder, diabetes, schizophrenia, and CKD. He presents from his nursing facility where he was receiving physical therapy and had a brief period of agitation and \"altered mental status \". EMS is not able to describe what the outcome mental status was however on EMS arrival, they were told that he was more back to his baseline. History is extremely limited from the patient. Patient is tachypneic and appears somewhat anxious. He does not have any immediate complaints he denies any chest pain or trouble breathing. He does note pain and swelling in his lower extremities that appears to be chronic. He denies any fever, chills, cough or sputum production. No reports of dysuria or diarrhea however again, history is very limited from the patient. Of note urine cultures from 2/26/2022 grew Morganella morganii, strep agalactiae      The patientstates that there were no other associated signs and symptoms or modifying factors. Patient rates pain as 8/10. Review of Systems     As described above in the HPI otherwise all the systems reviewed and negative as reported by the patient. Past Medical, Surgical, Family, and Social History     He has a past medical history of Ataxia, Bipolar 1 disorder ([address] BPH (benign prostatic hyperplasia), Cerebral infarct ([address] Chronic kidney disease, Dementia ([address] Depression, Diabetes mellitus type 2, uncomplicated ([address] Gout, Hyperlipidemia, Hypertension, and Schizophrenia ([address] He has no past surgical history on file.   His family history is not on file. He reports that he has never smoked. He has never used smokeless tobacco. He reports previous alcohol use. Medications     Previous Medications    ACETAMINOPHEN (TYLENOL) 500 MG TABLET    Take 500 mg by mouth every 6 hours as needed for Pain    ALLOPURINOL (ZYLOPRIM) 100 MG TABLET    Take 100 mg by mouth daily    AMLODIPINE (NORVASC) 5 MG TABLET    Take 5 mg by mouth daily Hold for SBP <110 or Pulse <60    ARIPIPRAZOLE (ABILIFY) 15 MG TABLET    Take 15 mg by mouth daily    ASPIRIN 81 MG EC TABLET    Take 81 mg by mouth daily    ATORVASTATIN (LIPITOR) 40 MG TABLET    Take 40 mg by mouth nightly    BISACODYL (DULCOLAX) 10 MG SUPPOSITORY    Place 10 mg rectally daily as needed for Constipation    CRANBERRY 425 MG CAPS    Take 425 mg by mouth daily    DIVALPROEX (DEPAKOTE SPRINKLES) 125 MG CAPSULE    Take 250 mg by mouth at bedtime    DIVALPROEX (DEPAKOTE SPRINKLES) 125 MG CAPSULE    Take 125 mg by mouth every morning    DOCUSATE SODIUM (COLACE) 100 MG CAPSULE    Take 100 mg by mouth 2 times daily    FINASTERIDE (PROSCAR) 5 MG TABLET    Take 5 mg by mouth daily    FLUOXETINE (PROZAC) 20 MG CAPSULE    Take 40 mg by mouth daily    FUROSEMIDE (LASIX) 20 MG TABLET    20 mg daily     INSULIN ASPART (NOVOLOG) 100 UNIT/ML INJECTION VIAL    Inject 0-21 Units into the skin 3 times daily (before meals) Sliding scale   201-250 = 5 units  251-300 = 8 units  301-350 = 12 units  351-400 = 15 units  401-450 = 18 units  451-500 = 21 units  500+ notify MD    INSULIN ASPART (NOVOLOG) 100 UNIT/ML INJECTION VIAL    Inject 3 Units into the skin 3 times daily (before meals)    INSULIN DETEMIR (LEVEMIR) 100 UNIT/ML INJECTION VIAL    Inject 5 Units into the skin nightly    LATANOPROST (XALATAN) 0.005 % OPHTHALMIC SOLUTION    Place 1 drop into the right eye nightly     MELATONIN 1 MG TABLET    Take 1 mg by mouth nightly     MICONAZOLE (MICOTIN) 2 % POWDER    Apply topically 2 times daily.     MULTIPLE VITAMINS-MINERALS (THERAPEUTIC MULTIVITAMIN-MINERALS) TABLET    Take 1 tablet by mouth daily    NYSTATIN (MYCOSTATIN) 836505 UNIT/GM POWDER    Apply topically 4 times daily Apply topically 4 times daily. ONDANSETRON (ZOFRAN-ODT) 4 MG DISINTEGRATING TABLET    Take 4 mg by mouth every 8 hours as needed for Nausea or Vomiting    POLYETHYLENE GLYCOL (GLYCOLAX) 17 G PACKET    Take 17 g by mouth daily as needed for Constipation    POLYETHYLENE GLYCOL (MIRALAX) 17 G PACK PACKET    Take 17 g by mouth every 48 hours    RISPERIDONE (RISPERDAL) 0.5 MG TABLET    Take 0.5 mg by mouth every morning (before breakfast)     RISPERIDONE (RISPERDAL) 1 MG TABLET    Take 1.5 mg by mouth nightly     SKIN PROTECTANTS, MISC. (HYDROCERIN) CREA CREAM    Apply topically 2 times daily    TAMSULOSIN (FLOMAX) 0.4 MG CAPSULE    Take 0.8 mg by mouth nightly       Allergies     He is allergic to codeine, cogentin [benztropine], and penicillins. Physical Exam     INITIAL VITALS: BP: (!) 157/68, Temp: 98.4 °F (36.9 °C), Pulse: 119, Resp: (!) 48, SpO2: 93 %   Physical Exam  Vitals and nursing note reviewed. Constitutional:       Appearance: He is well-developed and normal weight. He is not ill-appearing or diaphoretic. HENT:      Head: Normocephalic and atraumatic. Mouth/Throat:      Mouth: Mucous membranes are moist.   Eyes:      Extraocular Movements: Extraocular movements intact. Pupils: Pupils are equal, round, and reactive to light. Cardiovascular:      Rate and Rhythm: Regular rhythm. Tachycardia present. Heart sounds: Normal heart sounds. Pulmonary:      Effort: Pulmonary effort is normal.      Breath sounds: Normal breath sounds. Abdominal:      General: Bowel sounds are normal.      Palpations: Abdomen is soft. Musculoskeletal:         General: Swelling present. Normal range of motion. Cervical back: Normal range of motion and neck supple. Comments: 2+ edema to bilateral lower extremities, symmetric.    Skin: General: Skin is warm. Capillary Refill: Capillary refill takes less than 2 seconds. Neurological:      Mental Status: He is alert. Mental status is at baseline. GCS: GCS eye subscore is 4. GCS verbal subscore is 5. GCS motor subscore is 6. Cranial Nerves: No cranial nerve deficit. Sensory: No sensory deficit. Motor: No weakness. Psychiatric:         Mood and Affect: Mood normal.         DiagnosticResults     EKG   Indication: Tachycardia: Ventricular rate 118, AR interval 176, QRS 70, QTc 442, Axis XX 5 degrees. Sinus tachycardia. Significant motion artifact. No acute ST-T wave elevations or depressions concerning for acute ischemia infarct however significant motion artifact making interpretation difficult. Interval development of sinus tachycardia compared to prior EKG performed in March 2022. RADIOLOGY:  CT CHEST PULMONARY EMBOLISM W CONTRAST   Final Result      No CT evidence of pulmonary embolism. Relative elevation of the right diaphragm. Mild patchy peripheral airspace density in the bilateral lower lobes, consistent with possible early infiltrates or atelectasis. No other acute findings in the chest.      XR CHEST PORTABLE   Final Result      Limited inspiration. Mild bibasilar increased density consistent with possible early infiltrate/atelectasis.              LABS:   Results for orders placed or performed during the hospital encounter of 04/13/22   CBC with Auto Differential   Result Value Ref Range    WBC 12.1 (H) 4.0 - 11.0 K/uL    RBC 3.94 (L) 4.20 - 5.90 M/uL    Hemoglobin 10.5 (L) 13.5 - 17.5 g/dL    Hematocrit 32.3 (L) 40.5 - 52.5 %    MCV 82.0 80.0 - 100.0 fL    MCH 26.6 26.0 - 34.0 pg    MCHC 32.5 31.0 - 36.0 g/dL    RDW 18.8 (H) 12.4 - 15.4 %    Platelets 368 971 - 252 K/uL    MPV 7.8 5.0 - 10.5 fL    Neutrophils % 85.0 %    Lymphocytes % 4.0 %    Monocytes % 0.0 %    Eosinophils % 0.0 %    Basophils % 0.0 %    Neutrophils Absolute 11.6 (H) 1.7 - 7.7 K/uL    Lymphocytes Absolute 0.5 (L) 1.0 - 5.1 K/uL    Monocytes Absolute 0.0 0.0 - 1.3 K/uL    Eosinophils Absolute 0.0 0.0 - 0.6 K/uL    Basophils Absolute 0.0 0.0 - 0.2 K/uL    Bands Relative 8 (H) 0 - 7 %    Metamyelocytes Relative 2 (A) %    Myelocyte Percent 1 (A) %    Anisocytosis Occasional (A)     Polychromasia Occasional (A)    Basic Metabolic Panel w/ Reflex to MG   Result Value Ref Range    Sodium 136 136 - 145 mmol/L    Potassium reflex Magnesium 5.1 3.5 - 5.1 mmol/L    Chloride 99 99 - 110 mmol/L    CO2 22 21 - 32 mmol/L    Anion Gap 15 3 - 16    Glucose 214 (H) 70 - 99 mg/dL    BUN 31 (H) 7 - 20 mg/dL    CREATININE 1.6 (H) 0.8 - 1.3 mg/dL    GFR Non- 43 (A) >60    GFR  52 (A) >60    Calcium 9.9 8.3 - 10.6 mg/dL   Troponin   Result Value Ref Range    Troponin 0.03 (H) <0.01 ng/mL   Brain Natriuretic Peptide   Result Value Ref Range    Pro-BNP 25 0 - 124 pg/mL   Urinalysis   Result Value Ref Range    Color, UA Yellow Straw/Yellow    Clarity, UA Clear Clear    Glucose, Ur Negative Negative mg/dL    Bilirubin Urine Negative Negative    Ketones, Urine Negative Negative mg/dL    Specific Gravity, UA 1.015 1.005 - 1.030    Blood, Urine SMALL (A) Negative    pH, UA 7.5 5.0 - 8.0    Protein, UA TRACE (A) Negative mg/dL    Urobilinogen, Urine 0.2 <2.0 E.U./dL    Nitrite, Urine POSITIVE (A) Negative    Leukocyte Esterase, Urine LARGE (A) Negative    Microscopic Examination YES     Urine Type Voided    Blood gas, venous (Lab)   Result Value Ref Range    pH, Daquan 7.352 7.350 - 7.450    pCO2, Daquan 48.5 41.0 - 51.0 mmHg    pO2, Daquan 42.6 (H) 25.0 - 40.0 mmHg    HCO3, Venous 26.9 24.0 - 28.0 mmol/L    Base Excess, Daquan 0.8 -2.0 - 3.0 mmol/L    O2 Sat, Daquan 73 Not established %    Carboxyhemoglobin 1.1 0.0 - 1.5 %    MetHgb, Daquan 0.6 0.0 - 1.5 %    TC02 (Calc), Daquan 28 mmol/L    Hemoglobin, Daquan, Reduced 26.70 %   Troponin   Result Value Ref Range    Troponin 0.04 (H) <0.01 ng/mL Microscopic Urinalysis   Result Value Ref Range    WBC, UA >100 (A) 0 - 5 /HPF    RBC, UA 0-2 0 - 4 /HPF    Bacteria, UA 1+ (A) None Seen /HPF   Lactic Acid   Result Value Ref Range    Lactic Acid 2.0 0.4 - 2.0 mmol/L   EKG 12 Lead   Result Value Ref Range    Ventricular Rate 118 BPM    Atrial Rate 118 BPM    P-R Interval 176 ms    QRS Duration 70 ms    Q-T Interval 316 ms    QTc Calculation (Bazett) 442 ms    P Axis 49 degrees    R Axis 25 degrees    T Axis 102 degrees    Diagnosis       EKG performed in ER and to be interpreted by ER physician. Confirmed by MD, ER ([id number]),  Clifford Plummer ([phone]) on 4/13/2022 6:54:55 PM       ED BEDSIDE ULTRASOUND:      RECENT VITALS:  BP: (!) 135/56, Temp: 98.4 °F (36.9 °C),Pulse: 119, Resp: (!) 48, SpO2: 93 %     Procedures         ED Course     Nursing Notes, Past Medical Hx, Past Surgical Hx, Social Hx, Allergies, and Family Hx werereviewed. The patient was given thefollowing medications:  Orders Placed This Encounter   Medications    iopamidol (ISOVUE-370) 76 % injection 80 mL    FOLLOWED BY Linked Order Group     meropenem (MERREM) 1,000 mg in sodium chloride 0.9 % 100 mL IVPB (mini-bag)      Order Specific Question:   Antimicrobial Indications      Answer:   Urinary Tract Infection     meropenem (MERREM) 1,000 mg in sodium chloride 0.9 % 100 mL IVPB (mini-bag)      Order Specific Question:   Antimicrobial Indications      Answer:   Urinary Tract Infection    lactated ringers bolus       CONSULTS:  IP CONSULT TO HOSPITALIST  IP CONSULT TO [address] [place] [address] / [facility] / Kim Hussain is a 73 y.o. male who presents with concerns of altered mental status. On arrival patient was tachycardic and tachypneic. He is afebrile but otherwise normotensive. He did have borderline oxygen saturation readings. He has an extensive medical history including recent hospitalization with MDRO UTI.   Laboratory studies reveal elevated white blood cell count, mildly elevated troponin however he has a history of chronic troponin elevation, and acute on chronic renal insufficiency. Urine was nitrite positive with greater than 100 white blood cells. Given his tachypnea and tachycardia and borderline hypoxia, chest x-ray was performed and CTPA was performed as well concerning for possible acute infection versus pulmonary embolism. No convincing evidence of pneumonia on his chest x-ray however given his urine findings, there is concern that he has recurrence of his MDRO UTI. Given prior sensitivities I will start him on meropenem, given 30 cc/kg as he does meet SIRS criteria and I believe he would benefit from mission to the hospital.  He will be admitted for further management. .    Critical Care:  Due to the immediate potential for life-threatening deterioration due to suspected MDRO UTI with acute on chronic renal insufficiency and SIRS criteria suggestive of sepsis, I spent 33 minutes providing critical care. This time excludes timespent performing procedures but includes time spent on direct patient care, history retrieval, review of the chart, and discussions with patient, family, and consultant(s). Clinical Impression     1. Altered mental status, unspecified altered mental status type    2. Acute cystitis without hematuria    3. Acute renal failure superimposed on chronic kidney disease, unspecified CKD stage, unspecified acute renal failure type ([facility] [address]        Disposition     PATIENT REFERRED TO:  No follow-up provider specified.     DISCHARGE MEDICATIONS:  New Prescriptions    No medications on file       DISPOSITION Decision To Admit 04/13/2022 05:54:19 PM         Cyrus Salmeron MD  04/13/22 [id number]

## 2022-04-14 ENCOUNTER — APPOINTMENT (OUTPATIENT)
Dept: VASCULAR LAB | Age: 72
DRG: 871 | End: 2022-04-14
Payer: MEDICARE

## 2022-04-14 LAB
A/G RATIO: 0.7 (ref 1.1–2.2)
ALBUMIN SERPL-MCNC: 3.2 G/DL (ref 3.4–5)
ALBUMIN SERPL-MCNC: 3.2 G/DL (ref 3.4–5)
ALP BLD-CCNC: 175 U/L (ref 40–129)
ALT SERPL-CCNC: 19 U/L (ref 10–40)
ANION GAP SERPL CALCULATED.3IONS-SCNC: 10 MMOL/L (ref 3–16)
ANION GAP SERPL CALCULATED.3IONS-SCNC: 11 MMOL/L (ref 3–16)
AST SERPL-CCNC: 30 U/L (ref 15–37)
BASOPHILS ABSOLUTE: 0 K/UL (ref 0–0.2)
BASOPHILS RELATIVE PERCENT: 0.1 %
BILIRUB SERPL-MCNC: 0.5 MG/DL (ref 0–1)
BILIRUBIN DIRECT: <0.2 MG/DL (ref 0–0.3)
BILIRUBIN, INDIRECT: ABNORMAL MG/DL (ref 0–1)
BUN BLDV-MCNC: 35 MG/DL (ref 7–20)
BUN BLDV-MCNC: 35 MG/DL (ref 7–20)
CALCIUM SERPL-MCNC: 9.5 MG/DL (ref 8.3–10.6)
CALCIUM SERPL-MCNC: 9.7 MG/DL (ref 8.3–10.6)
CHLORIDE BLD-SCNC: 100 MMOL/L (ref 99–110)
CHLORIDE BLD-SCNC: 97 MMOL/L (ref 99–110)
CO2: 25 MMOL/L (ref 21–32)
CO2: 25 MMOL/L (ref 21–32)
CREAT SERPL-MCNC: 1.8 MG/DL (ref 0.8–1.3)
CREAT SERPL-MCNC: 1.8 MG/DL (ref 0.8–1.3)
EOSINOPHILS ABSOLUTE: 0 K/UL (ref 0–0.6)
EOSINOPHILS RELATIVE PERCENT: 0 %
GFR AFRICAN AMERICAN: 45
GFR AFRICAN AMERICAN: 45
GFR NON-AFRICAN AMERICAN: 37
GFR NON-AFRICAN AMERICAN: 37
GLUCOSE BLD-MCNC: 157 MG/DL (ref 70–99)
GLUCOSE BLD-MCNC: 162 MG/DL (ref 70–99)
GLUCOSE BLD-MCNC: 198 MG/DL (ref 70–99)
GLUCOSE BLD-MCNC: 198 MG/DL (ref 70–99)
GLUCOSE BLD-MCNC: 205 MG/DL (ref 70–99)
GLUCOSE BLD-MCNC: 259 MG/DL (ref 70–99)
GLUCOSE BLD-MCNC: 277 MG/DL (ref 70–99)
HCT VFR BLD CALC: 29.7 % (ref 40.5–52.5)
HEMOGLOBIN: 9.3 G/DL (ref 13.5–17.5)
LYMPHOCYTES ABSOLUTE: 0.7 K/UL (ref 1–5.1)
LYMPHOCYTES RELATIVE PERCENT: 2.9 %
MCH RBC QN AUTO: 25.4 PG (ref 26–34)
MCHC RBC AUTO-ENTMCNC: 31.4 G/DL (ref 31–36)
MCV RBC AUTO: 80.9 FL (ref 80–100)
MONOCYTES ABSOLUTE: 1.1 K/UL (ref 0–1.3)
MONOCYTES RELATIVE PERCENT: 4.7 %
NEUTROPHILS ABSOLUTE: 21.9 K/UL (ref 1.7–7.7)
NEUTROPHILS RELATIVE PERCENT: 92.3 %
PDW BLD-RTO: 18.2 % (ref 12.4–15.4)
PERFORMED ON: ABNORMAL
PHOSPHORUS: 3.1 MG/DL (ref 2.5–4.9)
PLATELET # BLD: 306 K/UL (ref 135–450)
PMV BLD AUTO: 7.7 FL (ref 5–10.5)
POTASSIUM REFLEX MAGNESIUM: 6 MMOL/L (ref 3.5–5.1)
POTASSIUM SERPL-SCNC: 5 MMOL/L (ref 3.5–5.1)
PROCALCITONIN: 46.78 NG/ML (ref 0–0.15)
RBC # BLD: 3.67 M/UL (ref 4.2–5.9)
SODIUM BLD-SCNC: 133 MMOL/L (ref 136–145)
SODIUM BLD-SCNC: 135 MMOL/L (ref 136–145)
TOTAL PROTEIN: 7.6 G/DL (ref 6.4–8.2)
WBC # BLD: 23.8 K/UL (ref 4–11)

## 2022-04-14 PROCEDURE — 6370000000 HC RX 637 (ALT 250 FOR IP): Performed by: INTERNAL MEDICINE

## 2022-04-14 PROCEDURE — 2580000003 HC RX 258: Performed by: INTERNAL MEDICINE

## 2022-04-14 PROCEDURE — 80053 COMPREHEN METABOLIC PANEL: CPT

## 2022-04-14 PROCEDURE — 85025 COMPLETE CBC W/AUTO DIFF WBC: CPT

## 2022-04-14 PROCEDURE — 36415 COLL VENOUS BLD VENIPUNCTURE: CPT

## 2022-04-14 PROCEDURE — 1200000000 HC SEMI PRIVATE

## 2022-04-14 PROCEDURE — 93970 EXTREMITY STUDY: CPT

## 2022-04-14 PROCEDURE — 6360000002 HC RX W HCPCS: Performed by: INTERNAL MEDICINE

## 2022-04-14 PROCEDURE — 84145 PROCALCITONIN (PCT): CPT

## 2022-04-14 RX ORDER — ALBUTEROL SULFATE 2.5 MG/3ML
2.5 SOLUTION RESPIRATORY (INHALATION) EVERY 6 HOURS PRN
Status: DISCONTINUED | OUTPATIENT
Start: 2022-04-14 | End: 2022-04-18 | Stop reason: HOSPADM

## 2022-04-14 RX ORDER — FUROSEMIDE 10 MG/ML
40 INJECTION INTRAMUSCULAR; INTRAVENOUS 2 TIMES DAILY
Status: COMPLETED | OUTPATIENT
Start: 2022-04-14 | End: 2022-04-14

## 2022-04-14 RX ORDER — DEXTROSE MONOHYDRATE 50 MG/ML
100 INJECTION, SOLUTION INTRAVENOUS PRN
Status: DISCONTINUED | OUTPATIENT
Start: 2022-04-14 | End: 2022-04-18 | Stop reason: HOSPADM

## 2022-04-14 RX ADMIN — INSULIN GLARGINE 5 UNITS: 100 INJECTION, SOLUTION SUBCUTANEOUS at 23:00

## 2022-04-14 RX ADMIN — INSULIN LISPRO 1 UNITS: 100 INJECTION, SOLUTION INTRAVENOUS; SUBCUTANEOUS at 22:58

## 2022-04-14 RX ADMIN — ACETAMINOPHEN 650 MG: 325 TABLET ORAL at 23:09

## 2022-04-14 RX ADMIN — DIVALPROEX SODIUM 250 MG: 125 CAPSULE, COATED PELLETS ORAL at 23:01

## 2022-04-14 RX ADMIN — FUROSEMIDE 40 MG: 10 INJECTION, SOLUTION INTRAMUSCULAR; INTRAVENOUS at 08:25

## 2022-04-14 RX ADMIN — SODIUM CHLORIDE, PRESERVATIVE FREE 10 ML: 5 INJECTION INTRAVENOUS at 22:58

## 2022-04-14 RX ADMIN — ATORVASTATIN CALCIUM 40 MG: 40 TABLET, FILM COATED ORAL at 22:58

## 2022-04-14 RX ADMIN — SODIUM ZIRCONIUM CYCLOSILICATE 10 G: 10 POWDER, FOR SUSPENSION ORAL at 08:42

## 2022-04-14 RX ADMIN — DIVALPROEX SODIUM 125 MG: 125 CAPSULE, COATED PELLETS ORAL at 08:28

## 2022-04-14 RX ADMIN — INSULIN LISPRO 6 UNITS: 100 INJECTION, SOLUTION INTRAVENOUS; SUBCUTANEOUS at 14:40

## 2022-04-14 RX ADMIN — MEROPENEM 1000 MG: 1 INJECTION, POWDER, FOR SOLUTION INTRAVENOUS at 18:27

## 2022-04-14 RX ADMIN — TAMSULOSIN HYDROCHLORIDE 0.8 MG: 0.4 CAPSULE ORAL at 01:40

## 2022-04-14 RX ADMIN — INSULIN LISPRO 4 UNITS: 100 INJECTION, SOLUTION INTRAVENOUS; SUBCUTANEOUS at 08:34

## 2022-04-14 RX ADMIN — ATORVASTATIN CALCIUM 40 MG: 40 TABLET, FILM COATED ORAL at 01:41

## 2022-04-14 RX ADMIN — SODIUM ZIRCONIUM CYCLOSILICATE 10 G: 10 POWDER, FOR SUSPENSION ORAL at 14:43

## 2022-04-14 RX ADMIN — DIVALPROEX SODIUM 250 MG: 125 CAPSULE, COATED PELLETS ORAL at 01:37

## 2022-04-14 RX ADMIN — ENOXAPARIN SODIUM 40 MG: 40 INJECTION SUBCUTANEOUS at 08:29

## 2022-04-14 RX ADMIN — ARIPIPRAZOLE 15 MG: 5 TABLET ORAL at 08:28

## 2022-04-14 RX ADMIN — ASPIRIN 81 MG: 81 TABLET, COATED ORAL at 08:28

## 2022-04-14 RX ADMIN — MEROPENEM 1000 MG: 1 INJECTION, POWDER, FOR SOLUTION INTRAVENOUS at 08:28

## 2022-04-14 RX ADMIN — TAMSULOSIN HYDROCHLORIDE 0.8 MG: 0.4 CAPSULE ORAL at 22:58

## 2022-04-14 RX ADMIN — FUROSEMIDE 40 MG: 10 INJECTION, SOLUTION INTRAMUSCULAR; INTRAVENOUS at 18:24

## 2022-04-14 RX ADMIN — INSULIN LISPRO 3 UNITS: 100 INJECTION, SOLUTION INTRAVENOUS; SUBCUTANEOUS at 01:42

## 2022-04-14 RX ADMIN — INSULIN GLARGINE 5 UNITS: 100 INJECTION, SOLUTION SUBCUTANEOUS at 01:45

## 2022-04-14 ASSESSMENT — PAIN SCALES - GENERAL: PAINLEVEL_OUTOF10: 3

## 2022-04-14 NOTE — PROGRESS NOTES
VSS A/Ox4 pt is calm/coopartive. Pt has no complaints of nausea/pain. Male Bree Otter in place with yellow cloudy urine. Pt was bath by PCA. Fall precautions in place call light in reach bed in lowest position will continue to monitor.

## 2022-04-14 NOTE — CARE COORDINATION
Cm following, pt from [address] will DC back to [facility]. Pt on IV ABX for +UTI, nephro consulted for elevated BUN creat 1.8 K+6.  Electronically signed by Elliott Ibarra RN on 4/14/2022 at 10:09 AM   [phone]

## 2022-04-14 NOTE — PROGRESS NOTES
Pharmacy Note - Renal Dosing    Meropenem ordered for treatment of UTI. Per [facility] Renal Dose Adjustment Policy and Extended Infusion Beta-Lactam Policy, dose will be changed to 1000 mg EI q12h. Estimated Creatinine Clearance: Estimated Creatinine Clearance: 47 mL/min (A) (based on SCr of 1.6 mg/dL (H)). Dialysis Status, KIMBERLY, CKD: CKD  BMI: Body mass index is 30.17 kg/m². Rationale for Adjustment: Agent is renally eliminated. Pharmacy will continue to monitor renal function, cultures and sensitivities (where available) and adjust dose as necessary. Please call with any questions.   Dean Pires, PharmD  Main Pharmacy: [id number]  4/13/2022 10:46 PM

## 2022-04-14 NOTE — PROGRESS NOTES
Pt received a late lunch and was coverage for insulin at 1440. Pt won't receive coverage for insulin at 1700.

## 2022-04-14 NOTE — PLAN OF CARE
Problem: Falls - Risk of:  Goal: Will remain free from falls  Description: Will remain free from falls  Outcome: Ongoing  Note: Pt is hourly rounded on. Bed alarm on.       Problem: Skin Integrity:  Goal: Will show no infection signs and symptoms  Description: Will show no infection signs and symptoms  Outcome: Ongoing  Note: Skin is kept clean and dry

## 2022-04-14 NOTE — PROGRESS NOTES
Hospitalist Progress Note      PCP: Lieutenant Jeremi MD    Date of Admission: 4/13/2022    Chief Complaint: Altered mental status    Subjective:     Denies any specific complaint says he just wants to go home, doesn't like being in the hospital. Does not answer all ROS but does point to his legs being swollen. Medications:  Reviewed    Infusion Medications    dextrose      sodium chloride       Scheduled Medications    furosemide  40 mg IntraVENous BID    sodium zirconium cyclosilicate  10 g Oral TID    aspirin  81 mg Oral Daily    atorvastatin  40 mg Oral Nightly    divalproex  250 mg Oral Nightly    divalproex  125 mg Oral QAM    insulin glargine  5 Units SubCUTAneous Nightly    tamsulosin  0.8 mg Oral Nightly    ARIPiprazole  15 mg Oral Daily    meropenem  1,000 mg IntraVENous Q12H    insulin lispro  0-12 Units SubCUTAneous TID WC    insulin lispro  0-6 Units SubCUTAneous Nightly    sodium chloride flush  5-40 mL IntraVENous 2 times per day    enoxaparin  40 mg SubCUTAneous Daily     PRN Meds: glucose, dextrose bolus (hypoglycemia), glucagon (rDNA), dextrose, albuterol, sodium chloride flush, sodium chloride, ondansetron **OR** ondansetron, polyethylene glycol, acetaminophen **OR** acetaminophen    No intake or output data in the 24 hours ending 04/14/22 0828    Exam:    /67   Pulse 99   Temp 99.2 °F (37.3 °C) (Oral)   Resp 28   Wt 198 lb 6.6 oz (90 kg)   SpO2 92%   BMI 30.17 kg/m²     General appearance: No apparent distress, appears stated age and cooperative. HEENT: Pupils equal, round, and reactive to light. Conjunctivae/corneas clear. Neck: Supple, with full range of motion. No jugular venous distention. Trachea midline. Respiratory:  Normal respiratory effort. Clear to auscultation, bilaterally without Rales/Wheezes/Rhonchi. Cardiovascular: Regular rate and rhythm with normal S1/S2 without murmurs, rubs or gallops.   Abdomen: Soft, non-tender, non-distended with doses     Significant leg edema  On talking to the family does seem to have chronic issue, patient is on Norvasc will hold that, will need leg elevation, patient did had an echo done last year in February normal EF with normal right ventricular size and function,   check venous Doppler - no DVT     Mention of history of schizophrenia and bipolar. Sister does confirm bipolar  On Depakote, risperidone     Diabetes mellitus  Lantus and insulin sliding scale     DVT prophylaxis Lovenox  Diet: ADULT DIET;  Regular; 4 carb choices (60 gm/meal)  Code Status: Full Code    PT/OT Eval Status:     Dispo - Inpatient    Sheridan Conn DO

## 2022-04-14 NOTE — PROGRESS NOTES
4 Eyes Admission Assessment     I agree as the admission nurse that 2 RN's have performed a thorough Head to Toe Skin Assessment on the patient. ALL assessment sites listed below have been assessed on admission. Areas assessed by both nurses:   [x]   Head, Face, and Ears   [x]   Shoulders, Back, and Chest  [x]   Arms, Elbows, and Hands   [x]   Coccyx, Sacrum, and Ischium - blanchable redness  [x]   Legs, Feet, and Heels- BLE red, blisters, weeping, and darkening of skin        Does the Patient have Skin Breakdown?   Areas listed above        Pio Prevention initiated:  Yes   Wound Care Orders initiated:  NA      [facility] nurse consulted for Pressure Injury (Stage 3,4, Unstageable, DTI, NWPT, and Complex wounds) or Pio score 18 or lower:  No      Nurse 1 eSignature: Electronically signed by Higinio Hauser RN on 4/14/22 at 5:54 AM EDT    SHARE this note so that the co-signing nurse is able to place an eSignature    Nurse 2 eSignature: Electronically signed by Osbaldo Do RN on 4/14/22 at 6:17 AM EDT

## 2022-04-14 NOTE — PROGRESS NOTES
Pt alert & oriented x4. Pt Temp 100.2 F and given tylenol w/ benefit. Pt BLE red, swollen, blistery, weeping, and pitting edema noted, legs elevated. Pt on 4 L of O2 and weaned down to 1.5 L of O2. IV access lost, new IV placed, IV fluids running. Male purewick in place. Pt has no other needs at this time. Bed alarm and wheels in lowest position. Call light and bedside table within reach.

## 2022-04-14 NOTE — CONSULTS
Consult received. Labs and notes were reviewed. Case was discussed with the staff. Full note to follow.     Thanks  Nephrology  [address]  Office: [phone]  Cell: [phone]  Fax: [phone]

## 2022-04-14 NOTE — CONSULTS
Nephrology Consult Note    CC: AMS, UTI       History Obtained From:  Chart Review    HISTORY OF PRESENT ILLNESS:  Patient is a 72 yo male PMH CKD, MDR UTI, CVA with dementia, bipolar disorder, schizophrenia, DMII who presented to the ED from nursing home yesterday with AMS and increased agitation. On arrival patient was tachycardic and tachypnic with a leukocytosis of 12.1. UA showed positive nitrites, large leukocyte esterace, >100 WBCS and 1+ bacteria. Given history of MDR UTIs, there was concern for urosepsis and patient was started on meropenem. Past Medical History:        Diagnosis Date    Ataxia     Bipolar 1 disorder (HCC)     BPH (benign prostatic hyperplasia)     Cerebral infarct ([facility])     Chronic kidney disease     Dementia ([facility]     Depression     Diabetes mellitus type 2, uncomplicated ([facility]     Gout     Hyperlipidemia     Hypertension     Schizophrenia ([facility]        Past Surgical History:    History reviewed. No pertinent surgical history. Medications Priorto Admission:    Medications Prior to Admission: insulin detemir (LEVEMIR) 100 UNIT/ML injection vial, Inject 5 Units into the skin nightly  insulin aspart (NOVOLOG) 100 UNIT/ML injection vial, Inject 3 Units into the skin 3 times daily (before meals)  miconazole (MICOTIN) 2 % powder, Apply topically 2 times daily. (Patient not taking: Reported on 4/13/2022)  nystatin (MYCOSTATIN) 544925 UNIT/GM powder, Apply topically 4 times daily Apply topically 4 times daily.   polyethylene glycol (MIRALAX) 17 g PACK packet, Take 17 g by mouth every 48 hours (Patient not taking: Reported on 4/13/2022)  divalproex (DEPAKOTE SPRINKLES) 125 MG capsule, Take 250 mg by mouth at bedtime  divalproex (DEPAKOTE SPRINKLES) 125 MG capsule, Take 125 mg by mouth every morning  furosemide (LASIX) 20 MG tablet, 20 mg daily  (Patient not taking: Reported on 4/13/2022)  melatonin 1 MG tablet, Take 1 mg by mouth nightly  (Patient not taking: Reported on 4/13/2022)  latanoprost (XALATAN) 0.005 % ophthalmic solution, Place 1 drop into the right eye nightly   Skin Protectants, Misc.  (HYDROCERIN) CREA cream, Apply topically 2 times daily (Patient not taking: Reported on 4/13/2022)  acetaminophen (TYLENOL) 500 MG tablet, Take 500 mg by mouth every 6 hours as needed for Pain  allopurinol (ZYLOPRIM) 100 MG tablet, Take 100 mg by mouth daily  amLODIPine (NORVASC) 5 MG tablet, Take 5 mg by mouth daily Hold for SBP <110 or Pulse <60  ARIPiprazole (ABILIFY) 15 MG tablet, Take 15 mg by mouth daily  aspirin 81 MG EC tablet, Take 81 mg by mouth daily  atorvastatin (LIPITOR) 40 MG tablet, Take 40 mg by mouth nightly  bisacodyl (DULCOLAX) 10 MG suppository, Place 10 mg rectally daily as needed for Constipation  Cranberry 425 MG CAPS, Take 450 mg by mouth daily   docusate sodium (COLACE) 100 MG capsule, Take 100 mg by mouth 2 times daily (Patient not taking: Reported on 4/13/2022)  finasteride (PROSCAR) 5 MG tablet, Take 5 mg by mouth daily (Patient not taking: Reported on 4/13/2022)  FLUoxetine (PROZAC) 20 MG capsule, Take 40 mg by mouth daily (Patient not taking: Reported on 4/13/2022)  Multiple Vitamins-Minerals (THERAPEUTIC MULTIVITAMIN-MINERALS) tablet, Take 1 tablet by mouth daily  insulin aspart (NOVOLOG) 100 UNIT/ML injection vial, Inject 0-21 Units into the skin 3 times daily (before meals) Sliding scale  201-250 = 5 units 251-300 = 8 units 301-350 = 12 units 351-400 = 15 units 401-450 = 18 units 451-500 = 21 units 500+ notify MD  ondansetron (ZOFRAN-ODT) 4 MG disintegrating tablet, Take 4 mg by mouth every 8 hours as needed for Nausea or Vomiting  polyethylene glycol (GLYCOLAX) 17 g packet, Take 17 g by mouth daily as needed for Constipation (Patient not taking: Reported on 4/13/2022)  risperiDONE (RISPERDAL) 0.5 MG tablet, Take 0.5 mg by mouth every morning (before breakfast)  (Patient not taking: Reported on 4/13/2022)  risperiDONE (RISPERDAL) 1 MG tablet, Take 1.5 mg by mouth nightly  (Patient not taking: Reported on 4/13/2022)  tamsulosin (FLOMAX) 0.4 MG capsule, Take 0.8 mg by mouth nightly    Allergies:  Codeine, Cogentin [benztropine], and Penicillins    Social History:   TOBACCO:   reports that he has never smoked. He has never used smokeless tobacco.  ETOH:   reports previous alcohol use. Family History:   History reviewed. No pertinent family history. ROS: A 10 point review of systems was conducted, significant findings as noted in HPI. Physical Exam  Constitutional:       Appearance: He is obese. HENT:      Head: Normocephalic. Nose: Nose normal.   Eyes:      Extraocular Movements: Extraocular movements intact. Conjunctiva/sclera: Conjunctivae normal.      Pupils: Pupils are equal, round, and reactive to light. Cardiovascular:      Rate and Rhythm: Normal rate and regular rhythm. Pulses: Normal pulses. Heart sounds: Normal heart sounds. Pulmonary:      Effort: Pulmonary effort is normal.      Breath sounds: Normal breath sounds. Abdominal:      Palpations: Abdomen is soft. Musculoskeletal:         General: Normal range of motion. Cervical back: Normal range of motion and neck supple. Right lower leg: Edema present. Left lower leg: Edema present. Skin:     Comments: Woody skin bilateral LE with severely hyperkeratotic appearance of skin on feet   Neurological:      General: No focal deficit present.    Psychiatric:      Comments: Signs of developmental delay       Physical exam:       Vitals:    04/14/22 0726   BP:    Pulse:    Resp:    Temp:    SpO2: 92%       DATA:    Labs:  CBC:   Recent Labs     04/13/22  1533 04/14/22  0550   WBC 12.1* 23.8*   HGB 10.5* 9.3*   HCT 32.3* 29.7*    306       BMP:   Recent Labs     04/13/22  1533 04/14/22  0550    135*   K 5.1 6.0*   CL 99 100   CO2 22 25   BUN 31* 35*   CREATININE 1.6* 1.8*   GLUCOSE 214* 198*     LFT's:   Recent Labs     04/14/22  0550   AST 30   ALT 19   BILITOT 0.5   ALKPHOS 175*     Troponin:   Recent Labs     04/13/22  1533 04/13/22  1822   TROPONINI 0.03* 0.04*     BNP:No results for input(s): BNP in the last 72 hours. ABGs: No results for input(s): PHART, GCI9OGK, PO2ART in the last 72 hours. INR: No results for input(s): INR in the last 72 hours. U/A:  Recent Labs     04/13/22  1616   COLORU Yellow   PHUR 7.5   WBCUA >100*   RBCUA 0-2   BACTERIA 1+*   CLARITYU Clear   SPECGRAV 1.015   LEUKOCYTESUR LARGE*   UROBILINOGEN 0.2   BILIRUBINUR Negative   BLOODU SMALL*   GLUCOSEU Negative       CT CHEST PULMONARY EMBOLISM W CONTRAST   Final Result      No CT evidence of pulmonary embolism. Relative elevation of the right diaphragm. Mild patchy peripheral airspace density in the bilateral lower lobes, consistent with possible early infiltrates or atelectasis. No other acute findings in the chest.      XR CHEST PORTABLE   Final Result      Limited inspiration. Mild bibasilar increased density consistent with possible early infiltrate/atelectasis. VL Extremity Venous Bilateral    (Results Pending)           ASSESSMENT AND PLAN:    KIMBERLY on CKD 2 (etiology likely DM/HTN)  - creatinine 1.8 from baseline of 1.2  - likely prerenal  - s/p sepsis boluls  - f/u p/c ratio  - f/u evening RFP    Hyperkalemia  - Lasix 40 IV for 2 doses, Lokelma, albuterol  - f/u evening RFP    MDRO UTI  - Merrem until C&S result    Anemia - components of iron deficiency and chronic disease  HTN - normotensive at this time, continue holding Norvasc today since he's getting Lasix today for hyperkalemia    Will discuss with attending physician Dr. Marylu Castillo Status:Full code  FEN: Regular  PPX: Lovenox  DISPO: IP    This note transcribed by:  Marisa Ng MD     Patient was seen and examined and the case was discussed with the resident. He acted as my scribe. I agree with the assessment and plan.     Thanks  Nephrology  [facility] [address] [place] [address] [id number]  Office: [phone]  Cell: [phone]  Fax: [phone]

## 2022-04-14 NOTE — PROGRESS NOTES
Pt pulse ox reading 85% 1.5L NC. Pt appeared to be SOB. pt was increase 4L NC 92%. Pt was asked to cough and deep breath. Attending notified. Orders place for breathing treatment. Will continue to monitor.

## 2022-04-15 LAB
ALBUMIN SERPL-MCNC: 3.3 G/DL (ref 3.4–5)
ANION GAP SERPL CALCULATED.3IONS-SCNC: 10 MMOL/L (ref 3–16)
ANISOCYTOSIS: ABNORMAL
BANDED NEUTROPHILS RELATIVE PERCENT: 9 % (ref 0–7)
BASOPHILS ABSOLUTE: 0.2 K/UL (ref 0–0.2)
BASOPHILS RELATIVE PERCENT: 1 %
BUN BLDV-MCNC: 34 MG/DL (ref 7–20)
BURR CELLS: ABNORMAL
CALCIUM SERPL-MCNC: 9.9 MG/DL (ref 8.3–10.6)
CHLORIDE BLD-SCNC: 97 MMOL/L (ref 99–110)
CO2: 27 MMOL/L (ref 21–32)
CREAT SERPL-MCNC: 1.6 MG/DL (ref 0.8–1.3)
CREATININE URINE: 22.6 MG/DL (ref 39–259)
EOSINOPHILS ABSOLUTE: 0.5 K/UL (ref 0–0.6)
EOSINOPHILS RELATIVE PERCENT: 3 %
GFR AFRICAN AMERICAN: 52
GFR NON-AFRICAN AMERICAN: 43
GLUCOSE BLD-MCNC: 163 MG/DL (ref 70–99)
GLUCOSE BLD-MCNC: 208 MG/DL (ref 70–99)
GLUCOSE BLD-MCNC: 243 MG/DL (ref 70–99)
GLUCOSE BLD-MCNC: 263 MG/DL (ref 70–99)
GLUCOSE BLD-MCNC: 271 MG/DL (ref 70–99)
HCT VFR BLD CALC: 31 % (ref 40.5–52.5)
HEMOGLOBIN: 9.8 G/DL (ref 13.5–17.5)
HYPOCHROMIA: ABNORMAL
LYMPHOCYTES ABSOLUTE: 1.1 K/UL (ref 1–5.1)
LYMPHOCYTES RELATIVE PERCENT: 7 %
MCH RBC QN AUTO: 25.6 PG (ref 26–34)
MCHC RBC AUTO-ENTMCNC: 31.6 G/DL (ref 31–36)
MCV RBC AUTO: 81.1 FL (ref 80–100)
MONOCYTES ABSOLUTE: 0.9 K/UL (ref 0–1.3)
MONOCYTES RELATIVE PERCENT: 6 %
NEUTROPHILS ABSOLUTE: 13.1 K/UL (ref 1.7–7.7)
NEUTROPHILS RELATIVE PERCENT: 74 %
PDW BLD-RTO: 18.3 % (ref 12.4–15.4)
PERFORMED ON: ABNORMAL
PHOSPHORUS: 3.1 MG/DL (ref 2.5–4.9)
PLATELET # BLD: 277 K/UL (ref 135–450)
PMV BLD AUTO: 7.9 FL (ref 5–10.5)
POTASSIUM SERPL-SCNC: 4.7 MMOL/L (ref 3.5–5.1)
PROCALCITONIN: 30.33 NG/ML (ref 0–0.15)
PROTEIN PROTEIN: 11 MG/DL
PROTEIN/CREAT RATIO: 0.5 MG/DL
RBC # BLD: 3.82 M/UL (ref 4.2–5.9)
SODIUM BLD-SCNC: 134 MMOL/L (ref 136–145)
WBC # BLD: 15.8 K/UL (ref 4–11)

## 2022-04-15 PROCEDURE — 94640 AIRWAY INHALATION TREATMENT: CPT

## 2022-04-15 PROCEDURE — 2700000000 HC OXYGEN THERAPY PER DAY

## 2022-04-15 PROCEDURE — 1200000000 HC SEMI PRIVATE

## 2022-04-15 PROCEDURE — 6370000000 HC RX 637 (ALT 250 FOR IP): Performed by: INTERNAL MEDICINE

## 2022-04-15 PROCEDURE — 84156 ASSAY OF PROTEIN URINE: CPT

## 2022-04-15 PROCEDURE — 82570 ASSAY OF URINE CREATININE: CPT

## 2022-04-15 PROCEDURE — 85025 COMPLETE CBC W/AUTO DIFF WBC: CPT

## 2022-04-15 PROCEDURE — 80069 RENAL FUNCTION PANEL: CPT

## 2022-04-15 PROCEDURE — 6360000002 HC RX W HCPCS: Performed by: INTERNAL MEDICINE

## 2022-04-15 PROCEDURE — 94761 N-INVAS EAR/PLS OXIMETRY MLT: CPT

## 2022-04-15 PROCEDURE — 84145 PROCALCITONIN (PCT): CPT

## 2022-04-15 PROCEDURE — 2580000003 HC RX 258: Performed by: INTERNAL MEDICINE

## 2022-04-15 PROCEDURE — 36415 COLL VENOUS BLD VENIPUNCTURE: CPT

## 2022-04-15 RX ORDER — ALBUTEROL SULFATE 2.5 MG/3ML
2.5 SOLUTION RESPIRATORY (INHALATION) 3 TIMES DAILY
Status: DISCONTINUED | OUTPATIENT
Start: 2022-04-15 | End: 2022-04-17

## 2022-04-15 RX ORDER — CASTOR OIL AND BALSAM, PERU 788; 87 MG/G; MG/G
OINTMENT TOPICAL 2 TIMES DAILY
Status: DISCONTINUED | OUTPATIENT
Start: 2022-04-15 | End: 2022-04-18 | Stop reason: HOSPADM

## 2022-04-15 RX ORDER — FUROSEMIDE 40 MG/1
40 TABLET ORAL DAILY
Status: DISCONTINUED | OUTPATIENT
Start: 2022-04-15 | End: 2022-04-18 | Stop reason: HOSPADM

## 2022-04-15 RX ADMIN — INSULIN LISPRO 6 UNITS: 100 INJECTION, SOLUTION INTRAVENOUS; SUBCUTANEOUS at 17:49

## 2022-04-15 RX ADMIN — INSULIN LISPRO 6 UNITS: 100 INJECTION, SOLUTION INTRAVENOUS; SUBCUTANEOUS at 12:55

## 2022-04-15 RX ADMIN — INSULIN GLARGINE 5 UNITS: 100 INJECTION, SOLUTION SUBCUTANEOUS at 22:37

## 2022-04-15 RX ADMIN — ACETAMINOPHEN 650 MG: 325 TABLET ORAL at 14:56

## 2022-04-15 RX ADMIN — DIVALPROEX SODIUM 125 MG: 125 CAPSULE, COATED PELLETS ORAL at 08:23

## 2022-04-15 RX ADMIN — ASPIRIN 81 MG: 81 TABLET, COATED ORAL at 08:23

## 2022-04-15 RX ADMIN — FUROSEMIDE 40 MG: 40 TABLET ORAL at 08:56

## 2022-04-15 RX ADMIN — ENOXAPARIN SODIUM 40 MG: 40 INJECTION SUBCUTANEOUS at 08:23

## 2022-04-15 RX ADMIN — TAMSULOSIN HYDROCHLORIDE 0.8 MG: 0.4 CAPSULE ORAL at 22:37

## 2022-04-15 RX ADMIN — ALBUTEROL SULFATE 2.5 MG: 2.5 SOLUTION RESPIRATORY (INHALATION) at 13:11

## 2022-04-15 RX ADMIN — Medication: at 17:53

## 2022-04-15 RX ADMIN — SODIUM CHLORIDE, PRESERVATIVE FREE 10 ML: 5 INJECTION INTRAVENOUS at 22:35

## 2022-04-15 RX ADMIN — INSULIN LISPRO 2 UNITS: 100 INJECTION, SOLUTION INTRAVENOUS; SUBCUTANEOUS at 08:27

## 2022-04-15 RX ADMIN — MEROPENEM 1000 MG: 1 INJECTION, POWDER, FOR SOLUTION INTRAVENOUS at 08:10

## 2022-04-15 RX ADMIN — DIVALPROEX SODIUM 250 MG: 125 CAPSULE, COATED PELLETS ORAL at 22:38

## 2022-04-15 RX ADMIN — ARIPIPRAZOLE 15 MG: 5 TABLET ORAL at 08:23

## 2022-04-15 RX ADMIN — ATORVASTATIN CALCIUM 40 MG: 40 TABLET, FILM COATED ORAL at 22:38

## 2022-04-15 RX ADMIN — INSULIN LISPRO 2 UNITS: 100 INJECTION, SOLUTION INTRAVENOUS; SUBCUTANEOUS at 22:35

## 2022-04-15 RX ADMIN — ALBUTEROL SULFATE 2.5 MG: 2.5 SOLUTION RESPIRATORY (INHALATION) at 20:51

## 2022-04-15 RX ADMIN — MEROPENEM 1000 MG: 1 INJECTION, POWDER, FOR SOLUTION INTRAVENOUS at 18:21

## 2022-04-15 ASSESSMENT — PAIN SCALES - GENERAL
PAINLEVEL_OUTOF10: 4
PAINLEVEL_OUTOF10: 0

## 2022-04-15 NOTE — PROGRESS NOTES
VSS A/Ox4 pt tolerated breakfast without nausea. Pt has no c/o pain at this time. Male Leocadia Lei in place with adequate output clear yellow urine. Pt is tolerating fluids. Pt continues to stay at 92% on 4 L NC and has no c/o of SOB. Fall precautions in place call light in reach bed in lowest position wheels locked will continue to monitor.

## 2022-04-15 NOTE — CONSULTS
[facility] Wound Ostomy Continence Nurse  Consult Note       NAME:  Davon Bocanegra  MEDICAL RECORD NUMBER:  [id number]  AGE: 73 y.o. GENDER: male  : 1950  TODAY'S DATE:  4/15/2022    Subjective   Reason for WOCN Evaluation and Assessment: L Heel - Stage 3      Davon Bocanegra is a 73 y.o. male referred by:   [] Physician  [x] Nursing  [] Other:     Wound Identification:  Wound Type: pressure  Contributing Factors: venous stasis, diabetes, chronic pressure, decreased mobility, shear force, incontinence of stool and incontinence of urine    Wound History: Davon Bocanegra is a 73 y.o. male who presented with history of mild mental retardation, bipolar disorder, diabetes mellitus, history of multidrug-resistant UTI, in the nursing home resident  sent to the hospital with worsening mental status. Patient is a very poor historian not told by many good history. Patient on arrival in the ER was pretty tachypneic and tachycardic that led to investigation for pulmonary embolism CT PE was negative. Patient was found to have abnormal UA. Patient during the last admission was found to have multidrug-resistant UTI. Also called patient's sister who was not aware that the patient is in the hospital at this time. She was aware that patient was recently admitted to the hospital.  She does mention that patient does have a history of mild mild retardation.   She denies any history of stroke though it is mention in the chart  Current Wound Care Treatment: L Heel - Venelex    Patient Goal of Care:  [x] Wound Healing  [] Odor Control  [] Palliative Care  [] Pain Control   [] Other:         PAST MEDICAL HISTORY        Diagnosis Date    Ataxia     Bipolar 1 disorder ([facility]     BPH (benign prostatic hyperplasia)     Cerebral infarct (HCC)     Chronic kidney disease     Dementia ([facility]     Depression     Diabetes mellitus type 2, uncomplicated ([facility]     Gout     Hyperlipidemia     Hypertension     Schizophrenia ([facility] PAST SURGICAL HISTORY    History reviewed. No pertinent surgical history. FAMILY HISTORY    History reviewed. No pertinent family history. SOCIAL HISTORY    Social History     Tobacco Use    Smoking status: Never Smoker    Smokeless tobacco: Never Used   Vaping Use    Vaping Use: Never used   Substance Use Topics    Alcohol use: Not Currently    Drug use: Not on file       ALLERGIES    Allergies   Allergen Reactions    Codeine     Cogentin [Benztropine]     Penicillins        MEDICATIONS    No current facility-administered medications on file prior to encounter. Current Outpatient Medications on File Prior to Encounter   Medication Sig Dispense Refill    insulin detemir (LEVEMIR) 100 UNIT/ML injection vial Inject 5 Units into the skin nightly 10 mL 1    insulin aspart (NOVOLOG) 100 UNIT/ML injection vial Inject 3 Units into the skin 3 times daily (before meals) 10 mL 3    miconazole (MICOTIN) 2 % powder Apply topically 2 times daily. (Patient not taking: Reported on 4/13/2022) 45 g 1    nystatin (MYCOSTATIN) 513634 UNIT/GM powder Apply topically 4 times daily Apply topically 4 times daily.  polyethylene glycol (MIRALAX) 17 g PACK packet Take 17 g by mouth every 48 hours (Patient not taking: Reported on 4/13/2022)      divalproex (DEPAKOTE SPRINKLES) 125 MG capsule Take 250 mg by mouth at bedtime      divalproex (DEPAKOTE SPRINKLES) 125 MG capsule Take 125 mg by mouth every morning      furosemide (LASIX) 20 MG tablet 20 mg daily  (Patient not taking: Reported on 4/13/2022)      melatonin 1 MG tablet Take 1 mg by mouth nightly  (Patient not taking: Reported on 4/13/2022)      latanoprost (XALATAN) 0.005 % ophthalmic solution Place 1 drop into the right eye nightly       Skin Protectants, Misc.  (HYDROCERIN) CREA cream Apply topically 2 times daily (Patient not taking: Reported on 4/13/2022) 1 Container 0    acetaminophen (TYLENOL) 500 MG tablet Take 500 mg by mouth every 6 hours as needed for Pain      allopurinol (ZYLOPRIM) 100 MG tablet Take 100 mg by mouth daily      amLODIPine (NORVASC) 5 MG tablet Take 5 mg by mouth daily Hold for SBP <110 or Pulse <60      ARIPiprazole (ABILIFY) 15 MG tablet Take 15 mg by mouth daily      aspirin 81 MG EC tablet Take 81 mg by mouth daily      atorvastatin (LIPITOR) 40 MG tablet Take 40 mg by mouth nightly      bisacodyl (DULCOLAX) 10 MG suppository Place 10 mg rectally daily as needed for Constipation      Cranberry 425 MG CAPS Take 450 mg by mouth daily       docusate sodium (COLACE) 100 MG capsule Take 100 mg by mouth 2 times daily (Patient not taking: Reported on 4/13/2022)      finasteride (PROSCAR) 5 MG tablet Take 5 mg by mouth daily (Patient not taking: Reported on 4/13/2022)      FLUoxetine (PROZAC) 20 MG capsule Take 40 mg by mouth daily (Patient not taking: Reported on 4/13/2022)      Multiple Vitamins-Minerals (THERAPEUTIC MULTIVITAMIN-MINERALS) tablet Take 1 tablet by mouth daily      insulin aspart (NOVOLOG) 100 UNIT/ML injection vial Inject 0-21 Units into the skin 3 times daily (before meals) Sliding scale   201-250 = 5 units  251-300 = 8 units  301-350 = 12 units  351-400 = 15 units  401-450 = 18 units  451-500 = 21 units  500+ notify MD      ondansetron (ZOFRAN-ODT) 4 MG disintegrating tablet Take 4 mg by mouth every 8 hours as needed for Nausea or Vomiting      polyethylene glycol (GLYCOLAX) 17 g packet Take 17 g by mouth daily as needed for Constipation (Patient not taking: Reported on 4/13/2022)      risperiDONE (RISPERDAL) 0.5 MG tablet Take 0.5 mg by mouth every morning (before breakfast)  (Patient not taking: Reported on 4/13/2022)      risperiDONE (RISPERDAL) 1 MG tablet Take 1.5 mg by mouth nightly  (Patient not taking: Reported on 4/13/2022)      tamsulosin (FLOMAX) 0.4 MG capsule Take 0.8 mg by mouth nightly         Objective    BP (!) 141/71   Pulse 103   Temp 97.3 °F (36.3 °C) (Oral)   Resp 24   Wt 198 lb 6.6 oz (90 kg)   SpO2 95%   BMI 30.17 kg/m²     LABS:  WBC:    Lab Results   Component Value Date    WBC 15.8 04/15/2022     H/H:    Lab Results   Component Value Date    HGB 9.8 04/15/2022    HCT 31.0 04/15/2022     PTT:  No results found for: APTT, PTT[APTT}  PT/INR:    Lab Results   Component Value Date    PROTIME 11.2 10/06/2021    INR 0.99 10/06/2021     HgBA1c:    Lab Results   Component Value Date    LABA1C 8.7 10/07/2021       Assessment: L Heel - wound bed is pink, periwound blanchable but soft. R Heel mushy but blanchable   Pio Risk Score: Pio Scale Score: 17    Patient Active Problem List   Diagnosis Code    Other fatigue R53.83    Right hand weakness R29.898    Numbness of right hand R20.0    Schizophrenia (HCC) F20.9    Dementia (HCC) F03.90    Essential hypertension I10    Sepsis (HCC) A41.9    CKD (chronic kidney disease) N18.9    Cellulitis L03.90    UTI (urinary tract infection) N39.0       Measurements:  Wound 02/24/21 Buttocks Inner (Active)   Number of days: 414       Wound 04/15/22 Heel Left (Active)   Wound Image   04/15/22 1523   Wound Etiology Pressure Stage  3 04/15/22 1523   Dressing Status Intact 04/15/22 1523   Wound Cleansed Not Cleansed 04/15/22 1523   Dressing/Treatment Pharmaceutical agent (see MAR) 04/15/22 1523   Offloading for Diabetic Foot Ulcers Offloading boot 04/15/22 1523   Dressing Change Due 04/15/22 04/15/22 1523   Wound Length (cm) 1.2 cm 04/15/22 1523   Wound Width (cm) 1.4 cm 04/15/22 1523   Wound Depth (cm) 0.2 cm 04/15/22 1523   Wound Surface Area (cm^2) 1.68 cm^2 04/15/22 1523   Wound Volume (cm^3) 0.336 cm^3 04/15/22 1523   Wound Assessment Pink/red 04/15/22 1523   Drainage Amount None 04/15/22 1523   Odor None 04/15/22 1523   Franca-wound Assessment Blanchable erythema 04/15/22 1523   Margins Defined edges 04/15/22 1523   Number of days: 0   L Heel:      Response to treatment:  Well tolerated by patient.      Pain Assessment:  Severity:  0 / 10  Quality of pain: N/A  Wound Pain Timing/Severity: none  Premedicated: No    Plan   Plan of Care: Wound 04/15/22 Heel Left-Dressing/Treatment: Pharmaceutical agent (see MAR) (Venelex)   Recommendation: L Heel - clean with NS, dry, apply Venelex BID  Offloading boots at all times  Reposition pt every 2 hours  Call Wound Care for deterioration [phone]    Specialty Bed Required : No   [] Low Air Loss   [] Pressure Redistribution  [] Fluid Immersion  [] Bariatric  [] Total Pressure Relief  [] Other:     Current Diet: ADULT DIET; Regular; 4 carb choices (60 gm/meal);  Low Potassium (Less than 3000 mg/day)  Dietician consult:  No    Discharge Plan:  Placement for patient upon discharge: skilled nursing    Patient appropriate for Outpatient [address]: No    Referrals:  [x]   [] [facility]  [] Supplies  [] Other    Patient/Caregiver Teaching:  Level of patient/caregiver understanding able to:   [] Indicates understanding       [] Needs reinforcement  [] Unsuccessful      [] Verbal Understanding  [] Demonstrated understanding       [] No evidence of learning  [] Refused teaching         [] N/A       Electronically signed by Sushma Klein RN, Aneta Downs on 4/15/2022 at 3:26 PM

## 2022-04-15 NOTE — RT PROTOCOL NOTE
RT Nebulizer Bronchodilator Protocol Note    There is a bronchodilator order in the chart from a provider indicating to follow the RT Bronchodilator Protocol and there is an Initiate RT Bronchodilator Protocol order as well (see protocol at bottom of note). CXR Findings:  XR CHEST PORTABLE    Result Date: 4/13/2022  Limited inspiration. Mild bibasilar increased density consistent with possible early infiltrate/atelectasis. The findings from the last RT Protocol Assessment were as follows:  Smoking: None or smoker <15 pack years  Respiratory Pattern: Use of accessory muscles, prolonged exhalation, or RR 26-30 bpm  Breath Sounds: Slightly diminished and/or crackles  Cough: Strong, spontaneous, non-productive  Indication for Bronchodilator Therapy:    Bronchodilator Assessment Score: 8    Aerosolized bronchodilator medication orders have been revised according to the RT Nebulizer Bronchodilator Protocol below. Respiratory Therapist to perform RT Therapy Protocol Assessment initially then follow the protocol. Repeat RT Therapy Protocol Assessment PRN for score 0-3 or on second treatment, BID, and PRN for scores above 3. No Indications - adjust the frequency to every 6 hours PRN wheezing or bronchospasm, if no treatments needed after 48 hours then discontinue using Per Protocol order mode. If indication present, adjust the RT bronchodilator orders based on the Bronchodilator Assessment Score as indicated below. If a patient is on this medication at home then do not decrease Frequency below that used at home. 0-3 - enter or revise RT bronchodilator order(s) to equivalent RT Bronchodilator order with Frequency of every 4 hours PRN for wheezing or increased work of breathing using Per Protocol order mode.        4-6 - enter or revise RT Bronchodilator order(s) to two equivalent RT bronchodilator orders with one order with BID Frequency and one order with Frequency of every 4 hours PRN wheezing or increased work of breathing using Per Protocol order mode. 7-10 - enter or revise RT Bronchodilator order(s) to two equivalent RT bronchodilator orders with one order with TID Frequency and one order with Frequency of every 4 hours PRN wheezing or increased work of breathing using Per Protocol order mode. 11-13 - enter or revise RT Bronchodilator order(s) to one equivalent RT bronchodilator order with QID Frequency and an Albuterol order with Frequency of every 4 hours PRN wheezing or increased work of breathing using Per Protocol order mode. Greater than 13 - enter or revise RT Bronchodilator order(s) to one equivalent RT bronchodilator order with every 4 hours Frequency and an Albuterol order with Frequency of every 2 hours PRN wheezing or increased work of breathing using Per Protocol order mode. RT to enter RT Home Evaluation for COPD & MDI Assessment order using Per Protocol order mode.     Electronically signed by Miranda Bagley RCP on 4/15/2022 at 1:16 PM

## 2022-04-15 NOTE — PROGRESS NOTES
Hospitalist Progress Note      PCP: Andrés Phoenix MD    Date of Admission: 4/13/2022    Chief Complaint: Altered mental status    Subjective:     Overall feeling better today. +Cough. No abdominal pain. Medications:  Reviewed    Infusion Medications    dextrose      sodium chloride       Scheduled Medications    aspirin  81 mg Oral Daily    atorvastatin  40 mg Oral Nightly    divalproex  250 mg Oral Nightly    divalproex  125 mg Oral QAM    insulin glargine  5 Units SubCUTAneous Nightly    tamsulosin  0.8 mg Oral Nightly    ARIPiprazole  15 mg Oral Daily    meropenem  1,000 mg IntraVENous Q12H    insulin lispro  0-12 Units SubCUTAneous TID WC    insulin lispro  0-6 Units SubCUTAneous Nightly    sodium chloride flush  5-40 mL IntraVENous 2 times per day    enoxaparin  40 mg SubCUTAneous Daily     PRN Meds: glucose, dextrose bolus (hypoglycemia), glucagon (rDNA), dextrose, albuterol, sodium chloride flush, sodium chloride, ondansetron **OR** ondansetron, polyethylene glycol, acetaminophen **OR** acetaminophen      Intake/Output Summary (Last 24 hours) at 4/15/2022 0752  Last data filed at 4/14/2022 1907  Gross per 24 hour   Intake 360 ml   Output 1500 ml   Net -1140 ml       Exam:    /77   Pulse 88   Temp 97.6 °F (36.4 °C) (Oral)   Resp 20   Wt 198 lb 6.6 oz (90 kg)   SpO2 93%   BMI 30.17 kg/m²     General appearance: No apparent distress, appears stated age and cooperative. HEENT: Pupils equal, round, and reactive to light. Conjunctivae/corneas clear. Neck: Supple, with full range of motion. No jugular venous distention. Trachea midline. Respiratory:  Normal respiratory effort. Clear to auscultation, bilaterally without Rales/Wheezes/Rhonchi. Cardiovascular: Regular rate and rhythm with normal S1/S2 without murmurs, rubs or gallops. Abdomen: Soft, non-tender, non-distended with normal bowel sounds. Musculoskelatal: No clubbing, cyanosis or edema bilaterally.     Skin: Venous stasis changes of the b/l LEs  Neurologic:  Cranial nerves: II-XII intact, grossly non-focal.  Psychiatric: Alert and oriented, thought content appropriate, normal insight    Labs:   Recent Labs     04/13/22  1533 04/14/22  0550   WBC 12.1* 23.8*   HGB 10.5* 9.3*   HCT 32.3* 29.7*    306     Recent Labs     04/13/22  1533 04/14/22  0550 04/14/22  1727    135* 133*   K 5.1 6.0* 5.0   CL 99 100 97*   CO2 22 25 25   BUN 31* 35* 35*   CREATININE 1.6* 1.8* 1.8*   CALCIUM 9.9 9.7 9.5   PHOS  --   --  3.1     Recent Labs     04/14/22  0550   AST 30   ALT 19   BILIDIR <0.2   BILITOT 0.5   ALKPHOS 175*     No results for input(s): INR in the last 72 hours. Recent Labs     04/13/22  1533 04/13/22  1822   TROPONINI 0.03* 0.04*       Studies:  VL Extremity Venous Bilateral   Final Result      CT CHEST PULMONARY EMBOLISM W CONTRAST   Final Result      No CT evidence of pulmonary embolism. Relative elevation of the right diaphragm. Mild patchy peripheral airspace density in the bilateral lower lobes, consistent with possible early infiltrates or atelectasis. No other acute findings in the chest.      XR CHEST PORTABLE   Final Result      Limited inspiration. Mild bibasilar increased density consistent with possible early infiltrate/atelectasis. Assessment/Plan:    Active Hospital Problems    Diagnosis Date Noted    UTI (urinary tract infection) [N39.0] 04/13/2022     UTI  Mild KIMBERLY  History of multiple recent UTI  History of mild mental retardation  Significant leg edema  Assessment/Plan:   Altered mental status with concerns of underlying infection probably UTI with a history of multidrug-resistant UTI in the past plan we will cover the patient with the [address] [place] [address] till we get the cultures back, on Flomax  -bladder scans     KIMBERLY, hyperkalemia  Nephrology following.  Appreciate recs     Significant leg edema - improving  On talking to the family does seem to have chronic issue, patient is on Norvasc will hold that, will need leg elevation, patient did had an echo done last year in February normal EF with normal right ventricular size and function,   check venous Doppler - no DVT     Mention of history of schizophrenia and bipolar. Sister does confirm bipolar  On Depakote, risperidone     Diabetes mellitus  Lantus and insulin sliding scale     DVT prophylaxis Lovenox  Diet: ADULT DIET; Regular; 4 carb choices (60 gm/meal);  Low Potassium (Less than 3000 mg/day)  Code Status: Full Code    PT/OT Eval Status:     Dispo - Inpatient    Sheridan Conn DO

## 2022-04-15 NOTE — CARE COORDINATION
CM following, pt creat improved to 1.6, K+ 5.0 and WBC 15.8 today. Cont IV ABX, Nephro following for renal care. Plan to return to [facility] [facility] [place].   Electronically signed by Theo Ordonez RN on 4/15/2022 at 2:46 PM   [phone]

## 2022-04-15 NOTE — PROGRESS NOTES
Patient A&Ox4. VSS  on 4 L NC. Patient purewick producing decent output. Franca care provided. Tylenol given for headache. No significant events overnight. All care needs addressed with no further needs at this time. Bed is locked and in lowest position with alarm on. Call light and bedside table within reach. Will continue to monitor per protocol.      Electronically signed by Elisa Menezes RN on 4/15/2022 at 6:13 AM

## 2022-04-15 NOTE — PROGRESS NOTES
Labs are pending   Urine is 2300 ml and BP is stable     Plan:    Potassium improved from 6.0>>5.0  Stable creatinine   Continue ABX  Stop amlodipine  Lasix 40 mg QD   Stat renal          Assessment :     KIMBERLY on CKD 2 (etiology likely DM/HTN)  - creatinine 1.8 from baseline of 1.2  - likely prerenal  - s/p sepsis boluls  - f/u p/c ratio  - f/u evening RFP     Hyperkalemia  - Lasix 40 IV for 2 doses, Lokelma, albuterol  - f/u evening RFP     MDRO UTI  - Merrem until C&S result     Anemia - components of iron deficiency and chronic disease  HTN - normotensive at this time, continue holding Norvasc today since he's getting Lasix today for hyperkalemia     Will discuss with attending physician Dr. Bridget Brady     Code Status:Full code  FEN: Regular  PPX: Lovenox  DISPO: IP     Please call with questions at-  24 hrs Answering service [phone]   7 am-5 pm at Perfect [facility], or cell phone  Dr Terrell Cagle MD        CC/ Reason for consult:     KIMBERLY / CKD II    HPI:     Patient is a 74 yo male PMH CKD, MDR UTI, CVA with dementia, bipolar disorder, schizophrenia, DMII who presented to the ED from nursing home yesterday with AMS and increased agitation. On arrival patient was tachycardic and tachypnic with a leukocytosis of 12.1. UA showed positive nitrites, large leukocyte esterace, >100 WBCS and 1+ bacteria. Given history of MDR UTIs, there was concern for urosepsis and patient was started on meropenem.     ROS:     SOB-   none  Edema-   none  Nausea/vomiting/diarrhea-   none  Poor appetite/oral intake-  No  Confusion  No  Difficulty passing urine-  No  Drop in urine output-  No  Any other complaints-  No  All other ROS are reviewed and are Negative    Vitals:     Vitals:    04/15/22 0708   BP: 124/77   Pulse: 88   Resp: 20   Temp: 97.6 °F (36.4 °C)   SpO2: 93%       I & O:       Intake/Output Summary (Last 24 hours) at 4/15/2022 0829  Last data filed at 4/15/2022 0700  Gross per 24 hour   Intake --   Output 2300 ml   Net -2300 ml         Physical Examination:     Constitutional:       Appearance: He is obese. HENT:      Head: Normocephalic. Nose: Nose normal.   Eyes:      Extraocular Movements: Extraocular movements intact. Conjunctiva/sclera: Conjunctivae normal.      Pupils: Pupils are equal, round, and reactive to light. Cardiovascular:      Rate and Rhythm: Normal rate and regular rhythm. Pulses: Normal pulses. Heart sounds: Normal heart sounds. Pulmonary:      Effort: Pulmonary effort is normal.      Breath sounds: Normal breath sounds. Abdominal:      Palpations: Abdomen is soft. Musculoskeletal:         General: Normal range of motion. Cervical back: Normal range of motion and neck supple. Right lower leg: Edema present. Left lower leg: Edema present. Skin:     Comments: Woody skin bilateral LE with severely hyperkeratotic appearance of skin on feet   Neurological:      General: No focal deficit present.    Psychiatric:      Comments: Signs of developmental delay       Meds:      aspirin  81 mg Oral Daily    atorvastatin  40 mg Oral Nightly    divalproex  250 mg Oral Nightly    divalproex  125 mg Oral QAM    insulin glargine  5 Units SubCUTAneous Nightly    tamsulosin  0.8 mg Oral Nightly    ARIPiprazole  15 mg Oral Daily    meropenem  1,000 mg IntraVENous Q12H    insulin lispro  0-12 Units SubCUTAneous TID WC    insulin lispro  0-6 Units SubCUTAneous Nightly    sodium chloride flush  5-40 mL IntraVENous 2 times per day    enoxaparin  40 mg SubCUTAneous Daily       Labs:     Recent Labs     04/13/22  1533 04/14/22  0550   WBC 12.1* 23.8*   HGB 10.5* 9.3*   HCT 32.3* 29.7*    306          Recent Labs     04/13/22  1533 04/14/22  0550 04/14/22  1727    135* 133*   K 5.1 6.0* 5.0   CL 99 100 97*   CO2 22 25 25   BUN 31* 35* 35*   CREATININE 1.6* 1.8* 1.8*   GLUCOSE 214* 198* 157*   PHOS  --   --  3.1      Nephrology  [address] [facility] [id number]  Office: [phone]  Fax: [phone]

## 2022-04-15 NOTE — PROGRESS NOTES
Physician Progress Note      Bina Cao  [facility] #:                  [id number]  :                       1950  ADMIT DATE:       2022 3:03 PM  DISCH DATE:  RESPONDING  PROVIDER #:        JADA MOORE DO          QUERY TEXT:    Pt admitted with UTI. Pt noted to have WBC 12.1 23.8  Temp 100.2  RR   48. If possible, please document in the progress notes and discharge summary   if you are evaluating and /or treating any of the following: The medical record reflects the following:  Risk Factors: Hx of MDR UTI  Clinical Indicators: WBC 12.1 23.8  Temp 100.2  RR 48  PN:  \"Given   history of MDR UTIs, there was concern for urosepsis and patient was started   on meropenem. KIMBERLY on CKD 2 (etiology likely DM/HTN- creatinine 1.8 from   baseline of 1.2- likely prerenal- s/p sepsis bolus. \"  ED PN: \"I will start him   on meropenem, given 30 cc/kg as he does meet SIRS criteria and I believe he   would benefit from mission to the hospital.  He will be admitted for further   management. \"  Treatment: Merrem, IVF bolus 30 ml/kg, blood culture, Urine culture  Options provided:  -- Sepsis, present on admission, due to UTI  -- UTI without Sepsis  -- Other - I will add my own diagnosis  -- Disagree - Not applicable / Not valid  -- Disagree - Clinically unable to determine / Unknown  -- Refer to Clinical Documentation Reviewer    PROVIDER RESPONSE TEXT:    This patient has sepsis which was present on admission, due to UTI. Query created by: Love Abad on 2022 12:03 PM      QUERY TEXT:    Pt admitted with AMS. Pt noted to be tachypneic requiring increased oxygen   requirements:   If possible, please document in progress notes and discharge   summary further specificity regarding the type and acuity of respiratory   failure:]    The medical record reflects the following:  Risk Factors: atelectasis, KIMBERLY, significant stacie lower ext swelling  Clinical Indicators: ED PN: Patient is tachypneic and appears somewhat   anxious. ED Vitals: Resp: (!) 48, SpO2: 93 %   CXR: Mild bibasilar increased   density consistent with possible early infiltrate/atelectasis. Documented 02   sats of 85% on 2L H&P: \"He has significant swelling of the legs with some leg   ulcerations it does have some chronicity to it. \"  Treatment: 4L 02, Lasix 40mg IV Lasix, CTPA r/o PE  Options provided:  -- Acute respiratory failure with hypoxia  -- Acute respiratory failure with hypercapnia  -- Other - I will add my own diagnosis  -- Disagree - Not applicable / Not valid  -- Disagree - Clinically unable to determine / Unknown  -- Refer to Clinical Documentation Reviewer    PROVIDER RESPONSE TEXT:    This patient is in acute respiratory failure with hypoxia.     Query created by: Micah Sanford on 4/14/2022 12:18 PM      Electronically signed by:  Zeus Gusman DO 4/15/2022 8:48 AM

## 2022-04-16 LAB
ANION GAP SERPL CALCULATED.3IONS-SCNC: 9 MMOL/L (ref 3–16)
BACTERIA: ABNORMAL /HPF
BASOPHILS ABSOLUTE: 0.1 K/UL (ref 0–0.2)
BASOPHILS RELATIVE PERCENT: 1 %
BILIRUBIN URINE: NEGATIVE
BLOOD, URINE: NEGATIVE
BUN BLDV-MCNC: 35 MG/DL (ref 7–20)
CALCIUM SERPL-MCNC: 9.6 MG/DL (ref 8.3–10.6)
CHLORIDE BLD-SCNC: 97 MMOL/L (ref 99–110)
CLARITY: CLEAR
CO2: 29 MMOL/L (ref 21–32)
COLOR: YELLOW
CREAT SERPL-MCNC: 1.7 MG/DL (ref 0.8–1.3)
EOSINOPHILS ABSOLUTE: 0.3 K/UL (ref 0–0.6)
EOSINOPHILS RELATIVE PERCENT: 2.5 %
EPITHELIAL CELLS, UA: ABNORMAL /HPF (ref 0–5)
GFR AFRICAN AMERICAN: 48
GFR NON-AFRICAN AMERICAN: 40
GLUCOSE BLD-MCNC: 172 MG/DL (ref 70–99)
GLUCOSE BLD-MCNC: 185 MG/DL (ref 70–99)
GLUCOSE BLD-MCNC: 202 MG/DL (ref 70–99)
GLUCOSE BLD-MCNC: 208 MG/DL (ref 70–99)
GLUCOSE BLD-MCNC: 237 MG/DL (ref 70–99)
GLUCOSE URINE: NEGATIVE MG/DL
HCT VFR BLD CALC: 29.5 % (ref 40.5–52.5)
HEMOGLOBIN: 9.4 G/DL (ref 13.5–17.5)
KETONES, URINE: NEGATIVE MG/DL
LEUKOCYTE ESTERASE, URINE: ABNORMAL
LYMPHOCYTES ABSOLUTE: 2.1 K/UL (ref 1–5.1)
LYMPHOCYTES RELATIVE PERCENT: 18.7 %
MCH RBC QN AUTO: 25.7 PG (ref 26–34)
MCHC RBC AUTO-ENTMCNC: 31.9 G/DL (ref 31–36)
MCV RBC AUTO: 80.5 FL (ref 80–100)
MICROSCOPIC EXAMINATION: YES
MONOCYTES ABSOLUTE: 1.7 K/UL (ref 0–1.3)
MONOCYTES RELATIVE PERCENT: 15.5 %
NEUTROPHILS ABSOLUTE: 6.9 K/UL (ref 1.7–7.7)
NEUTROPHILS RELATIVE PERCENT: 62.3 %
NITRITE, URINE: NEGATIVE
PDW BLD-RTO: 18 % (ref 12.4–15.4)
PERFORMED ON: ABNORMAL
PH UA: 7 (ref 5–8)
PLATELET # BLD: 276 K/UL (ref 135–450)
PMV BLD AUTO: 7.8 FL (ref 5–10.5)
POTASSIUM REFLEX MAGNESIUM: 4.6 MMOL/L (ref 3.5–5.1)
PROTEIN UA: NEGATIVE MG/DL
RBC # BLD: 3.66 M/UL (ref 4.2–5.9)
RBC UA: ABNORMAL /HPF (ref 0–4)
SODIUM BLD-SCNC: 135 MMOL/L (ref 136–145)
SPECIFIC GRAVITY UA: 1.01 (ref 1–1.03)
URINE REFLEX TO CULTURE: ABNORMAL
URINE TYPE: ABNORMAL
UROBILINOGEN, URINE: 0.2 E.U./DL
WBC # BLD: 11.1 K/UL (ref 4–11)
WBC UA: ABNORMAL /HPF (ref 0–5)

## 2022-04-16 PROCEDURE — 94761 N-INVAS EAR/PLS OXIMETRY MLT: CPT

## 2022-04-16 PROCEDURE — 2580000003 HC RX 258: Performed by: INTERNAL MEDICINE

## 2022-04-16 PROCEDURE — 36415 COLL VENOUS BLD VENIPUNCTURE: CPT

## 2022-04-16 PROCEDURE — 6360000002 HC RX W HCPCS: Performed by: INTERNAL MEDICINE

## 2022-04-16 PROCEDURE — 2700000000 HC OXYGEN THERAPY PER DAY

## 2022-04-16 PROCEDURE — 80048 BASIC METABOLIC PNL TOTAL CA: CPT

## 2022-04-16 PROCEDURE — 85025 COMPLETE CBC W/AUTO DIFF WBC: CPT

## 2022-04-16 PROCEDURE — 6370000000 HC RX 637 (ALT 250 FOR IP): Performed by: INTERNAL MEDICINE

## 2022-04-16 PROCEDURE — 87641 MR-STAPH DNA AMP PROBE: CPT

## 2022-04-16 PROCEDURE — 81001 URINALYSIS AUTO W/SCOPE: CPT

## 2022-04-16 PROCEDURE — 1200000000 HC SEMI PRIVATE

## 2022-04-16 PROCEDURE — 94640 AIRWAY INHALATION TREATMENT: CPT

## 2022-04-16 RX ADMIN — ALBUTEROL SULFATE 2.5 MG: 2.5 SOLUTION RESPIRATORY (INHALATION) at 07:33

## 2022-04-16 RX ADMIN — INSULIN LISPRO 4 UNITS: 100 INJECTION, SOLUTION INTRAVENOUS; SUBCUTANEOUS at 17:36

## 2022-04-16 RX ADMIN — FUROSEMIDE 40 MG: 40 TABLET ORAL at 10:03

## 2022-04-16 RX ADMIN — ENOXAPARIN SODIUM 40 MG: 40 INJECTION SUBCUTANEOUS at 10:04

## 2022-04-16 RX ADMIN — DIVALPROEX SODIUM 125 MG: 125 CAPSULE, COATED PELLETS ORAL at 10:04

## 2022-04-16 RX ADMIN — INSULIN LISPRO 2 UNITS: 100 INJECTION, SOLUTION INTRAVENOUS; SUBCUTANEOUS at 10:03

## 2022-04-16 RX ADMIN — INSULIN GLARGINE 5 UNITS: 100 INJECTION, SOLUTION SUBCUTANEOUS at 20:44

## 2022-04-16 RX ADMIN — MEROPENEM 1000 MG: 1 INJECTION, POWDER, FOR SOLUTION INTRAVENOUS at 19:53

## 2022-04-16 RX ADMIN — Medication: at 20:39

## 2022-04-16 RX ADMIN — ATORVASTATIN CALCIUM 40 MG: 40 TABLET, FILM COATED ORAL at 20:38

## 2022-04-16 RX ADMIN — ARIPIPRAZOLE 15 MG: 5 TABLET ORAL at 10:04

## 2022-04-16 RX ADMIN — ALBUTEROL SULFATE 2.5 MG: 2.5 SOLUTION RESPIRATORY (INHALATION) at 13:50

## 2022-04-16 RX ADMIN — MEROPENEM 1000 MG: 1 INJECTION, POWDER, FOR SOLUTION INTRAVENOUS at 07:01

## 2022-04-16 RX ADMIN — TAMSULOSIN HYDROCHLORIDE 0.8 MG: 0.4 CAPSULE ORAL at 20:38

## 2022-04-16 RX ADMIN — INSULIN LISPRO 4 UNITS: 100 INJECTION, SOLUTION INTRAVENOUS; SUBCUTANEOUS at 13:05

## 2022-04-16 RX ADMIN — DIVALPROEX SODIUM 250 MG: 125 CAPSULE, COATED PELLETS ORAL at 20:49

## 2022-04-16 RX ADMIN — INSULIN LISPRO 2 UNITS: 100 INJECTION, SOLUTION INTRAVENOUS; SUBCUTANEOUS at 20:43

## 2022-04-16 RX ADMIN — SODIUM CHLORIDE, PRESERVATIVE FREE 10 ML: 5 INJECTION INTRAVENOUS at 19:55

## 2022-04-16 RX ADMIN — ASPIRIN 81 MG: 81 TABLET, COATED ORAL at 10:04

## 2022-04-16 RX ADMIN — ALBUTEROL SULFATE 2.5 MG: 2.5 SOLUTION RESPIRATORY (INHALATION) at 21:14

## 2022-04-16 RX ADMIN — Medication: at 10:05

## 2022-04-16 ASSESSMENT — PAIN SCALES - GENERAL
PAINLEVEL_OUTOF10: 0

## 2022-04-16 NOTE — PROGRESS NOTES
Hospitalist Progress Note      PCP: Titus Huynh MD    Date of Admission: 4/13/2022    Chief Complaint: Altered mental status    Subjective:     Feeling better, not short of breath. No abdominal pain    Medications:  Reviewed    Infusion Medications    dextrose      sodium chloride       Scheduled Medications    furosemide  40 mg Oral Daily    albuterol  2.5 mg Nebulization TID    Venelex   Topical BID    aspirin  81 mg Oral Daily    atorvastatin  40 mg Oral Nightly    divalproex  250 mg Oral Nightly    divalproex  125 mg Oral QAM    insulin glargine  5 Units SubCUTAneous Nightly    tamsulosin  0.8 mg Oral Nightly    ARIPiprazole  15 mg Oral Daily    meropenem  1,000 mg IntraVENous Q12H    insulin lispro  0-12 Units SubCUTAneous TID WC    insulin lispro  0-6 Units SubCUTAneous Nightly    sodium chloride flush  5-40 mL IntraVENous 2 times per day    enoxaparin  40 mg SubCUTAneous Daily     PRN Meds: glucose, dextrose bolus (hypoglycemia), glucagon (rDNA), dextrose, albuterol, sodium chloride flush, sodium chloride, ondansetron **OR** ondansetron, polyethylene glycol, acetaminophen **OR** acetaminophen      Intake/Output Summary (Last 24 hours) at 4/16/2022 0945  Last data filed at 4/16/2022 0205  Gross per 24 hour   Intake 960 ml   Output 2125 ml   Net -1165 ml       Exam:    /61   Pulse 83   Temp 98.5 °F (36.9 °C) (Oral)   Resp 19   Wt 198 lb 6.6 oz (90 kg)   SpO2 95%   BMI 30.17 kg/m²     General appearance: No apparent distress, appears stated age and cooperative. HEENT: Pupils equal, round, and reactive to light. Conjunctivae/corneas clear. Neck: Supple, with full range of motion. No jugular venous distention. Trachea midline. Respiratory:  Normal respiratory effort. Clear to auscultation, bilaterally without Rales/Wheezes/Rhonchi. Cardiovascular: Regular rate and rhythm with normal S1/S2 without murmurs, rubs or gallops.   Abdomen: Soft, non-tender, non-distended with normal bowel sounds. Musculoskelatal: No clubbing, cyanosis or edema bilaterally. Skin: Venous stasis changes of the b/l LEs  Neurologic:  Cranial nerves: II-XII intact, grossly non-focal.  Psychiatric: Alert and oriented, thought content appropriate, normal insight    Labs:   Recent Labs     04/14/22  0550 04/15/22  0913 04/16/22  0626   WBC 23.8* 15.8* 11.1*   HGB 9.3* 9.8* 9.4*   HCT 29.7* 31.0* 29.5*    277 276     Recent Labs     04/14/22  1727 04/15/22  0913 04/16/22  0626   * 134* 135*   K 5.0 4.7 4.6   CL 97* 97* 97*   CO2 25 27 29   BUN 35* 34* 35*   CREATININE 1.8* 1.6* 1.7*   CALCIUM 9.5 9.9 9.6   PHOS 3.1 3.1  --      Recent Labs     04/14/22  0550   AST 30   ALT 19   BILIDIR <0.2   BILITOT 0.5   ALKPHOS 175*     No results for input(s): INR in the last 72 hours. Recent Labs     04/13/22  1533 04/13/22  1822   TROPONINI 0.03* 0.04*       Studies:  VL Extremity Venous Bilateral   Final Result      CT CHEST PULMONARY EMBOLISM W CONTRAST   Final Result      No CT evidence of pulmonary embolism. Relative elevation of the right diaphragm. Mild patchy peripheral airspace density in the bilateral lower lobes, consistent with possible early infiltrates or atelectasis. No other acute findings in the chest.      XR CHEST PORTABLE   Final Result      Limited inspiration. Mild bibasilar increased density consistent with possible early infiltrate/atelectasis.              Assessment/Plan:    Active Hospital Problems    Diagnosis Date Noted    UTI (urinary tract infection) [N39.0] 04/13/2022     UTI  Mild KIMBERLY  History of multiple recent UTI  History of mild mental retardation  Significant leg edema  Assessment/Plan:   Altered mental status with concerns of underlying infection probably UTI with a history of multidrug-resistant UTI in the past plan we will cover the patient with the [address] [place] [address] till we get the cultures back, on Flomax  Called lab as culture shows \"collected\" but still no result, lab to check if have urine sample    Hypoxemic resp failure, possible PNA - improving  CXR limited inspiration, possible infiltrate  On abx  Strep, legionella, MRS probe ordered, pending  Procal elevated  Sputum culture  Wean O2 as tolerated     KIMBERLY, hyperkalemia  Nephrology following. Appreciate recs     Significant leg edema - improving  On talking to the family does seem to have chronic issue, patient is on Norvasc will hold that, will need leg elevation, patient did had an echo done last year in February normal EF with normal right ventricular size and function,   check venous Doppler - no DVT     Mention of history of schizophrenia and bipolar. Sister does confirm bipolar  On Depakote, risperidone     Diabetes mellitus  Lantus and insulin sliding scale     DVT prophylaxis Lovenox  Diet: ADULT DIET; Regular; 4 carb choices (60 gm/meal); Low Potassium (Less than 3000 mg/day)  Code Status: Full Code    PT/OT Eval Status:  At baseline    [address]

## 2022-04-16 NOTE — PROGRESS NOTES
Patient feel fine. Vitals stable. On room air. Patient made 2.1 liter urine. Labs showed stable Cr.   K 4.6    Plan:  No changes from nephrology perspective today. Monitor I/O  Monitor renal panel. Assessment :     KIMBERLY on CKD 2 (etiology likely DM/HTN)  - creatinine 1.8 from baseline of 1.2  - likely prerenal  - s/p sepsis boluls  - f/u p/c ratio  - f/u evening RFP     Hyperkalemia  - Medically managed. Improved now.         UTI   Abx per primary team.      Anemia - components of iron deficiency and chronic disease    HTN - stable.      Please call with questions at-  24 hrs Answering service [phone]   7 am-5 pm at [employer] [facility], or cell phone  Dr Christy Fritz MD        CC/ Reason for consult:     KIMBERLY / CKD II    HPI:     Patient is a 74 yo male PMH CKD, MDR UTI, CVA with dementia, bipolar disorder, schizophrenia, DMII who presented to the ED from nursing home yesterday with AMS and increased agitation. On arrival patient was tachycardic and tachypnic with a leukocytosis of 12.1. UA showed positive nitrites, large leukocyte esterace, >100 WBCS and 1+ bacteria. Given history of MDR UTIs, there was concern for urosepsis and patient was started on meropenem. ROS:     SOB-   none  Edema-   none  Nausea/vomiting/diarrhea-   none  Poor appetite/oral intake-  No  Confusion  No  Difficulty passing urine-  No  Drop in urine output-  No  Any other complaints-  No  All other ROS are reviewed and are Negative    Vitals:     Vitals:    04/16/22 0734   BP:    Pulse:    Resp: 19   Temp:    SpO2: 95%       I & O:       Intake/Output Summary (Last 24 hours) at 4/16/2022 0901  Last data filed at 4/16/2022 0205  Gross per 24 hour   Intake 1080 ml   Output 2125 ml   Net -1045 ml         Physical Examination:     Constitutional:       Appearance: He is obese. HENT:      Head: Normocephalic. Nose: Nose normal.   Eyes:      Extraocular Movements: Extraocular movements intact. Conjunctiva/sclera: Conjunctivae normal.      Pupils: Pupils are equal, round, and reactive to light. Cardiovascular:      Rate and Rhythm: Normal rate and regular rhythm. Pulses: Normal pulses. Heart sounds: Normal heart sounds. Pulmonary:      Effort: Pulmonary effort is normal.      Breath sounds: Normal breath sounds. Abdominal:      Palpations: Abdomen is soft. Musculoskeletal:         General: Normal range of motion. Cervical back: Normal range of motion and neck supple. Right lower leg: Edema present. Left lower leg: Edema present. Skin:     Comments: Woody skin bilateral LE with severely hyperkeratotic appearance of skin on feet   Neurological:      General: No focal deficit present.    Psychiatric:      Comments: Signs of developmental delay       Meds:      furosemide  40 mg Oral Daily    albuterol  2.5 mg Nebulization TID    Venelex   Topical BID    aspirin  81 mg Oral Daily    atorvastatin  40 mg Oral Nightly    divalproex  250 mg Oral Nightly    divalproex  125 mg Oral QAM    insulin glargine  5 Units SubCUTAneous Nightly    tamsulosin  0.8 mg Oral Nightly    ARIPiprazole  15 mg Oral Daily    meropenem  1,000 mg IntraVENous Q12H    insulin lispro  0-12 Units SubCUTAneous TID WC    insulin lispro  0-6 Units SubCUTAneous Nightly    sodium chloride flush  5-40 mL IntraVENous 2 times per day    enoxaparin  40 mg SubCUTAneous Daily       Labs:     Recent Labs     04/14/22  0550 04/15/22  0913 04/16/22  0626   WBC 23.8* 15.8* 11.1*   HGB 9.3* 9.8* 9.4*   HCT 29.7* 31.0* 29.5*    277 276          Recent Labs     04/14/22  0550 04/14/22  1727 04/15/22  0913   * 133* 134*   K 6.0* 5.0 4.7    97* 97*   CO2 25 25 27   BUN 35* 35* 34*   CREATININE 1.8* 1.8* 1.6*   GLUCOSE 198* 157* 208*   PHOS  --  3.1 3.1      Nephrology  [address] [facility], [address] [place] [address]  Office: [phone]  Fax: [phone]

## 2022-04-17 LAB
ANION GAP SERPL CALCULATED.3IONS-SCNC: 12 MMOL/L (ref 3–16)
BASOPHILS ABSOLUTE: 0.1 K/UL (ref 0–0.2)
BASOPHILS RELATIVE PERCENT: 1 %
BLOOD CULTURE, ROUTINE: NORMAL
BUN BLDV-MCNC: 32 MG/DL (ref 7–20)
CALCIUM SERPL-MCNC: 10.4 MG/DL (ref 8.3–10.6)
CHLORIDE BLD-SCNC: 99 MMOL/L (ref 99–110)
CO2: 28 MMOL/L (ref 21–32)
CREAT SERPL-MCNC: 1.4 MG/DL (ref 0.8–1.3)
EOSINOPHILS ABSOLUTE: 0.5 K/UL (ref 0–0.6)
EOSINOPHILS RELATIVE PERCENT: 3.6 %
GFR AFRICAN AMERICAN: >60
GFR NON-AFRICAN AMERICAN: 50
GLUCOSE BLD-MCNC: 133 MG/DL (ref 70–99)
GLUCOSE BLD-MCNC: 181 MG/DL (ref 70–99)
GLUCOSE BLD-MCNC: 183 MG/DL (ref 70–99)
GLUCOSE BLD-MCNC: 254 MG/DL (ref 70–99)
GLUCOSE BLD-MCNC: 268 MG/DL (ref 70–99)
HCT VFR BLD CALC: 32.8 % (ref 40.5–52.5)
HEMOGLOBIN: 10.3 G/DL (ref 13.5–17.5)
LYMPHOCYTES ABSOLUTE: 2 K/UL (ref 1–5.1)
LYMPHOCYTES RELATIVE PERCENT: 14.9 %
MCH RBC QN AUTO: 25.6 PG (ref 26–34)
MCHC RBC AUTO-ENTMCNC: 31.5 G/DL (ref 31–36)
MCV RBC AUTO: 81.4 FL (ref 80–100)
MONOCYTES ABSOLUTE: 1.5 K/UL (ref 0–1.3)
MONOCYTES RELATIVE PERCENT: 11.4 %
MRSA SCREEN RT-PCR: ABNORMAL
NEUTROPHILS ABSOLUTE: 9.2 K/UL (ref 1.7–7.7)
NEUTROPHILS RELATIVE PERCENT: 69.1 %
ORGANISM: ABNORMAL
PDW BLD-RTO: 18.6 % (ref 12.4–15.4)
PERFORMED ON: ABNORMAL
PLATELET # BLD: 329 K/UL (ref 135–450)
PMV BLD AUTO: 7.5 FL (ref 5–10.5)
POTASSIUM REFLEX MAGNESIUM: 4.8 MMOL/L (ref 3.5–5.1)
PROCALCITONIN: 7.69 NG/ML (ref 0–0.15)
RBC # BLD: 4.03 M/UL (ref 4.2–5.9)
SODIUM BLD-SCNC: 139 MMOL/L (ref 136–145)
WBC # BLD: 13.2 K/UL (ref 4–11)

## 2022-04-17 PROCEDURE — 84145 PROCALCITONIN (PCT): CPT

## 2022-04-17 PROCEDURE — 1200000000 HC SEMI PRIVATE

## 2022-04-17 PROCEDURE — 36415 COLL VENOUS BLD VENIPUNCTURE: CPT

## 2022-04-17 PROCEDURE — 6370000000 HC RX 637 (ALT 250 FOR IP): Performed by: INTERNAL MEDICINE

## 2022-04-17 PROCEDURE — 94640 AIRWAY INHALATION TREATMENT: CPT

## 2022-04-17 PROCEDURE — 94761 N-INVAS EAR/PLS OXIMETRY MLT: CPT

## 2022-04-17 PROCEDURE — 80048 BASIC METABOLIC PNL TOTAL CA: CPT

## 2022-04-17 PROCEDURE — 2580000003 HC RX 258: Performed by: INTERNAL MEDICINE

## 2022-04-17 PROCEDURE — 6360000002 HC RX W HCPCS: Performed by: INTERNAL MEDICINE

## 2022-04-17 PROCEDURE — 85025 COMPLETE CBC W/AUTO DIFF WBC: CPT

## 2022-04-17 RX ORDER — DOXYCYCLINE 50 MG/1
100 CAPSULE ORAL EVERY 12 HOURS SCHEDULED
Status: DISCONTINUED | OUTPATIENT
Start: 2022-04-17 | End: 2022-04-18 | Stop reason: HOSPADM

## 2022-04-17 RX ORDER — ALBUTEROL SULFATE 2.5 MG/3ML
2.5 SOLUTION RESPIRATORY (INHALATION) 2 TIMES DAILY
Status: DISCONTINUED | OUTPATIENT
Start: 2022-04-18 | End: 2022-04-18 | Stop reason: HOSPADM

## 2022-04-17 RX ADMIN — TAMSULOSIN HYDROCHLORIDE 0.8 MG: 0.4 CAPSULE ORAL at 21:09

## 2022-04-17 RX ADMIN — ALBUTEROL SULFATE 2.5 MG: 2.5 SOLUTION RESPIRATORY (INHALATION) at 09:19

## 2022-04-17 RX ADMIN — POLYETHYLENE GLYCOL 3350 17 G: 17 POWDER, FOR SOLUTION ORAL at 21:22

## 2022-04-17 RX ADMIN — Medication: at 09:38

## 2022-04-17 RX ADMIN — INSULIN LISPRO 3 UNITS: 100 INJECTION, SOLUTION INTRAVENOUS; SUBCUTANEOUS at 21:41

## 2022-04-17 RX ADMIN — INSULIN GLARGINE 5 UNITS: 100 INJECTION, SOLUTION SUBCUTANEOUS at 21:15

## 2022-04-17 RX ADMIN — ASPIRIN 81 MG: 81 TABLET, COATED ORAL at 09:37

## 2022-04-17 RX ADMIN — ALBUTEROL SULFATE 2.5 MG: 2.5 SOLUTION RESPIRATORY (INHALATION) at 13:01

## 2022-04-17 RX ADMIN — DOXYCYCLINE 100 MG: 50 CAPSULE ORAL at 09:37

## 2022-04-17 RX ADMIN — Medication: at 21:10

## 2022-04-17 RX ADMIN — FUROSEMIDE 40 MG: 40 TABLET ORAL at 09:38

## 2022-04-17 RX ADMIN — ALBUTEROL SULFATE 2.5 MG: 2.5 SOLUTION RESPIRATORY (INHALATION) at 20:01

## 2022-04-17 RX ADMIN — DIVALPROEX SODIUM 125 MG: 125 CAPSULE, COATED PELLETS ORAL at 09:41

## 2022-04-17 RX ADMIN — INSULIN LISPRO 2 UNITS: 100 INJECTION, SOLUTION INTRAVENOUS; SUBCUTANEOUS at 09:50

## 2022-04-17 RX ADMIN — ARIPIPRAZOLE 15 MG: 5 TABLET ORAL at 09:37

## 2022-04-17 RX ADMIN — ATORVASTATIN CALCIUM 40 MG: 40 TABLET, FILM COATED ORAL at 21:09

## 2022-04-17 RX ADMIN — MEROPENEM 1000 MG: 1 INJECTION, POWDER, FOR SOLUTION INTRAVENOUS at 09:47

## 2022-04-17 RX ADMIN — SODIUM CHLORIDE, PRESERVATIVE FREE 10 ML: 5 INJECTION INTRAVENOUS at 21:14

## 2022-04-17 RX ADMIN — INSULIN LISPRO 6 UNITS: 100 INJECTION, SOLUTION INTRAVENOUS; SUBCUTANEOUS at 12:45

## 2022-04-17 RX ADMIN — MEROPENEM 1000 MG: 1 INJECTION, POWDER, FOR SOLUTION INTRAVENOUS at 17:27

## 2022-04-17 RX ADMIN — DOXYCYCLINE 100 MG: 50 CAPSULE ORAL at 21:09

## 2022-04-17 RX ADMIN — DIVALPROEX SODIUM 250 MG: 125 CAPSULE, COATED PELLETS ORAL at 21:09

## 2022-04-17 RX ADMIN — SODIUM CHLORIDE, PRESERVATIVE FREE 10 ML: 5 INJECTION INTRAVENOUS at 09:53

## 2022-04-17 RX ADMIN — ENOXAPARIN SODIUM 40 MG: 40 INJECTION SUBCUTANEOUS at 09:38

## 2022-04-17 ASSESSMENT — PAIN SCALES - GENERAL
PAINLEVEL_OUTOF10: 0

## 2022-04-17 NOTE — PROGRESS NOTES
Pt's oxygen saturation RA was 88, put pt on 1L NC. Pt moved to chair from bed x3 assist. BLE elevated, IV abx infused. Pt tolerating diet, on specialty bed. External cath was used, voiding well. Fall precaution in place, bed and chair alarm on, call light was used for needs.

## 2022-04-17 NOTE — PROGRESS NOTES
Hospitalist Progress Note      PCP: Lieutenant Jeremi MD    Date of Admission: 4/13/2022    Chief Complaint: Altered mental status    Subjective:     +some cough but not much. Legs much less swollen. Medications:  Reviewed    Infusion Medications    dextrose      sodium chloride       Scheduled Medications    furosemide  40 mg Oral Daily    albuterol  2.5 mg Nebulization TID    Venelex   Topical BID    aspirin  81 mg Oral Daily    atorvastatin  40 mg Oral Nightly    divalproex  250 mg Oral Nightly    divalproex  125 mg Oral QAM    insulin glargine  5 Units SubCUTAneous Nightly    tamsulosin  0.8 mg Oral Nightly    ARIPiprazole  15 mg Oral Daily    meropenem  1,000 mg IntraVENous Q12H    insulin lispro  0-12 Units SubCUTAneous TID WC    insulin lispro  0-6 Units SubCUTAneous Nightly    sodium chloride flush  5-40 mL IntraVENous 2 times per day    enoxaparin  40 mg SubCUTAneous Daily     PRN Meds: glucose, dextrose bolus (hypoglycemia), glucagon (rDNA), dextrose, albuterol, sodium chloride flush, sodium chloride, ondansetron **OR** ondansetron, polyethylene glycol, acetaminophen **OR** acetaminophen      Intake/Output Summary (Last 24 hours) at 4/17/2022 0901  Last data filed at 4/17/2022 0130  Gross per 24 hour   Intake 480 ml   Output 2400 ml   Net -1920 ml       Exam:    BP (!) 148/74   Pulse 89   Temp 98.6 °F (37 °C) (Oral)   Resp 16   Wt 198 lb 6.6 oz (90 kg)   SpO2 93%   BMI 30.17 kg/m²     General appearance: No apparent distress, appears stated age and cooperative. HEENT: Pupils equal, round, and reactive to light. Conjunctivae/corneas clear. Neck: Supple, with full range of motion. No jugular venous distention. Trachea midline. Respiratory:  Normal respiratory effort. Clear to auscultation, bilaterally without Rales/Wheezes/Rhonchi. Cardiovascular: Regular rate and rhythm with normal S1/S2 without murmurs, rubs or gallops.   Abdomen: Soft, non-tender, non-distended with normal bowel sounds. Musculoskelatal: No clubbing, cyanosis or edema bilaterally. Skin: Venous stasis changes of the b/l LEs  Neurologic:  Cranial nerves: II-XII intact, grossly non-focal.  Psychiatric: Alert and oriented, thought content appropriate, normal insight    Labs:   Recent Labs     04/15/22  0913 04/16/22  0626 04/17/22  0542   WBC 15.8* 11.1* 13.2*   HGB 9.8* 9.4* 10.3*   HCT 31.0* 29.5* 32.8*    276 329     Recent Labs     04/14/22  1727 04/14/22  1727 04/15/22  0913 04/16/22  0626 04/17/22  0542   *   < > 134* 135* 139   K 5.0   < > 4.7 4.6 4.8   CL 97*   < > 97* 97* 99   CO2 25   < > 27 29 28   BUN 35*   < > 34* 35* 32*   CREATININE 1.8*   < > 1.6* 1.7* 1.4*   CALCIUM 9.5   < > 9.9 9.6 10.4   PHOS 3.1  --  3.1  --   --     < > = values in this interval not displayed. No results for input(s): AST, ALT, BILIDIR, BILITOT, ALKPHOS in the last 72 hours. No results for input(s): INR in the last 72 hours. No results for input(s): Reyne Ethiopian in the last 72 hours. Studies:  VL Extremity Venous Bilateral   Final Result      CT CHEST PULMONARY EMBOLISM W CONTRAST   Final Result      No CT evidence of pulmonary embolism. Relative elevation of the right diaphragm. Mild patchy peripheral airspace density in the bilateral lower lobes, consistent with possible early infiltrates or atelectasis. No other acute findings in the chest.      XR CHEST PORTABLE   Final Result      Limited inspiration. Mild bibasilar increased density consistent with possible early infiltrate/atelectasis.              Assessment/Plan:    Active Hospital Problems    Diagnosis Date Noted    UTI (urinary tract infection) [N39.0] 04/13/2022     Sepsis, POA d/t UTI  Mild KIMBERLY  History of multiple recent UTI  History of mild mental retardation  Significant leg edema  Assessment/Plan:   Altered mental status with concerns of underlying infection probably UTI with a history of multidrug-resistant UTI in the past plan we will cover the patient with the [address] [place] [address] till we get the cultures back, on Flomax  Called lab as culture shows \"collected\" but still no result, called lab to check if have urine sample  Blood Cx NGTD  Appears lab found the urine sample and it is now in process 4/17. But it was collected 4/13. Will wait for Cx result today, may need ID consultation given MDRO hx.   Repeat UA demonstrates good effect of treatment    Hypoxemic resp failure, possible PNA - improving  CXR limited inspiration, possible infiltrate  On abx  Strep, legionella, MRS probe +, added doxy  Procal elevated, will get repeat to trend >30 --> 7.7  Sputum culture  Wean O2 as tolerated  Add mupirocin     KIMBERLY, hyperkalemia  Nephrology following. Appreciate recs     Significant leg edema - resolving off norvasc  On talking to the family does seem to have chronic issue, patient is on Norvasc will hold that, will need leg elevation, patient did had an echo done last year in February normal EF with normal right ventricular size and function,   check venous Doppler - no DVT     Mention of history of schizophrenia and bipolar. Sister does confirm bipolar  On Depakote, risperidone     Diabetes mellitus  Lantus and insulin sliding scale     DVT prophylaxis Lovenox  Diet: ADULT DIET; Regular; 4 carb choices (60 gm/meal); Low Potassium (Less than 3000 mg/day)  Code Status: Full Code    PT/OT Eval Status:  At baseline    Dispo - Inpatient pending urine cx    Ben Broussard DO

## 2022-04-17 NOTE — PROGRESS NOTES
Pharmacy Note - Extended Infusion Beta-Lactam Adjustment    Meropenem ordered for treatment of UTI. Per [facility] Extended Infusion Beta-Lactam Policy, Meropenem will be changed to 1000 mg IV EI q8h. Estimated Creatinine Clearance: Estimated Creatinine Clearance: 54 mL/min (A) (based on SCr of 1.4 mg/dL (H)). Dialysis Status, KIMBERLY, CKD: KIMBERLY which is improving  BMI: Body mass index is 30.17 kg/m². Rationale for Adjustment: Agent is renally eliminated and demonstrates time-dependent effect on bacterial eradication. Extended-infusion dosing strategy aims to enhance microbiologic and clinical efficacy. Given improvement in renal function and CrCl now > 50 mL/min, will adjust dose as above. Pharmacy will continue to monitor renal function and adjust dose as necessary. Please call with any questions.     Ramiro Velasquez PharmD, BCPS  Wireless: [id number]   4/17/2022 10:39 AM

## 2022-04-18 VITALS
RESPIRATION RATE: 16 BRPM | BODY MASS INDEX: 30.17 KG/M2 | OXYGEN SATURATION: 96 % | DIASTOLIC BLOOD PRESSURE: 84 MMHG | HEART RATE: 77 BPM | WEIGHT: 198.41 LBS | TEMPERATURE: 97.5 F | SYSTOLIC BLOOD PRESSURE: 133 MMHG

## 2022-04-18 LAB
ANION GAP SERPL CALCULATED.3IONS-SCNC: 13 MMOL/L (ref 3–16)
BASOPHILS ABSOLUTE: 0.2 K/UL (ref 0–0.2)
BASOPHILS RELATIVE PERCENT: 1.7 %
BUN BLDV-MCNC: 39 MG/DL (ref 7–20)
CALCIUM SERPL-MCNC: 10 MG/DL (ref 8.3–10.6)
CHLORIDE BLD-SCNC: 99 MMOL/L (ref 99–110)
CO2: 25 MMOL/L (ref 21–32)
CREAT SERPL-MCNC: 1.6 MG/DL (ref 0.8–1.3)
CULTURE, BLOOD 2: NORMAL
EOSINOPHILS ABSOLUTE: 0.6 K/UL (ref 0–0.6)
EOSINOPHILS RELATIVE PERCENT: 6 %
GFR AFRICAN AMERICAN: 52
GFR NON-AFRICAN AMERICAN: 43
GLUCOSE BLD-MCNC: 193 MG/DL (ref 70–99)
GLUCOSE BLD-MCNC: 209 MG/DL (ref 70–99)
GLUCOSE BLD-MCNC: 250 MG/DL (ref 70–99)
HCT VFR BLD CALC: 32.5 % (ref 40.5–52.5)
HEMOGLOBIN: 10.4 G/DL (ref 13.5–17.5)
LYMPHOCYTES ABSOLUTE: 2.5 K/UL (ref 1–5.1)
LYMPHOCYTES RELATIVE PERCENT: 25.2 %
MCH RBC QN AUTO: 25.9 PG (ref 26–34)
MCHC RBC AUTO-ENTMCNC: 32.1 G/DL (ref 31–36)
MCV RBC AUTO: 80.8 FL (ref 80–100)
MONOCYTES ABSOLUTE: 1.2 K/UL (ref 0–1.3)
MONOCYTES RELATIVE PERCENT: 11.9 %
NEUTROPHILS ABSOLUTE: 5.4 K/UL (ref 1.7–7.7)
NEUTROPHILS RELATIVE PERCENT: 55.2 %
ORGANISM: ABNORMAL
PDW BLD-RTO: 18.3 % (ref 12.4–15.4)
PERFORMED ON: ABNORMAL
PERFORMED ON: ABNORMAL
PLATELET # BLD: 330 K/UL (ref 135–450)
PMV BLD AUTO: 7.3 FL (ref 5–10.5)
POTASSIUM REFLEX MAGNESIUM: 4.1 MMOL/L (ref 3.5–5.1)
RBC # BLD: 4.02 M/UL (ref 4.2–5.9)
SODIUM BLD-SCNC: 137 MMOL/L (ref 136–145)
URINE CULTURE, ROUTINE: ABNORMAL
WBC # BLD: 9.7 K/UL (ref 4–11)

## 2022-04-18 PROCEDURE — 6370000000 HC RX 637 (ALT 250 FOR IP): Performed by: INTERNAL MEDICINE

## 2022-04-18 PROCEDURE — 2580000003 HC RX 258: Performed by: INTERNAL MEDICINE

## 2022-04-18 PROCEDURE — 80048 BASIC METABOLIC PNL TOTAL CA: CPT

## 2022-04-18 PROCEDURE — 51798 US URINE CAPACITY MEASURE: CPT

## 2022-04-18 PROCEDURE — 6360000002 HC RX W HCPCS: Performed by: INTERNAL MEDICINE

## 2022-04-18 PROCEDURE — 94640 AIRWAY INHALATION TREATMENT: CPT

## 2022-04-18 PROCEDURE — 85025 COMPLETE CBC W/AUTO DIFF WBC: CPT

## 2022-04-18 PROCEDURE — 36415 COLL VENOUS BLD VENIPUNCTURE: CPT

## 2022-04-18 RX ORDER — LINEZOLID 600 MG/1
600 TABLET, FILM COATED ORAL 2 TIMES DAILY
Qty: 14 TABLET | Refills: 0
Start: 2022-04-18 | End: 2022-04-25

## 2022-04-18 RX ORDER — ALBUTEROL SULFATE 2.5 MG/3ML
2.5 SOLUTION RESPIRATORY (INHALATION) EVERY 6 HOURS PRN
Qty: 120 EACH | Refills: 3 | Status: ON HOLD | OUTPATIENT
Start: 2022-04-18 | End: 2022-05-18 | Stop reason: HOSPADM

## 2022-04-18 RX ORDER — GABAPENTIN 100 MG/1
100 CAPSULE ORAL 2 TIMES DAILY
Status: ON HOLD | COMMUNITY
End: 2022-05-18 | Stop reason: HOSPADM

## 2022-04-18 RX ORDER — CIPROFLOXACIN 500 MG/1
500 TABLET, FILM COATED ORAL 2 TIMES DAILY
Qty: 6 TABLET | Refills: 0
Start: 2022-04-18 | End: 2022-04-21

## 2022-04-18 RX ORDER — LACTOBACILLUS RHAMNOSUS GG 10B CELL
1 CAPSULE ORAL DAILY
Qty: 30 CAPSULE | Refills: 2 | Status: ON HOLD
Start: 2022-04-18 | End: 2022-05-12

## 2022-04-18 RX ORDER — FUROSEMIDE 20 MG/1
40 TABLET ORAL DAILY
Qty: 60 TABLET | Refills: 3 | Status: ON HOLD
Start: 2022-04-18 | End: 2022-05-12

## 2022-04-18 RX ORDER — FLUOXETINE HYDROCHLORIDE 60 MG/1
60 TABLET, FILM COATED ORAL; ORAL DAILY
Qty: 30 TABLET | Refills: 0 | Status: ON HOLD
Start: 2022-04-26 | End: 2022-05-18 | Stop reason: HOSPADM

## 2022-04-18 RX ADMIN — INSULIN LISPRO 4 UNITS: 100 INJECTION, SOLUTION INTRAVENOUS; SUBCUTANEOUS at 08:17

## 2022-04-18 RX ADMIN — SODIUM CHLORIDE, PRESERVATIVE FREE 10 ML: 5 INJECTION INTRAVENOUS at 08:14

## 2022-04-18 RX ADMIN — MEROPENEM 1000 MG: 1 INJECTION, POWDER, FOR SOLUTION INTRAVENOUS at 09:27

## 2022-04-18 RX ADMIN — INSULIN LISPRO 6 UNITS: 100 INJECTION, SOLUTION INTRAVENOUS; SUBCUTANEOUS at 11:55

## 2022-04-18 RX ADMIN — MEROPENEM 1000 MG: 1 INJECTION, POWDER, FOR SOLUTION INTRAVENOUS at 03:46

## 2022-04-18 RX ADMIN — FUROSEMIDE 40 MG: 40 TABLET ORAL at 08:11

## 2022-04-18 RX ADMIN — DOXYCYCLINE 100 MG: 50 CAPSULE ORAL at 08:11

## 2022-04-18 RX ADMIN — ALBUTEROL SULFATE 2.5 MG: 2.5 SOLUTION RESPIRATORY (INHALATION) at 09:37

## 2022-04-18 RX ADMIN — ENOXAPARIN SODIUM 40 MG: 40 INJECTION SUBCUTANEOUS at 08:12

## 2022-04-18 RX ADMIN — ARIPIPRAZOLE 15 MG: 5 TABLET ORAL at 08:11

## 2022-04-18 RX ADMIN — DIVALPROEX SODIUM 125 MG: 125 CAPSULE, COATED PELLETS ORAL at 08:12

## 2022-04-18 RX ADMIN — Medication: at 08:14

## 2022-04-18 RX ADMIN — MUPIROCIN: 20 OINTMENT TOPICAL at 08:14

## 2022-04-18 RX ADMIN — ASPIRIN 81 MG: 81 TABLET, COATED ORAL at 08:11

## 2022-04-18 ASSESSMENT — PAIN SCALES - GENERAL: PAINLEVEL_OUTOF10: 0

## 2022-04-18 NOTE — FLOWSHEET NOTE
04/18/22 1515   Encounter Summary   Services provided to: Patient   Referral/Consult From: Rounding   Continue Visiting   (4/18, neeraj )   Complexity of Encounter Low   Length of Encounter 15 minutes   Routine   Type Initial   Assessment Calm; Approachable; Hopeful   Intervention Active listening;Explored feelings, thoughts, concerns;Nurtured hope   Outcome Comfort;Expressed gratitude   Staff Suzette Rivas, [place]

## 2022-04-18 NOTE — DISCHARGE SUMMARY
Hospital Discharge Summary    Patient's PCP: Zeus Gallego MD  Admit Date: 4/13/2022   Discharge Date: 4/18/2022    Admitting Physician: Dr. Ekaterina Billy MD  Discharge Physician: Dr. Shameka Lundy MD   Consults: nephrology    HPI:  Rola Carpio is a 73 y.o. male who presented with history of mild mental retardation, bipolar disorder, diabetes mellitus, history of multidrug-resistant UTI, in the nursing home resident sent to the hospital with worsening mental status. Patient is a very poor historian . Patient on arrival in the ER was pretty tachypneic and tachycardic that led to investigation for pulmonary embolism CT PE was negative. Patient was found to have abnormal UA. Patient during the last admission was found to have multidrug-resistant UTI.     Per patient's sister was aware that patient was recently admitted to the hospital.  She does mention that patient does have a history of mild mild retardation. She denies any history of stroke though it is mention in the chart. \"        Brief hospital course:  Given the concern of the patients presentation and the concern of the possible multi-factorial etiology contributing to patients symptomatology.  Patient was admitted and evaluated and found to have:        Discharge Diagnoses:       Sepsis, POA   - Likey sec to complicated  UTI; possible PNA  - Resolved with txt of above      Altered mental status   Acute metabolic encephalopathy  - Sec to complicated UTI  - History of multidrug-resistant UTI in the past  - Was placed on Merrem  - Cx: Proteus, sensitive  - Blood Cx NGTD   - Repeat UA demonstrates good effect of treatment  - Switch to PO antibx to complete course  - Monitor for retention  - Urology follow up for recurretn UTIs         Hypoxemic resp failure: POA  Possible PNA: POA  - improving  CXR limited inspiration, possible infiltrate  On abx: IV--> PO  Strep, legionella, MRS probe +, added doxy  Procal elevated, will get repeat to trend >30 --> 7.7  Sputum culture  MRSA screen positive  Empiric zyvox  (hold SSRI while on)  Mupirocin  Wean O2 as tolerated       KIMBERLY on CKD 2: POA  - creatinine 1.8 from baseline of 1.2  - likely prerenal  Nephrology following. Appreciate recs       Significant leg edema, chronic - resolving off norvasc  On talking to the family does seem to have chronic issue, patient is on Norvasc was held Echo done last year in February normal EF with normal right ventricular size and function,   Venous Doppler - no DVT  Appears likely sec to chronic venous stasis       History of schizophrenia and bipolar. Sister does confirm bipolar  On Depakote, risperidone  Hold SSRI while on Zyvox       Diabetes mellitus  Lantus and insulin sliding scale            Physical Exam: BP (!) 148/79   Pulse 85   Temp 98.5 °F (36.9 °C) (Oral)   Resp 16   Wt 198 lb 6.6 oz (90 kg)   SpO2 95%   BMI 30.17 kg/m²     Recent Labs     04/17/22  1201 04/17/22  1657 04/17/22  2107 04/18/22  0724 04/18/22  1130   POCGLU 268* 133* 254* 209* 250*       General appearance: No apparent distress, appears stated age and cooperative. HEENT: Pupils equal, round, and reactive to light. Conjunctivae/corneas clear. Neck: Supple, with full range of motion. No jugular venous distention. Trachea midline. Respiratory:  Normal respiratory effort. Clear to auscultation, bilaterally without Rales/Wheezes/Rhonchi. Cardiovascular: Regular rate and rhythm with normal S1/S2 without murmurs, rubs or gallops. Abdomen: Soft, non-tender, non-distended with normal bowel sounds. Musculoskelatal: No clubbing, cyanosis or edema bilaterally.     Skin: Venous stasis changes of the b/l LEs  Neurologic:  Cranial nerves: II-XII intact, grossly non-focal.  Psychiatric: Alert and oriented, thought content appropriate, normal insight           LABS:  Recent Labs     04/18/22  0539   WBC 9.7   HGB 10.4*         Recent Labs     04/18/22  0539      K 4.1   CL 99   CO2 25 BUN 39*   CREATININE 1.6*   GLUCOSE 193*     No results for input(s): INR in the last 72 hours. Discharge Medications:  Current Discharge Medication List           Details   mupirocin (BACTROBAN) 2 % ointment Apply topically 3 times daily. Qty: 1 each, Refills: 0      albuterol (PROVENTIL) (2.5 MG/3ML) 0.083% nebulizer solution Take 3 mLs by nebulization every 6 hours as needed for Wheezing or Shortness of Breath  Qty: 120 each, Refills: 3      linezolid (ZYVOX) 600 MG tablet Take 1 tablet by mouth 2 times daily for 7 days  Qty: 14 tablet, Refills: 0      ciprofloxacin (CIPRO) 500 MG tablet Take 1 tablet by mouth 2 times daily for 3 days  Qty: 6 tablet, Refills: 0      lactobacillus (CULTURELLE) CAPS capsule Take 1 capsule by mouth daily  Qty: 30 capsule, Refills: 2              Details   furosemide (LASIX) 20 MG tablet Take 2 tablets by mouth daily  Qty: 60 tablet, Refills: 3      FLUoxetine HCl 60 MG TABS Take 60 mg by mouth daily  Qty: 30 tablet, Refills: 0              Details   gabapentin (NEURONTIN) 100 MG capsule Take 100 mg by mouth in the morning and at bedtime. metFORMIN (GLUCOPHAGE) 500 MG tablet Take 500 mg by mouth 2 times daily (with meals)      insulin detemir (LEVEMIR) 100 UNIT/ML injection vial Inject 5 Units into the skin nightly  Qty: 10 mL, Refills: 1      !! insulin aspart (NOVOLOG) 100 UNIT/ML injection vial Inject 3 Units into the skin 3 times daily (before meals)  Qty: 10 mL, Refills: 3      miconazole (MICOTIN) 2 % powder Apply topically 2 times daily. Qty: 45 g, Refills: 1      nystatin (MYCOSTATIN) 682429 UNIT/GM powder Apply topically every 6-8 hours as needed (athlete's foot)       ! ! polyethylene glycol (MIRALAX) 17 g PACK packet Take 17 g by mouth every 48 hours       !! divalproex (DEPAKOTE SPRINKLES) 125 MG capsule Take 250 mg by mouth at bedtime      !! divalproex (DEPAKOTE SPRINKLES) 125 MG capsule Take 125 mg by mouth every morning      latanoprost (XALATAN) 0.005 % ophthalmic solution Place 1 drop into the right eye nightly       acetaminophen (TYLENOL) 500 MG tablet Take 500 mg by mouth every 6 hours as needed for Pain      allopurinol (ZYLOPRIM) 100 MG tablet Take 100 mg by mouth daily      ARIPiprazole (ABILIFY) 15 MG tablet Take 15 mg by mouth daily      aspirin 81 MG EC tablet Take 81 mg by mouth daily      atorvastatin (LIPITOR) 40 MG tablet Take 40 mg by mouth nightly      bisacodyl (DULCOLAX) 10 MG suppository Place 10 mg rectally daily as needed for Constipation      Cranberry 425 MG CAPS Take 450 mg by mouth daily       docusate sodium (COLACE) 100 MG capsule Take 100 mg by mouth 2 times daily       finasteride (PROSCAR) 5 MG tablet Take 5 mg by mouth daily       Multiple Vitamins-Minerals (THERAPEUTIC MULTIVITAMIN-MINERALS) tablet Take 1 tablet by mouth daily      !! insulin aspart (NOVOLOG) 100 UNIT/ML injection vial Inject 0-21 Units into the skin 3 times daily (before meals) Sliding scale   201-250 = 5 units  251-300 = 8 units  301-350 = 12 units  351-400 = 15 units  401-450 = 18 units  451-500 = 21 units  500+ notify MD      ondansetron (ZOFRAN-ODT) 4 MG disintegrating tablet Take 4 mg by mouth every 8 hours as needed for Nausea or Vomiting      ! ! polyethylene glycol (GLYCOLAX) 17 g packet Take 17 g by mouth daily as needed for Constipation       tamsulosin (FLOMAX) 0.4 MG capsule Take 0.8 mg by mouth nightly       !! - Potential duplicate medications found. Please discuss with provider. Activity: activity as tolerated  Diet: Resume previous diet  Wound Care: none needed    Disposition: long term care facility  Discharged Condition: Stable  Follow Up: Primary Care Physician in one week    Total time spent on discharge, finalizing medications, referrals and arranging outpatient follow up was more than 30 minutes    Thank you Dr. Pavithra Espinoza MD for the opportunity to be involved in this patients care.  If you have any questions or concerns please feel free to contact me at [phone].

## 2022-04-18 NOTE — CARE COORDINATION
Case Management Assessment            Discharge Note                    Date / Time of Note: 4/18/2022 2:28 PM                  Discharge Note Completed by: Nathan Holley RN    Patient Name: Rick Query   YOB: 1950  Diagnosis: UTI (urinary tract infection) [N39.0]  Acute cystitis without hematuria [N30.00]  Altered mental status, unspecified altered mental status type [R41.82]  Acute renal failure superimposed on chronic kidney disease, unspecified CKD stage, unspecified acute renal failure type ([facility] [address] [N17.9, N18.9]   Date / Time: 4/13/2022  3:03 PM    Current PCP: Radha Zheng MD  Clinic patient: No    Hospitalization in the last 30 days: No    Advance Directives:  Code Status: Full Code  [place] DNR form completed and on chart: Not Indicated    Financial:  Payor: MEDICARE / Plan: MEDICARE PART A AND B / Product Type: *No Product type* /      Pharmacy:    [address] [place] [address] [phone] [facility] [phone]  [address] [place] [address]  Phone: [phone] Fax: [phone]      Assistance purchasing medications?:    Assistance provided by Case Management: None at this time    Does patient want to participate in local refill/ meds to beds program?: No    Meds To Beds General Rules:  1. Can ONLY be done Monday- Friday between 8:30am-5pm  2. Prescription(s) must be in pharmacy by 3pm to be filled same day  3. Copy of patient's insurance/ prescription drug card and patient face sheet must be sent along with the prescription(s)  4. Cost of Rx cannot be added to hospital bill. If financial assistance is needed, please contact unit  or ;  or  CANNOT provide pharmacy voucher for patients co-pays  5.  Patients can then  the prescription on their way out of the hospital at discharge, or pharmacy can deliver to the bedside if staff is available. (payment due at time of pick-up or delivery - cash, check, or card accepted)     Able to afford home medications/ co-pay costs: Yes    ADLS:  Current PT AM-PAC Score:   /24  Current OT AM-PAC Score:   /24      DISCHARGE Disposition: [facility] (LTC): [facility]  Phone: [phone]  Fax: [phone]    LOC at discharge: Loly0 Tamiko Calzada Completed: Yes    Notification completed in HENS/PAS?:  Not Applicable    IMM Completed:       Transportation:  Transportation PLAN for discharge: EMS transportation   Mode of Transport: Ambulance stretcher - BLS  Reason for medical transport: Flight Risk due to Schizophrenia Bipolar, +UTI increased confusion  Name of Transport Company: [employer] [facility]   Phone: [phone]  Time of Transport: 530p    Transport form completed: Yes          Additional CM Notes: Pt will return LTC to [address] at 530 via [facility], nurse to call report to [phone] orders faxed to [phone]     The Plan for Transition of Care is related to the following treatment goals of UTI (urinary tract infection) [N39.0]  Acute cystitis without hematuria [N30.00]  Altered mental status, unspecified altered mental status type [R41.82]  Acute renal failure superimposed on chronic kidney disease, unspecified CKD stage, unspecified acute renal failure type ([facility] [address] [N17.9, N18.9]    The Patient and/or patient representative Jose C Betancur and his family were provided with a choice of provider and agrees with the discharge plan Yes    Freedom of choice list was provided with basic dialogue that supports the patient's individualized plan of care/goals and shares the quality data associated with the providers.  Yes    Care Transitions patient: No    Taylor Tubbs RN  [facility]  Case Management Department  Ph: [phone]  Fax: [phone]

## 2022-04-18 NOTE — RT PROTOCOL NOTE
RT Inhaler-Nebulizer Bronchodilator Protocol Note    There is a bronchodilator order in the chart from a provider indicating to follow the RT Bronchodilator Protocol and there is an Initiate RT Inhaler-Nebulizer Bronchodilator Protocol order as well (see protocol at bottom of note). CXR Findings:  No results found. The findings from the last RT Protocol Assessment were as follows:   History Pulmonary Disease: None or smoker <15 pack years  Respiratory Pattern: Dyspnea on exertion or RR 21-25 bpm  Breath Sounds: Slightly diminished and/or crackles  Cough: Strong, spontaneous, non-productive  Indication for Bronchodilator Therapy:    Bronchodilator Assessment Score: 4    Aerosolized bronchodilator medication orders have been revised according to the RT Inhaler-Nebulizer Bronchodilator Protocol below. Respiratory Therapist to perform RT Therapy Protocol Assessment initially then follow the protocol. Repeat RT Therapy Protocol Assessment PRN for score 0-3 or on second treatment, BID, and PRN for scores above 3. No Indications - adjust the frequency to every 6 hours PRN wheezing or bronchospasm, if no treatments needed after 48 hours then discontinue using Per Protocol order mode. If indication present, adjust the RT bronchodilator orders based on the Bronchodilator Assessment Score as indicated below. Use Inhaler orders unless patient has one or more of the following: on home nebulizer, not able to hold breath for 10 seconds, is not alert and oriented, cannot activate and use MDI correctly, or respiratory rate 25 breaths per minute or more, then use the equivalent nebulizer order(s) with same Frequency and PRN reasons based on the score. If a patient is on this medication at home then do not decrease Frequency below that used at home.     0-3 - enter or revise RT bronchodilator order(s) to equivalent RT Bronchodilator order with Frequency of every 4 hours PRN for wheezing or increased work of breathing using Per Protocol order mode. 4-6 - enter or revise RT Bronchodilator order(s) to two equivalent RT bronchodilator orders with one order with BID Frequency and one order with Frequency of every 4 hours PRN wheezing or increased work of breathing using Per Protocol order mode. 7-10 - enter or revise RT Bronchodilator order(s) to two equivalent RT bronchodilator orders with one order with TID Frequency and one order with Frequency of every 4 hours PRN wheezing or increased work of breathing using Per Protocol order mode. 11-13 - enter or revise RT Bronchodilator order(s) to one equivalent RT bronchodilator order with QID Frequency and an Albuterol order with Frequency of every 4 hours PRN wheezing or increased work of breathing using Per Protocol order mode. Greater than 13 - enter or revise RT Bronchodilator order(s) to one equivalent RT bronchodilator order with every 4 hours Frequency and an Albuterol order with Frequency of every 2 hours PRN wheezing or increased work of breathing using Per Protocol order mode. RT to enter RT Home Evaluation for COPD & MDI Assessment order using Per Protocol order mode.     Electronically signed by Ruthann Blair RCP on 4/17/2022 at 8:02 PM

## 2022-04-18 NOTE — PROGRESS NOTES
Attempted to call [address] for third time and has not answered.      Electronically signed by Wallace Carroll RN on 4/18/2022 at 5:05 PM

## 2022-04-18 NOTE — PROGRESS NOTES
Patient feel fine. Vitals stable. On room air. Patient made 0.5 liter urine. Creatinine is up 1.6  K is better     Plan:    Continue lasix daily   No changes from nephrology perspective today. Monitor I/O  Monitor renal panel. Assessment :     KIMBERLY on CKD 2 (etiology likely DM/HTN)  - creatinine 1.8 from baseline of 1.2  - likely prerenal  - s/p sepsis boluls  - f/u p/c ratio  - f/u evening RFP     Hyperkalemia  - Medically managed. Improved now.         UTI   Abx per primary team.      Anemia - components of iron deficiency and chronic disease    HTN - stable.      Please call with questions at-  24 hrs Answering service [phone]   7 am-5 pm at Perfect serve, or cell phone  Dr Hernan Melendez MD        CC/ Reason for consult:     KIMBERLY / CKD II    HPI:     Patient is a 72 yo male PMH CKD, MDR UTI, CVA with dementia, bipolar disorder, schizophrenia, DMII who presented to the ED from nursing home yesterday with AMS and increased agitation. On arrival patient was tachycardic and tachypnic with a leukocytosis of 12.1. UA showed positive nitrites, large leukocyte esterace, >100 WBCS and 1+ bacteria. Given history of MDR UTIs, there was concern for urosepsis and patient was started on meropenem. ROS:     SOB-   none  Edema-   none  Nausea/vomiting/diarrhea-   none  Poor appetite/oral intake-  No  Confusion  No  Difficulty passing urine-  No  Drop in urine output-  No  Any other complaints-  No  All other ROS are reviewed and are Negative    Vitals:     Vitals:    04/18/22 0737   BP: (!) 152/75   Pulse: 78   Resp: 16   Temp: 98.4 °F (36.9 °C)   SpO2: 94%       I & O:       Intake/Output Summary (Last 24 hours) at 4/18/2022 0906  Last data filed at 4/18/2022 0846  Gross per 24 hour   Intake 240 ml   Output 1250 ml   Net -1010 ml         Physical Examination:     Constitutional:       Appearance: He is obese. HENT:      Head: Normocephalic.       Nose: Nose normal.   Eyes:      Extraocular Movements: Extraocular movements intact. Conjunctiva/sclera: Conjunctivae normal.      Pupils: Pupils are equal, round, and reactive to light. Cardiovascular:      Rate and Rhythm: Normal rate and regular rhythm. Pulses: Normal pulses. Heart sounds: Normal heart sounds. Pulmonary:      Effort: Pulmonary effort is normal.      Breath sounds: Normal breath sounds. Abdominal:      Palpations: Abdomen is soft. Musculoskeletal:         General: Normal range of motion. Cervical back: Normal range of motion and neck supple. Right lower leg: Edema present. Left lower leg: Edema present. Skin:     Comments: Woody skin bilateral LE with severely hyperkeratotic appearance of skin on feet   Neurological:      General: No focal deficit present.    Psychiatric:      Comments: Signs of developmental delay       Meds:      doxycycline monohydrate  100 mg Oral 2 times per day    meropenem  1,000 mg IntraVENous Q8H    mupirocin   Topical BID    albuterol  2.5 mg Nebulization BID    furosemide  40 mg Oral Daily    Venelex   Topical BID    aspirin  81 mg Oral Daily    atorvastatin  40 mg Oral Nightly    divalproex  250 mg Oral Nightly    divalproex  125 mg Oral QAM    insulin glargine  5 Units SubCUTAneous Nightly    tamsulosin  0.8 mg Oral Nightly    ARIPiprazole  15 mg Oral Daily    insulin lispro  0-12 Units SubCUTAneous TID WC    insulin lispro  0-6 Units SubCUTAneous Nightly    sodium chloride flush  5-40 mL IntraVENous 2 times per day    enoxaparin  40 mg SubCUTAneous Daily       Labs:     Recent Labs     04/16/22  0626 04/17/22  0542 04/18/22  0539   WBC 11.1* 13.2* 9.7   HGB 9.4* 10.3* 10.4*   HCT 29.5* 32.8* 32.5*    329 330          Recent Labs     04/15/22  0913 04/15/22  0913 04/16/22  0626 04/17/22  0542 04/18/22  0539   *   < > 135* 139 137   K 4.7  --  4.6 4.8 4.1   CL 97*   < > 97* 99 99   CO2 27   < > 29 28 25   BUN 34*   < > 35* 32* 39*   CREATININE 1.6*   < > 1.7* 1.4* 1.6*   GLUCOSE 208*   < > 172* 183* 193*   PHOS 3.1  --   --   --   --     < > = values in this interval not displayed.       Nephrology  [address] [place] [address]  Office: [phone]  Fax: [phone]

## 2022-04-18 NOTE — DISCHARGE INSTR - COC
Continuity of Care Form    Patient Name: Monse Lou   :  1950  MRN:  [id number]    Admit date:  2022  Discharge date:  ***    Code Status Order: Full Code   Advance Directives:      Admitting Physician:  Odalis Babb MD  PCP: Azalea Hilario MD    Discharging Nurse: [facility] Unit/Room#: 5936/6703-07  Discharging Unit Phone Number: ***    Emergency Contact:   Extended Emergency Contact Information  Primary Emergency Contact: [address] [place] [address], [address]  [facility] Phone: [phone]  Mobile Phone: [phone]  Relation: Brother/Sister  Secondary Emergency Contact: [address] [place] [address] Phone: [phone]  Relation: Brother/Sister    Past Surgical History:  History reviewed. No pertinent surgical history. Immunization History: There is no immunization history on file for this patient.     Active Problems:  Patient Active Problem List   Diagnosis Code    Other fatigue R53.83    Right hand weakness R29.898    Numbness of right hand R20.0    Schizophrenia (HCC) F20.9    Dementia (HCC) F03.90    Essential hypertension I10    Sepsis ([address] A41.9    CKD (chronic kidney disease) N18.9    Cellulitis L03.90    UTI (urinary tract infection) N39.0       Isolation/Infection:   Isolation            Contact          Patient Infection Status       Infection Onset Added Last Indicated Last Indicated By Review Planned Expiration Resolved Resolved By    MRSA 22 MRSA DNA Probe, Nasal        MDRO (multi-drug resistant organism) 22 Culture, Urine        Resolved    COVID-19 (Rule Out) 21 COVID-19 (Ordered)   21 Rule-Out Test Resulted            Nurse Assessment:  Last Vital Signs: BP (!) 148/79   Pulse 85   Temp 98.5 °F (36.9 °C) (Oral)   Resp 16   Wt 198 lb 6.6 oz (90 kg)   SpO2 95%   BMI 30.17 kg/m²     Last documented pain score (0-10 scale): Pain Level: 0  Last Weight:   Wt Readings from Last 1 Encounters:   22 198 lb 6.6 oz (90 kg)     Mental Status:  oriented and alert    IV Access:  - None    Nursing Mobility/ADLs:  Walking   Assisted  Transfer  Assisted  Bathing  Assisted  Dressing  Assisted  Toileting  Assisted  Feeding  Independent  Med Admin  Independent  Med Delivery   prefers mixed with applesauce (pills whole, not crushed)    Wound Care Documentation and Therapy:  Wound 02/24/21 Buttocks Inner (Active)   Number of days: 417       Wound 04/15/22 Heel Left (Active)   Wound Image   04/15/22 1523   Wound Etiology Pressure Stage  3 04/15/22 1523   Dressing Status Intact 04/16/22 1605   Wound Cleansed Not Cleansed 04/15/22 1523   Dressing/Treatment Pharmaceutical agent (see MAR) 04/15/22 1523   Offloading for Diabetic Foot Ulcers Offloading boot 04/15/22 1523   Dressing Change Due 04/15/22 04/15/22 1523   Wound Length (cm) 1.2 cm 04/15/22 1523   Wound Width (cm) 1.4 cm 04/15/22 1523   Wound Depth (cm) 0.2 cm 04/15/22 1523   Wound Surface Area (cm^2) 1.68 cm^2 04/15/22 1523   Wound Volume (cm^3) 0.336 cm^3 04/15/22 1523   Wound Assessment Pink/red 04/15/22 1523   Drainage Amount None 04/15/22 1523   Odor None 04/15/22 1523   Franca-wound Assessment Blanchable erythema 04/15/22 1523   Margins Defined edges 04/15/22 1523   Number of days: 2        Elimination:  Continence: Bowel: Yes  Bladder: Yes  Urinary Catheter: None   Colostomy/Ileostomy/Ileal Conduit: No       Date of Last BM: 3/18/22      Intake/Output Summary (Last 24 hours) at 4/18/2022 1401  Last data filed at 4/18/2022 0846  Gross per 24 hour   Intake --   Output 1250 ml   Net -1250 ml     I/O last 3 completed shifts: In: 240 [P.O.:240]  Out: 1500 [Urine:1500]    Safety Concerns:      At Risk for Falls    Impairments/Disabilities:      None    Nutrition Therapy:  Current Nutrition Therapy:   - Oral Diet:  General    Routes of Feeding: Oral  Liquids: {Slp liquid thickness:09028}  Daily Fluid Restriction: no  Last Modified Barium Swallow with Video (Video Swallowing Test): not done    Treatments at the Time of Hospital Discharge:   Respiratory Treatments: ***  Oxygen Therapy:  is not on home oxygen therapy. Ventilator:    - No ventilator support    Rehab Therapies: {THERAPEUTIC INTERVENTION:6633659873}  Weight Bearing Status/Restrictions: No weight bearing restrictions  Other Medical Equipment (for information only, NOT a DME order):  {EQUIPMENT:140360985}  Other Treatments: ***    Patient's personal belongings (please select all that are sent with patient):  None    RN SIGNATURE:  Electronically signed by Soham Schaefer RN on 4/18/22 at 3:12 PM EDT    CASE MANAGEMENT/SOCIAL WORK SECTION    Inpatient Status Date: ***    Readmission Risk Assessment Score:  Readmission Risk              Risk of Unplanned Readmission:  30           Discharging to Facility/ Agency   [facility]  [address] [place] [address]       Phone: [phone]       Fax: [phone]          / signature: Electronically signed by Ziyad King RN on 4/18/22 at 2:22 PM EDT    PHYSICIAN SECTION    Prognosis: Fair    Condition at Discharge: Stable    Rehab Potential (if transferring to Rehab): Good    Recommended Labs or Other Treatments After Discharge:     Hold Fluoxetine while on Zyvox (1 week0  Check BMP in 3-5 days and PRN  Follow up with urology for recurrent UTIs      Physician Certification: I certify the above information and transfer of Buster Daniels  is necessary for the continuing treatment of the diagnosis listed and that he requires [facility] for greater 30 days.      Update Admission H&P: No change in H&P    PHYSICIAN SIGNATURE:  Electronically signed by Bong Rodriguez MD on 4/18/22 at 2:02 PM EDT

## 2022-04-18 NOTE — PROGRESS NOTES
Attempted to call report to [address] with no answer.     Electronically signed by Sage Wasserman RN on 4/18/2022 at 4:16 PM

## 2022-04-18 NOTE — CARE COORDINATION
CM following, pending urine CX with sensitivities for final ABX recs for DC. Pt will return back to [address] once medically stable.  Electronically signed by Vasquez Carlson RN on 4/18/2022 at 1:34 PM   [phone]

## 2022-04-18 NOTE — PROGRESS NOTES
Patient A&OX4. VSS. Patient has not reported any pain or nausea during shift. Primofit is in place with adequate urine output. Tolerating diet as ordered. Continues to receive intermittent IVABX infusions. All patient care needs have been met at this time. Fall precautions are in place. Call light and bedside table are within reach if needed. Will continue to monitor.      Electronically signed by Meryle Christen, RN on 4/18/2022 at 9:50 AM

## 2022-04-18 NOTE — PROGRESS NOTES
VSS, A&O, denies pain and n/v. Pt had x1 large BM, voiding using external cath. Pt ambulated to the bathroom x2 assist with FWW and GB. IV abx infused, tolerating diet. Pt has specialty bed but preferred the chair. Fall precaution in place, call light was used for needs. Uneventful night with pt.

## 2022-04-18 NOTE — PROGRESS NOTES
Attempted to call [address] for second time with no answer.     Electronically signed by Sharifa Cai RN on 4/18/2022 at 4:25 PM

## 2022-04-18 NOTE — PROGRESS NOTES
Clinical Pharmacy Progress Note  Medication History      List of of current medications patient is taking is complete. Home Medication list in EPIC updated to reflect changes noted below.      Source of information: Medication list from [address] [place] [address]  · New list received today - pages missing from packet of paperwork received with patient on admission     Changes made to home medication list:   Medications removed (no longer taking):  · Melatonin  · Risperidone  · Hydrocerin     Medications added:   · Gabapentin  · Metformin     Medication doses / instructions adjusted:   · Fluoxetine  · Nystatin powder     Please call with questions--  Thanks--  Jules Kurtz, PharmD, BCPS, BCGP  [id number] ([facility])   4/18/2022 12:20 PM      Current Outpatient Medications   Medication Instructions    acetaminophen (TYLENOL) 500 mg, Oral, EVERY 6 HOURS PRN    allopurinol (ZYLOPRIM) 100 mg, Oral, DAILY    amLODIPine (NORVASC) 5 mg, Oral, DAILY, Hold for SBP <110 or Pulse <60     ARIPiprazole (ABILIFY) 15 mg, Oral, DAILY    aspirin 81 mg, Oral, DAILY    atorvastatin (LIPITOR) 40 mg, Oral, NIGHTLY    bisacodyl (DULCOLAX) 10 mg, Rectal, DAILY PRN    Cranberry 450 mg, Oral, DAILY    divalproex (DEPAKOTE SPRINKLES) 250 mg, Oral, Nightly    divalproex (DEPAKOTE SPRINKLES) 125 mg, Oral, EVERY MORNING    docusate sodium (COLACE) 100 mg, Oral, 2 TIMES DAILY    finasteride (PROSCAR) 5 mg, Oral, DAILY    FLUoxetine HCl 60 mg, Oral, DAILY    furosemide (LASIX) 20 mg, Oral, DAILY    gabapentin (NEURONTIN) 100 mg, Oral, 2 times daily    insulin aspart (NOVOLOG) 0-21 Units, SubCUTAneous, 3 TIMES DAILY BEFORE MEALS, Sliding scale <BR>201-250 = 5 units<BR>251-300 = 8 units<BR>301-350 = 12 units<BR>351-400 = 15 units<BR>401-450 = 18 units<BR>451-500 = 21 units<BR>500+ notify MD     insulin aspart (NOVOLOG) 3 Units, SubCUTAneous, 3 TIMES DAILY BEFORE MEALS    latanoprost (XALATAN) 0.005 % ophthalmic solution 1 drop, Right

## 2022-05-11 ENCOUNTER — HOSPITAL ENCOUNTER (INPATIENT)
Age: 72
LOS: 7 days | Discharge: HOSPICE | DRG: 871 | End: 2022-05-18
Attending: EMERGENCY MEDICINE | Admitting: INTERNAL MEDICINE
Payer: MEDICARE

## 2022-05-11 ENCOUNTER — APPOINTMENT (OUTPATIENT)
Dept: GENERAL RADIOLOGY | Age: 72
DRG: 871 | End: 2022-05-11
Payer: MEDICARE

## 2022-05-11 DIAGNOSIS — K85.90 ACUTE PANCREATITIS, UNSPECIFIED COMPLICATION STATUS, UNSPECIFIED PANCREATITIS TYPE: ICD-10-CM

## 2022-05-11 DIAGNOSIS — I44.0 FIRST DEGREE AV BLOCK: ICD-10-CM

## 2022-05-11 DIAGNOSIS — R41.82 ALTERED MENTAL STATUS, UNSPECIFIED ALTERED MENTAL STATUS TYPE: Primary | ICD-10-CM

## 2022-05-11 LAB
ALBUMIN SERPL-MCNC: 3.4 G/DL (ref 3.4–5)
ALP BLD-CCNC: 180 U/L (ref 40–129)
ALT SERPL-CCNC: 23 U/L (ref 10–40)
ANION GAP SERPL CALCULATED.3IONS-SCNC: 13 MMOL/L (ref 3–16)
AST SERPL-CCNC: 33 U/L (ref 15–37)
BACTERIA: ABNORMAL /HPF
BASE EXCESS VENOUS: 0.4 MMOL/L (ref -2–3)
BASOPHILS ABSOLUTE: 0 K/UL (ref 0–0.2)
BASOPHILS RELATIVE PERCENT: 0.5 %
BILIRUB SERPL-MCNC: <0.2 MG/DL (ref 0–1)
BILIRUBIN DIRECT: <0.2 MG/DL (ref 0–0.3)
BILIRUBIN URINE: NEGATIVE
BILIRUBIN, INDIRECT: ABNORMAL MG/DL (ref 0–1)
BLOOD, URINE: ABNORMAL
BUN BLDV-MCNC: 33 MG/DL (ref 7–20)
CALCIUM SERPL-MCNC: 10 MG/DL (ref 8.3–10.6)
CARBOXYHEMOGLOBIN: 0.1 % (ref 0–1.5)
CHLORIDE BLD-SCNC: 108 MMOL/L (ref 99–110)
CLARITY: CLEAR
CO2: 23 MMOL/L (ref 21–32)
COLOR: YELLOW
CREAT SERPL-MCNC: 1.6 MG/DL (ref 0.8–1.3)
EOSINOPHILS ABSOLUTE: 0.1 K/UL (ref 0–0.6)
EOSINOPHILS RELATIVE PERCENT: 1 %
EPITHELIAL CELLS, UA: ABNORMAL /HPF (ref 0–5)
GFR AFRICAN AMERICAN: 52
GFR NON-AFRICAN AMERICAN: 43
GLUCOSE BLD-MCNC: 88 MG/DL (ref 70–99)
GLUCOSE URINE: NEGATIVE MG/DL
HCO3 VENOUS: 28 MMOL/L (ref 24–28)
HCT VFR BLD CALC: 32.8 % (ref 40.5–52.5)
HEMOGLOBIN, VEN, REDUCED: 15.4 %
HEMOGLOBIN: 10.4 G/DL (ref 13.5–17.5)
HYALINE CASTS: ABNORMAL /LPF (ref 0–2)
KETONES, URINE: NEGATIVE MG/DL
LACTIC ACID, SEPSIS: 1.9 MMOL/L (ref 0.4–1.9)
LACTIC ACID, SEPSIS: 1.9 MMOL/L (ref 0.4–1.9)
LEUKOCYTE ESTERASE, URINE: ABNORMAL
LIPASE: 600 U/L (ref 13–60)
LYMPHOCYTES ABSOLUTE: 0.8 K/UL (ref 1–5.1)
LYMPHOCYTES RELATIVE PERCENT: 9.1 %
MAGNESIUM: 2.1 MG/DL (ref 1.8–2.4)
MCH RBC QN AUTO: 26 PG (ref 26–34)
MCHC RBC AUTO-ENTMCNC: 31.6 G/DL (ref 31–36)
MCV RBC AUTO: 82.2 FL (ref 80–100)
METHEMOGLOBIN VENOUS: 0.2 % (ref 0–1.5)
MICROSCOPIC EXAMINATION: YES
MONOCYTES ABSOLUTE: 0.6 K/UL (ref 0–1.3)
MONOCYTES RELATIVE PERCENT: 6.7 %
NEUTROPHILS ABSOLUTE: 7.5 K/UL (ref 1.7–7.7)
NEUTROPHILS RELATIVE PERCENT: 82.7 %
NITRITE, URINE: POSITIVE
O2 SAT, VEN: 85 %
PCO2, VEN: 57.9 MMHG (ref 41–51)
PDW BLD-RTO: 19 % (ref 12.4–15.4)
PH UA: 6 (ref 5–8)
PH VENOUS: 7.29 (ref 7.35–7.45)
PLATELET # BLD: 275 K/UL (ref 135–450)
PMV BLD AUTO: 8.7 FL (ref 5–10.5)
PO2, VEN: 56.5 MMHG (ref 25–40)
POTASSIUM REFLEX MAGNESIUM: 5.7 MMOL/L (ref 3.5–5.1)
PROTEIN UA: 30 MG/DL
RBC # BLD: 3.99 M/UL (ref 4.2–5.9)
RBC UA: ABNORMAL /HPF (ref 0–4)
RENAL EPITHELIAL, UA: ABNORMAL /HPF (ref 0–1)
SODIUM BLD-SCNC: 144 MMOL/L (ref 136–145)
SPECIFIC GRAVITY UA: 1.02 (ref 1–1.03)
TCO2 CALC VENOUS: 30 MMOL/L
TOTAL PROTEIN: 7.4 G/DL (ref 6.4–8.2)
TROPONIN: 0.02 NG/ML
TROPONIN: 0.03 NG/ML
URINE TYPE: ABNORMAL
UROBILINOGEN, URINE: 0.2 E.U./DL
WBC # BLD: 9.1 K/UL (ref 4–11)
WBC UA: >100 /HPF (ref 0–5)

## 2022-05-11 PROCEDURE — 83735 ASSAY OF MAGNESIUM: CPT

## 2022-05-11 PROCEDURE — 81001 URINALYSIS AUTO W/SCOPE: CPT

## 2022-05-11 PROCEDURE — 85025 COMPLETE CBC W/AUTO DIFF WBC: CPT

## 2022-05-11 PROCEDURE — 87040 BLOOD CULTURE FOR BACTERIA: CPT

## 2022-05-11 PROCEDURE — 99285 EMERGENCY DEPT VISIT HI MDM: CPT

## 2022-05-11 PROCEDURE — 2580000003 HC RX 258: Performed by: STUDENT IN AN ORGANIZED HEALTH CARE EDUCATION/TRAINING PROGRAM

## 2022-05-11 PROCEDURE — 83605 ASSAY OF LACTIC ACID: CPT

## 2022-05-11 PROCEDURE — 83036 HEMOGLOBIN GLYCOSYLATED A1C: CPT

## 2022-05-11 PROCEDURE — 6360000002 HC RX W HCPCS: Performed by: STUDENT IN AN ORGANIZED HEALTH CARE EDUCATION/TRAINING PROGRAM

## 2022-05-11 PROCEDURE — 1200000000 HC SEMI PRIVATE

## 2022-05-11 PROCEDURE — 87086 URINE CULTURE/COLONY COUNT: CPT

## 2022-05-11 PROCEDURE — 83690 ASSAY OF LIPASE: CPT

## 2022-05-11 PROCEDURE — 87077 CULTURE AEROBIC IDENTIFY: CPT

## 2022-05-11 PROCEDURE — 86403 PARTICLE AGGLUT ANTBDY SCRN: CPT

## 2022-05-11 PROCEDURE — 36415 COLL VENOUS BLD VENIPUNCTURE: CPT

## 2022-05-11 PROCEDURE — 82803 BLOOD GASES ANY COMBINATION: CPT

## 2022-05-11 PROCEDURE — 87186 SC STD MICRODIL/AGAR DIL: CPT

## 2022-05-11 PROCEDURE — 80048 BASIC METABOLIC PNL TOTAL CA: CPT

## 2022-05-11 PROCEDURE — 93005 ELECTROCARDIOGRAM TRACING: CPT | Performed by: STUDENT IN AN ORGANIZED HEALTH CARE EDUCATION/TRAINING PROGRAM

## 2022-05-11 PROCEDURE — 71045 X-RAY EXAM CHEST 1 VIEW: CPT

## 2022-05-11 PROCEDURE — 96360 HYDRATION IV INFUSION INIT: CPT

## 2022-05-11 PROCEDURE — 84484 ASSAY OF TROPONIN QUANT: CPT

## 2022-05-11 PROCEDURE — 80076 HEPATIC FUNCTION PANEL: CPT

## 2022-05-11 RX ORDER — SODIUM CHLORIDE, SODIUM LACTATE, POTASSIUM CHLORIDE, AND CALCIUM CHLORIDE .6; .31; .03; .02 G/100ML; G/100ML; G/100ML; G/100ML
500 INJECTION, SOLUTION INTRAVENOUS ONCE
Status: DISCONTINUED | OUTPATIENT
Start: 2022-05-11 | End: 2022-05-11

## 2022-05-11 RX ORDER — SODIUM CHLORIDE, SODIUM LACTATE, POTASSIUM CHLORIDE, AND CALCIUM CHLORIDE .6; .31; .03; .02 G/100ML; G/100ML; G/100ML; G/100ML
1000 INJECTION, SOLUTION INTRAVENOUS ONCE
Status: COMPLETED | OUTPATIENT
Start: 2022-05-11 | End: 2022-05-11

## 2022-05-11 RX ORDER — SODIUM CHLORIDE, SODIUM LACTATE, POTASSIUM CHLORIDE, AND CALCIUM CHLORIDE .6; .31; .03; .02 G/100ML; G/100ML; G/100ML; G/100ML
1000 INJECTION, SOLUTION INTRAVENOUS ONCE
Status: COMPLETED | OUTPATIENT
Start: 2022-05-11 | End: 2022-05-12

## 2022-05-11 RX ADMIN — CALCIUM GLUCONATE 3000 MG: 98 INJECTION, SOLUTION INTRAVENOUS at 23:46

## 2022-05-11 RX ADMIN — SODIUM CHLORIDE, POTASSIUM CHLORIDE, SODIUM LACTATE AND CALCIUM CHLORIDE 1000 ML: 600; 310; 30; 20 INJECTION, SOLUTION INTRAVENOUS at 23:15

## 2022-05-11 RX ADMIN — SODIUM CHLORIDE, POTASSIUM CHLORIDE, SODIUM LACTATE AND CALCIUM CHLORIDE 1000 ML: 600; 310; 30; 20 INJECTION, SOLUTION INTRAVENOUS at 20:12

## 2022-05-11 NOTE — ED NOTES
Received report from previous shift Rn. Assume care of patient at this time.       Rosina Mckee RN  05/11/22 7547

## 2022-05-11 NOTE — ED PROVIDER NOTES
[address] [place]          EM RESIDENT NOTE       Date of evaluation: 5/11/2022    Chief Complaint     Altered Mental Status (per NH pt slower to respond, pt asks why he is at the hospital he feels fine)      of Present Illness     Rajendra Lieberman is a 81 y.o. male with a PMHx of DM, MDR UTIs, CVA, bipolar disorder, dementia presents from [facility] via EMS for evaluation of AMS. Per EMS facility had called for pt confusion. Pt apparently usually is more conversant and knows things like when certain baseball games are on but today was slower to respond and could not answer some of these usual questions. Pt apparently asked EMS en route why he was coming to the hospital. On attempted calls to SNF unable to get a hold of any providers for further information. Review of Systems     Review of Systems   Unable to perform ROS: Dementia       A complete ROS was performed and is otherwise negative. Past Medical, Surgical, Family, and Social History     He has a past medical history of Ataxia, Bipolar 1 disorder ([address] BPH (benign prostatic hyperplasia), Cerebral infarct ([facility] [address] Chronic kidney disease, Dementia ([address] Depression, Diabetes mellitus type 2, uncomplicated ([address] Gout, Hyperlipidemia, Hypertension, and Schizophrenia ([address] He has no past surgical history on file. His family history is not on file. He reports that he has never smoked. He has never used smokeless tobacco. He reports previous alcohol use. Medications     Current Discharge Medication List      CONTINUE these medications which have NOT CHANGED    Details   gabapentin (NEURONTIN) 100 MG capsule Take 100 mg by mouth in the morning and at bedtime. metFORMIN (GLUCOPHAGE) 500 MG tablet Take 500 mg by mouth 2 times daily (with meals)      mupirocin (BACTROBAN) 2 % ointment Apply topically 3 times daily.   Qty: 1 each, Refills: 0      furosemide (LASIX) 20 MG tablet Take 2 tablets by mouth daily  Qty: 60 tablet, Refills: 3      albuterol (PROVENTIL) (2.5 MG/3ML) 0.083% nebulizer solution Take 3 mLs by nebulization every 6 hours as needed for Wheezing or Shortness of Breath  Qty: 120 each, Refills: 3      lactobacillus (CULTURELLE) CAPS capsule Take 1 capsule by mouth daily  Qty: 30 capsule, Refills: 2      FLUoxetine HCl 60 MG TABS Take 60 mg by mouth daily  Qty: 30 tablet, Refills: 0      insulin detemir (LEVEMIR) 100 UNIT/ML injection vial Inject 5 Units into the skin nightly  Qty: 10 mL, Refills: 1      !! insulin aspart (NOVOLOG) 100 UNIT/ML injection vial Inject 3 Units into the skin 3 times daily (before meals)  Qty: 10 mL, Refills: 3      miconazole (MICOTIN) 2 % powder Apply topically 2 times daily. Qty: 45 g, Refills: 1      nystatin (MYCOSTATIN) 782741 UNIT/GM powder Apply topically every 6-8 hours as needed (athlete's foot)       ! ! polyethylene glycol (MIRALAX) 17 g PACK packet Take 17 g by mouth every 48 hours       !! divalproex (DEPAKOTE SPRINKLES) 125 MG capsule Take 250 mg by mouth at bedtime      !! divalproex (DEPAKOTE SPRINKLES) 125 MG capsule Take 125 mg by mouth every morning      latanoprost (XALATAN) 0.005 % ophthalmic solution Place 1 drop into the right eye nightly       acetaminophen (TYLENOL) 500 MG tablet Take 500 mg by mouth every 6 hours as needed for Pain      allopurinol (ZYLOPRIM) 100 MG tablet Take 100 mg by mouth daily      ARIPiprazole (ABILIFY) 15 MG tablet Take 15 mg by mouth daily      aspirin 81 MG EC tablet Take 81 mg by mouth daily      atorvastatin (LIPITOR) 40 MG tablet Take 40 mg by mouth nightly      bisacodyl (DULCOLAX) 10 MG suppository Place 10 mg rectally daily as needed for Constipation      Cranberry 425 MG CAPS Take 450 mg by mouth daily       docusate sodium (COLACE) 100 MG capsule Take 100 mg by mouth 2 times daily       finasteride (PROSCAR) 5 MG tablet Take 5 mg by mouth daily       Multiple Vitamins-Minerals (THERAPEUTIC MULTIVITAMIN-MINERALS) tablet Take 1 tablet by mouth daily      !! insulin aspart (NOVOLOG) 100 UNIT/ML injection vial Inject 0-21 Units into the skin 3 times daily (before meals) Sliding scale   201-250 = 5 units  251-300 = 8 units  301-350 = 12 units  351-400 = 15 units  401-450 = 18 units  451-500 = 21 units  500+ notify MD      ondansetron (ZOFRAN-ODT) 4 MG disintegrating tablet Take 4 mg by mouth every 8 hours as needed for Nausea or Vomiting      ! ! polyethylene glycol (GLYCOLAX) 17 g packet Take 17 g by mouth daily as needed for Constipation       tamsulosin (FLOMAX) 0.4 MG capsule Take 0.8 mg by mouth nightly       !! - Potential duplicate medications found. Please discuss with provider. Allergies     He is allergic to codeine, cogentin [benztropine], and penicillins. Physical Exam     INITIAL VITALS: BP: 91/76, Temp: 97.3 °F (36.3 °C), Pulse: 56, Resp: 17, SpO2: 100 %   Physical Exam  Vitals and nursing note reviewed. Constitutional:       General: He is not in acute distress. Appearance: He is ill-appearing (Chronically ill appearing). He is not diaphoretic. Comments: Pt is hard-of-hearing   HENT:      Head: Normocephalic and atraumatic. Eyes:      Extraocular Movements: Extraocular movements intact. Pupils: Pupils are equal, round, and reactive to light. Cardiovascular:      Rate and Rhythm: Bradycardia present. Heart sounds: No murmur heard. Pulmonary:      Effort: Pulmonary effort is normal. No respiratory distress. Breath sounds: Normal breath sounds. No wheezing or rales. Abdominal:      General: There is no distension. Palpations: Abdomen is soft. Tenderness: There is abdominal tenderness (Grimaces with diffuse palpation). Musculoskeletal:      Cervical back: Normal range of motion. Skin:     General: Skin is dry. Comments: Skin is cool  BLE with chronic appearing erythema, no warmth or TTP.    Neurological:      Mental Status: He is alert.      Cranial Nerves: Dysarthria present. No cranial nerve deficit. Motor: No weakness (Symmetric strength, able to hold all 4 extremities anti-gravity without drift). Comments: Pt is able to state his name and where he lives. Pt answers \"4 days\" when asked if he has any concerns. DiagnosticResults     EKG   Interpreted in conjunction with emergencydepartment physician Silvina Batista MD  Sinus bradycardia with first-degree AV block (). Rate of 53. Normal axis. Normal QRS and QT intervals. No ST or T wave change. Prior EKG from 3/5/22 extent of first degree AV block has worsened (210 to 272). RADIOLOGY:  XR CHEST PORTABLE   Final Result      No acute pulmonary disease.              LABS:   Results for orders placed or performed during the hospital encounter of 36/57/80   Basic Metabolic Panel w/ Reflex to MG   Result Value Ref Range    Sodium 144 136 - 145 mmol/L    Potassium reflex Magnesium 5.7 (H) 3.5 - 5.1 mmol/L    Chloride 108 99 - 110 mmol/L    CO2 23 21 - 32 mmol/L    Anion Gap 13 3 - 16    Glucose 88 70 - 99 mg/dL    BUN 33 (H) 7 - 20 mg/dL    CREATININE 1.6 (H) 0.8 - 1.3 mg/dL    GFR Non- 43 (A) >60    GFR  52 (A) >60    Calcium 10.0 8.3 - 10.6 mg/dL   Hepatic Function Panel   Result Value Ref Range    Total Protein 7.4 6.4 - 8.2 g/dL    Albumin 3.4 3.4 - 5.0 g/dL    Alkaline Phosphatase 180 (H) 40 - 129 U/L    ALT 23 10 - 40 U/L    AST 33 15 - 37 U/L    Total Bilirubin <0.2 0.0 - 1.0 mg/dL    Bilirubin, Direct <0.2 0.0 - 0.3 mg/dL    Bilirubin, Indirect see below 0.0 - 1.0 mg/dL   Troponin   Result Value Ref Range    Troponin 0.03 (H) <0.01 ng/mL   Lipase   Result Value Ref Range    Lipase 600.0 (H) 13.0 - 60.0 U/L   Lactate, Sepsis   Result Value Ref Range    Lactic Acid, Sepsis 1.9 0.4 - 1.9 mmol/L   Lactate, Sepsis   Result Value Ref Range    Lactic Acid, Sepsis 1.9 0.4 - 1.9 mmol/L   Urinalysis with Microscopic   Result Value Ref Range    Color, UA Yellow Straw/Yellow    Clarity, UA Clear Clear    Glucose, Ur Negative Negative mg/dL    Bilirubin Urine Negative Negative    Ketones, Urine Negative Negative mg/dL    Specific Gravity, UA 1.025 1.005 - 1.030    Blood, Urine MODERATE (A) Negative    pH, UA 6.0 5.0 - 8.0    Protein, UA 30 (A) Negative mg/dL    Urobilinogen, Urine 0.2 <2.0 E.U./dL    Nitrite, Urine POSITIVE (A) Negative    Leukocyte Esterase, Urine LARGE (A) Negative    Microscopic Examination YES     Urine Type Voided     Hyaline Casts, UA 6-10 (A) 0 - 2 /LPF    WBC, UA >100 (A) 0 - 5 /HPF    RBC, UA  (A) 0 - 4 /HPF    Epithelial Cells, UA 6-10 (A) 0 - 5 /HPF    Renal Epithelial, UA 6-10 (A) 0 - 1 /HPF    Bacteria, UA 2+ (A) None Seen /HPF   Blood Gas, Venous   Result Value Ref Range    pH, Da[place] 7.293 (L) 7.350 - 7.450    pCO2, [place] 57.9 (H) 41.0 - 51.0 mmHg    pO2, [place] 56.5 (H) 25.0 - 40.0 mmHg    HCO3, Venous 28.0 24.0 - 28.0 mmol/L    Base Excess, Daqu[place] 0.4 -2.0 - 3.0 mmol/L    O2 Sat, Daquan 85 Not established %    Carboxyhemoglobin 0.1 0.0 - 1.5 %    MetHgb, Daqu[place] 0.2 0.0 - 1.5 %    TC02 (Calc), Daqu[place] 30 mmol/L    Hemoglobin, Daqu[place], Reduced 15.40 %   CBC with Auto Differential   Result Value Ref Range    WBC 9.1 4.0 - 11.0 K/uL    RBC 3.99 (L) 4.20 - 5.90 M/uL    Hemoglobin 10.4 (L) 13.5 - 17.5 g/dL    Hematocrit 32.8 (L) 40.5 - 52.5 %    MCV 82.2 80.0 - 100.0 fL    MCH 26.0 26.0 - 34.0 pg    MCHC 31.6 31.0 - 36.0 g/dL    RDW 19.0 (H) 12.4 - 15.4 %    Platelets 837 640 - 261 K/uL    MPV 8.7 5.0 - 10.5 fL    Neutrophils % 82.7 %    Lymphocytes % 9.1 %    Monocytes % 6.7 %    Eosinophils % 1.0 %    Basophils % 0.5 %    Neutrophils Absolute 7.5 1.7 - 7.7 K/uL    Lymphocytes Absolute 0.8 (L) 1.0 - 5.1 K/uL    Monocytes Absolute 0.6 0.0 - 1.3 K/uL    Eosinophils Absolute 0.1 0.0 - 0.6 K/uL    Basophils Absolute 0.0 0.0 - 0.2 K/uL   Troponin   Result Value Ref Range    Troponin 0.02 (H) <0.01 ng/mL   Magnesium   Result Value Ref Range Magnesium 2.10 1.80 - 2.40 mg/dL   POCT Glucose   Result Value Ref Range    POC Glucose 65 (L) 70 - 99 mg/dl    Performed on ACCU-CHEK          RECENT VITALS:  BP: 115/64, Temp: 97.1 °F (36.2 °C), Pulse: 56,Resp: 18, SpO2: 93 %       ED Course     Nursing Notes, Past Medical Hx, Past Surgical Hx, Social Hx, Allergies, and Family Hx were reviewed. The patient was given the followingmedications:  Orders Placed This Encounter   Medications    lactated ringers bolus    calcium gluconate 3,000 mg in dextrose 5 % 100 mL IVPB    DISCONTD: lactated ringers bolus    DISCONTD: meropenem (MERREM) 1,000 mg in sodium chloride 0.9 % 100 mL IVPB (mini-bag)     Order Specific Question:   Antimicrobial Indications     Answer: Other     Order Specific Question:   Other Abx Indication     Answer:   UTI    lactated ringers bolus    ARIPiprazole (ABILIFY) tablet 15 mg    aspirin EC tablet 81 mg    atorvastatin (LIPITOR) tablet 40 mg    divalproex (DEPAKOTE SPRINKLE) capsule 250 mg    divalproex (DEPAKOTE SPRINKLE) capsule 125 mg    latanoprost (XALATAN) 0.005 % ophthalmic solution 1 drop    sodium chloride flush 0.9 % injection 5-40 mL    sodium chloride flush 0.9 % injection 5-40 mL    0.9 % sodium chloride infusion    enoxaparin (LOVENOX) injection 40 mg     Order Specific Question:   Indication of Use     Answer:   Prophylaxis-DVT/PE    polyethylene glycol (GLYCOLAX) packet 17 g    OR Linked Order Group     acetaminophen (TYLENOL) tablet 650 mg     acetaminophen (TYLENOL) suppository 650 mg    lactated ringers infusion    DISCONTD: meropenem (MERREM) 1,000 mg in sodium chloride 0.9 % 100 mL IVPB (mini-bag)     Order Specific Question:   Antimicrobial Indications     Answer:   Urinary Tract Infection     Order Specific Question:   UTI duration of therapy     Answer:    Other    DISCONTD: meropenem (MERREM) 1,000 mg in sodium chloride 0.9 % 100 mL IVPB (mini-bag)     Order Specific Question:   Antimicrobial Indications     Answer:   Urinary Tract Infection     Order Specific Question:   UTI duration of therapy     Answer: Other    FOLLOWED BY Linked Order Group     meropenem (MERREM) 1,000 mg in sodium chloride 0.9 % 100 mL IVPB (mini-bag)      Order Specific Question:   Antimicrobial Indications      Answer:   Urinary Tract Infection      Order Specific Question:   UTI duration of therapy      Answer: Other     meropenem (MERREM) 1,000 mg in sodium chloride 0.9 % 100 mL IVPB (mini-bag)      Order Specific Question:   Antimicrobial Indications      Answer:   Urinary Tract Infection      Order Specific Question:   UTI duration of therapy      Answer: Other    glucose chewable tablet 16 g    OR Linked Order Group     dextrose bolus 10% 125 mL     dextrose bolus 10% 250 mL    glucagon (rDNA) injection 1 mg    dextrose 5 % solution    insulin lispro (1 Unit Dial) 0-6 Units       CONSULTS:  IP CONSULT TO HOSPITALIST  IP CONSULT TO CASE MANAGEMENT  IP CONSULT TO SOCIAL WORK  IP CONSULT TO [address] [place] [address] / ASSESSMENT / Valeria Anibal is a 81 y.o. male with PMHx DM, MDR UTIs, dementia, CVA presents from SNF for AMS described by EMS as SNF providers concerned that patient is \"slower\" to respond and somewhat confused from baseline. On evaluation patient is drowsy but will arouse to voice, regard examiner and follow commands appropriately. He is noted to be bradycardic to the 50s with a first-degree AV block with some associated mild hypotension with blood pressures running 90s over 70s (per chart review at baseline pt typically runs [id number] systolic). Saturates appropriately on room air. Afebrile. He is somewhat dysarthric which is this is unclear if it is his baseline speech but has no other focal deficits on exam.  Heart is bradycardic but regular without murmur. Lungs are clear to auscultation bilaterally.   Abdomen is obese with some grimaces with diffuse palpation although this is inconsistent. He has chronic appearing dermal thickening and erythema of the bilateral lower extremities without associated warmth or tenderness to suggest acute cellulitis. EKG demonstrated first-degree AV block without any other conduction deficits or signs of ischemia. Trop of 0.03>0.02 consistent with baseline values ruling down ACS. He does have some prior EKGs where he has a very mild first-degree AV block with heart rates in the high 50s but was notably normotensive to hypertensive at that time. He did not come with a current medication list and SNF providers were unavailable but per chart review he is not noted to be on any anti-hypertensives or medications with bradycardia as a predominant side effect. Labs were notable for no leukocytosis, stable chronic normocytic anemia. BUN and creatinine consistent with baseline CKD. Hyperkalemia of 5.7, pt with no peaked T waves, although with AV block/bradycardia and some P wave flattening on EKG treated with 3 grams calcium gluconate for cardiac stabilization. He does have a UTI again on labs today. Based on mild hypotension obtained blood cultures, treated with 2L IVF for possible sepsis. As he has a history of MDR UTI, after reviewing recent cultures/sensitivties will initially treat with meropenem. Labs otherwise notable for mild alk phos elevation to 180 with otherwise unremarkable LFTs. Lipase is elevated to 600, with his questionable diffuse abdominal TTP on exam, concern for possible pancreatitis. As he is afebrile without labs to suggest biliary obstruction did not feel imaging necessary at this point. Spoke with hospitalist for admission for further evaluation and monitoring for bradycardia, UTI, pancreatitis. This patient was also evaluated by the attending physician. All care plans were discussed and agreed upon. Clinical Impression     1. Altered mental status, unspecified altered mental status type    2.  First degree AV block    3. Acute pancreatitis, unspecified complication status, unspecified pancreatitis type        Disposition     PATIENT REFERRED TO:  No follow-up provider specified.     DISCHARGE MEDICATIONS:  Current Discharge Medication List          DISPOSITION Admitted 05/11/2022 11:09:21 PM       Esther Cooney MD  05/12/22 [id number]

## 2022-05-12 ENCOUNTER — APPOINTMENT (OUTPATIENT)
Dept: CT IMAGING | Age: 72
DRG: 871 | End: 2022-05-12
Payer: MEDICARE

## 2022-05-12 ENCOUNTER — APPOINTMENT (OUTPATIENT)
Dept: GENERAL RADIOLOGY | Age: 72
DRG: 871 | End: 2022-05-12
Payer: MEDICARE

## 2022-05-12 LAB
A/G RATIO: 0.7 (ref 1.1–2.2)
ALBUMIN SERPL-MCNC: 2.9 G/DL (ref 3.4–5)
ALP BLD-CCNC: 168 U/L (ref 40–129)
ALT SERPL-CCNC: 20 U/L (ref 10–40)
ANION GAP SERPL CALCULATED.3IONS-SCNC: 5 MMOL/L (ref 3–16)
AST SERPL-CCNC: 27 U/L (ref 15–37)
BASOPHILS ABSOLUTE: 0.1 K/UL (ref 0–0.2)
BASOPHILS RELATIVE PERCENT: 0.9 %
BILIRUB SERPL-MCNC: <0.2 MG/DL (ref 0–1)
BUN BLDV-MCNC: 31 MG/DL (ref 7–20)
CALCIUM SERPL-MCNC: 10.2 MG/DL (ref 8.3–10.6)
CHLORIDE BLD-SCNC: 109 MMOL/L (ref 99–110)
CO2: 26 MMOL/L (ref 21–32)
CREAT SERPL-MCNC: 1.4 MG/DL (ref 0.8–1.3)
EKG ATRIAL RATE: 53 BPM
EKG ATRIAL RATE: 58 BPM
EKG DIAGNOSIS: NORMAL
EKG DIAGNOSIS: NORMAL
EKG P AXIS: 21 DEGREES
EKG P AXIS: 37 DEGREES
EKG P-R INTERVAL: 238 MS
EKG P-R INTERVAL: 274 MS
EKG Q-T INTERVAL: 466 MS
EKG Q-T INTERVAL: 486 MS
EKG QRS DURATION: 100 MS
EKG QRS DURATION: 88 MS
EKG QTC CALCULATION (BAZETT): 456 MS
EKG QTC CALCULATION (BAZETT): 457 MS
EKG R AXIS: 13 DEGREES
EKG R AXIS: 16 DEGREES
EKG T AXIS: -7 DEGREES
EKG T AXIS: -8 DEGREES
EKG VENTRICULAR RATE: 53 BPM
EKG VENTRICULAR RATE: 58 BPM
EOSINOPHILS ABSOLUTE: 0.2 K/UL (ref 0–0.6)
EOSINOPHILS RELATIVE PERCENT: 1.2 %
GFR AFRICAN AMERICAN: >60
GFR NON-AFRICAN AMERICAN: 50
GLUCOSE BLD-MCNC: 107 MG/DL (ref 70–99)
GLUCOSE BLD-MCNC: 139 MG/DL (ref 70–99)
GLUCOSE BLD-MCNC: 65 MG/DL (ref 70–99)
GLUCOSE BLD-MCNC: 65 MG/DL (ref 70–99)
GLUCOSE BLD-MCNC: 76 MG/DL (ref 70–99)
GLUCOSE BLD-MCNC: 77 MG/DL (ref 70–99)
GLUCOSE BLD-MCNC: 83 MG/DL (ref 70–99)
GLUCOSE BLD-MCNC: 88 MG/DL (ref 70–99)
GLUCOSE BLD-MCNC: 88 MG/DL (ref 70–99)
GLUCOSE BLD-MCNC: 89 MG/DL (ref 70–99)
HCT VFR BLD CALC: 31.4 % (ref 40.5–52.5)
HEMOGLOBIN: 10 G/DL (ref 13.5–17.5)
LIPASE: 600 U/L (ref 13–60)
LYMPHOCYTES ABSOLUTE: 0.9 K/UL (ref 1–5.1)
LYMPHOCYTES RELATIVE PERCENT: 6.6 %
MCH RBC QN AUTO: 25.7 PG (ref 26–34)
MCHC RBC AUTO-ENTMCNC: 31.9 G/DL (ref 31–36)
MCV RBC AUTO: 80.5 FL (ref 80–100)
MONOCYTES ABSOLUTE: 0.7 K/UL (ref 0–1.3)
MONOCYTES RELATIVE PERCENT: 5.4 %
NEUTROPHILS ABSOLUTE: 11.7 K/UL (ref 1.7–7.7)
NEUTROPHILS RELATIVE PERCENT: 85.9 %
PDW BLD-RTO: 19 % (ref 12.4–15.4)
PERFORMED ON: ABNORMAL
PERFORMED ON: NORMAL
PLATELET # BLD: 258 K/UL (ref 135–450)
PMV BLD AUTO: 8.5 FL (ref 5–10.5)
POTASSIUM REFLEX MAGNESIUM: 5.3 MMOL/L (ref 3.5–5.1)
RBC # BLD: 3.91 M/UL (ref 4.2–5.9)
SODIUM BLD-SCNC: 140 MMOL/L (ref 136–145)
T4 FREE: 0.6 NG/DL (ref 0.9–1.8)
TOTAL PROTEIN: 6.8 G/DL (ref 6.4–8.2)
TSH SERPL DL<=0.05 MIU/L-ACNC: 26.73 UIU/ML (ref 0.27–4.2)
WBC # BLD: 13.6 K/UL (ref 4–11)

## 2022-05-12 PROCEDURE — 73630 X-RAY EXAM OF FOOT: CPT

## 2022-05-12 PROCEDURE — 2060000000 HC ICU INTERMEDIATE R&B

## 2022-05-12 PROCEDURE — 6370000000 HC RX 637 (ALT 250 FOR IP): Performed by: INTERNAL MEDICINE

## 2022-05-12 PROCEDURE — 6360000002 HC RX W HCPCS: Performed by: STUDENT IN AN ORGANIZED HEALTH CARE EDUCATION/TRAINING PROGRAM

## 2022-05-12 PROCEDURE — 80053 COMPREHEN METABOLIC PANEL: CPT

## 2022-05-12 PROCEDURE — 2580000003 HC RX 258: Performed by: INTERNAL MEDICINE

## 2022-05-12 PROCEDURE — 74176 CT ABD & PELVIS W/O CONTRAST: CPT

## 2022-05-12 PROCEDURE — 84481 FREE ASSAY (FT-3): CPT

## 2022-05-12 PROCEDURE — 6360000002 HC RX W HCPCS: Performed by: INTERNAL MEDICINE

## 2022-05-12 PROCEDURE — 93005 ELECTROCARDIOGRAM TRACING: CPT | Performed by: INTERNAL MEDICINE

## 2022-05-12 PROCEDURE — 2580000003 HC RX 258: Performed by: STUDENT IN AN ORGANIZED HEALTH CARE EDUCATION/TRAINING PROGRAM

## 2022-05-12 PROCEDURE — 36415 COLL VENOUS BLD VENIPUNCTURE: CPT

## 2022-05-12 PROCEDURE — 84443 ASSAY THYROID STIM HORMONE: CPT

## 2022-05-12 PROCEDURE — 51701 INSERT BLADDER CATHETER: CPT

## 2022-05-12 PROCEDURE — 85025 COMPLETE CBC W/AUTO DIFF WBC: CPT

## 2022-05-12 PROCEDURE — 93010 ELECTROCARDIOGRAM REPORT: CPT | Performed by: INTERNAL MEDICINE

## 2022-05-12 PROCEDURE — 84439 ASSAY OF FREE THYROXINE: CPT

## 2022-05-12 PROCEDURE — 51798 US URINE CAPACITY MEASURE: CPT

## 2022-05-12 PROCEDURE — 83690 ASSAY OF LIPASE: CPT

## 2022-05-12 RX ORDER — ASPIRIN 81 MG/1
81 TABLET ORAL DAILY
Status: DISCONTINUED | OUTPATIENT
Start: 2022-05-12 | End: 2022-05-15

## 2022-05-12 RX ORDER — DEXTROSE MONOHYDRATE 50 MG/ML
100 INJECTION, SOLUTION INTRAVENOUS PRN
Status: DISCONTINUED | OUTPATIENT
Start: 2022-05-12 | End: 2022-05-18 | Stop reason: HOSPADM

## 2022-05-12 RX ORDER — LATANOPROST 50 UG/ML
1 SOLUTION/ DROPS OPHTHALMIC NIGHTLY
Status: DISCONTINUED | OUTPATIENT
Start: 2022-05-12 | End: 2022-05-18 | Stop reason: HOSPADM

## 2022-05-12 RX ORDER — ENOXAPARIN SODIUM 100 MG/ML
40 INJECTION SUBCUTANEOUS DAILY
Status: DISCONTINUED | OUTPATIENT
Start: 2022-05-12 | End: 2022-05-18 | Stop reason: HOSPADM

## 2022-05-12 RX ORDER — TAMSULOSIN HYDROCHLORIDE 0.4 MG/1
0.8 CAPSULE ORAL NIGHTLY
Status: CANCELLED | OUTPATIENT
Start: 2022-05-12

## 2022-05-12 RX ORDER — DIVALPROEX SODIUM 125 MG/1
125 CAPSULE, COATED PELLETS ORAL EVERY MORNING
Status: DISCONTINUED | OUTPATIENT
Start: 2022-05-12 | End: 2022-05-17

## 2022-05-12 RX ORDER — ATORVASTATIN CALCIUM 40 MG/1
40 TABLET, FILM COATED ORAL NIGHTLY
Status: DISCONTINUED | OUTPATIENT
Start: 2022-05-12 | End: 2022-05-18 | Stop reason: HOSPADM

## 2022-05-12 RX ORDER — AMLODIPINE BESYLATE 5 MG/1
5 TABLET ORAL DAILY
Status: ON HOLD | COMMUNITY
End: 2022-05-18 | Stop reason: HOSPADM

## 2022-05-12 RX ORDER — CIMETIDINE 300 MG/1
300 TABLET, FILM COATED ORAL 2 TIMES DAILY
Status: ON HOLD | COMMUNITY
End: 2022-05-18 | Stop reason: HOSPADM

## 2022-05-12 RX ORDER — SODIUM CHLORIDE 9 MG/ML
INJECTION, SOLUTION INTRAVENOUS CONTINUOUS
Status: DISCONTINUED | OUTPATIENT
Start: 2022-05-12 | End: 2022-05-13

## 2022-05-12 RX ORDER — FINASTERIDE 5 MG/1
5 TABLET, FILM COATED ORAL DAILY
Status: CANCELLED | OUTPATIENT
Start: 2022-05-12

## 2022-05-12 RX ORDER — SODIUM CHLORIDE 9 MG/ML
INJECTION, SOLUTION INTRAVENOUS PRN
Status: DISCONTINUED | OUTPATIENT
Start: 2022-05-12 | End: 2022-05-18 | Stop reason: HOSPADM

## 2022-05-12 RX ORDER — ARIPIPRAZOLE 5 MG/1
15 TABLET ORAL DAILY
Status: DISCONTINUED | OUTPATIENT
Start: 2022-05-12 | End: 2022-05-18 | Stop reason: HOSPADM

## 2022-05-12 RX ORDER — POLYETHYLENE GLYCOL 3350 17 G/17G
17 POWDER, FOR SOLUTION ORAL DAILY PRN
Status: DISCONTINUED | OUTPATIENT
Start: 2022-05-12 | End: 2022-05-18 | Stop reason: HOSPADM

## 2022-05-12 RX ORDER — SODIUM CHLORIDE 0.9 % (FLUSH) 0.9 %
5-40 SYRINGE (ML) INJECTION PRN
Status: DISCONTINUED | OUTPATIENT
Start: 2022-05-12 | End: 2022-05-18 | Stop reason: HOSPADM

## 2022-05-12 RX ORDER — SODIUM CHLORIDE 0.9 % (FLUSH) 0.9 %
5-40 SYRINGE (ML) INJECTION EVERY 12 HOURS SCHEDULED
Status: DISCONTINUED | OUTPATIENT
Start: 2022-05-12 | End: 2022-05-18 | Stop reason: HOSPADM

## 2022-05-12 RX ORDER — FUROSEMIDE 40 MG/1
40 TABLET ORAL DAILY
Status: ON HOLD | COMMUNITY
End: 2022-05-18 | Stop reason: HOSPADM

## 2022-05-12 RX ORDER — SODIUM CHLORIDE, SODIUM LACTATE, POTASSIUM CHLORIDE, CALCIUM CHLORIDE 600; 310; 30; 20 MG/100ML; MG/100ML; MG/100ML; MG/100ML
INJECTION, SOLUTION INTRAVENOUS CONTINUOUS
Status: DISCONTINUED | OUTPATIENT
Start: 2022-05-12 | End: 2022-05-12

## 2022-05-12 RX ORDER — ACETAMINOPHEN 650 MG/1
650 SUPPOSITORY RECTAL EVERY 6 HOURS PRN
Status: DISCONTINUED | OUTPATIENT
Start: 2022-05-12 | End: 2022-05-18 | Stop reason: HOSPADM

## 2022-05-12 RX ORDER — FLUOXETINE HYDROCHLORIDE 60 MG/1
60 TABLET, FILM COATED ORAL; ORAL DAILY
Status: CANCELLED | OUTPATIENT
Start: 2022-05-12

## 2022-05-12 RX ORDER — DIVALPROEX SODIUM 125 MG/1
250 CAPSULE, COATED PELLETS ORAL NIGHTLY
Status: DISCONTINUED | OUTPATIENT
Start: 2022-05-12 | End: 2022-05-17

## 2022-05-12 RX ORDER — INSULIN LISPRO 100 [IU]/ML
0-6 INJECTION, SOLUTION INTRAVENOUS; SUBCUTANEOUS EVERY 4 HOURS
Status: DISCONTINUED | OUTPATIENT
Start: 2022-05-12 | End: 2022-05-18 | Stop reason: HOSPADM

## 2022-05-12 RX ORDER — ACETAMINOPHEN 325 MG/1
650 TABLET ORAL EVERY 6 HOURS PRN
Status: DISCONTINUED | OUTPATIENT
Start: 2022-05-12 | End: 2022-05-18 | Stop reason: HOSPADM

## 2022-05-12 RX ADMIN — ENOXAPARIN SODIUM 40 MG: 100 INJECTION SUBCUTANEOUS at 10:16

## 2022-05-12 RX ADMIN — ASPIRIN 81 MG: 81 TABLET, COATED ORAL at 10:17

## 2022-05-12 RX ADMIN — DIVALPROEX SODIUM 125 MG: 125 CAPSULE ORAL at 10:17

## 2022-05-12 RX ADMIN — SODIUM CHLORIDE: 9 INJECTION, SOLUTION INTRAVENOUS at 18:07

## 2022-05-12 RX ADMIN — MEROPENEM 1000 MG: 1 INJECTION, POWDER, FOR SOLUTION INTRAVENOUS at 11:24

## 2022-05-12 RX ADMIN — DEXTROSE MONOHYDRATE 125 ML: 100 INJECTION, SOLUTION INTRAVENOUS at 05:12

## 2022-05-12 RX ADMIN — DEXTROSE MONOHYDRATE 125 ML: 100 INJECTION, SOLUTION INTRAVENOUS at 21:40

## 2022-05-12 RX ADMIN — ARIPIPRAZOLE 15 MG: 5 TABLET ORAL at 10:17

## 2022-05-12 RX ADMIN — MEROPENEM 1000 MG: 1 INJECTION, POWDER, FOR SOLUTION INTRAVENOUS at 03:09

## 2022-05-12 RX ADMIN — SODIUM CHLORIDE, POTASSIUM CHLORIDE, SODIUM LACTATE AND CALCIUM CHLORIDE: 600; 310; 30; 20 INJECTION, SOLUTION INTRAVENOUS at 11:22

## 2022-05-12 RX ADMIN — SODIUM CHLORIDE, POTASSIUM CHLORIDE, SODIUM LACTATE AND CALCIUM CHLORIDE: 600; 310; 30; 20 INJECTION, SOLUTION INTRAVENOUS at 03:06

## 2022-05-12 ASSESSMENT — PAIN SCALES - PAIN ASSESSMENT IN ADVANCED DEMENTIA (PAINAD)
NEGVOCALIZATION: 0
CONSOLABILITY: 0
CONSOLABILITY: 0
FACIALEXPRESSION: 0
CONSOLABILITY: 0
NEGVOCALIZATION: 0
FACIALEXPRESSION: 0
NEGVOCALIZATION: 0
FACIALEXPRESSION: 0
FACIALEXPRESSION: 0
CONSOLABILITY: 0
NEGVOCALIZATION: 0
BODYLANGUAGE: 0
TOTALSCORE: 0
BODYLANGUAGE: 0
NEGVOCALIZATION: 0
NEGVOCALIZATION: 0
TOTALSCORE: 0
CONSOLABILITY: 0
BODYLANGUAGE: 0
BREATHING: 0
CONSOLABILITY: 0
NEGVOCALIZATION: 0
CONSOLABILITY: 0
FACIALEXPRESSION: 0
BODYLANGUAGE: 0
TOTALSCORE: 0
FACIALEXPRESSION: 0
TOTALSCORE: 0
BREATHING: 0
TOTALSCORE: 0
BREATHING: 0
BREATHING: 0
BODYLANGUAGE: 0
BREATHING: 0
FACIALEXPRESSION: 0
BREATHING: 0
FACIALEXPRESSION: 0
TOTALSCORE: 0
NEGVOCALIZATION: 0
BODYLANGUAGE: 0
NEGVOCALIZATION: 0
CONSOLABILITY: 0
TOTALSCORE: 0
CONSOLABILITY: 0
BREATHING: 0
BODYLANGUAGE: 0
FACIALEXPRESSION: 0

## 2022-05-12 ASSESSMENT — PAIN SCALES - WONG BAKER
WONGBAKER_NUMERICALRESPONSE: 0

## 2022-05-12 ASSESSMENT — PAIN SCALES - GENERAL
PAINLEVEL_OUTOF10: 0
PAINLEVEL_OUTOF10: 0

## 2022-05-12 NOTE — ED PROVIDER NOTES
ED Attending Attestation Note     Date of evaluation: 5/11/2022    This patient was seen by the resident. I have seen and examined the patient, agree with the workup, evaluation, management and diagnosis. The care plan has been discussed. I have reviewed the ECG and concur with the resident's interpretation. My assessment reveals patient sitting up in bed, sleeping, no signs of respiratory distress he is slightly bradycardic, blood pressure systolic is in the 54Y which is slightly low for him, he will get laboratory studies, blood work and urinalysis looking for signs of infection.      Margaret Michaud MD  05/11/22 2002

## 2022-05-12 NOTE — ED NOTES
Patients vital signs obtained. Lactic and trop obtained. Warm blanket and pillow given to patient. Pending lab results and urination. Patient unable to urinate at this time. MD informed.       Smitha Dooley RN  05/11/22 4150

## 2022-05-12 NOTE — PROGRESS NOTES
Hospitalist Progress Note      PCP: Linda Hernandez MD    Date of Admission: 5/11/2022    Chief Complaint: Sent from nursing home for altered mental state    Hospital Course:   81 y.o. male who presents from his nursing home for altered mental status  - poor historian with hx of dementia   - hx of mild mental retardation, bipolar disorder, diabetes mellitus, history of multidrug-resistant UTI    Subjective: dementia, says there are 4 things, doesn't appear to be in distress       Medications:  Reviewed    Infusion Medications    sodium chloride      lactated ringers 75 mL/hr at 05/12/22 1122    dextrose       Scheduled Medications    ARIPiprazole  15 mg Oral Daily    aspirin  81 mg Oral Daily    atorvastatin  40 mg Oral Nightly    divalproex  250 mg Oral Nightly    divalproex  125 mg Oral QAM    latanoprost  1 drop Right Eye Nightly    sodium chloride flush  5-40 mL IntraVENous 2 times per day    enoxaparin  40 mg SubCUTAneous Daily    insulin lispro  0-6 Units SubCUTAneous Q4H    meropenem  1,000 mg IntraVENous Q12H     PRN Meds: sodium chloride flush, sodium chloride, polyethylene glycol, acetaminophen **OR** acetaminophen, glucose, dextrose bolus **OR** dextrose bolus, glucagon (rDNA), dextrose      Intake/Output Summary (Last 24 hours) at 5/12/2022 1435  Last data filed at 5/12/2022 1122  Gross per 24 hour   Intake 60 ml   Output 500 ml   Net -440 ml       Physical Exam Performed:    /75   Pulse 61   Temp 97.1 °F (36.2 °C) (Oral)   Resp 15   Ht 5' 9\" (1.753 m)   Wt 215 lb (97.5 kg)   SpO2 98%   BMI 31.75 kg/m²     General appearance: chronically ill appearing  HEENT: Pupils equal, round, and reactive to light. Conjunctivae/corneas clear. Neck: Supple, with full range of motion. No jugular venous distention. Trachea midline. Respiratory:  Normal respiratory effort. Clear to auscultation, bilaterally without Rales/Wheezes/Rhonchi.   Cardiovascular: Regular rate and rhythm with normal S1/S2 without murmurs, rubs or gallops. Abdomen: Soft, non-tender, non-distended with normal bowel sounds. Glans and foreskin redness. Musculoskeletal: Lower extremity chronic venous stasis changes  Skin: Skin color, texture, turgor normal.  No rashes or lesions. Neurologic:  Moving all extremities  Psychiatric: Alert , unable to answer orientation questions  Capillary Refill: Brisk,< 3 seconds   Peripheral Pulses: +2 palpable, equal bilaterally       Labs:   Recent Labs     05/11/22  1945 05/12/22  0736   WBC 9.1 13.6*   HGB 10.4* 10.0*   HCT 32.8* 31.4*    258     Recent Labs     05/11/22 2016 05/12/22  0736    140   K 5.7* 5.3*    109   CO2 23 26   BUN 33* 31*   CREATININE 1.6* 1.4*   CALCIUM 10.0 10.2     Recent Labs     05/11/22 2016 05/12/22  0736   AST 33 27   ALT 23 20   BILIDIR <0.2  --    BILITOT <0.2 <0.2   ALKPHOS 180* 168*     No results for input(s): INR in the last 72 hours. Recent Labs     05/11/22 2016 05/11/22 2125   TROPONINI 0.03* 0.02*       Urinalysis:      Lab Results   Component Value Date    NITRU POSITIVE 05/11/2022    WBCUA >100 05/11/2022    BACTERIA 2+ 05/11/2022    RBCUA  05/11/2022    BLOODU MODERATE 05/11/2022    SPECGRAV 1.025 05/11/2022    GLUCOSEU Negative 05/11/2022       Radiology:  XR CHEST PORTABLE   Final Result      No acute pulmonary disease.          XR FOOT LEFT (MIN 3 VIEWS)    (Results Pending)           Assessment/Plan:    Active Hospital Problems    Diagnosis Date Noted    AMS (altered mental status) [R41.82] 05/11/2022     Priority: Medium     Acute metabolic encephalopathy possibly due to UTI  - hx of MDRO 02/2022 Morganella morganii , 04/2022 Proteus mirabilis  Rx of UTI  Abnormal thyroid studies in the past, Hx of elevated TSH 11.91 in 02/2021 but Free T4 was normal, patient is not on synthroid supplementation  Will recheck TSH/Free T4  Delirium precuations    UTI, symptomatic with change of mentation per NH  - Merrem for Abx, follow up Cx results  - redness and inflammation on the glans and perpuce with ring like formation concern for balanitis/paraphimosis. Urology consulted     Elevated lipase, > 600  He has no epigastric tenderness on exam, I do not think this is pancreatitis  - advance diet  - will check for abdominal pathology with CT abdomen pelvis with oral contrast for further evaluation     KIMBERLY on CKD 2: POA  - creatinine 1.6 from baseline of 1.2  - rule out post renal with bladder scan with straight cath parameters  - Urology consulted  - continue IVF, drop rate to 75 mL/hr    Hyperkalemia - level coming down, rule out obstruction    Leg edema, chronic - with chronic venous stasis changes. -- recent hospitalization, Norvasc was stopped  - podiatry consulted     History of schizophrenia and bipolar, bipolar  Continue pta meds     Diabetes mellitus, HgbA1c 8.7  BG levels low 70s-80s  Continue SSI, hold off long acting insulin    DVT Prophylaxis: lovenox  Diet: ADULT DIET;  Dysphagia - Soft and Bite Sized; 3 carb choices (45 gm/meal)  Code Status: Full Code    PT/OT Eval Status: from long term care    Dispo - continue inpatient care    Keke Dumont MD  Internal Medicine

## 2022-05-12 NOTE — H&P
Hospital Medicine History & Physical      PCP: Jamal Mcclain MD    Date of Admission: 5/11/2022    Date of Service: Pt seen/examined on 5/11/2022 and Admitted to Inpatient with expected LOS greater than two midnights due to medical therapy. Chief Complaint: Sent from nursing home for altered mental state      History Of Present Illness:      81 y.o. male who presents from his nursing home for altered mental status. According to the EMS, the facility has called them for altered mental status. Usually patient is more conversant and more alert and appropriate. But today he is found to be slow to respond. Patient has history of MDRO UTIs and has been treated recently for Proteus mirabilis UTI in April 2022. Patient has history of dementia at baseline and unable to provide any reliable history. Answers to questions is not appropriate and keeps repeating 4 days. Patient has no complaints. No vomiting or diarrhea observed. No recorded fever. No URI symptoms observed. Past Medical History:          Diagnosis Date    Ataxia     Bipolar 1 disorder ([facility] [address]     BPH (benign prostatic hyperplasia)     Cerebral infarct (HCC)     Chronic kidney disease     Dementia ([facility] [address]     Depression     Diabetes mellitus type 2, uncomplicated ([facility] [address]     Gout     Hyperlipidemia     Hypertension     Schizophrenia ([facility] [address]        Past Surgical History:      History reviewed. No pertinent surgical history. Medications Prior to Admission:      Prior to Admission medications    Medication Sig Start Date End Date Taking? Authorizing Provider   gabapentin (NEURONTIN) 100 MG capsule Take 100 mg by mouth in the morning and at bedtime. Historical Provider, MD   metFORMIN (GLUCOPHAGE) 500 MG tablet Take 500 mg by mouth 2 times daily (with meals)    Historical Provider, MD   mupirocin (BACTROBAN) 2 % ointment Apply topically 3 times daily.  4/18/22   Sy Ortega MD   furosemide (LASIX) 20 MG tablet Take 2 tablets by mouth daily 4/18/22   Yo Watkins MD   albuterol (PROVENTIL) (2.5 MG/3ML) 0.083% nebulizer solution Take 3 mLs by nebulization every 6 hours as needed for Wheezing or Shortness of Breath 4/18/22   Yo Watkins MD   lactobacillus (CULTURELLE) CAPS capsule Take 1 capsule by mouth daily 4/18/22   Yo Watkins MD   FLUoxetine HCl 60 MG TABS Take 60 mg by mouth daily 4/26/22   Yo Watkins MD   insulin detemir (LEVEMIR) 100 UNIT/ML injection vial Inject 5 Units into the skin nightly 3/8/22   Ralph Matos MD   insulin aspart (NOVOLOG) 100 UNIT/ML injection vial Inject 3 Units into the skin 3 times daily (before meals) 3/8/22   Ralph Matos MD   miconazole (MICOTIN) 2 % powder Apply topically 2 times daily.  3/8/22   Ralph Matos MD   nystatin (MYCOSTATIN) 305857 UNIT/GM powder Apply topically every 6-8 hours as needed (athlete's foot)     Historical Provider, MD   polyethylene glycol (MIRALAX) 17 g PACK packet Take 17 g by mouth every 48 hours     Historical Provider, MD   divalproex (DEPAKOTE SPRINKLES) 125 MG capsule Take 250 mg by mouth at bedtime    Historical Provider, MD   divalproex (DEPAKOTE SPRINKLES) 125 MG capsule Take 125 mg by mouth every morning    Historical Provider, MD   latanoprost (XALATAN) 0.005 % ophthalmic solution Place 1 drop into the right eye nightly     Historical Provider, MD   acetaminophen (TYLENOL) 500 MG tablet Take 500 mg by mouth every 6 hours as needed for Pain    Historical Provider, MD   allopurinol (ZYLOPRIM) 100 MG tablet Take 100 mg by mouth daily    Historical Provider, MD   ARIPiprazole (ABILIFY) 15 MG tablet Take 15 mg by mouth daily    Historical Provider, MD   aspirin 81 MG EC tablet Take 81 mg by mouth daily    Historical Provider, MD   atorvastatin (LIPITOR) 40 MG tablet Take 40 mg by mouth nightly    Historical Provider, MD   bisacodyl (DULCOLAX) 10 MG suppository Place 10 mg rectally daily as needed for Constipation    Historical Provider, MD Cranberry 425 MG CAPS Take 450 mg by mouth daily     Historical Provider, MD   docusate sodium (COLACE) 100 MG capsule Take 100 mg by mouth 2 times daily     Historical Provider, MD   finasteride (PROSCAR) 5 MG tablet Take 5 mg by mouth daily     Historical Provider, MD   Multiple Vitamins-Minerals (THERAPEUTIC MULTIVITAMIN-MINERALS) tablet Take 1 tablet by mouth daily    Historical Provider, MD   insulin aspart (NOVOLOG) 100 UNIT/ML injection vial Inject 0-21 Units into the skin 3 times daily (before meals) Sliding scale   201-250 = 5 units  251-300 = 8 units  301-350 = 12 units  351-400 = 15 units  401-450 = 18 units  451-500 = 21 units  500+ notify MD    Historical Provider, MD   ondansetron (ZOFRAN-ODT) 4 MG disintegrating tablet Take 4 mg by mouth every 8 hours as needed for Nausea or Vomiting    Historical Provider, MD   polyethylene glycol (GLYCOLAX) 17 g packet Take 17 g by mouth daily as needed for Constipation     Historical Provider, MD   tamsulosin (FLOMAX) 0.4 MG capsule Take 0.8 mg by mouth nightly    Historical Provider, MD       Allergies:  Codeine, Cogentin [benztropine], and Penicillins    Social History:      TOBACCO:   reports that he has never smoked. He has never used smokeless tobacco.  ETOH:   reports previous alcohol use. E-Cigarettes/Vaping Use     Questions Responses    E-Cigarette/Vaping Use Never User    Start Date     Passive Exposure     Quit Date     Counseling Given     Comments           Family History:    Reviewed in detail and negative for DM, CAD, Cancer, CVA. Patient is unable to provide history due to dementia at baseline    REVIEW OF SYSTEMS COMPLETED:     Unable to obtain history due to dementia at baseline    PHYSICAL EXAM PERFORMED:    BP (!) 94/56   Pulse 57   Temp 97.3 °F (36.3 °C) (Oral)   Resp 17   Ht 5' 9\" (1.753 m)   Wt 215 lb (97.5 kg)   SpO2 95%   BMI 31.75 kg/m²     General appearance:  No apparent distress, appears stated age and cooperative.   Hard of hearing, chronically ill-appearing  HEENT:  Normal cephalic, atraumatic without obvious deformity. Pupils equal, round, and reactive to light. Extra ocular muscles intact. Conjunctivae/corneas clear. Neck: Supple, with full range of motion. No jugular venous distention. Trachea midline. Respiratory:  Normal respiratory effort. Clear to auscultation, bilaterally without Rales/Wheezes/Rhonchi. Cardiovascular: Bradycardic rate and rhythm with normal S1/S2 without murmurs, rubs or gallops. Abdomen: Soft, tenderness + (grimaces with palpation diffusely), non-distended with normal bowel sounds. Musculoskeletal:  No clubbing, cyanosis or edema bilaterally. Full range of motion without deformity. Skin: Skin color, texture, turgor normal.  No rashes or lesions. Neurologic:  Neurovascularly intact without any focal sensory/motor deficits. Cranial nerves: II-XII intact, grossly non-focal.  Psychiatric:  Alert and oriented to self only. Capillary Refill: Brisk,3 seconds, normal  Peripheral Pulses: +2 palpable, equal bilaterally       Labs:     Recent Labs     05/11/22  1945   WBC 9.1   HGB 10.4*   HCT 32.8*        Recent Labs     05/11/22 2016      K 5.7*      CO2 23   BUN 33*   CREATININE 1.6*   CALCIUM 10.0     Recent Labs     05/11/22 2016   AST 33   ALT 23   BILIDIR <0.2   BILITOT <0.2   ALKPHOS 180*     No results for input(s): INR in the last 72 hours.   Recent Labs     05/11/22 2016 05/11/22 2125   TROPONINI 0.03* 0.02*       Urinalysis:      Lab Results   Component Value Date    NITRU POSITIVE 05/11/2022    WBCUA 3-5 04/16/2022    BACTERIA Rare 04/16/2022    RBCUA None seen 04/16/2022    BLOODU MODERATE 05/11/2022    SPECGRAV 1.025 05/11/2022    GLUCOSEU Negative 05/11/2022       Radiology:   EKG:  I have reviewed the EKG with the following interpretation: Sinus bradycardia, VR = 53, first-degree AV block, normal QRS and QTc, no acute ST-T changes    XR CHEST PORTABLE   Final Result No acute pulmonary disease. ASSESSMENT:    Active Hospital Problems    Diagnosis Date Noted    AMS (altered mental status) [R41.82] 05/11/2022     Priority: Medium   #Altered mental state-likely secondary to UTI  #Acute pancreatitis-given elevated lipase to 600 and diffuse abdominal tenderness  #CKD with hyperkalemia and EKG changes  #Chronically elevated troponin  #Chronic anemia-H&H stable at baseline  #DM-2  #Dementia at baseline  #Other chronic medical conditions-CVA, bipolar disorder    PLAN:  -Calcium gluconate 1 g IV piggyback given EKG changes  -IV hydration, received 1 L of LR in ED. Will give another fluid bolus of 1 L of normal saline followed by maintenance fluids  -N.p.o.  -Continue on meropenem  -Monitor electrolytes and renal function  -Repeat EKG in a.m.  -Continue on his nursing home medications appropriately, pharmacy consult placed for verification of medications  -Continue on low-dose SSI for now, adjust as needed  -Recheck lipase in a.m. x1  -Supportive therapy      DVT Prophylaxis: Lovenox  Diet: Diet NPO  Code Status: Full Code    PT/OT Eval Status: As tolerated with fall precautions    Dispo -GMF with telemetry       Jesús Ferris MD    Thank you Darryn Butler MD for the opportunity to be involved in this patient's care. If you have any questions or concerns please feel free to contact me at [phone].

## 2022-05-12 NOTE — PROGRESS NOTES
Pt now NPO, sips with meds due to pocketing food in mouth, pt doesn't do well with following instructions RN concerned that if pt continues to eat he will aspirate due to mentation status.

## 2022-05-12 NOTE — PROGRESS NOTES
rn attempted to call report, room isn't clean yet and RN was busy with another patient at this time will call back

## 2022-05-12 NOTE — PROGRESS NOTES
Pt to ct, rn at bedside. Dr. Kerri Tapia aware of rectal temp of 90. 3. pt remains covered up in warm blankets.

## 2022-05-12 NOTE — CONSULTS
Department of Podiatry Consult Note  Resident       Reason for Consult:  L Heel Stage 3  Requesting Physician:  Dr. Timoteo Hernandez MD    CHIEF COMPLAINT:  Left Heel Ulcer    HISTORY OF PRESENT ILLNESS:    The patient is a 81 y.o. male with significant past medical history as listed below who is consulted to podiatry for a left heel ulcer. Patient presented to the hospital secondary to altered mental status, patient was then admit for further evaluation and monitoring for bradycardia, UTI, and pancreatitis. Unable to obtain pertinent history from patient. Most of the history was obtained from epic chart review. Per epic chart review patient was recently in house in April for altered mental status and UTI. Patient had left heel ulcer during last hospitalization that was being taken care of by wound care nurse. It is unknown how long the wound to the left heel has been there for. During last hospitalization heel wound was left open with application of Venelex ointment. Past Medical History:        Diagnosis Date    Ataxia     [facility] [address]     BPH (benign prostatic hyperplasia)     Cerebral infarct (HCC)     Chronic kidney disease     Dementia ([facility] [address]     Depression     Diabetes mellitus type 2, uncomplicated ([facility] [address]     Gout     Hyperlipidemia     Hypertension     Schizophrenia ([facility] [address]        Past Surgical History:    History reviewed. No pertinent surgical history. Allergies:   Codeine, Cogentin [benztropine], and Penicillins    Medications:   Home Meds  No current facility-administered medications on file prior to encounter.      Current Outpatient Medications on File Prior to Encounter   Medication Sig Dispense Refill    amLODIPine (NORVASC) 5 MG tablet Take 5 mg by mouth daily      cimetidine (TAGAMET) 300 MG tablet Take 300 mg by mouth 2 times daily      furosemide (LASIX) 40 MG tablet Take 40 mg by mouth daily      linagliptin (TRADJENTA) 5 MG tablet Take 5 mg by mouth daily  gabapentin (NEURONTIN) 100 MG capsule Take 100 mg by mouth in the morning and at bedtime.  metFORMIN (GLUCOPHAGE) 500 MG tablet Take 500 mg by mouth 2 times daily (with meals)      albuterol (PROVENTIL) (2.5 MG/3ML) 0.083% nebulizer solution Take 3 mLs by nebulization every 6 hours as needed for Wheezing or Shortness of Breath 120 each 3    FLUoxetine HCl 60 MG TABS Take 60 mg by mouth daily 30 tablet 0    insulin detemir (LEVEMIR) 100 UNIT/ML injection vial Inject 5 Units into the skin nightly 10 mL 1    insulin aspart (NOVOLOG) 100 UNIT/ML injection vial Inject 3 Units into the skin 3 times daily (before meals) 10 mL 3    miconazole (MICOTIN) 2 % powder Apply topically 2 times daily.  45 g 1    nystatin (MYCOSTATIN) 100682 UNIT/GM powder Apply topically every 6-8 hours as needed (athlete's foot)       polyethylene glycol (MIRALAX) 17 g PACK packet Take 17 g by mouth every 48 hours       divalproex (DEPAKOTE SPRINKLES) 125 MG capsule Take 250 mg by mouth at bedtime      divalproex (DEPAKOTE SPRINKLES) 125 MG capsule Take 125 mg by mouth every morning      latanoprost (XALATAN) 0.005 % ophthalmic solution Place 1 drop into the right eye nightly       acetaminophen (TYLENOL) 500 MG tablet Take 500 mg by mouth every 6 hours as needed for Pain      allopurinol (ZYLOPRIM) 100 MG tablet Take 100 mg by mouth daily      ARIPiprazole (ABILIFY) 15 MG tablet Take 15 mg by mouth daily      aspirin 81 MG EC tablet Take 81 mg by mouth daily      atorvastatin (LIPITOR) 40 MG tablet Take 40 mg by mouth nightly      bisacodyl (DULCOLAX) 10 MG suppository Place 10 mg rectally daily as needed for Constipation      Cranberry 450 MG TABS Take 450 mg by mouth daily       docusate sodium (COLACE) 100 MG capsule Take 100 mg by mouth 2 times daily       finasteride (PROSCAR) 5 MG tablet Take 5 mg by mouth daily       Multiple Vitamins-Minerals (THERAPEUTIC MULTIVITAMIN-MINERALS) tablet Take 1 tablet by mouth daily      insulin aspart (NOVOLOG) 100 UNIT/ML injection vial Inject 0-21 Units into the skin 3 times daily (before meals) Sliding scale   201-250 = 5 units  251-300 = 8 units  301-350 = 12 units  351-400 = 15 units  401-450 = 18 units  451-500 = 21 units  500+ notify MD      ondansetron (ZOFRAN-ODT) 4 MG disintegrating tablet Take 4 mg by mouth every 8 hours as needed for Nausea or Vomiting      polyethylene glycol (GLYCOLAX) 17 g packet Take 17 g by mouth daily as needed for Constipation       tamsulosin (FLOMAX) 0.4 MG capsule Take 0.8 mg by mouth nightly         Current Meds  ARIPiprazole (ABILIFY) tablet 15 mg, Daily  aspirin EC tablet 81 mg, Daily  atorvastatin (LIPITOR) tablet 40 mg, Nightly  divalproex (DEPAKOTE SPRINKLE) capsule 250 mg, Nightly  divalproex (DEPAKOTE SPRINKLE) capsule 125 mg, QAM  latanoprost (XALATAN) 0.005 % ophthalmic solution 1 drop, Nightly  sodium chloride flush 0.9 % injection 5-40 mL, 2 times per day  sodium chloride flush 0.9 % injection 5-40 mL, PRN  0.9 % sodium chloride infusion, PRN  enoxaparin (LOVENOX) injection 40 mg, Daily  polyethylene glycol (GLYCOLAX) packet 17 g, Daily PRN  acetaminophen (TYLENOL) tablet 650 mg, Q6H PRN   Or  acetaminophen (TYLENOL) suppository 650 mg, Q6H PRN  lactated ringers infusion, Continuous  glucose chewable tablet 16 g, PRN  dextrose bolus 10% 125 mL, PRN   Or  dextrose bolus 10% 250 mL, PRN  glucagon (rDNA) injection 1 mg, PRN  dextrose 5 % solution, PRN  insulin lispro (1 Unit Dial) 0-6 Units, Q4H  meropenem (MERREM) 1,000 mg in sodium chloride 0.9 % 100 mL IVPB (mini-bag), Q12H        Family History:   History reviewed. No pertinent family history. Social History:   TOBACCO:   reports that he has never smoked. He has never used smokeless tobacco.  ETOH:   reports previous alcohol use. DRUGS:   has no history on file for drug use.       REVIEW OF SYSTEMS:    Unable to be obtained secondary to patient's altered mental status. PHYSICAL EXAM:      Vitals:    /75   Pulse 61   Temp 97.1 °F (36.2 °C) (Oral)   Resp 15   Ht 5' 9\" (1.753 m)   Wt 215 lb (97.5 kg)   SpO2 98%   BMI 31.75 kg/m²     LABS:   Recent Labs     05/11/22 1945 05/12/22  0736   WBC 9.1 13.6*   HGB 10.4* 10.0*   HCT 32.8* 31.4*    258     Recent Labs     05/12/22  0736      K 5.3*      CO2 26   BUN 31*   CREATININE 1.4*     Recent Labs     05/11/22 2016 05/12/22  0736   PROT 7.4 6.8         GENERAL: Patient is alert and oriented x3. No signs of acute distress noted. LOWER EXTREMITY EXAMINATION:  VASCULAR: DP and PT pulses are non palpable 0/4 b/l. Upon hand-held Doppler examination DP and PT pulses were noted to be biphasic bilaterally CFT is brisk to the digits of the foot b/l. Skin temperature is warm to cool from proximal to distal with no focal calor noted. +1 pitting edema noted R>L. No pain with calf compression b/l. NEUROLOGIC: Unable to be obtained secondary to patient's altered mental status. DERMATOLOGIC: Diffuse dermatologic changes noted to b/l LE. Webspaces 1-4 b/l are macerated without any acute signs of infection. Patient provided verbal consent for photos to be obtained today, 05/12/22    Right lower extremity:          Partial-thickness ulceration noted to the anterior aspect of the tibia of the right lower extremity. Wound measures approximately 2 cm x 1.5 cm x 0.1 cm. The wound base is fully fibrotic in nature. No erythema noted. The wound does not probe. No purulent drainage, tracking, tunneling, crepitus, fluctuance, or acute signs of infection noted. Sanguinous crust noted to the distal tuft of the right great toe. No open wound noted. No fluctuance, crepitus, or malodor noted. No acute signs of infection noted. Left lower extremity:          Partial-thickness ulceration noted to the plantar lateral aspect of the left heel. Wound measures approximately 1.2 cm x 1.4 cm x 0.1 cm. Wound base is a mixture of granular and dermal tissue. No drainage or purulent drainage expressed. The wound does not probe to bone. No erythema noted. No tracking, tunneling, crepitus, fluctuance, or acute signs of infection noted. MUSCULOSKELETAL: Muscle strength is unable to be obtained secondary to patient's altered mental status. No pain with palpation of the foot or ankle b/l. Ankle joint ROM is decreased in dorsiflexion with the knee extended. No obvious biomechanical abnormalities. IMAGING:  XR Left foot ordered    ASSESSMENT/PLAN:   -Partial-thickness ulceration, right anterior shin  -Partial-thickness ulceration, left plantar lateral heel-NPUAP stage II  -Venous stasis dermatitis, bilateral lower extremities  -Peripheral vascular disease  -Type 2 diabetes mellitus with peripheral neuropathy    -Patient seen and examined at bedside this PM  -VSS, leukocytosis noted (WBC 13.6)  -lactic acid 1.9  -HbA1c pending  -Blood cultures 1 & 2 pending  -XR of the left foot obtained; will follow up results  -Wound culture not obtained at this time 2/2 no active drainage  -Continue IV antibiotics per primary team for patient suspected UTI  -Left lower extremity dressed with wet to dry gauze, DSD, and Ace with gentle compression  -Right lower extremity dressed with Mepilex border  -post-op shoe ordered to patient room  -Prevalon boots reapplied. Patient is to wear at all times while in bed to prevent further deep tissue injury.  -Weightbearing as tolerated bilateral lower extremity in protective shoe gear postop shoe  -Wound care nurse taking care of right lower extremity wound; will defer treatment of right lower extremity wound to the wound care nurse. DISPO: Partial-thickness ulcerations; B/L LE. No acute signs of infection noted from the wounds to bilateral lower extremities. X-ray of left foot ordered; will follow up results. We will see patient with Dr. Tash Fitzgerald tomorrow.     The patient assessment and plan was discussed with Dr. Mei Morales DPM.    Carla Calvert DPM   Podiatric Resident PGY1  Pager [phone] or Miguel  5/12/2022, 1:34 PM

## 2022-05-12 NOTE — PROGRESS NOTES
Pharmacy Note - Renal Dosing and Extended Infusion Beta-Lactam Adjustment    Meropenem 1000 mg every 8 hours ordered for patient. Per [facility] Renal Dose Adjustment Policy and Extended Infusion Beta-Lactam Policy, order will be changed to 1000 mg 12 hours. Estimated Creatinine Clearance: Estimated Creatinine Clearance: 49 mL/min (A) (based on SCr of 1.6 mg/dL (H)). Dialysis Status, KIMBERLY, CKD:   BMI: Body mass index is 31.75 kg/m². Rationale for Adjustment: Agent is renally eliminated and demonstrates time-dependent effect on bacterial eradication. Extended-infusion dosing strategy aims to enhance microbiologic and clinical efficacy. Pharmacy will continue to monitor renal function, cultures and sensitivities (where available) and adjust dose as necessary. Please call with any questions.     Suly Silva, PharmD, BCPS

## 2022-05-12 NOTE — CONSULTS
[facility] Nurse  Consult Note       NAME:  Buster Daniels  MEDICAL RECORD NUMBER:  [id number]  AGE: 81 y.o. GENDER: male  : 1950  TODAY'S DATE:  2022    Subjective   Reason for WOCN Evaluation and Assessment: L Heel - Stage 2  R Lower Leg - Venous      Buster Daniels is a 81 y.o. male referred by:   [] Physician  [x] Nursing  [] Other:     Wound Identification:  Wound Type: venous and pressure  Contributing Factors: diabetes, chronic pressure, decreased mobility, shear force, obesity and poor hygiene    Wound History: 81 y.o. male who presents from his nursing home for altered mental status. According to the EMS, the facility has called them for altered mental status. Usually patient is more conversant and more alert and appropriate. But today he is found to be slow to respond. Patient has history of MDRO UTIs and has been treated recently for Proteus mirabilis UTI in 2022. Patient has history of dementia at baseline and unable to provide any reliable history. Answers to questions is not appropriate and keeps repeating 4 days. Patient has no complaints. No vomiting or diarrhea observed. No recorded fever. No URI symptoms observed. Current Wound Care Treatment: R Lower Leg - alginate ag, foam  L Heel - podiatry providing treatment    Patient Goal of Care:  [x] Wound Healing  [] Odor Control  [] Palliative Care  [] Pain Control   [] Other:         PAST MEDICAL HISTORY        Diagnosis Date    Ataxia     Bipolar 1 disorder ([facility]     BPH (benign prostatic hyperplasia)     Cerebral infarct (HCC)     Chronic kidney disease     Dementia ([facility]     Depression     Diabetes mellitus type 2, uncomplicated (HCC)     Gout     Hyperlipidemia     Hypertension     Schizophrenia ([facility]        PAST SURGICAL HISTORY    History reviewed. No pertinent surgical history. FAMILY HISTORY    History reviewed. No pertinent family history.     SOCIAL HISTORY    Social History Tobacco Use    Smoking status: Never Smoker    Smokeless tobacco: Never Used   Vaping Use    Vaping Use: Never used   Substance Use Topics    Alcohol use: Not Currently    Drug use: Not on file       ALLERGIES    Allergies   Allergen Reactions    Codeine     Cogentin [Benztropine]     Penicillins        MEDICATIONS    No current facility-administered medications on file prior to encounter. Current Outpatient Medications on File Prior to Encounter   Medication Sig Dispense Refill    amLODIPine (NORVASC) 5 MG tablet Take 5 mg by mouth daily      cimetidine (TAGAMET) 300 MG tablet Take 300 mg by mouth 2 times daily      furosemide (LASIX) 40 MG tablet Take 40 mg by mouth daily      linagliptin (TRADJENTA) 5 MG tablet Take 5 mg by mouth daily      gabapentin (NEURONTIN) 100 MG capsule Take 100 mg by mouth in the morning and at bedtime.  metFORMIN (GLUCOPHAGE) 500 MG tablet Take 500 mg by mouth 2 times daily (with meals)      albuterol (PROVENTIL) (2.5 MG/3ML) 0.083% nebulizer solution Take 3 mLs by nebulization every 6 hours as needed for Wheezing or Shortness of Breath 120 each 3    FLUoxetine HCl 60 MG TABS Take 60 mg by mouth daily 30 tablet 0    insulin detemir (LEVEMIR) 100 UNIT/ML injection vial Inject 5 Units into the skin nightly 10 mL 1    insulin aspart (NOVOLOG) 100 UNIT/ML injection vial Inject 3 Units into the skin 3 times daily (before meals) 10 mL 3    miconazole (MICOTIN) 2 % powder Apply topically 2 times daily.  45 g 1    nystatin (MYCOSTATIN) 828741 UNIT/GM powder Apply topically every 6-8 hours as needed (athlete's foot)       polyethylene glycol (MIRALAX) 17 g PACK packet Take 17 g by mouth every 48 hours       divalproex (DEPAKOTE SPRINKLES) 125 MG capsule Take 250 mg by mouth at bedtime      divalproex (DEPAKOTE SPRINKLES) 125 MG capsule Take 125 mg by mouth every morning      latanoprost (XALATAN) 0.005 % ophthalmic solution Place 1 drop into the right eye nightly       acetaminophen (TYLENOL) 500 MG tablet Take 500 mg by mouth every 6 hours as needed for Pain      allopurinol (ZYLOPRIM) 100 MG tablet Take 100 mg by mouth daily      ARIPiprazole (ABILIFY) 15 MG tablet Take 15 mg by mouth daily      aspirin 81 MG EC tablet Take 81 mg by mouth daily      atorvastatin (LIPITOR) 40 MG tablet Take 40 mg by mouth nightly      bisacodyl (DULCOLAX) 10 MG suppository Place 10 mg rectally daily as needed for Constipation      Cranberry 450 MG TABS Take 450 mg by mouth daily       docusate sodium (COLACE) 100 MG capsule Take 100 mg by mouth 2 times daily       finasteride (PROSCAR) 5 MG tablet Take 5 mg by mouth daily       Multiple Vitamins-Minerals (THERAPEUTIC MULTIVITAMIN-MINERALS) tablet Take 1 tablet by mouth daily      insulin aspart (NOVOLOG) 100 UNIT/ML injection vial Inject 0-21 Units into the skin 3 times daily (before meals) Sliding scale   201-250 = 5 units  251-300 = 8 units  301-350 = 12 units  351-400 = 15 units  401-450 = 18 units  451-500 = 21 units  500+ notify MD      ondansetron (ZOFRAN-ODT) 4 MG disintegrating tablet Take 4 mg by mouth every 8 hours as needed for Nausea or Vomiting      polyethylene glycol (GLYCOLAX) 17 g packet Take 17 g by mouth daily as needed for Constipation       tamsulosin (FLOMAX) 0.4 MG capsule Take 0.8 mg by mouth nightly         Objective    /75   Pulse 61   Temp 97.1 °F (36.2 °C) (Oral)   Resp 15   Ht 5' 9\" (1.753 m)   Wt 215 lb (97.5 kg)   SpO2 98%   BMI 31.75 kg/m²     LABS:  WBC:    Lab Results   Component Value Date    WBC 13.6 05/12/2022     H/H:    Lab Results   Component Value Date    HGB 10.0 05/12/2022    HCT 31.4 05/12/2022     PTT:  No results found for: APTT, PTT[APTT}  PT/INR:    Lab Results   Component Value Date    PROTIME 11.2 10/06/2021    INR 0.99 10/06/2021     HgBA1c:    Lab Results   Component Value Date    LABA1C 8.7 10/07/2021       Assessment: R Lower Leg - superficial ulceration, pink with scant yellow wound bed, no drainage noted   Pio Risk Score: Pio Scale Score: 12    Patient Active Problem List   Diagnosis Code    Other fatigue R53.83    Right hand weakness R29.898    Numbness of right hand R20.0    Schizophrenia (HCC) F20.9    Dementia (HCC) F03.90    Essential hypertension I10    Sepsis (HCC) A41.9    CKD (chronic kidney disease) N18.9    Cellulitis L03.90    UTI (urinary tract infection) N39.0    AMS (altered mental status) R41.82       Measurements:  Wound 04/15/22 Heel Left (Active)   Wound Image   04/15/22 1523   Wound Etiology Pressure Stage 3 05/12/22 1136   Dressing Status New drainage noted 05/12/22 1136   Wound Cleansed Other (Comment) 05/12/22 1136   Dressing/Treatment Open to air 05/12/22 1136   Offloading for Diabetic Foot Ulcers Offloading boot 05/12/22 1136   Dressing Change Due 04/15/22 04/15/22 1523   Wound Length (cm) 1.2 cm 04/15/22 1523   Wound Width (cm) 1.4 cm 04/15/22 1523   Wound Depth (cm) 0.2 cm 04/15/22 1523   Wound Surface Area (cm^2) 1.68 cm^2 04/15/22 1523   Wound Volume (cm^3) 0.336 cm^3 04/15/22 1523   Wound Assessment [place]/red;Slough 05/12/22 1136   Drainage Amount None 05/12/22 1136   Odor None 05/12/22 1136   Franca-wound Assessment Dry/flaky 05/12/22 1136   Margins Attached edges; Defined edges 05/12/22 1136   Number of days: 26       Wound 05/12/22 Leg Lower;Right (Active)   Wound Image   05/12/22 1136   Wound Etiology Venous 05/12/22 1136   Dressing Status New drainage noted;New dressing applied 05/12/22 1136   Wound Cleansed Other (Comment) 05/12/22 1136   Dressing/Treatment Alginate with Ag; Foam 05/12/22 1136   Offloading for Diabetic Foot Ulcers Offloading boot 05/12/22 1136   Dressing Change Due 05/13/22 05/12/22 1136   Wound Length (cm) 1.5 cm 05/12/22 1136   Wound Width (cm) 2 cm 05/12/22 1136   Wound Depth (cm) 0.1 cm 05/12/22 1136   Wound Surface Area (cm^2) 3 cm^2 05/12/22 1136   Wound Volume (cm^3) 0.3 cm^3 05/12/22 1136   Wound Assessment [place]/red;Slough 05/12/22 1136   Drainage Amount Scant 05/12/22 1136   Drainage Description Yellow 05/12/22 1136   Odor None 05/12/22 1136   Franca-wound Assessment Dry/flaky; Hemosiderin staining (brown yellow) 05/12/22 1136   Margins Attached edges; Defined edges 05/12/22 1136   Wound Thickness Description not for Pressure Injury Partial thickness 05/12/22 1136   Number of days: 0   R Lower Leg:      Response to treatment:  Well tolerated by patient. Pain Assessment:  Severity:  0 / 10  Quality of pain: N/A  Wound Pain Timing/Severity: none  Premedicated: No    Plan   Plan of Care: Wound 04/15/22 Heel Left-Dressing/Treatment: Open to air  Wound 05/12/22 Leg Lower;Right-Dressing/Treatment: Alginate with Ag,Foam   Recommendation: R Lower Leg - clean with NS, alginate ag - Aquacel, foam dressing, change daily  L Heel - defer to podiatry   Offloading boots at all times  Reposition pt every 2 hours  Call Wound Care for deterioration [phone]    Podiatry consulted for L Heel    Specialty Bed Required : Yes   [x] Low Air Loss   [x] Pressure Redistribution  [] Fluid Immersion  [] Bariatric  [] Total Pressure Relief  [] Other:     Current Diet: ADULT DIET;  Dysphagia - Soft and Bite Sized; 3 carb choices (45 gm/meal)  Dietician consult:  No    Discharge Plan:  Placement for patient upon discharge: skilled nursing    Patient appropriate for Outpatient [address]: Yes    Referrals:  []   [] [address] [place] [address]  [] Supplies  [] Other    Patient/Caregiver Teaching:  Level of patient/caregiver understanding able to:   [] Indicates understanding       [] Needs reinforcement  [] Unsuccessful      [] Verbal Understanding  [] Demonstrated understanding       [] No evidence of learning  [] Refused teaching         [] N/A       Electronically signed by Johann Jane RN, Radha Garcia on 5/12/2022 at 11:43 AM

## 2022-05-12 NOTE — CONSULTS
Clinical Pharmacy Progress Note  Medication History      Asked to verify home medications by Dr. Cecilia Izaguirre. List of of current medications patient is taking is complete. Home Medication list in EPIC updated to reflect changes noted below.      Source of information: medication list from [facility]     Changes made to home medication list:   Medications removed (no longer taking):  · Culturelle  · Mupirocin cream     Medications added:   · Amlodipine  · Cimetidine  · Linagliptin     Medication doses / instructions adjusted:   · Furosemide     Please call with questions--  Thanks--  Sophy Rodriguez, PharmD, BCPS, [facility]  [id number] ([facility])   5/12/2022 8:24 AM      Current Outpatient Medications   Medication Instructions    acetaminophen (TYLENOL) 500 mg, Oral, EVERY 6 HOURS PRN    albuterol (PROVENTIL) 2.5 mg, Nebulization, EVERY 6 HOURS PRN    allopurinol (ZYLOPRIM) 100 mg, Oral, DAILY    amLODIPine (NORVASC) 5 mg, Oral, DAILY    ARIPiprazole (ABILIFY) 15 mg, Oral, DAILY    aspirin 81 mg, Oral, DAILY    atorvastatin (LIPITOR) 40 mg, Oral, NIGHTLY    bisacodyl (DULCOLAX) 10 mg, Rectal, DAILY PRN    cimetidine (TAGAMET) 300 mg, Oral, 2 TIMES DAILY    Cranberry 450 mg, Oral, DAILY    divalproex (DEPAKOTE SPRINKLES) 250 mg, Oral, Nightly    divalproex (DEPAKOTE SPRINKLES) 125 mg, Oral, EVERY MORNING    docusate sodium (COLACE) 100 mg, Oral, 2 TIMES DAILY    finasteride (PROSCAR) 5 mg, Oral, DAILY    FLUoxetine HCl 60 mg, Oral, DAILY    furosemide (LASIX) 40 mg, Oral, DAILY    gabapentin (NEURONTIN) 100 mg, Oral, 2 times daily    insulin aspart (NOVOLOG) 0-21 Units, SubCUTAneous, 3 TIMES DAILY BEFORE MEALS, Sliding scale <BR>201-250 = 5 units<BR>251-300 = 8 units<BR>301-350 = 12 units<BR>351-400 = 15 units<BR>401-450 = 18 units<BR>451-500 = 21 units<BR>500+ notify MD     insulin aspart (NOVOLOG) 3 Units, SubCUTAneous, 3 TIMES DAILY BEFORE MEALS    latanoprost (XALATAN) 0.005 % ophthalmic solution 1 drop, Right Eye, NIGHTLY    Levemir 5 Units, SubCUTAneous, NIGHTLY    linagliptin (TRADJENTA) 5 mg, Oral, DAILY    metFORMIN (GLUCOPHAGE) 500 mg, Oral, 2 TIMES DAILY WITH MEALS    miconazole (MICOTIN) 2 % powder Apply topically 2 times daily.     Multiple Vitamins-Minerals (THERAPEUTIC MULTIVITAMIN-MINERALS) tablet 1 tablet, Oral, DAILY    nystatin (MYCOSTATIN) 033477 UNIT/GM powder Topical, EVERY 6-8 HOURS PRN    ondansetron (ZOFRAN-ODT) 4 mg, Oral, EVERY 8 HOURS PRN    polyethylene glycol (GLYCOLAX) 17 g, Oral, DAILY PRN    polyethylene glycol (MIRALAX) 17 g, Oral, Every 48 Hours    tamsulosin (FLOMAX) 0.8 mg, Oral, NIGHTLY

## 2022-05-12 NOTE — PROGRESS NOTES
Pt straight cathed for 500ml, edson urine, last bit of urine was cloudy. Pt tolerated well. Still unable to get rectal temp. Dr. Melida Sandoval aware pt has multiple warm blankets on, skin remains cool to touch. Prevlon boots also applied and wound care rn ordered for open area on left heel   1200: dr Melida Sandoval at bedside aware I still cant' get a temp on patient, dr. Melida Sandoval evaluating pt.  Pt remiains wrapped up In warm blankets

## 2022-05-12 NOTE — PROGRESS NOTES
angely sheffield initiated. Per PCU RN pt needs to have q4 neuro checks done, completed and remains unchanged from previous assessment. Pt still only alert to name, minimal interaction, responds to pain and sensation, pupils reactive but slow to react.  Pt's speech remains garbled and unchanged

## 2022-05-12 NOTE — CONSULTS
Urology Attending Consult Note      Reason for Consultation: penile edema/erythema    History: 81 yr old male SNF resident admitted with [address]. Patient not able to provide any history. Patient has had retention and has been st catheterized twice with nursing staff noting penile redness/swelling. They also noted some purulent drainage when pulled foreskin back. Family History, Social History, Review of Systems:  Reviewed and agreed to as per chart    Vitals:  /75   Pulse 71   Temp 97.1 °F (36.2 °C) (Oral)   Resp 18   Ht 5' 9\" (1.753 m)   Wt 215 lb (97.5 kg)   SpO2 96%   BMI 31.75 kg/m²   Temp  Av.3 °F (36.3 °C)  Min: 97.1 °F (36.2 °C)  Max: 97.5 °F (36.4 °C)    Intake/Output Summary (Last 24 hours) at 2022 1508  Last data filed at 2022 1451  Gross per 24 hour   Intake 60 ml   Output 850 ml   Net -790 ml         Physical:   Well developed, well nourished in no acute distress   Mood indicates no abnormalities. Pt doesnt appear depressed   Not Orientated to time and place   Neck is supple, trachea is midline   Respiratory effort is normal   Cardiovascular show no extremity swelling   Abdomen no masses or hernias are palpated, there is no tenderness. Liver and Spleen appear normal.   Skin show no abnormal lesions   Lymph nodes are not palpated in the inguinal, neck, or axillary area.      Male :   Penis appears abnormal and uncircumcised; balanoposthitis present   Urethral meatus is normal in size and location   Scrotum appears normal and both testicles appear normal in size and location      Labs:  WBC:    Lab Results   Component Value Date    WBC 13.6 2022     Hemoglobin/Hematocrit:    Lab Results   Component Value Date    HGB 10.0 2022    HCT 31.4 2022     BMP:    Lab Results   Component Value Date     2022    K 5.3 2022     2022    CO2 26 2022    BUN 31 2022    LABALBU 2.9 2022    CREATININE 1.4 05/12/2022    CALCIUM 10.2 05/12/2022    GFRAA >60 05/12/2022    LABGLOM 50 05/12/2022     PT/INR:    Lab Results   Component Value Date    PROTIME 11.2 10/06/2021    INR 0.99 10/06/2021     PTT:  No results found for: APTT[APTT    Urinalysis:     Urine Culture:     Blood Culture:     Antibiotic Therapy:     Imaging:     Impression/Plan: Balanoposthitis  Urinary retention   Will start mycolog ointment and nursing staff instructed to place anderson if pt again unable to void    Sheralyn Duane, MD

## 2022-05-12 NOTE — DISCHARGE INSTR - COC
Continuity of Care Form    Patient Name: Vamsi Marshall   :  1950  MRN:  [id number]    Admit date:  2022  Discharge date:  ***    Code Status Order: Full Code   Advance Directives:      Admitting Physician:  Rowan St MD  PCP: Pavithra Espinoza MD    Discharging Nurse: [facility] Unit/Room#: 6705/5777-12  Discharging Unit Phone Number: ***    Emergency Contact:   Extended Emergency Contact Information  Primary Emergency Contact: [address] [place] [address], [address] Phone: [phone]  Mobile Phone: [phone]  Relation: Brother/Sister  Secondary Emergency Contact: [address] [place] [address] Phone: [phone]  Relation: Brother/Sister    Past Surgical History:  History reviewed. No pertinent surgical history. Immunization History: There is no immunization history on file for this patient.     Active Problems:  Patient Active Problem List   Diagnosis Code    Other fatigue R53.83    Right hand weakness R29.898    Numbness of right hand R20.0    Schizophrenia (HCC) F20.9    Dementia (HCC) F03.90    Essential hypertension I10    Sepsis ([address] A41.9    CKD (chronic kidney disease) N18.9    Cellulitis L03.90    UTI (urinary tract infection) N39.0    AMS (altered mental status) R41.82       Isolation/Infection:   Isolation            Contact          Patient Infection Status       Infection Onset Added Last Indicated Last Indicated By Review Planned Expiration Resolved Resolved By    MRSA 22 MRSA DNA Probe, Nasal        MDRO (multi-drug resistant organism) 22 Culture, Urine        Resolved    COVID-19 (Rule Out) 21 COVID-19 (Ordered)   21 Rule-Out Test Resulted            Nurse Assessment:  Last Vital Signs: /75   Pulse 71   Temp 97.1 °F (36.2 °C) (Oral)   Resp 18   Ht 5' 9\" (1.753 m)   Wt 215 lb (97.5 kg)   SpO2 96%   BMI 31.75 kg/m²     Last documented pain score (0-10 scale): Pain Level: 0  Last Weight: Wt Readings from Last 1 Encounters:   05/11/22 215 lb (97.5 kg)     Mental Status:  {IP PT MENTAL STATUS:20030}    IV Access:  { AIDAN IV ACCESS:837764215}    Nursing Mobility/ADLs:  Walking   {CHP DME YXVT:102413056}  Transfer  {CHP DME NUQD:611057171}  Bathing  {CHP DME GUXD:715166710}  Dressing  {CHP DME KNCS:522372344}  Toileting  {CHP DME SPQO:170191218}  Feeding  {CHP DME CPES:500993719}  Med Admin  {CHP DME XOJK:217443648}  Med Delivery   { AIDAN MED Delivery:534046755}    Wound Care Documentation and Therapy:  Wound 04/15/22 Heel Left (Active)   Wound Image   04/15/22 1523   Wound Etiology Pressure Stage 3 05/12/22 1507   Dressing Status New drainage noted 05/12/22 1136   Wound Cleansed Not Cleansed 05/12/22 1507   Dressing/Treatment Open to air 05/12/22 1507   Offloading for Diabetic Foot Ulcers Offloading boot 05/12/22 1507   Dressing Change Due 04/15/22 04/15/22 1523   Wound Length (cm) 1.2 cm 04/15/22 1523   Wound Width (cm) 1.4 cm 04/15/22 1523   Wound Depth (cm) 0.2 cm 04/15/22 1523   Wound Surface Area (cm^2) 1.68 cm^2 04/15/22 1523   Wound Volume (cm^3) 0.336 cm^3 04/15/22 1523   Wound Assessment Pink/red 05/12/22 1507   Drainage Amount None 05/12/22 1507   Odor None 05/12/22 1507   Franca-wound Assessment Blanchable erythema 05/12/22 1507   Margins Attached edges; Defined edges 05/12/22 1136   Number of days: 27       Wound 05/12/22 Leg Lower;Right (Active)   Wound Image   05/12/22 1136   Wound Etiology Venous 05/12/22 1136   Dressing Status New drainage noted;New dressing applied 05/12/22 1136   Wound Cleansed Other (Comment) 05/12/22 1136   Dressing/Treatment Alginate with Ag; Foam 05/12/22 1136   Offloading for Diabetic Foot Ulcers Offloading boot 05/12/22 1136   Dressing Change Due 05/13/22 05/12/22 1136   Wound Length (cm) 1.5 cm 05/12/22 1136   Wound Width (cm) 2 cm 05/12/22 1136   Wound Depth (cm) 0.1 cm 05/12/22 1136   Wound Surface Area (cm^2) 3 cm^2 05/12/22 1136   Wound Volume (cm^3) 0.3 cm^3 22 1136   Wound Assessment [place]/red;Slough 22 1136   Drainage Amount Scant 22 1136   Drainage Description Yellow 22 1136   Odor None 22 1136   Franca-wound Assessment Dry/flaky; Hemosiderin staining (brown yellow) 22 1136   Margins Attached edges; Defined edges 22 1136   Wound Thickness Description not for Pressure Injury Partial thickness 22 1136   Number of days: 0        Elimination:  Continence: Bowel: {YES / WR:31337}  Bladder: {YES / IA:23934}  Urinary Catheter: {Urinary Catheter:870481196}   Colostomy/Ileostomy/Ileal Conduit: {YES / VY:31505}       Date of Last BM: ***    Intake/Output Summary (Last 24 hours) at 2022 1641  Last data filed at 2022 1451  Gross per 24 hour   Intake 60 ml   Output 850 ml   Net -790 ml     No intake/output data recorded.     Safety Concerns:     [address] Safety Concerns:810990220}    Impairments/Disabilities:      [address] Impairments/Disabilities:235511377}    Nutrition Therapy:  Current Nutrition Therapy:   [address] Diet List:961794073}    Routes of Feeding: {[facility] DME Other Feedings:267141297}  Liquids: {Slp liquid thickness:12105}  Daily Fluid Restriction: {CHP DME Yes amt example:422052372}  Last Modified Barium Swallow with Video (Video Swallowing Test): {Done Not Done BZTP:509295427}    Treatments at the Time of Hospital Discharge:   Respiratory Treatments: ***  Oxygen Therapy:  {Therapy; copd oxygen:98603}  Ventilator:    { CC Vent LQT}    Rehab Therapies: {THERAPEUTIC INTERVENTION:2423481231}  Weight Bearing Status/Restrictions: [address]  Weight Bearin}  Other Medical Equipment (for information only, NOT a DME order):  {EQUIPMENT:135802305}  Other Treatments: ***    Patient's personal belongings (please select all that are sent with patient):  {[facility] DME Belongings:475133185}    RN SIGNATURE:  {Esignature:461202670}    CASE MANAGEMENT/SOCIAL WORK SECTION    Inpatient Status Date: ***    Readmission Risk Assessment Score:  Readmission Risk              Risk of Unplanned Readmission:  32           Discharging to Facility/ Agency   Name:   Address:  Phone:  Fax:    Dialysis Facility (if applicable)   Name:  Address:  Dialysis Schedule:  Phone:  Fax:    / signature: {Esignature:819830879}    PHYSICIAN SECTION    Prognosis: {Prognosis:4889900054}    Condition at Discharge: Brandon Mcclain Patient Condition:060286134}    Rehab Potential (if transferring to Rehab): {Prognosis:7297838958}    Recommended Labs or Other Treatments After Discharge: ***    Podiatry Wound Care Discharge Instructions  Please perform every other day dressing changes to left lower extremity as follows  -Apply mepilex heel border to the heel of the left lower extremity  Please perform every other day dressing changes of the right lower extremity as follows  -Apply Mepilex border to the wound on the front of the shin of the right lower extremity  Patient is weightbearing as tolerated  Bilateral lower extremity  Please follow-up with Dr. Randolph Patton DPM for wound recheck    Physician Certification: I certify the above information and transfer of Daisey Paget  is necessary for the continuing treatment of the diagnosis listed and that he requires {Admit to Appropriate Level of Care:90338} for {GREATER/LESS:954206763} 30 days.      Update Admission H&P: {CHP DME Changes in UUNBB:570972741}    PHYSICIAN SIGNATURE:  {Esignature:883555058}

## 2022-05-12 NOTE — PROGRESS NOTES
1750:rn attempted to call report to PCU room still isn't cleaned this RN asked to call back in a few min  1810: attempted to call room still isn't clean

## 2022-05-13 PROBLEM — N39.0 UTI (URINARY TRACT INFECTION): Status: RESOLVED | Noted: 2022-04-13 | Resolved: 2022-05-13

## 2022-05-13 LAB
A/G RATIO: 0.9 (ref 1.1–2.2)
ALBUMIN SERPL-MCNC: 2.5 G/DL (ref 3.4–5)
ALBUMIN SERPL-MCNC: 2.9 G/DL (ref 3.4–5)
ALBUMIN SERPL-MCNC: 3.1 G/DL (ref 3.4–5)
ALP BLD-CCNC: 35 U/L (ref 40–129)
ALT SERPL-CCNC: 18 U/L (ref 10–40)
AMMONIA: 38 UMOL/L (ref 16–60)
ANION GAP SERPL CALCULATED.3IONS-SCNC: 19 MMOL/L (ref 3–16)
ANION GAP SERPL CALCULATED.3IONS-SCNC: 8 MMOL/L (ref 3–16)
ANION GAP SERPL CALCULATED.3IONS-SCNC: 8 MMOL/L (ref 3–16)
AST SERPL-CCNC: 29 U/L (ref 15–37)
BASOPHILS ABSOLUTE: 0.1 K/UL (ref 0–0.2)
BASOPHILS RELATIVE PERCENT: 0.4 %
BILIRUB SERPL-MCNC: <0.2 MG/DL (ref 0–1)
BUN BLDV-MCNC: 28 MG/DL (ref 7–20)
BUN BLDV-MCNC: 28 MG/DL (ref 7–20)
BUN BLDV-MCNC: 29 MG/DL (ref 7–20)
CALCIUM SERPL-MCNC: 9.3 MG/DL (ref 8.3–10.6)
CALCIUM SERPL-MCNC: 9.3 MG/DL (ref 8.3–10.6)
CHLORIDE BLD-SCNC: 107 MMOL/L (ref 99–110)
CHLORIDE BLD-SCNC: 107 MMOL/L (ref 99–110)
CHLORIDE BLD-SCNC: 98 MMOL/L (ref 99–110)
CO2: 18 MMOL/L (ref 21–32)
CO2: 21 MMOL/L (ref 21–32)
CO2: 21 MMOL/L (ref 21–32)
CORTISOL TOTAL: 10.3 UG/DL
CORTISOL TOTAL: 11.9 UG/DL
CREAT SERPL-MCNC: 1.6 MG/DL (ref 0.8–1.3)
EOSINOPHILS ABSOLUTE: 0.1 K/UL (ref 0–0.6)
EOSINOPHILS RELATIVE PERCENT: 0.5 %
ESTIMATED AVERAGE GLUCOSE: 203 MG/DL
GFR AFRICAN AMERICAN: 52
GFR NON-AFRICAN AMERICAN: 43
GLUCOSE BLD-MCNC: 100 MG/DL (ref 70–99)
GLUCOSE BLD-MCNC: 116 MG/DL (ref 70–99)
GLUCOSE BLD-MCNC: 121 MG/DL (ref 70–99)
GLUCOSE BLD-MCNC: 48 MG/DL (ref 70–99)
GLUCOSE BLD-MCNC: 61 MG/DL (ref 70–99)
GLUCOSE BLD-MCNC: 62 MG/DL (ref 70–99)
GLUCOSE BLD-MCNC: 67 MG/DL (ref 70–99)
GLUCOSE BLD-MCNC: 78 MG/DL (ref 70–99)
GLUCOSE BLD-MCNC: 79 MG/DL (ref 70–99)
GLUCOSE BLD-MCNC: 89 MG/DL (ref 70–99)
GLUCOSE BLD-MCNC: 94 MG/DL (ref 70–99)
HBA1C MFR BLD: 8.7 %
HCT VFR BLD CALC: 31.7 % (ref 40.5–52.5)
HEMOGLOBIN: 10.1 G/DL (ref 13.5–17.5)
LIPASE: 271 U/L (ref 13–60)
LYMPHOCYTES ABSOLUTE: 0.8 K/UL (ref 1–5.1)
LYMPHOCYTES RELATIVE PERCENT: 5.6 %
MAGNESIUM: 1.6 MG/DL (ref 1.8–2.4)
MCH RBC QN AUTO: 25.8 PG (ref 26–34)
MCHC RBC AUTO-ENTMCNC: 31.8 G/DL (ref 31–36)
MCV RBC AUTO: 81.1 FL (ref 80–100)
MONOCYTES ABSOLUTE: 1 K/UL (ref 0–1.3)
MONOCYTES RELATIVE PERCENT: 7.1 %
NEUTROPHILS ABSOLUTE: 12.2 K/UL (ref 1.7–7.7)
NEUTROPHILS RELATIVE PERCENT: 86.4 %
PDW BLD-RTO: 19.2 % (ref 12.4–15.4)
PERFORMED ON: ABNORMAL
PERFORMED ON: NORMAL
PHOSPHORUS: 2.4 MG/DL (ref 2.5–4.9)
PHOSPHORUS: 2.8 MG/DL (ref 2.5–4.9)
PLATELET # BLD: 247 K/UL (ref 135–450)
PMV BLD AUTO: 9 FL (ref 5–10.5)
POTASSIUM SERPL-SCNC: 5.4 MMOL/L (ref 3.5–5.1)
POTASSIUM SERPL-SCNC: 5.6 MMOL/L (ref 3.5–5.1)
RBC # BLD: 3.91 M/UL (ref 4.2–5.9)
SODIUM BLD-SCNC: 135 MMOL/L (ref 136–145)
SODIUM BLD-SCNC: 136 MMOL/L (ref 136–145)
SODIUM BLD-SCNC: 136 MMOL/L (ref 136–145)
TOTAL CK: 87 U/L (ref 39–308)
TOTAL PROTEIN: 6.5 G/DL (ref 6.4–8.2)
WBC # BLD: 14.1 K/UL (ref 4–11)

## 2022-05-13 PROCEDURE — 80069 RENAL FUNCTION PANEL: CPT

## 2022-05-13 PROCEDURE — A4216 STERILE WATER/SALINE, 10 ML: HCPCS | Performed by: INTERNAL MEDICINE

## 2022-05-13 PROCEDURE — 82533 TOTAL CORTISOL: CPT

## 2022-05-13 PROCEDURE — 80053 COMPREHEN METABOLIC PANEL: CPT

## 2022-05-13 PROCEDURE — 2060000000 HC ICU INTERMEDIATE R&B

## 2022-05-13 PROCEDURE — 92526 ORAL FUNCTION THERAPY: CPT

## 2022-05-13 PROCEDURE — 80400 ACTH STIMULATION PANEL: CPT

## 2022-05-13 PROCEDURE — 82550 ASSAY OF CK (CPK): CPT

## 2022-05-13 PROCEDURE — 83735 ASSAY OF MAGNESIUM: CPT

## 2022-05-13 PROCEDURE — 82140 ASSAY OF AMMONIA: CPT

## 2022-05-13 PROCEDURE — 2580000003 HC RX 258: Performed by: INTERNAL MEDICINE

## 2022-05-13 PROCEDURE — 83690 ASSAY OF LIPASE: CPT

## 2022-05-13 PROCEDURE — 92610 EVALUATE SWALLOWING FUNCTION: CPT

## 2022-05-13 PROCEDURE — 2580000003 HC RX 258: Performed by: STUDENT IN AN ORGANIZED HEALTH CARE EDUCATION/TRAINING PROGRAM

## 2022-05-13 PROCEDURE — 6360000002 HC RX W HCPCS: Performed by: STUDENT IN AN ORGANIZED HEALTH CARE EDUCATION/TRAINING PROGRAM

## 2022-05-13 PROCEDURE — 85025 COMPLETE CBC W/AUTO DIFF WBC: CPT

## 2022-05-13 PROCEDURE — 6370000000 HC RX 637 (ALT 250 FOR IP): Performed by: UROLOGY

## 2022-05-13 PROCEDURE — 6360000002 HC RX W HCPCS: Performed by: INTERNAL MEDICINE

## 2022-05-13 PROCEDURE — 2500000003 HC RX 250 WO HCPCS: Performed by: INTERNAL MEDICINE

## 2022-05-13 PROCEDURE — 36415 COLL VENOUS BLD VENIPUNCTURE: CPT

## 2022-05-13 RX ORDER — LEVOTHYROXINE SODIUM ANHYDROUS 100 UG/5ML
100 INJECTION, POWDER, LYOPHILIZED, FOR SOLUTION INTRAVENOUS DAILY
Status: DISCONTINUED | OUTPATIENT
Start: 2022-05-13 | End: 2022-05-13

## 2022-05-13 RX ORDER — SODIUM CHLORIDE 9 MG/ML
5 INJECTION INTRAVENOUS ONCE
Status: COMPLETED | OUTPATIENT
Start: 2022-05-13 | End: 2022-05-13

## 2022-05-13 RX ORDER — SODIUM CHLORIDE 9 MG/ML
INJECTION, SOLUTION INTRAVENOUS ONCE
Status: COMPLETED | OUTPATIENT
Start: 2022-05-13 | End: 2022-05-13

## 2022-05-13 RX ORDER — COSYNTROPIN 0.25 MG/ML
250 INJECTION, POWDER, FOR SOLUTION INTRAMUSCULAR; INTRAVENOUS ONCE
Status: CANCELLED | OUTPATIENT
Start: 2022-05-13

## 2022-05-13 RX ORDER — SODIUM CHLORIDE 9 MG/ML
5 INJECTION INTRAVENOUS DAILY
Status: DISCONTINUED | OUTPATIENT
Start: 2022-05-13 | End: 2022-05-13

## 2022-05-13 RX ORDER — LEVOTHYROXINE SODIUM ANHYDROUS 100 UG/5ML
100 INJECTION, POWDER, LYOPHILIZED, FOR SOLUTION INTRAVENOUS ONCE
Status: COMPLETED | OUTPATIENT
Start: 2022-05-13 | End: 2022-05-13

## 2022-05-13 RX ORDER — DEXAMETHASONE SODIUM PHOSPHATE 4 MG/ML
4 INJECTION, SOLUTION INTRA-ARTICULAR; INTRALESIONAL; INTRAMUSCULAR; INTRAVENOUS; SOFT TISSUE ONCE
Status: CANCELLED | OUTPATIENT
Start: 2022-05-13

## 2022-05-13 RX ORDER — DEXTROSE AND SODIUM CHLORIDE 5; .9 G/100ML; G/100ML
INJECTION, SOLUTION INTRAVENOUS CONTINUOUS
Status: DISCONTINUED | OUTPATIENT
Start: 2022-05-13 | End: 2022-05-15

## 2022-05-13 RX ORDER — LEVOTHYROXINE SODIUM ANHYDROUS 100 UG/5ML
50 INJECTION, POWDER, LYOPHILIZED, FOR SOLUTION INTRAVENOUS DAILY
Status: DISCONTINUED | OUTPATIENT
Start: 2022-05-13 | End: 2022-05-18 | Stop reason: HOSPADM

## 2022-05-13 RX ORDER — COSYNTROPIN 0.25 MG/ML
250 INJECTION, POWDER, FOR SOLUTION INTRAMUSCULAR; INTRAVENOUS ONCE
Status: COMPLETED | OUTPATIENT
Start: 2022-05-13 | End: 2022-05-13

## 2022-05-13 RX ORDER — MAGNESIUM SULFATE IN WATER 40 MG/ML
2000 INJECTION, SOLUTION INTRAVENOUS ONCE
Status: COMPLETED | OUTPATIENT
Start: 2022-05-13 | End: 2022-05-14

## 2022-05-13 RX ORDER — SODIUM CHLORIDE 9 MG/ML
5 INJECTION INTRAVENOUS DAILY
Status: DISCONTINUED | OUTPATIENT
Start: 2022-05-14 | End: 2022-05-18 | Stop reason: HOSPADM

## 2022-05-13 RX ADMIN — VANCOMYCIN HYDROCHLORIDE 1750 MG: 10 INJECTION, POWDER, LYOPHILIZED, FOR SOLUTION INTRAVENOUS at 13:34

## 2022-05-13 RX ADMIN — HYDROCORTISONE SODIUM SUCCINATE 100 MG: 100 INJECTION, POWDER, FOR SOLUTION INTRAMUSCULAR; INTRAVENOUS at 16:01

## 2022-05-13 RX ADMIN — NYSTATIN AND TRIAMCINOLONE ACETONIDE: 100000; 1 CREAM TOPICAL at 20:57

## 2022-05-13 RX ADMIN — LEVOTHYROXINE SODIUM ANHYDROUS 100 MCG: 100 INJECTION, POWDER, LYOPHILIZED, FOR SOLUTION INTRAVENOUS at 16:07

## 2022-05-13 RX ADMIN — HYDROCORTISONE SODIUM SUCCINATE 100 MG: 100 INJECTION, POWDER, FOR SOLUTION INTRAMUSCULAR; INTRAVENOUS at 23:48

## 2022-05-13 RX ADMIN — NYSTATIN AND TRIAMCINOLONE ACETONIDE: 100000; 1 CREAM TOPICAL at 13:16

## 2022-05-13 RX ADMIN — DEXTROSE MONOHYDRATE 125 ML: 100 INJECTION, SOLUTION INTRAVENOUS at 06:21

## 2022-05-13 RX ADMIN — COSYNTROPIN 250 MCG: 0.25 INJECTION, POWDER, LYOPHILIZED, FOR SOLUTION INTRAVENOUS at 14:32

## 2022-05-13 RX ADMIN — ENOXAPARIN SODIUM 40 MG: 100 INJECTION SUBCUTANEOUS at 09:29

## 2022-05-13 RX ADMIN — DEXTROSE MONOHYDRATE 100 ML/HR: 50 INJECTION, SOLUTION INTRAVENOUS at 05:12

## 2022-05-13 RX ADMIN — SODIUM CHLORIDE 5 ML: 9 INJECTION, SOLUTION INTRAMUSCULAR; INTRAVENOUS; SUBCUTANEOUS at 16:08

## 2022-05-13 RX ADMIN — SODIUM CHLORIDE: 9 INJECTION, SOLUTION INTRAVENOUS at 16:12

## 2022-05-13 RX ADMIN — THIAMINE HYDROCHLORIDE 200 MG: 100 INJECTION, SOLUTION INTRAMUSCULAR; INTRAVENOUS at 09:28

## 2022-05-13 RX ADMIN — MAGNESIUM SULFATE HEPTAHYDRATE 2000 MG: 2 INJECTION, SOLUTION INTRAVENOUS at 23:42

## 2022-05-13 RX ADMIN — THIAMINE HYDROCHLORIDE 200 MG: 100 INJECTION, SOLUTION INTRAMUSCULAR; INTRAVENOUS at 18:16

## 2022-05-13 RX ADMIN — MEROPENEM 1000 MG: 1 INJECTION, POWDER, FOR SOLUTION INTRAVENOUS at 13:15

## 2022-05-13 RX ADMIN — THIAMINE HYDROCHLORIDE 200 MG: 100 INJECTION, SOLUTION INTRAMUSCULAR; INTRAVENOUS at 21:06

## 2022-05-13 RX ADMIN — DEXTROSE AND SODIUM CHLORIDE: 5; 900 INJECTION, SOLUTION INTRAVENOUS at 11:57

## 2022-05-13 RX ADMIN — SODIUM CHLORIDE, PRESERVATIVE FREE 10 ML: 5 INJECTION INTRAVENOUS at 09:25

## 2022-05-13 RX ADMIN — MEROPENEM 1000 MG: 1 INJECTION, POWDER, FOR SOLUTION INTRAVENOUS at 00:24

## 2022-05-13 RX ADMIN — MEROPENEM 1000 MG: 1 INJECTION, POWDER, FOR SOLUTION INTRAVENOUS at 23:52

## 2022-05-13 ASSESSMENT — PAIN SCALES - PAIN ASSESSMENT IN ADVANCED DEMENTIA (PAINAD)
NEGVOCALIZATION: 0
FACIALEXPRESSION: 0
BREATHING: 0
TOTALSCORE: 0
BODYLANGUAGE: 0
TOTALSCORE: 1
BREATHING: 0
BODYLANGUAGE: 0
CONSOLABILITY: 0
BREATHING: 0
FACIALEXPRESSION: 0
BODYLANGUAGE: 0
TOTALSCORE: 0
TOTALSCORE: 0
FACIALEXPRESSION: 0
BODYLANGUAGE: 0
CONSOLABILITY: 0
CONSOLABILITY: 0
BREATHING: 0
TOTALSCORE: 0
BREATHING: 0
FACIALEXPRESSION: 0
TOTALSCORE: 0
FACIALEXPRESSION: 0
NEGVOCALIZATION: 0
FACIALEXPRESSION: 0
CONSOLABILITY: 0
CONSOLABILITY: 0
BODYLANGUAGE: 0
NEGVOCALIZATION: 1
NEGVOCALIZATION: 0
NEGVOCALIZATION: 0
CONSOLABILITY: 0
BODYLANGUAGE: 0
BREATHING: 0
CONSOLABILITY: 0
NEGVOCALIZATION: 0
TOTALSCORE: 0
BREATHING: 0
NEGVOCALIZATION: 0
FACIALEXPRESSION: 0
BODYLANGUAGE: 0

## 2022-05-13 ASSESSMENT — PAIN SCALES - WONG BAKER: WONGBAKER_NUMERICALRESPONSE: 0

## 2022-05-13 ASSESSMENT — PAIN SCALES - GENERAL: PAINLEVEL_OUTOF10: 0

## 2022-05-13 NOTE — PROGRESS NOTES
Speech Language Pathology  Facility/Department: [facility] 4 PCU  Dysphagia Daily Treatment Note    NAME: Monse Lou  : 1950  MRN: [id number]    Patient Diagnosis(es):   Patient Active Problem List    Diagnosis Date Noted    AMS (altered mental status) 2022    UTI (urinary tract infection) 2022    Cellulitis 2022    CKD (chronic kidney disease) 2022    Sepsis ([facility] [address] 2022    Numbness of right hand 10/07/2021    Schizophrenia ([facility] [address]     Dementia ([facility] [address]     Essential hypertension     Right hand weakness 10/06/2021    Other fatigue      Allergies: Allergies   Allergen Reactions    Codeine     Cogentin [Benztropine]     Penicillins      Recent Chest Xray 22      No acute pulmonary disease.        Previous dysphagia history:  22- 3/1/22 - SBS/Thin recommended   10/7/21: ETC/thin recommended    Chart reviewed. Medical Diagnosis: First degree AV block [I44.0]  Altered mental status, unspecified altered mental status type [R41.82]  Acute pancreatitis, unspecified complication status, unspecified pancreatitis type [K85.90]  AMS (altered mental status) [R41.82]   Treatment Diagnosis: oropharyngeal dysphagia    BSE Impression 22   Rn states okay to assess pt. Pt lethargic, not able to maintain alertness throughout session. Pt did not follow commands or respond to questions asked. Presented small amounts of ice chips and tsp of water. Pt removed from spoon and demonstrated adequate labial seal.  No overt signs of aspiration emerged, however pt did not maintain alertness with several trials,  therefore did not attempt additional trials due to concern for pt safety. Recommend cont NPO, aggressive oral care, small amounts of ice chips if alert enough.  Plan to reassess next session as pt appropriate    MBS results - pt not appropriate at this time due to inconsistent participation    Pain: denies    Current Diet : Diet NPO Exceptions are: Sips of Water with Meds   Recommended Form of Meds: Via alternative means of nutrition      Treatment:  Pt seen bedside to address the following goals:  1-The patient will tolerate repeat bedside swallowing evaluation when able. 5/13: RN notified SLP that pt more alert and MD asks for pt to be re-assessed. Pt much more alert, did not follow commands, verbalizing but not intelligible. Pt analyzed with ice chips, water and pepsi via cup and straw and puree. Pt exhibited cough after thin liquid trial when SLP walked out of room to obtain additional PO. No other overt signs of aspiration, voice remained clear. Pt held puree in oral cavity and required use of suction to remove. Pt required max encouragement to take PO, stating \"no,\"  In between trials. Recommend trial clear liquids with close monitoring for s/s of aspiration. Recommend medications be given through alternate means. Plan to re-assess next session to determine ability to advance diet vs need (and ability to participate) in instrumental exam  Goal met    New goal:  2- Pt will consume PO safely without signs or symptoms of aspiration    Patient/Family/Caregiver Education:  Pt educated to purpose of visit and rationale for taking PO trials. Pt did not state full comprehension    Compensatory Strategies:  90 degrees with all PO  Small sips  Check oral cavity for holding of bolus     Plan:  Continue dysphagia treatment with goals per plan of care. Diet recommendations: Trial Clear liquids -if any s/s of aspiration emerge, or there is respiratory decline,  make NPO until further evaluated by SLP  DC recommendation: ongoing treatment indicated during this hospitalization  Treatment: 14  D/W nursing Alejandra  Needs met prior to leaving room, call button in reach. Anibal Bailey M.S./[facility]-SLP #[id number]  Pg.  # [phone]  If patient is discharged prior to next treatment, this note will serve as the discharge summary

## 2022-05-13 NOTE — PLAN OF CARE
Problem: Discharge Planning  Goal: Discharge to home or other facility with appropriate resources  Outcome: Not Progressing     Problem: Skin/Tissue Integrity  Goal: Absence of new skin breakdown  Description: 1. Monitor for areas of redness and/or skin breakdown  2. Assess vascular access sites hourly  3. Every 4-6 hours minimum:  Change oxygen saturation probe site  4. Every 4-6 hours:  If on nasal continuous positive airway pressure, respiratory therapy assess nares and determine need for appliance change or resting period. Outcome: Not Progressing     Problem: SLP Adult - Impaired Swallowing  Goal: By Discharge: Advance to least restrictive diet without signs or symptoms of aspiration for planned discharge setting. See evaluation for individualized goals.   5/13/2022 1244 by RICHIE Bose  Outcome: Not Progressing     Problem: Nutrition Deficit:  Goal: Optimize nutritional status  Outcome: Not Progressing     Problem: Pain  Goal: Verbalizes/displays adequate comfort level or baseline comfort level  Outcome: Progressing  Flowsheets  Taken 5/13/2022 0901 by Josiah Dixon RN  Verbalizes/displays adequate comfort level or baseline comfort level: Assess pain using appropriate pain scale  Taken 5/13/2022 0457 by Luz Sheriff RN  Verbalizes/displays adequate comfort level or baseline comfort level: Assess pain using appropriate pain scale     Problem: Chronic Conditions and Co-morbidities  Goal: Patient's chronic conditions and co-morbidity symptoms are monitored and maintained or improved  Outcome: Progressing     Problem: Safety - Adult  Goal: Free from fall injury  Outcome: Progressing     Problem: ABCDS Injury Assessment  Goal: Absence of physical injury  Outcome: Progressing

## 2022-05-13 NOTE — CONSULTS
Clinical Pharmacy Consult Note    Vancomycin - Management by Pharmacy    Consult Date(s): 5/13/22  Consulting Provider(s): Dr. Vladimir Cain / Plan    1)   MRSA UTI- Vancomycin   Concurrent Antimicrobials:   o Meropenem - Day #2   Day of Vanc Therapy: #1   Current Dosing Method: Bayesian-Guided AUC Dosing   Therapeutic Goal: < 500 mg/L*hr   Current Dose / Frequency: 1750mg IV x1, followed by 1250mg IV q24h   Plan / Rationale:   o Admitted with KIMBERLY on CKD. Baseline SCr ~1.2. SCr 1.6 today, up from 1.4 yesterday.  o Will give vancomycin 1750mg IV x1 today as loading dose (~18 mg/kg), followed by 1250mg IV q24h. o Based on Bayesian kinetics, estimated AUC is ~470 mg/L*h with predicted steady-state trough of 14.7 mg/L.  o Will plan to check level in next ~36-48h to confirm kinetic estimates.  Will continue to monitor clinical condition and make adjustments to regimen as appropriate. Thank you for consulting Pharmacy! Colby Padilla, PharmD, BCPS  Wireless: [id number]   5/13/2022 10:26 AM          Interval update: Urine Cx growing >100k MRSA. Vancomycin IV added today. Subjective/Objective: Mr. Vamsi Marshall is a 81 y.o. male with a PMHx significant for dementia, bipolar 1 disorder, hx of MDRO UTIs, CKD, dementia, depression, DM2, gout, HTN, schizophrenia admitted for acute encephalopathy, UTI and elevated lipase. Pharmacy has been consulted to dose vancomycin. Height:   Ht Readings from Last 1 Encounters:   05/11/22 5' 9\" (1.753 m)     Weight:   Wt Readings from Last 1 Encounters:   05/11/22 215 lb (97.5 kg)       Current & Prior Antimicrobial Regimen(s):   Meropenem (5/12-current)   Vancomycin - pharmacy to dose  o 1750mg IV x1 5/13  o 1250mg IV q24h (5/14-current)    Level(s) / Doses:    Date Time Dose Level / Type of Level Interpretation                 Note: Serum levels collected for AUC-based dosing may be high if collected in close proximity to the dose administered.  This is not necessarily indicative of toxicity. Cultures & Sensitivities:    Date Site Micro Susceptibility / Result   5/11 Blood  NGTD x 2    5/11 Urine  >100k MRSA      Labs / Ancillary Data:    Estimated Creatinine Clearance: 49 mL/min (A) (based on SCr of 1.6 mg/dL (H)). Recent Labs     05/11/22  1945 05/11/22 2016 05/12/22  0736 05/13/22  0824   CREATININE  --  1.6* 1.4* 1.6*   BUN  --  33* 31* 28*   WBC 9.1  --  13.6* 14.1*       Additional Lab Values / Findings of Note:    Procalcitonin: No results for input(s): PROCAL in the last 72 hours.

## 2022-05-13 NOTE — PLAN OF CARE
Problem: SLP Adult - Impaired Swallowing  Goal: By Discharge: Advance to least restrictive diet without signs or symptoms of aspiration for planned discharge setting. See evaluation for individualized goals.   Outcome: Not Progressing

## 2022-05-13 NOTE — PROGRESS NOTES
4 Eyes Admission Assessment     I agree as the admission nurse that 2 RN's have performed a thorough Head to Toe Skin Assessment on the patient. ALL assessment sites listed below have been assessed on admission. Areas assessed by both nurses:   [x]   Head, Face, and Ears   [x]   Shoulders, Back, and Chest  [x]   Arms, Elbows, and Hands Bilat elbows blanchable redness noted    [x]   Coccyx, Sacrum, and Ischium  [x]   Legs, Feet, and Heels        Does the Patient have Skin Breakdown?   Yes a wound was noted on the Admission Assessment and an LDA was Initiated documentation include the Franca-wound, Wound Assessment, Measurements, Dressing Treatment, Drainage, and Color\",         Pio Prevention initiated:  Yes   Wound Care Orders initiated:  Yes      [id number]  nurse consulted for Pressure Injury (Stage 3,4, Unstageable, DTI, NWPT, and Complex wounds) or Pio score 18 or lower:  Yes      Nurse 1 eSignature: Electronically signed by Edward Roche RN on 5/13/22 at 2:31 AM EDT    **SHARE this note so that the co-signing nurse is able to place an eSignature**    Nurse 2 eSignature: Electronically signed by Terry Albarran RN on 5/13/22 at 1:52 AM EDT

## 2022-05-13 NOTE — PROGRESS NOTES
Podiatric Surgery Daily Progress Note      Admit Date: 5/11/2022      Code:Full Code    Patient seen and examined, labs and records reviewed    Subjective:     Patient seen at bedside this AM.      Review of Systems: Unable to be obtained secondary to patient's status       Objective     BP (!) 109/56   Pulse 107   Temp 99 °F (37.2 °C) (Axillary)   Resp 18   Ht 5' 9\" (1.753 m)   Wt 215 lb (97.5 kg)   SpO2 92%   BMI 31.75 kg/m²      I/O:    Intake/Output Summary (Last 24 hours) at 5/13/2022 0619  Last data filed at 5/13/2022 0500  Gross per 24 hour   Intake 60 ml   Output 1225 ml   Net -1165 ml              Wt Readings from Last 3 Encounters:   05/11/22 215 lb (97.5 kg)   04/13/22 198 lb 6.6 oz (90 kg)   03/05/22 215 lb 13.3 oz (97.9 kg)       LABS:    Recent Labs     05/11/22  1945 05/12/22  0736   WBC 9.1 13.6*   HGB 10.4* 10.0*   HCT 32.8* 31.4*    258        Recent Labs     05/12/22  0736      K 5.3*      CO2 26   BUN 31*   CREATININE 1.4*        Recent Labs     05/11/22 2016 05/12/22  0736   PROT 7.4 6.8       LOWER EXTREMITY EXAMINATION    Dressing to  LE intact. No strikethrough noted to the external dressing. Mild serosanguineous drainage noted to the internal layers of the dressing. VASCULAR: DP and PT pulses are non palpable 0/4 b/l. Upon hand-held Doppler examination DP and PT pulses were noted to be biphasic bilaterally CFT is brisk to the digits of the foot b/l. Skin temperature is warm to cool from proximal to distal with no focal calor noted. +1 pitting edema noted R>L. No pain with calf compression b/l.     NEUROLOGIC: Unable to be obtained secondary to patient's altered mental status.     DERMATOLOGIC: Diffuse dermatologic changes noted to b/l LE. Webspaces 1-4 b/l are macerated without any acute signs of infection.     RLE dressing left clean, dry, and intact     Sanguinous crust noted to the distal tuft of the right great toe. No open wound noted.   No fluctuance, crepitus, or malodor noted. No acute signs of infection noted.     Left lower extremity:     Full-thickness ulceration noted to the plantar lateral aspect of the left heel. Wound measures approximately 1.2 cm x 1.4 cm x 0.1 cm. Wound base is a mixture of granular and dermal tissue. No drainage or purulent drainage expressed. The wound does not probe to bone. No erythema noted. No tracking, tunneling, crepitus, fluctuance, or acute signs of infection noted.     MUSCULOSKELETAL: Muscle strength is unable to be obtained secondary to patient's altered mental status. No pain with palpation of the foot or ankle b/l. Ankle joint ROM is decreased in dorsiflexion with the knee extended. No obvious biomechanical abnormalities. IMAGING:  XR Left Foot 5/12/2022  Narrative   LEFT FOOT  3 views       HISTORY: Wound       COMPARISON: none       FINDINGS:       No evidence of acute fracture or traumatic bony abnormality is seen.       Bony irregularity is seen involving the posterior aspect of the calcaneus. This is questionable for osteomyelitis. Consider MRI correlation for further evaluation if there is concern of infection in this area.           Impression       Lucency and irregularity noted involving the posterior surface of the calcaneus which is questionable for osteomyelitis. ASSESSMENT/PLAN  -Full-thickness ulceration, left plantar lateral heel-NPUAP stage II  -Partial-thickness ulceration, right anterior shin  -Venous stasis dermatitis, bilateral lower extremities  -Peripheral vascular disease  -Type 2 diabetes mellitus with peripheral neuropathy    -Patient seen and examined at bedside this AM  -Hypotensive otherwise VSS,  no updated CBC this a.m.  -lactic acid 1.9  -HbA1c 8.7  -Blood cultures 1 & 2 NGTD  -XR imaging reviewed; impression noted above  -XR reviewed;  Low suspicion of OM to the calcaneus as there is no open or draining wound located at this location  -Wound culture not obtained at this time 2/2 no active drainage  -Continue IV antibiotics per primary team for UTI  -Left lower extremity dressed with Mepilex heel border  -Prevalon boots reapplied. Patient is to wear at all times while in bed to prevent further deep tissue injury.  -Weightbearing as tolerated bilateral lower extremity in protective shoe gear postop shoe  -Wound care nurse taking care of right lower extremity wound; will defer treatment of right lower extremity wound to the wound care nurse.  -Nursing communication placed for daily dressing changes to be done to the left lower extremity as listed above      DISPO: Full-thickness ulcerations; B/L LE. No acute signs of infection noted from the wounds to bilateral lower extremities. Imaging reviewed. Nursing communication placed for everyday dressing changes to the left LE. Will follow patient peripherally and see patient next Friday if patient is still in house. Patient ok to discharge from podiatric standpoint pending medical clearance.     Patient assessment and plan was discussed with Dr. Kenneth Terrazas DPM.    Carolyne Christensen DPM   Podiatric Resident PGY1  Pager [phone] or Miguel  5/13/2022, 8:07 AM

## 2022-05-13 NOTE — PROGRESS NOTES
Hospitalist Progress Note      PCP: Reggie Nuñez MD    Date of Admission: 5/11/2022    Chief Complaint: Sent from nursing home for altered mental state    Hospital Course:   81 y.o. male who presents from his nursing home for altered mental status  - poor historian with hx of dementia   - hx of mild mental retardation, bipolar disorder, diabetes mellitus, history of multidrug-resistant UTI  - work up in the hospital concerning for MRSA UTI, Hypothyroidism (symptomatic with hypothermia, hypoglycemia, AMS)    Subjective:  More alert today, able to stick his tongue out  Squeeze bilateral LE    I attempted to call Actito Bird but unable to reach    Overnight became hypoglycemic, hypothermic needing Sharyn hugger  Needing D10 bolus and now on D5 normal saline      Medications:  Reviewed    Infusion Medications    dextrose 5 % and 0.9 % NaCl      sodium chloride      dextrose Stopped (05/13/22 0458)     Scheduled Medications    thiamine (VITAMIN B1) IVPB  200 mg IntraVENous TID    vancomycin  1,750 mg IntraVENous Once    Followed by   Erlin Agarwal ON 5/14/2022] vancomycin  1,250 mg IntraVENous Q24H    cosyntropin  250 mcg IntraVENous Once    ARIPiprazole  15 mg Oral Daily    aspirin  81 mg Oral Daily    atorvastatin  40 mg Oral Nightly    divalproex  250 mg Oral Nightly    divalproex  125 mg Oral QAM    latanoprost  1 drop Right Eye Nightly    sodium chloride flush  5-40 mL IntraVENous 2 times per day    enoxaparin  40 mg SubCUTAneous Daily    insulin lispro  0-6 Units SubCUTAneous Q4H    nystatin-triamcinolone   Topical BID    meropenem  1,000 mg IntraVENous Q12H     PRN Meds: sodium chloride flush, sodium chloride, polyethylene glycol, acetaminophen **OR** acetaminophen, glucose, dextrose bolus **OR** dextrose bolus, glucagon (rDNA), dextrose      Intake/Output Summary (Last 24 hours) at 5/13/2022 1049  Last data filed at 5/13/2022 0500  Gross per 24 hour   Intake 0 ml   Output 725 ml   Net -725 ml       Physical Exam Performed:    BP (!) 102/56   Pulse 83   Temp 97.4 °F (36.3 °C) (Axillary)   Resp 18   Ht 5' 9\" (1.753 m)   Wt 215 lb (97.5 kg)   SpO2 90%   BMI 31.75 kg/m²     General appearance: chronically ill appearing  HEENT: Pupils equal, round, and reactive to light. Conjunctivae/corneas clear. Neck: Supple, with full range of motion. No jugular venous distention. Trachea midline. Respiratory:  Normal respiratory effort. Clear to auscultation, bilaterally without Rales/Wheezes/Rhonchi. Cardiovascular: Regular rate and rhythm with normal S1/S2 without murmurs, rubs or gallops. Abdomen: Soft, non-tender, non-distended with normal bowel sounds. Glans and foreskin redness. Musculoskeletal: Lower extremity chronic venous stasis changes  Skin: Skin color, texture, turgor normal.  No rashes or lesions. Neurologic:  Moving all extremities, displays bilateral upper extremity, able to stick his tongue out when asked  Psychiatric: Alert , unable to answer orientation questions  Capillary Refill: Brisk,< 3 seconds   Peripheral Pulses: +2 palpable, equal bilaterally       Labs:   Recent Labs     05/11/22 1945 05/12/22 0736 05/13/22  0824   WBC 9.1 13.6* 14.1*   HGB 10.4* 10.0* 10.1*   HCT 32.8* 31.4* 31.7*    258 247     Recent Labs     05/11/22 2016 05/12/22  0736 05/13/22  0824    140 135*   K 5.7* 5.3*  --     109 98*   CO2 23 26 18*   BUN 33* 31* 28*   CREATININE 1.6* 1.4* 1.6*   CALCIUM 10.0 10.2 <0.8*     Recent Labs     05/11/22 2016 05/12/22  0736 05/13/22  0824   AST 33 27 29   ALT 23 20 18   BILIDIR <0.2  --   --    BILITOT <0.2 <0.2 <0.2   ALKPHOS 180* 168* 35*     No results for input(s): INR in the last 72 hours.   Recent Labs     05/11/22 2016 05/11/22 2125   TROPONINI 0.03* 0.02*       Urinalysis:      Lab Results   Component Value Date    NITRU POSITIVE 05/11/2022    WBCUA >100 05/11/2022    BACTERIA 2+ 05/11/2022    RBCUA  05/11/2022    BLOODU MODERATE 05/11/2022    SPECGRAV 1.025 05/11/2022    GLUCOSEU Negative 05/11/2022       Radiology:  CT ABDOMEN PELVIS WO CONTRAST Additional Contrast? None   Final Result      1. Bibasilar consolidations and small pleural effusions noted. 2. Small amount of stranding in the paracolic gutters and small amount of ascites,   3. Benign renal cysts left kidney and punctate nonobstructive calculus lower pole   4. Bladder wall thickening      XR FOOT LEFT (MIN 3 VIEWS)   Final Result      Lucency and irregularity noted involving the posterior surface of the calcaneus which is questionable for osteomyelitis. XR CHEST PORTABLE   Final Result      No acute pulmonary disease.                  Assessment/Plan:    Active Hospital Problems    Diagnosis Date Noted    AMS (altered mental status) [R41.82] 05/11/2022     Priority: Medium     Acute metabolic encephalopathy possibly due to UTI  - hx of MDRO  - Urine CX +ve for MRSA  - Will add Vanc, continue merrem  - Abnormal thyroid studies, will start rx   Delirium precuations    Hypothyroidism he is symptomatic with hypothermia/hypoglycemia/encephalopathy  in the past, Hx of elevated TSH 11.91 in 02/2021 but Free T4 was normal, patient is not on synthroid supplementation  Labs with TSH 26.73, Free T4 low @ 0.6  Free T3 pending  Cortisol baseline around 11  Cosyntropin test has been ordered  -Once completed we will start IV Synthroid 100 mcg one-time dose and 50 mcg once daily from tomorrow  Start Solu-Cortef 100 every 8 hours once cosyntropin test has been completed can back off if good response of cortisol at 60 minutes of the test.    UTI, symptomatic with change of mentation per NH  -Continue device as stated above  -Catheter in place, urology as outpatient on admission     Elevated lipase, > 600  Abdominal CT without acute findings    KIMBERLY on CKD 2: POA  -Creatinine continues 1.6, hyperkalemia  -Kumar catheter is in place  -Nephrology consult for further evaluation  Continue D5 normal saline IV fluids    Hyperkalemia -Kumar is in place despite that potassium level continues to be elevated  Nephrology consulted    Leg edema, chronic - with chronic venous stasis changes. -- recent hospitalization, Norvasc was stopped    Left plantar lateral heel pressure ulcer  X-ray was done, reviewed by podiatry low suspicion for osteomyelitis as there is no open or draining wound located in the location.     History of schizophrenia and bipolar, bipolar  Continue pta meds     Diabetes mellitus, HgbA1c 8.7  Hypoglycemia, Continue SSI, hold off long acting insulin    DVT Prophylaxis: lovenox  Diet: Diet NPO Exceptions are: Sips of Water with Meds  Code Status: Full Code    PT/OT Eval Status: from long term care    Dispo - continue inpatient care, critically ill patient    Pt has a high probability of imminent or life-threatening deterioration requiring close monitoring, and highly complex decision-making and/or interventions of high intensity to assess, manipulate, and support his critical organ systems to prevent the his inevitable decline which would occur if left untreated. A total critical care time 58 minutes spent, this includes but not limited to examining patient, collaborating with other physicians, monitoring vital signs, telemetry, and clinical response to IV medications; documentation time, review and interpretation of laboratory and radiological data, review of nursing notes and old record review. This time excludes any time that may have been spent performing procedures.          Demario Leiva MD  Internal Medicine

## 2022-05-13 NOTE — PROGRESS NOTES
Speech Language Pathology  Facility/Department: [facility] [address] [facility]   CLINICAL BEDSIDE SWALLOW EVALUATION  Late entry for 820    NAME: Franchesca Vicente  : 1950  MRN: [id number]    ADMISSION DATE: 2022  ADMITTING DIAGNOSIS: has Other fatigue; Right hand weakness; Numbness of right hand; Schizophrenia ([address] Dementia ([address] Essential hypertension; Sepsis ([address] CKD (chronic kidney disease); Cellulitis; UTI (urinary tract infection); and AMS (altered mental status) on their problem list.  ONSET DATE:  22    Recent Chest Xray 22     No acute pulmonary disease. Date of Eval: 2022  Evaluating Therapist: RICHIE Glez    Current Diet level:  Current Diet : NPO    Primary Complaint  Patient Complaint: not stated    Pain:  Pain Assessment  Pain Assessment: None indicated through any means    Reason for Referral  Franchesca Vicente was referred for a bedside swallow evaluation to assess the efficiency of his swallow function, identify signs and symptoms of aspiration and make recommendations regarding safe dietary consistencies, effective compensatory strategies, and safe eating environment. History Of Present Illness:    Per MD notes:  \"80 y.o. male who presents from his nursing home for altered mental status. According to the EMS, the facility has called them for altered mental status. Usually patient is more conversant and more alert and appropriate. But today he is found to be slow to respond. Patient has history of MDRO UTIs and has been treated recently for Proteus mirabilis UTI in 2022. Patient has history of dementia at baseline and unable to provide any reliable history. Answers to questions is not appropriate and keeps repeating 4 days. Patient has no complaints. No vomiting or diarrhea observed. No recorded fever. No URI symptoms observed. \"    Impression  Rn states okay to assess pt. Pt lethargic, not able to maintain alertness throughout session.  Pt did not follow commands or respond to questions asked. Presented small amounts of ice chips and tsp of water. Pt removed from spoon and demonstrated adequate labial seal.  No overt signs of aspiration emerged, however pt did not maintain alertness with several trials,  therefore did not attempt additional trials due to concern for pt safety. Recommend cont NPO, aggressive oral care, small amounts of ice chips if alert enough. Plan to reassess next session as pt appropriate  Dysphagia Diagnosis: Suspected needs further assessment  Dysphagia Outcome Severity Scale: Level 2: Moderate Severe dysphagia- Maximum assistance or maximum use of strategies with partial PO only     Treatment Plan  Requires SLP Intervention: Yes  D/C Recommendations: To be determined     Recommended Diet and Intervention  · NPO   · Oral care / small amounts of ice chips if alert enough for the comfort of pt, health and hydration of oropharyngeal mucosa and swallow stimulation  · Medication through alternate means     Recommended Form of Meds: Via alternative means of nutrition     Therapeutic Interventions: Oral care; Patient/Family education    Compensatory Swallowing Strategies  n/a    Treatment/Goals  1-The patient will tolerate repeat bedside swallowing evaluation when able.     General  Chart Reviewed: Yes  Behavior/Cognition: Lethargic;Requires cueing  O2 Device: None (Room air)  Communication Observation:  (pt only stated his name, no other verbalizations noted)  Follows Directions: None  Dentition: Some missing teeth  Patient Positioning: Upright in bed  Baseline Vocal Quality: Weak  Volitional Cough:  (did not folflow command)    Vision/Hearing  Vision  Vision: Within Functional Limits  Hearing  Hearing: Within functional limits    Oral Motor Deficits  Not able to fully assess as pt did not follow commands    Prognosis  Individuals consulted  Consulted and agree with results and recommendations: Patient;RN  RN Name: Estella Reece  Patient Education: Pt educated to purpose of visit  Patient Education Response: No evidence of learning  Safety Devices in place: Yes  Type of devices: Call light within reach       Therapy Time  Time in: 805   Time out: 820    Plan:  · Recommended diet: NPO  · Medication through alternate means  · Aggressive Oral care / small amounts of ice chips if alert enough for the comfort of pt, health and hydration of oropharyngeal mucosa and swallow stimulation  · Re-assessment next session as pt propriate    Pt therapy goal: not able to state  Pt dc goal: not able to state  Arleen Rodriguez M.S./[id number]  Pg.  # [phone]  Needs met prior to leaving room, call light within reach, d/w NGHIA Roberts  This document will serve as a dc summary if pt dc prior to next visit  5/13/2022 10:11 AM

## 2022-05-13 NOTE — PROGRESS NOTES
Physician Progress Note      Onofre Box  CSN #:                  [id number]  :                       1950  ADMIT DATE:       2022 6:56 PM  [address] [place] DATE:  RESPONDING  PROVIDER #:        Dc Buckley MD          QUERY TEXT:    Pt admitted with AMS. Pt noted to have UTI. If possible, please document in   the progress notes and discharge summary if you are evaluating and / or   treating any of the following: The medical record reflects the following:  Risk Factors: UTI and AMS  Clinical Indicators: H&P: \"80 y.o. male who presents from his nursing home for   altered mental status. According to the EMS, the facility has called them   for altered mental status. Usually patient is more conversant and more alert   and appropriate. But today he is found to be slow to respond. Patient has   history of MDRO UTIs and has been treated recently for Proteus mirabilis UTI   in 2022: Altered mental state-likely secondary to UTI. \"  Treatment: IV hydration, received 1 L of LR in ED. Will give another fluid   bolus of 1 L of normal saline followed by maintenance fluids,Continue on   meropenem, Monitor electrolytes and renal function, Supportive therapy, Bed   alarm, Safety checks  Options provided:  -- Acute Metabolic encephalopathy due to UTI  -- Other - I will add my own diagnosis  -- Disagree - Not applicable / Not valid  -- Disagree - Clinically unable to determine / Unknown  -- Refer to Clinical Documentation Reviewer    PROVIDER RESPONSE TEXT:    This patient has Acute metabolic encephalopathy due to UTI.     Query created by: Danell Osler on 2022 10:59 AM      Electronically signed by:  Dc Buckley MD 2022 9:42 PM

## 2022-05-13 NOTE — CARE COORDINATION
Case Management Assessment           Initial Evaluation                Date / Time of Evaluation: 5/13/2022 4:02 PM                 Assessment Completed by: Maximilian Gallagher RN    Patient Name: Meena Padilla     YOB: 1950  Diagnosis: First degree AV block [I44.0]  Altered mental status, unspecified altered mental status type [R41.82]  Acute pancreatitis, unspecified complication status, unspecified pancreatitis type [K85.90]  AMS (altered mental status) [R41.82]     Date / Time: 5/11/2022  6:56 PM    Patient Admission Status: Inpatient    If patient is discharged prior to next notation, then this note serves as note for discharge by case management. Current PCP: Robert Lopez MD  Clinic Patient: No    Chart Reviewed: Yes  Patient/ Family Interviewed: No; unable to reach at this time    Initial assessment completed at bedside with:     Hospitalization in the last 30 days: Yes    Emergency Contacts:  Extended Emergency Contact Information  Primary Emergency Contact: [address] [place] [address], [address] [place] [address] Phone: [phone]  Mobile Phone: [phone]  Relation: Brother/Sister  Secondary Emergency Contact: [address] [place] [address] Phone: [phone]  Relation: Brother/Sister    Advance Directives:   Code Status: Full 2021 Monik Esquively: No  Agent:   Contact Number:     Copy present: No     In paper Chart: No    Scanned into EMR No    Financial  Payor: Hugo December / Plan: MEDICARE PART A AND B / Product Type: *No Product type* /     Pre-cert required for SNF: No    Pharmacy    [address], [address] [address]  Phone: [phone] Fax: [phone]      Potential assistance Purchasing Medications:    Does Patient want to participate in local refill/ meds to beds program?:      Meds To Beds General Rules:  1. Can ONLY be done Monday- Friday between 8:30am-5pm  2.  Prescription(s) must be in pharmacy by 3pm to be filled same day  3. Copy of patient's insurance/ prescription drug card and patient face sheet must be sent along with the prescription(s)  4. Cost of Rx cannot be added to hospital bill. If financial assistance is needed, please contact unit  or ;  or  CANNOT provide pharmacy voucher for patients co-pays  5. Patients can then  the prescription on their way out of the hospital at discharge, or pharmacy can deliver to the bedside if staff is available. (payment due at time of pick-up or delivery - cash, check, or card accepted)     Able to afford home medications/ co-pay costs: Yes    ADLS  Support Systems:      PT AM-PAC:   /24  OT AM-PAC:   /24    [place]: Long term care at [facility]  Steps: 0    Plans to RETURN to current housing: Yes  Barriers to RETURNING to current housing: none    [address]  Currently ACTIVE with [address]: No  Home Care Agency: Not Applicable      Durable Medical Equipment  DME Provider: facility provides  Equipment:     Home Oxygen and [address] prior to admission: No  [address]: Not Applicable  Other Respiratory Equipment:     Dialysis  Active with HD/PD prior to admission: No  Nephrologist:     HD Center:  Not Applicable    DISCHARGE PLAN:  Disposition: [facility] ([facility]): [facility]  Phone: . Fax: Aretha Rogers Transportation PLAN for discharge: EMS transportation     Factors facilitating achievement of predicted outcomes: Family support and Caregiver support    Barriers to discharge: Medication managment    Additional Case Management Notes: Per chart review patient is readmit from [facility] at [facility], currently under angely sheffield, wound care per podiatry and with AMS. CM unable to reach family at this time and no answer at facility. CM will continue to follow for DC planning and needs.     The Plan for Transition of Care is related to the following treatment goals of First degree AV block [I44.0]  Altered mental status, unspecified altered mental status type [R41.82]  Acute pancreatitis, unspecified complication status, unspecified pancreatitis type [K85.90]  AMS (altered mental status) [R41.82]    The Patient and/or patient representative Kary Mendoza and his family were provided with a choice of provider and agrees with the discharge plan Yes    Freedom of choice list was provided with basic dialogue that supports the patient's individualized plan of care/goals and shares the quality data associated with the providers.  Yes    Care Transition patient: No    Johan Stringer RN  [facility] [employer]  Case Management Department  Ph: [phone]   Fax: [phone]

## 2022-05-14 ENCOUNTER — APPOINTMENT (OUTPATIENT)
Dept: CT IMAGING | Age: 72
DRG: 871 | End: 2022-05-14
Payer: MEDICARE

## 2022-05-14 ENCOUNTER — APPOINTMENT (OUTPATIENT)
Dept: GENERAL RADIOLOGY | Age: 72
DRG: 871 | End: 2022-05-14
Payer: MEDICARE

## 2022-05-14 LAB
ALBUMIN SERPL-MCNC: 2.6 G/DL (ref 3.4–5)
AMMONIA: 39 UMOL/L (ref 16–60)
ANION GAP SERPL CALCULATED.3IONS-SCNC: 10 MMOL/L (ref 3–16)
BASE EXCESS ARTERIAL: 0.8 MMOL/L (ref -3–3)
BASOPHILS ABSOLUTE: 0 K/UL (ref 0–0.2)
BASOPHILS RELATIVE PERCENT: 0 %
BUN BLDV-MCNC: 30 MG/DL (ref 7–20)
CALCIUM SERPL-MCNC: 9.5 MG/DL (ref 8.3–10.6)
CARBOXYHEMOGLOBIN ARTERIAL: 0 % (ref 0–1.5)
CHLORIDE BLD-SCNC: 113 MMOL/L (ref 99–110)
CO2: 21 MMOL/L (ref 21–32)
CORTISOL 60 MIN: 10 UG/DL
CREAT SERPL-MCNC: 1.6 MG/DL (ref 0.8–1.3)
EOSINOPHILS ABSOLUTE: 0 K/UL (ref 0–0.6)
EOSINOPHILS RELATIVE PERCENT: 0 %
GFR AFRICAN AMERICAN: 52
GFR NON-AFRICAN AMERICAN: 43
GLUCOSE BLD-MCNC: 122 MG/DL (ref 70–99)
GLUCOSE BLD-MCNC: 133 MG/DL (ref 70–99)
GLUCOSE BLD-MCNC: 139 MG/DL (ref 70–99)
GLUCOSE BLD-MCNC: 158 MG/DL (ref 70–99)
GLUCOSE BLD-MCNC: 188 MG/DL (ref 70–99)
GLUCOSE BLD-MCNC: 216 MG/DL (ref 70–99)
GLUCOSE BLD-MCNC: 252 MG/DL (ref 70–99)
HCO3 ARTERIAL: 25 MMOL/L (ref 21–29)
HCT VFR BLD CALC: 33.8 % (ref 40.5–52.5)
HEMOGLOBIN, ART, EXTENDED: 10.3 G/DL
HEMOGLOBIN: 10.4 G/DL (ref 13.5–17.5)
LYMPHOCYTES ABSOLUTE: 0.6 K/UL (ref 1–5.1)
LYMPHOCYTES RELATIVE PERCENT: 3.3 %
MAGNESIUM: 2 MG/DL (ref 1.8–2.4)
MCH RBC QN AUTO: 25.4 PG (ref 26–34)
MCHC RBC AUTO-ENTMCNC: 30.8 G/DL (ref 31–36)
MCV RBC AUTO: 82.6 FL (ref 80–100)
METHEMOGLOBIN ARTERIAL: 0.7 % (ref 0–1.4)
MONOCYTES ABSOLUTE: 0.2 K/UL (ref 0–1.3)
MONOCYTES RELATIVE PERCENT: 1.1 %
NEUTROPHILS ABSOLUTE: 17.5 K/UL (ref 1.7–7.7)
NEUTROPHILS RELATIVE PERCENT: 95.6 %
O2 SAT, ARTERIAL: 95 % (ref 93–100)
PCO2 ARTERIAL: 36.3 MMHG (ref 35–45)
PDW BLD-RTO: 19.6 % (ref 12.4–15.4)
PERFORMED ON: ABNORMAL
PH ARTERIAL: 7.44 (ref 7.35–7.45)
PHOSPHORUS: 3 MG/DL (ref 2.5–4.9)
PLATELET # BLD: 236 K/UL (ref 135–450)
PMV BLD AUTO: 8.9 FL (ref 5–10.5)
PO2 ARTERIAL: 92.1 MMHG (ref 75–108)
POTASSIUM SERPL-SCNC: 5.2 MMOL/L (ref 3.5–5.1)
RBC # BLD: 4.09 M/UL (ref 4.2–5.9)
SODIUM BLD-SCNC: 144 MMOL/L (ref 136–145)
T3 FREE: 1.7 PG/ML (ref 2.3–4.2)
TCO2 ARTERIAL: 26 MMOL/L
WBC # BLD: 18.3 K/UL (ref 4–11)

## 2022-05-14 PROCEDURE — 92526 ORAL FUNCTION THERAPY: CPT

## 2022-05-14 PROCEDURE — 2700000000 HC OXYGEN THERAPY PER DAY

## 2022-05-14 PROCEDURE — 85025 COMPLETE CBC W/AUTO DIFF WBC: CPT

## 2022-05-14 PROCEDURE — 94761 N-INVAS EAR/PLS OXIMETRY MLT: CPT

## 2022-05-14 PROCEDURE — 2580000003 HC RX 258: Performed by: INTERNAL MEDICINE

## 2022-05-14 PROCEDURE — 36600 WITHDRAWAL OF ARTERIAL BLOOD: CPT

## 2022-05-14 PROCEDURE — 70450 CT HEAD/BRAIN W/O DYE: CPT

## 2022-05-14 PROCEDURE — 74018 RADEX ABDOMEN 1 VIEW: CPT

## 2022-05-14 PROCEDURE — 82140 ASSAY OF AMMONIA: CPT

## 2022-05-14 PROCEDURE — A4216 STERILE WATER/SALINE, 10 ML: HCPCS | Performed by: INTERNAL MEDICINE

## 2022-05-14 PROCEDURE — 83735 ASSAY OF MAGNESIUM: CPT

## 2022-05-14 PROCEDURE — 2580000003 HC RX 258: Performed by: STUDENT IN AN ORGANIZED HEALTH CARE EDUCATION/TRAINING PROGRAM

## 2022-05-14 PROCEDURE — 2500000003 HC RX 250 WO HCPCS: Performed by: INTERNAL MEDICINE

## 2022-05-14 PROCEDURE — 82803 BLOOD GASES ANY COMBINATION: CPT

## 2022-05-14 PROCEDURE — 84439 ASSAY OF FREE THYROXINE: CPT

## 2022-05-14 PROCEDURE — 6360000002 HC RX W HCPCS: Performed by: INTERNAL MEDICINE

## 2022-05-14 PROCEDURE — 6360000002 HC RX W HCPCS: Performed by: STUDENT IN AN ORGANIZED HEALTH CARE EDUCATION/TRAINING PROGRAM

## 2022-05-14 PROCEDURE — 36415 COLL VENOUS BLD VENIPUNCTURE: CPT

## 2022-05-14 PROCEDURE — 80069 RENAL FUNCTION PANEL: CPT

## 2022-05-14 PROCEDURE — 6370000000 HC RX 637 (ALT 250 FOR IP): Performed by: INTERNAL MEDICINE

## 2022-05-14 PROCEDURE — 2060000000 HC ICU INTERMEDIATE R&B

## 2022-05-14 RX ADMIN — NYSTATIN AND TRIAMCINOLONE ACETONIDE: 100000; 1 CREAM TOPICAL at 21:23

## 2022-05-14 RX ADMIN — INSULIN LISPRO 3 UNITS: 100 INJECTION, SOLUTION INTRAVENOUS; SUBCUTANEOUS at 20:39

## 2022-05-14 RX ADMIN — VANCOMYCIN HYDROCHLORIDE 1250 MG: 10 INJECTION, POWDER, LYOPHILIZED, FOR SOLUTION INTRAVENOUS at 12:48

## 2022-05-14 RX ADMIN — THIAMINE HYDROCHLORIDE 200 MG: 100 INJECTION, SOLUTION INTRAMUSCULAR; INTRAVENOUS at 18:10

## 2022-05-14 RX ADMIN — INSULIN LISPRO 1 UNITS: 100 INJECTION, SOLUTION INTRAVENOUS; SUBCUTANEOUS at 18:41

## 2022-05-14 RX ADMIN — MEROPENEM 1000 MG: 1 INJECTION, POWDER, FOR SOLUTION INTRAVENOUS at 23:35

## 2022-05-14 RX ADMIN — THIAMINE HYDROCHLORIDE 200 MG: 100 INJECTION, SOLUTION INTRAMUSCULAR; INTRAVENOUS at 09:26

## 2022-05-14 RX ADMIN — DIVALPROEX SODIUM 250 MG: 125 CAPSULE ORAL at 20:59

## 2022-05-14 RX ADMIN — SODIUM CHLORIDE 25 ML: 9 INJECTION, SOLUTION INTRAVENOUS at 21:15

## 2022-05-14 RX ADMIN — THIAMINE HYDROCHLORIDE 200 MG: 100 INJECTION, SOLUTION INTRAMUSCULAR; INTRAVENOUS at 21:20

## 2022-05-14 RX ADMIN — HYOSCYAMINE SULFATE 125 MCG: 0.12 TABLET, ORALLY DISINTEGRATING ORAL at 09:03

## 2022-05-14 RX ADMIN — NYSTATIN AND TRIAMCINOLONE ACETONIDE: 100000; 1 CREAM TOPICAL at 09:17

## 2022-05-14 RX ADMIN — LEVOTHYROXINE SODIUM ANHYDROUS 50 MCG: 100 INJECTION, POWDER, LYOPHILIZED, FOR SOLUTION INTRAVENOUS at 09:12

## 2022-05-14 RX ADMIN — SODIUM CHLORIDE, PRESERVATIVE FREE 10 ML: 5 INJECTION INTRAVENOUS at 21:11

## 2022-05-14 RX ADMIN — ATORVASTATIN CALCIUM 40 MG: 40 TABLET, FILM COATED ORAL at 20:59

## 2022-05-14 RX ADMIN — MEROPENEM 1000 MG: 1 INJECTION, POWDER, FOR SOLUTION INTRAVENOUS at 13:03

## 2022-05-14 RX ADMIN — INSULIN LISPRO 2 UNITS: 100 INJECTION, SOLUTION INTRAVENOUS; SUBCUTANEOUS at 13:48

## 2022-05-14 RX ADMIN — SODIUM CHLORIDE 25 ML: 9 INJECTION, SOLUTION INTRAVENOUS at 23:32

## 2022-05-14 RX ADMIN — LATANOPROST 1 DROP: 50 SOLUTION OPHTHALMIC at 21:21

## 2022-05-14 RX ADMIN — HYDROCORTISONE SODIUM SUCCINATE 100 MG: 100 INJECTION, POWDER, FOR SOLUTION INTRAMUSCULAR; INTRAVENOUS at 18:04

## 2022-05-14 RX ADMIN — HYDROCORTISONE SODIUM SUCCINATE 100 MG: 100 INJECTION, POWDER, FOR SOLUTION INTRAMUSCULAR; INTRAVENOUS at 09:08

## 2022-05-14 RX ADMIN — SODIUM CHLORIDE, PRESERVATIVE FREE 10 ML: 5 INJECTION INTRAVENOUS at 09:18

## 2022-05-14 RX ADMIN — SODIUM CHLORIDE, PRESERVATIVE FREE 5 ML: 5 INJECTION INTRAVENOUS at 09:17

## 2022-05-14 RX ADMIN — ENOXAPARIN SODIUM 40 MG: 100 INJECTION SUBCUTANEOUS at 09:09

## 2022-05-14 ASSESSMENT — PAIN SCALES - PAIN ASSESSMENT IN ADVANCED DEMENTIA (PAINAD)
NEGVOCALIZATION: 1
TOTALSCORE: 0
BODYLANGUAGE: 2
TOTALSCORE: 3
BREATHING: 1
BREATHING: 0
TOTALSCORE: 3
BREATHING: 0
BODYLANGUAGE: 2
FACIALEXPRESSION: 0
CONSOLABILITY: 0
NEGVOCALIZATION: 1
TOTALSCORE: 2
NEGVOCALIZATION: 1
FACIALEXPRESSION: 0
BREATHING: 0
CONSOLABILITY: 0
BODYLANGUAGE: 2
FACIALEXPRESSION: 0
TOTALSCORE: 3
FACIALEXPRESSION: 0
TOTALSCORE: 3
CONSOLABILITY: 0
FACIALEXPRESSION: 0
CONSOLABILITY: 0
BREATHING: 0
NEGVOCALIZATION: 1
BREATHING: 0
FACIALEXPRESSION: 0
BODYLANGUAGE: 0
TOTALSCORE: 3
BREATHING: 0
TOTALSCORE: 3
BODYLANGUAGE: 2
BODYLANGUAGE: 2
NEGVOCALIZATION: 1
FACIALEXPRESSION: 0
BODYLANGUAGE: 2
CONSOLABILITY: 0
NEGVOCALIZATION: 0
NEGVOCALIZATION: 1
BREATHING: 1
NEGVOCALIZATION: 1
NEGVOCALIZATION: 1
CONSOLABILITY: 0
CONSOLABILITY: 0
FACIALEXPRESSION: 0
BREATHING: 0
FACIALEXPRESSION: 0
BODYLANGUAGE: 2
FACIALEXPRESSION: 0
BREATHING: 0
FACIALEXPRESSION: 0
CONSOLABILITY: 0
TOTALSCORE: 3
NEGVOCALIZATION: 1
BODYLANGUAGE: 0
NEGVOCALIZATION: 1
NEGVOCALIZATION: 0
CONSOLABILITY: 0
TOTALSCORE: 3
BODYLANGUAGE: 0
FACIALEXPRESSION: 0
CONSOLABILITY: 0
BREATHING: 0
CONSOLABILITY: 0
TOTALSCORE: 3
TOTALSCORE: 1
BODYLANGUAGE: 2
NEGVOCALIZATION: 1
CONSOLABILITY: 0
NEGVOCALIZATION: 1
CONSOLABILITY: 0
BODYLANGUAGE: 2
FACIALEXPRESSION: 0
FACIALEXPRESSION: 0
BODYLANGUAGE: 2
TOTALSCORE: 3
CONSOLABILITY: 0
BODYLANGUAGE: 2
TOTALSCORE: 3

## 2022-05-14 ASSESSMENT — PAIN SCALES - GENERAL
PAINLEVEL_OUTOF10: 0

## 2022-05-14 NOTE — PROGRESS NOTES
Speech Language Pathology  Facility/Department: [facility] [address] [facility]  Dysphagia Daily Treatment Note    NAME: Jose Gillis  : 1950  MRN: [id number]    Patient Diagnosis(es):   Patient Active Problem List    Diagnosis Date Noted    AMS (altered mental status) 2022    Cellulitis 2022    CKD (chronic kidney disease) 2022    Sepsis ([facility] 2022    Numbness of right hand 10/07/2021    Schizophrenia ([facility]     Dementia ([facility]     Essential hypertension     Right hand weakness 10/06/2021    Other fatigue      Allergies: Allergies   Allergen Reactions    Codeine     Cogentin [Benztropine]     Penicillins      Recent Chest Xray 22      No acute pulmonary disease.        Previous dysphagia history:  22- 3/1/22 - SBS/Thin recommended   10/7/21: ETC/thin recommended    Chart reviewed. Medical Diagnosis: First degree AV block [I44.0]  Altered mental status, unspecified altered mental status type [R41.82]  Acute pancreatitis, unspecified complication status, unspecified pancreatitis type [K85.90]  AMS (altered mental status) [R41.82]   Treatment Diagnosis: oropharyngeal dysphagia    BSE Impression 22   Rn states okay to assess pt. Pt lethargic, not able to maintain alertness throughout session. Pt did not follow commands or respond to questions asked. Presented small amounts of ice chips and tsp of water. Pt removed from spoon and demonstrated adequate labial seal.  No overt signs of aspiration emerged, however pt did not maintain alertness with several trials,  therefore did not attempt additional trials due to concern for pt safety. Recommend cont NPO, aggressive oral care, small amounts of ice chips if alert enough.  Plan to reassess next session as pt appropriate    MBS results - pt not appropriate at this time due to inconsistent participation    Pain: denies    Current Diet : Diet NPO   Recommended Form of Meds: Via alternative means of nutrition      Treatment:  Pt seen bedside to address the following goals:  1-The patient will tolerate repeat bedside swallowing evaluation when able. 5/13: RN notified SLP that pt more alert and MD asks for pt to be re-assessed. Pt much more alert, did not follow commands, verbalizing but not intelligible. Pt analyzed with ice chips, water and pepsi via cup and straw and puree. Pt exhibited cough after thin liquid trial when SLP walked out of room to obtain additional PO. No other overt signs of aspiration, voice remained clear. Pt held puree in oral cavity and required use of suction to remove. Pt required max encouragement to take PO, stating \"no,\"  In between trials. Recommend trial clear liquids with close monitoring for s/s of aspiration. Recommend medications be given through alternate means. Plan to re-assess next session to determine ability to advance diet vs need (and ability to participate) in instrumental exam  Goal met  5/14: Received repeat order for bedside swallow evaluation. Per chart review/nursing, pt with increased difficulty swallowing and managing secretions over night, with pt requiring frequent suctioning. Received pt awake, and alert, but not following directions. Anterior loss of secretions. Positioned patient up in bed. Assessed tolerance ice chips, thins via tsp/straw/cup. Pt with positive oral acceptance, reduced labial seal (inadequate for straw use), oral clearance, however unable to perceive laryngeal swallow movement. Delayed cough. Recommend continue NPO, consider alternative means nutrition/hydration. Cont goal    2- Pt will consume PO safely without signs or symptoms of aspiration  5/14: see goal 1 above; d/c goal 2    Patient/Family/Caregiver Education:  Pt educated to purpose of visit and rationale for taking PO trials.  Pt did not state full comprehension    Compensatory Strategies:  90 degrees with all PO  Small sips  Check oral cavity for holding of bolus     Plan:  Continue dysphagia treatment with goals per plan of care. Diet recommendations:     - NPO  - frequent oral hygiene w/ suction kit   - needs alternative means nutrition/hydration    DC recommendation: ongoing treatment indicated during this hospitalization  Treatment: 12  D/W nursing HOSP STAUFFER  Needs met prior to leaving room, call button in reach. Roland Li, ATTILA [phone]  Pg.  # [phone]  If patient is discharged prior to next treatment, this note will serve as the discharge summary

## 2022-05-14 NOTE — PROGRESS NOTES
Physician Progress Note      Jose Manuel Christine  CSN #:                  [id number]  :                       1950  ADMIT DATE:       2022 6:56 PM  [address] [place] DATE:  RESPONDING  PROVIDER #:        Susie Dodd MD          QUERY TEXT:    Pt admitted with UTI. Per ED and Nephrology consultant documentation of   Sepsis. If possible, please document in the progress notes and discharge   summary if you are evaluating and /or treating any of the following: The medical record reflects the following:  Risk Factors: 80 y/o with PMHx of DM, MDR UTIs  Clinical Indicators: WBC on admission 13.6 and  WBC 14.1, , [id number]   hours into the admission patient became hypothermic with a Temp 90.3, 91.9    Per ED PN: \"Based on mild hypotension obtained blood cultures, treated with 2L   IVF for possible sepsis. As he has a history of MDR UTI, after reviewing   recent cultures/sensitivties will initially treat with meropenem. \"        PN: \"Overnight became hypoglycemic, hypothermic needing Sharyn hugger Needing   D10 bolus and now on D5 normal saline. \"  Treatment: 2500ml LR bolus, Merrem, Vancocin, lab monitoring, Blood cultures  Options provided:  -- Sepsis, likely present on admission, due to UTI  -- Sepsis, not present on admission  -- Sepsis was ruled out  -- Other - I will add my own diagnosis  -- Disagree - Not applicable / Not valid  -- Disagree - Clinically unable to determine / Unknown  -- Refer to Clinical Documentation Reviewer    PROVIDER RESPONSE TEXT:    This patient has sepsis, likely present on admission, due to UTI.     Query created by: Shelly Garcia on 2022 6:39 PM      Electronically signed by:  Susie Dodd MD 2022 8:30 AM

## 2022-05-14 NOTE — PROGRESS NOTES
Hospitalist Progress Note      PCP: Alena Monroe MD    Date of Admission: 5/11/2022    Chief Complaint: Sent from nursing home for altered mental state    Hospital Course:   81 y.o. male  of CKD stage IV, hypertension, diabetes mellitus type 2, advanced dementia with mood disorder MDRO UTI who presents from his nursing home for altered mental status  - poor historian with hx of dementia   - work up in the hospital concerning for MRSA UTI, Hypothyroidism (symptomatic with hypothermia, hypoglycemia, AMS)    Subjective:  Patient seen and examined today. Alert oriented to self. Not following any commands. ROS limited. On 2 L of oxygen. Still on [employer] [facility] [employer].     Attempted to call Shiela Red left a voicemail      Medications:  Reviewed    Infusion Medications    dextrose 5 % and 0.9 % NaCl 100 mL/hr at 05/14/22 0926    sodium chloride      dextrose Stopped (05/13/22 1694)     Scheduled Medications    thiamine (VITAMIN B1) IVPB  200 mg IntraVENous TID    vancomycin  1,250 mg IntraVENous Q24H    levothyroxine  50 mcg IntraVENous Daily    And    sodium chloride (PF)  5 mL IntraVENous Daily    hydrocortisone sodium succinate PF  100 mg IntraVENous Q8H    ARIPiprazole  15 mg Oral Daily    aspirin  81 mg Oral Daily    atorvastatin  40 mg Oral Nightly    divalproex  250 mg Oral Nightly    divalproex  125 mg Oral QAM    latanoprost  1 drop Right Eye Nightly    sodium chloride flush  5-40 mL IntraVENous 2 times per day    enoxaparin  40 mg SubCUTAneous Daily    insulin lispro  0-6 Units SubCUTAneous Q4H    nystatin-triamcinolone   Topical BID    meropenem  1,000 mg IntraVENous Q12H     PRN Meds: hyoscyamine, sodium chloride flush, sodium chloride, polyethylene glycol, acetaminophen **OR** acetaminophen, glucose, dextrose bolus **OR** dextrose bolus, glucagon (rDNA), dextrose      Intake/Output Summary (Last 24 hours) at 5/14/2022 1126  Last data filed at 5/14/2022 0426  Gross per 24 hour   Intake --   Output 1800 ml   Net -1800 ml       Physical Exam Performed:    /75   Pulse 94   Temp 98.1 °F (36.7 °C) (Axillary)   Resp 20   Ht 5' 9\" (1.753 m)   Wt 215 lb (97.5 kg)   SpO2 95%   BMI 31.75 kg/m²     General appearance: chronically ill appearing elderly, frail  HEENT: Pupils equal, round, and reactive to light. Conjunctivae/corneas clear. Neck: Supple, with full range of motion. No jugular venous distention. Trachea midline. Respiratory:  Normal respiratory effort. Clear to auscultation, bilaterally without Rales/Wheezes/Rhonchi. Cardiovascular: Regular rate and rhythm with normal S1/S2 without murmurs, rubs or gallops. Abdomen: Soft, non-tender, non-distended with normal bowel sounds. Glans and foreskin redness. Musculoskeletal: Lower extremity chronic venous stasis changes bilateral soft restraint  Skin: Skin color, texture, turgor normal.    Neurologic: Alert and oriented 0. Not following any commands  Psychiatric: Alert , unable to answer orientation questions  Capillary Refill: Brisk,< 3 seconds   Peripheral Pulses: +2 palpable, equal bilaterally       Labs:   Recent Labs     05/12/22 0736 05/13/22  0824 05/14/22  0443   WBC 13.6* 14.1* 18.3*   HGB 10.0* 10.1* 10.4*   HCT 31.4* 31.7* 33.8*    247 236     Recent Labs     05/13/22  1035 05/13/22 2050 05/14/22  0443    136 144   K 5.6* 5.4* 5.2*    107 113*   CO2 21 21 21   BUN 29* 28* 30*   CREATININE 1.6* 1.6* 1.6*   CALCIUM 9.3 9.3 9.5   PHOS 2.8 2.4* 3.0     Recent Labs     05/11/22 2016 05/12/22  0736 05/13/22  0824   AST 33 27 29   ALT 23 20 18   BILIDIR <0.2  --   --    BILITOT <0.2 <0.2 <0.2   ALKPHOS 180* 168* 35*     No results for input(s): INR in the last 72 hours.   Recent Labs     05/11/22 2016 05/11/22 2125 05/13/22  2050   CKTOTAL  --   --  87   TROPONINI 0.03* 0.02*  --        Urinalysis:      Lab Results   Component Value Date    NITRU POSITIVE 05/11/2022    WBCUA >100 05/11/2022    BACTERIA 2+ 05/11/2022    RBCUA  05/11/2022    BLOODU MODERATE 05/11/2022    SPECGRAV 1.025 05/11/2022    GLUCOSEU Negative 05/11/2022       Radiology:  CT ABDOMEN PELVIS WO CONTRAST Additional Contrast? None   Final Result      1. Bibasilar consolidations and small pleural effusions noted. 2. Small amount of stranding in the paracolic gutters and small amount of ascites,   3. Benign renal cysts left kidney and punctate nonobstructive calculus lower pole   4. Bladder wall thickening      XR FOOT LEFT (MIN 3 VIEWS)   Final Result      Lucency and irregularity noted involving the posterior surface of the calcaneus which is questionable for osteomyelitis. XR CHEST PORTABLE   Final Result      No acute pulmonary disease. XR CHEST PORTABLE    (Results Pending)           Assessment/Plan:    Active Hospital Problems    Diagnosis Date Noted    AMS (altered mental status) [R41.82] 05/11/2022     Priority: Medium     #Dysphagia. Patient failed speech eval.  Will put NG tube in and start tube feed. #Acute metabolic encephalopathy  Suspected this is patient's baseline. I tried to call patient's sister Odette Good left a voicemail    #UTI with MRSA. Urine cultures positive for MRSA. Continue vancomycin will complete total 14 days. #Suspected aspiration pneumonia. Continue meropenem. #Hypothermia/hypoglycemia/hypothyroid  in the past, Hx of elevated TSH 11.91 in 02/2021 but Free T4 was normal, patient is not on synthroid supplementation  Labs with TSH 26.73, Free T4 low @ 0.6, free T3 1.7. Started on IV Synthroid yesterday. Follow-up on free T4 level. Cortisol baseline around 11  Cosyntropin test did not adequate response to cortisol level.   Cont Solu-Cortef 100 every 8 hours once cosyntropin test has been completed can back off if good response of cortisol at 60 minutes of the test.    UTI, symptomatic with change of mentation per NH  -Continue device as stated above  -Catheter in place, urology as outpatient on admission     Elevated lipase, > 600  Abdominal CT without acute findings    KIMBERLY on CKD 2: POA  -Creatinine continues 1.6, hyperkalemia  -Kumar catheter is in place  -Nephrology consult for further evaluation  Continue D5 normal saline IV fluids    Hyperkalemia -Kumar is in place despite that potassium level continues to be elevated  Nephrology consulted    Leg edema, chronic - with chronic venous stasis changes. -- recent hospitalization, Norvasc was stopped    Left plantar lateral heel pressure ulcer  X-ray was done, reviewed by podiatry low suspicion for osteomyelitis as there is no open or draining wound located in the location.     History of schizophrenia and bipolar, bipolar  Continue pta meds     Diabetes mellitus, HgbA1c 8.7  Hypoglycemia, Continue SSI, hold off long acting insulin    DVT Prophylaxis: lovenox  Diet: Diet NPO  ADULT TUBE FEEDING; Nasogastric; Standard with Fiber; Continuous; 20; Yes; 10; Q 4 hours; 40; 30; Q 4 hours  Code Status: Full Code    PT/OT Eval Status: from long term care    Dispo - continue inpatient care, critically ill patient will consult palliative care.         Alecia Cruz MD  Internal Medicine

## 2022-05-14 NOTE — PROGRESS NOTES
Dr Ronna Mcgee notified that pt is less responsive throughout the day and increasingly more difficult to arouse. Dr Bacilio Stewart also notified of elevated HR, RR, and BP. Dr also notified L pupil not round and sluggish which appears to may not be new according to previous charting.

## 2022-05-14 NOTE — PROGRESS NOTES
Nephrology Consult Note          [facility]      [email or web address]         Phone: [phone]      5/14/2022 7:33 AM     Patient: Kenny Coats [id number]  [id number]  Date of Admit: 5/11/2022 LOS: 3 days      Plan:    Stable creatinine 1.6 ( near baseline )  K is better 5.2  BP is better and urine is 1800 ml / day     On IV hydrocortisone   Continue IVF  On antibiotics for UTI  Avoid nephrotoxins  Monitor I/O and vitals closely  Daily standing weight if possible      Renally dose all medications  Follow closely        Thank you for allowing us to participate in this patient's care    In case of any question please call us at our 24 hour answering service [phone] or from 7 AM to 5 PM via [employer] or cell number    Irma Sesay MD  [facility]  [address]  Fax: [phone]  Office: [phone]     Assessment & Plan     Hypothyroidism with hypothermia/hypoglycemia/encephalopathy  On dextrose in normal saline  On Stress dose steroids and IV synthroid    UTI: On Abx     Chronic kidney disease 3  Baseline Cr:1.4- 1.6  UA: 5/11 > 100 WBC,  RBC  Proteinuria: 30 mg/dl on UA. Cr around baseline but due to hypotension and sepsis high risk of Acute kidney injury   CT A/P: Benign renal cysts left kidney and punctate nonobstructive calculus lower pole        Electrolytes:  Mild hyperkalemia in patient with underlying CKD while getting Enoxaparin and he might also be developing acute kidney injury which may appear on later labs. Rule out rhabdomyolysis.        Lab Results   Component Value Date    CREATININE 1.6 (H) 05/14/2022    BUN 30 (H) 05/14/2022     05/14/2022    K 5.2 (H) 05/14/2022     (H) 05/14/2022    CO2 21 05/14/2022      Lab Results   Component Value Date    PTH 41.8 02/27/2022    CALCIUM 9.5 05/14/2022    PHOS 3.0 05/14/2022         Acid/Base status:  Stable      Volume status/BP:  Hypotension    Chronic appearing leg edema. On 2 liter oxygen. No RD  CXR on 5/11 no acute process. Hematology:   Lab Results   Component Value Date    IRON 36 (L) 02/28/2022    TIBC 187 (L) 02/28/2022    FERRITIN 483.6 (H) 02/28/2022     Lab Results   Component Value Date    WBC 18.3 (H) 05/14/2022    HGB 10.4 (L) 05/14/2022    HCT 33.8 (L) 05/14/2022    MCV 82.6 05/14/2022     05/14/2022           Reason for Consult     Reason for consult: CKD with hyperkalemia. Chief complaint:   Chief Complaint   Patient presents with    Altered Mental Status     per NH pt slower to respond, pt asks why he is at the hospital he feels fine       History of Present Illness   Rupali Navarrete is a 81 y.o. with PMH significant for MDR UTI, CVA with dementia, bipolar disorder, schizophrenia, DM, HTN, CKD was admitted from NH with AMS. UA concerning for UTI for which is getting treatment. Patient was also hypoglycemic with low BP and patient is on IV dextrose with normal saline, IV stress dose steroids and IV synthroid. Labs showed hyperkalemia and nephrology was consulted to assist in management. Cr around baseline. Patient is very lethargic and unable to answer my questions although he is awake. Review of Systems     Positive in bold or unable to assess     GEN: Fever, chills, night sweats. HEENT: Changes in vision, sore throat, rhinorrhea   CVS: Chest pain, palpitations, swelling or edema in legs  Pulmonary: Cough, hemoptysis, SOB  GI: Nausea, vomiting, diarrhea, constipation, abdominal pain  : Bladder incontinence, dysuria, hematuria. MSK: Muscle or joint or bone pains  Skin: Rashes, ulcers, skin thickness  CNS: Headache, dizziness, confusion, focal weakness, seizure. Psych: Anxiety, agitation, depression. Reviewed all 12 systems, negative except as above.      Past Medical History     Past Medical History:   Diagnosis Date    Ataxia     Bipolar 1 disorder ([facility]     BPH (benign prostatic hyperplasia)     Cerebral infarct ([facility]     Chronic kidney disease     D[facility]     Depression     Diabetes mellitus type 2, uncomplicated ([facility]     Gout     Hyperlipidemia     Hypertension     Schizophrenia ([facility]          Past Surgical History     History reviewed. No pertinent surgical history. Family History     History reviewed. No pertinent family history. Social History     Social History     Tobacco Use    Smoking status: Never Smoker    Smokeless tobacco: Never Used   Substance Use Topics    Alcohol use: Not Currently          Past medical, family, and social histories were reviewed as previously documented. Updates were made as necessary. Inpatient Medications and Allergies       Scheduled Meds:   thiamine (VITAMIN B1) IVPB  200 mg IntraVENous TID    vancomycin  1,250 mg IntraVENous Q24H    levothyroxine  50 mcg IntraVENous Daily    And    sodium chloride (PF)  5 mL IntraVENous Daily    hydrocortisone sodium succinate PF  100 mg IntraVENous Q8H    ARIPiprazole  15 mg Oral Daily    aspirin  81 mg Oral Daily    atorvastatin  40 mg Oral Nightly    divalproex  250 mg Oral Nightly    divalproex  125 mg Oral QAM    latanoprost  1 drop Right Eye Nightly    sodium chloride flush  5-40 mL IntraVENous 2 times per day    enoxaparin  40 mg SubCUTAneous Daily    insulin lispro  0-6 Units SubCUTAneous Q4H    nystatin-triamcinolone   Topical BID    meropenem  1,000 mg IntraVENous Q12H         Allergies: Allergies   Allergen Reactions    Codeine     Cogentin [Benztropine]     Penicillins          Vital Signs and Input Output     Vitals:    05/14/22 0424   BP: (!) 150/82   Pulse: 107   Resp: 22   Temp: 98.5 °F (36.9 °C)   SpO2: (!) 88%           Intake/Output Summary (Last 24 hours) at 5/14/2022 0733  Last data filed at 5/14/2022 0426  Gross per 24 hour   Intake --   Output 1800 ml   Net -1800 ml           Physical Exam     General appearance: NAD, look sick.    Head: Normocephalic, without obvious abnormality, atraumatic   Mouth: Dry mucous membrane  Neck: Supple. Lungs: Good air entry bilaterally and no respiratory distress on 2 liter oxygen. Auscultated from front. Heart: S1, S2.   Abdomen: soft, non-tender non-distended  Extremities: Chronic appearing leg edema. Skin: No concerning rash noted on limited exam  Psych: Calm  Neuro: Awake but confused. Patient also has dementia.        Laboratory Data   See above    Diagnostic Studies   Pertinent images reviewed

## 2022-05-14 NOTE — CONSULTS
Comprehensive Nutrition Assessment    RECOMMENDATIONS:  1. PO Diet: Continue NPO per MD/SLP recs  Nutrition Support: Start TF after NG is placed  · Recommend EN formula Renal  Nepro  @ goal rate 40ml/hr   · Initiate EN @ 20mL/hr and as tolerated, increase by 20 mL/hr q 4 hours until goal of 40 mL/hr is met. · Maintenance water flush of 30 ml every 4 hours while on IVF or Recommend water bolus 180ml every 4 hours or Defer to MD.   · Recommend protein modular QD via feeding tube. Flush with 30 ml water before/after administration. Do not mix with tube feeding formula. NUTRITION ASSESSMENT:   Nutritional summary & status: Received consult for TF ordering and management. Pt was seen by SLP this AM, recommending NPO at this time. Pt is poor historian with hx dementia. Noted CKD & hyperkalemia; pt to avoid all K+ containing fluids per nephrology. Will start pt on Nepro TF formula~provided EN recommendations above. Will continue to monitor nutritonal status closely and plan of care.  Admission/PMH: Admission: AMS; PMH: CKD stage IV, hypertension, diabetes mellitus type 2, advanced dementia with mood disorder MDRO UTI      MALNUTRITION ASSESSMENT  Context of Malnutrition: Acute Illness   Malnutrition Status:  At risk for malnutrition (Comment)  Findings of the 6 clinical characteristics of malnutrition (Minimum of 2 out of 6 clinical characteristics is required to make the diagnosis of moderate or severe Protein Calorie Malnutrition based on AND/ASPEN Guidelines):  Energy Intake: Less than/equal to 50% of estimated energy requirements    Energy Intake Time: x 3 days   Weight Loss %: 5% loss or greater    Weight loss Time: Greater than or equal to 1 month      NUTRITION DIAGNOSIS   Increased nutrient needs related to increase demand for energy/nutrients as evidenced by wounds    [address] and/or Nutrient Delivery:  Continue NPO,Start Tube Feeding  Nutrition Education/Counseling:  No recommendation at this time   Goals:  Pt to tolerate most appropriate diet to meet >75% of energy needs          Nutrition Monitoring and Evaluation:   Food/Nutrient Intake Outcomes:  Diet Advancement/Tolerance,Enteral Nutrition Intake/Tolerance  Physical Signs/Symptoms Outcomes:  Biochemical Data,Weight     OBJECTIVE DATA: Significant to nutrition assessment  · Nutrition-Focused Physical Findings: lbm 5/14; +2 RLE/LLE edema  · Labs: Reviewed; K+ 5.2, , POCBG 122-158  · Meds: Reviewed; thiamine, synthroid, insulin  · Wounds: Stage III,Pressure Injury (L heel)       CURRENT NUTRITION THERAPIES  Diet NPO  ADULT TUBE FEEDING; Nasogastric; Standard with Fiber; Continuous; 20; Yes; 10; Q 4 hours; 40; 30; Q 4 hours     PO Intake: NPO   PO Supplement Intake:NPO  Additional Sources of Calories/IVF:D5 & NS @ 100ml/px=556xdwh     ANTHROPOMETRICS  Current Height: 5' 9\" (175.3 cm)  Current Weight: 215 lb (97.5 kg)    Admission weight: 215 lb (97.5 kg)  Ideal Body Weight (IBW): 160 lbs  (73 kg)    Usual Bodyweight  (227-229lb per EMR)   Weight Changes: -14lb x 2 mo (6.1%)      BMI: 31.8    Wt Readings from Last 50 Encounters:   05/11/22 215 lb (97.5 kg)   04/13/22 198 lb 6.6 oz (90 kg) estimated   03/05/22 215 lb 13.3 oz (97.9 kg)   03/01/22 229 lb 0.9 oz (103.9 kg)       COMPARATIVE STANDARDS  Energy (kcal):  2711-3705     Protein (g):  108-130       Fluid (ml/day):  1500-1800ml    The patient will still be monitored per nutrition standards of care. Consult dietitian if nutrition interventions essential to patient care is needed.      Ashish Blanco [address] [place] [address]  Kristofer:  [phone]  Office:  [phone]

## 2022-05-14 NOTE — PROGRESS NOTES
Dr Mary Madrigal notified of pt cough and inability to swallow own secretions requiring frequent suctioning and inability to follow commands to swallow pills. Speech consult ordered.

## 2022-05-14 NOTE — PROGRESS NOTES
Clinical Pharmacy Progress Note    Vancomycin - Management by Pharmacy    Consult Date(s): 5/13/22  Consulting Provider(s): Dr. Maranda Menendez / Plan    1)   MRSA UTI- Vancomycin   Concurrent Antimicrobials:   o Meropenem - Day #3   Day of Vanc Therapy: #2   Current Dosing Method: Bayesian-Guided AUC Dosing   Therapeutic Goal: < 500 mg/L*hr   Current Dose / Frequency:  1250mg IV q24h   Plan / Rationale:   o Admitted with KIMBERLY on CKD. Baseline SCr ~1.2. SCr stable at 1.6.  o Received vancomycin 1750 mg IV x1 as loading dose yesterday.  o Will continue 1250mg IV q24h. - Based on Bayesian kinetics, estimated AUC is ~479 mg/L*h with predicted steady-state trough of 15 mg/L.  o Vancomycin level ordered for tomorrow AM, Sun 5/15 to confirm kinetic estimates.  Will continue to monitor clinical condition and make adjustments to regimen as appropriate. Please call with questions--  Reyna Medina PharmD, BCPS  Wireless: [phone]   5/14/2022 9:06 AM          Interval update: Started on IV levothyroxine yesterday due to concern for myxedema coma. WBC up to 18. 3. Afebrile. Subjective/Objective: Mr. Dyan Brown is a 81 y.o. male with a PMHx significant for dementia, bipolar 1 disorder, hx of MDRO UTIs, CKD, dementia, depression, DM2, gout, HTN, schizophrenia admitted for acute encephalopathy, UTI and elevated lipase. Pharmacy has been consulted to dose vancomycin. Height:   Ht Readings from Last 1 Encounters:   05/11/22 5' 9\" (1.753 m)     Weight:   Wt Readings from Last 1 Encounters:   05/11/22 215 lb (97.5 kg)       Current & Prior Antimicrobial Regimen(s):   Meropenem (5/12-current)   Vancomycin - pharmacy to dose  o  5/13  o 1250mg IV q24h (5/14-current)    Level(s) / Doses:    Date Time Dose Level / Type of Level Interpretation                 Note: Serum levels collected for AUC-based dosing may be high if collected in close proximity to the dose administered.  This is not necessarily indicative of toxicity. Cultures & Sensitivities:    Date Site Micro Susceptibility / Result   5/11 Blood  NGTD x 2    5/11 Urine  >100k MRSA Sensitive - linezolid, NTF, Bactrim, Vanc (BAKARI 2)  Resistant - tetracycline       Labs / Ancillary Data:    Estimated Creatinine Clearance: 49 mL/min (A) (based on SCr of 1.6 mg/dL (H)). Recent Labs     05/12/22  0736 05/12/22  0736 05/13/22  0824 05/13/22  0824 05/13/22  1035 05/13/22 2050 05/14/22  0443   CREATININE 1.4*   < > 1.6*   < > 1.6* 1.6* 1.6*   BUN 31*   < > 28*   < > 29* 28* 30*   WBC 13.6*  --  14.1*  --   --   --  18.3*    < > = values in this interval not displayed. Additional Lab Values / Findings of Note:    Procalcitonin: No results for input(s): PROCAL in the last 72 hours. [id number]

## 2022-05-15 LAB
A/G RATIO: 0.6 (ref 1.1–2.2)
ALBUMIN SERPL-MCNC: 2.6 G/DL (ref 3.4–5)
ALP BLD-CCNC: 164 U/L (ref 40–129)
ALT SERPL-CCNC: 22 U/L (ref 10–40)
ANION GAP SERPL CALCULATED.3IONS-SCNC: 11 MMOL/L (ref 3–16)
AST SERPL-CCNC: 38 U/L (ref 15–37)
BILIRUB SERPL-MCNC: 0.3 MG/DL (ref 0–1)
BUN BLDV-MCNC: 32 MG/DL (ref 7–20)
CALCIUM SERPL-MCNC: 9.4 MG/DL (ref 8.3–10.6)
CHLORIDE BLD-SCNC: 111 MMOL/L (ref 99–110)
CO2: 21 MMOL/L (ref 21–32)
CREAT SERPL-MCNC: 1.5 MG/DL (ref 0.8–1.3)
GFR AFRICAN AMERICAN: 56
GFR NON-AFRICAN AMERICAN: 46
GLUCOSE BLD-MCNC: 260 MG/DL (ref 70–99)
GLUCOSE BLD-MCNC: 265 MG/DL (ref 70–99)
GLUCOSE BLD-MCNC: 269 MG/DL (ref 70–99)
GLUCOSE BLD-MCNC: 276 MG/DL (ref 70–99)
GLUCOSE BLD-MCNC: 277 MG/DL (ref 70–99)
GLUCOSE BLD-MCNC: 279 MG/DL (ref 70–99)
GLUCOSE BLD-MCNC: 281 MG/DL (ref 70–99)
HCT VFR BLD CALC: 31.2 % (ref 40.5–52.5)
HEMOGLOBIN: 9.8 G/DL (ref 13.5–17.5)
MAGNESIUM: 2 MG/DL (ref 1.8–2.4)
MCH RBC QN AUTO: 25.6 PG (ref 26–34)
MCHC RBC AUTO-ENTMCNC: 31.4 G/DL (ref 31–36)
MCV RBC AUTO: 81.4 FL (ref 80–100)
ORGANISM: ABNORMAL
ORGANISM: ABNORMAL
PDW BLD-RTO: 19.8 % (ref 12.4–15.4)
PERFORMED ON: ABNORMAL
PHOSPHORUS: 2 MG/DL (ref 2.5–4.9)
PLATELET # BLD: 213 K/UL (ref 135–450)
PMV BLD AUTO: 8.8 FL (ref 5–10.5)
POTASSIUM SERPL-SCNC: 4.4 MMOL/L (ref 3.5–5.1)
RBC # BLD: 3.83 M/UL (ref 4.2–5.9)
SODIUM BLD-SCNC: 143 MMOL/L (ref 136–145)
T4 FREE: 0.8 NG/DL (ref 0.9–1.8)
TOTAL PROTEIN: 6.8 G/DL (ref 6.4–8.2)
TRIGL SERPL-MCNC: 58 MG/DL (ref 0–150)
URINE CULTURE, ROUTINE: ABNORMAL
URINE CULTURE, ROUTINE: ABNORMAL
VANCOMYCIN RANDOM: 17.3 UG/ML
WBC # BLD: 16.4 K/UL (ref 4–11)

## 2022-05-15 PROCEDURE — 2060000000 HC ICU INTERMEDIATE R&B

## 2022-05-15 PROCEDURE — 92526 ORAL FUNCTION THERAPY: CPT

## 2022-05-15 PROCEDURE — 80053 COMPREHEN METABOLIC PANEL: CPT

## 2022-05-15 PROCEDURE — 2500000003 HC RX 250 WO HCPCS: Performed by: INTERNAL MEDICINE

## 2022-05-15 PROCEDURE — 2700000000 HC OXYGEN THERAPY PER DAY

## 2022-05-15 PROCEDURE — 85027 COMPLETE CBC AUTOMATED: CPT

## 2022-05-15 PROCEDURE — 36415 COLL VENOUS BLD VENIPUNCTURE: CPT

## 2022-05-15 PROCEDURE — 84439 ASSAY OF FREE THYROXINE: CPT

## 2022-05-15 PROCEDURE — 84478 ASSAY OF TRIGLYCERIDES: CPT

## 2022-05-15 PROCEDURE — 2580000003 HC RX 258: Performed by: INTERNAL MEDICINE

## 2022-05-15 PROCEDURE — 84100 ASSAY OF PHOSPHORUS: CPT

## 2022-05-15 PROCEDURE — 6370000000 HC RX 637 (ALT 250 FOR IP): Performed by: INTERNAL MEDICINE

## 2022-05-15 PROCEDURE — 6360000002 HC RX W HCPCS: Performed by: INTERNAL MEDICINE

## 2022-05-15 PROCEDURE — 6360000002 HC RX W HCPCS: Performed by: STUDENT IN AN ORGANIZED HEALTH CARE EDUCATION/TRAINING PROGRAM

## 2022-05-15 PROCEDURE — 83735 ASSAY OF MAGNESIUM: CPT

## 2022-05-15 PROCEDURE — 80202 ASSAY OF VANCOMYCIN: CPT

## 2022-05-15 PROCEDURE — A4216 STERILE WATER/SALINE, 10 ML: HCPCS | Performed by: INTERNAL MEDICINE

## 2022-05-15 PROCEDURE — 94761 N-INVAS EAR/PLS OXIMETRY MLT: CPT

## 2022-05-15 PROCEDURE — 2580000003 HC RX 258: Performed by: STUDENT IN AN ORGANIZED HEALTH CARE EDUCATION/TRAINING PROGRAM

## 2022-05-15 RX ORDER — ASPIRIN 81 MG/1
81 TABLET, CHEWABLE ORAL DAILY
Status: DISCONTINUED | OUTPATIENT
Start: 2022-05-15 | End: 2022-05-18 | Stop reason: HOSPADM

## 2022-05-15 RX ORDER — LINEZOLID 2 MG/ML
600 INJECTION, SOLUTION INTRAVENOUS EVERY 12 HOURS
Status: DISCONTINUED | OUTPATIENT
Start: 2022-05-15 | End: 2022-05-16 | Stop reason: ALTCHOICE

## 2022-05-15 RX ORDER — AMLODIPINE BESYLATE 5 MG/1
5 TABLET ORAL DAILY
Status: DISCONTINUED | OUTPATIENT
Start: 2022-05-15 | End: 2022-05-16

## 2022-05-15 RX ADMIN — DEXTROSE AND SODIUM CHLORIDE: 5; 900 INJECTION, SOLUTION INTRAVENOUS at 02:00

## 2022-05-15 RX ADMIN — MEROPENEM 1000 MG: 1 INJECTION, POWDER, FOR SOLUTION INTRAVENOUS at 10:15

## 2022-05-15 RX ADMIN — INSULIN LISPRO 3 UNITS: 100 INJECTION, SOLUTION INTRAVENOUS; SUBCUTANEOUS at 17:20

## 2022-05-15 RX ADMIN — NYSTATIN AND TRIAMCINOLONE ACETONIDE: 100000; 1 CREAM TOPICAL at 21:06

## 2022-05-15 RX ADMIN — INSULIN LISPRO 3 UNITS: 100 INJECTION, SOLUTION INTRAVENOUS; SUBCUTANEOUS at 10:03

## 2022-05-15 RX ADMIN — LINEZOLID 600 MG: 600 INJECTION, SOLUTION INTRAVENOUS at 10:19

## 2022-05-15 RX ADMIN — AMLODIPINE BESYLATE 5 MG: 5 TABLET ORAL at 14:40

## 2022-05-15 RX ADMIN — ARIPIPRAZOLE 15 MG: 5 TABLET ORAL at 10:37

## 2022-05-15 RX ADMIN — SODIUM CHLORIDE, PRESERVATIVE FREE 5 ML: 5 INJECTION INTRAVENOUS at 10:40

## 2022-05-15 RX ADMIN — ASPIRIN 81 MG 81 MG: 81 TABLET ORAL at 11:58

## 2022-05-15 RX ADMIN — SODIUM CHLORIDE 25 ML: 9 INJECTION, SOLUTION INTRAVENOUS at 21:45

## 2022-05-15 RX ADMIN — MEROPENEM 1000 MG: 1 INJECTION, POWDER, FOR SOLUTION INTRAVENOUS at 17:18

## 2022-05-15 RX ADMIN — SODIUM CHLORIDE, PRESERVATIVE FREE 10 ML: 5 INJECTION INTRAVENOUS at 10:40

## 2022-05-15 RX ADMIN — THIAMINE HYDROCHLORIDE 200 MG: 100 INJECTION, SOLUTION INTRAMUSCULAR; INTRAVENOUS at 20:43

## 2022-05-15 RX ADMIN — THIAMINE HYDROCHLORIDE 200 MG: 100 INJECTION, SOLUTION INTRAMUSCULAR; INTRAVENOUS at 10:12

## 2022-05-15 RX ADMIN — DIVALPROEX SODIUM 250 MG: 125 CAPSULE ORAL at 20:50

## 2022-05-15 RX ADMIN — SODIUM CHLORIDE 25 ML: 9 INJECTION, SOLUTION INTRAVENOUS at 17:17

## 2022-05-15 RX ADMIN — LATANOPROST 1 DROP: 50 SOLUTION OPHTHALMIC at 21:05

## 2022-05-15 RX ADMIN — INSULIN LISPRO 3 UNITS: 100 INJECTION, SOLUTION INTRAVENOUS; SUBCUTANEOUS at 21:01

## 2022-05-15 RX ADMIN — LEVOTHYROXINE SODIUM ANHYDROUS 50 MCG: 100 INJECTION, POWDER, LYOPHILIZED, FOR SOLUTION INTRAVENOUS at 10:39

## 2022-05-15 RX ADMIN — INSULIN LISPRO 3 UNITS: 100 INJECTION, SOLUTION INTRAVENOUS; SUBCUTANEOUS at 14:26

## 2022-05-15 RX ADMIN — SODIUM CHLORIDE 25 ML: 9 INJECTION, SOLUTION INTRAVENOUS at 10:14

## 2022-05-15 RX ADMIN — SODIUM CHLORIDE 25 ML: 9 INJECTION, SOLUTION INTRAVENOUS at 10:11

## 2022-05-15 RX ADMIN — ATORVASTATIN CALCIUM 40 MG: 40 TABLET, FILM COATED ORAL at 20:49

## 2022-05-15 RX ADMIN — NYSTATIN AND TRIAMCINOLONE ACETONIDE: 100000; 1 CREAM TOPICAL at 10:22

## 2022-05-15 RX ADMIN — HYDROCORTISONE SODIUM SUCCINATE 100 MG: 100 INJECTION, POWDER, FOR SOLUTION INTRAMUSCULAR; INTRAVENOUS at 01:54

## 2022-05-15 RX ADMIN — INSULIN LISPRO 3 UNITS: 100 INJECTION, SOLUTION INTRAVENOUS; SUBCUTANEOUS at 05:40

## 2022-05-15 RX ADMIN — SODIUM CHLORIDE 25 ML: 9 INJECTION, SOLUTION INTRAVENOUS at 20:40

## 2022-05-15 RX ADMIN — DIVALPROEX SODIUM 125 MG: 125 CAPSULE ORAL at 10:37

## 2022-05-15 RX ADMIN — LINEZOLID 600 MG: 600 INJECTION, SOLUTION INTRAVENOUS at 21:49

## 2022-05-15 RX ADMIN — ENOXAPARIN SODIUM 40 MG: 100 INJECTION SUBCUTANEOUS at 10:38

## 2022-05-15 RX ADMIN — INSULIN LISPRO 3 UNITS: 100 INJECTION, SOLUTION INTRAVENOUS; SUBCUTANEOUS at 01:49

## 2022-05-15 ASSESSMENT — PAIN SCALES - PAIN ASSESSMENT IN ADVANCED DEMENTIA (PAINAD)
CONSOLABILITY: 0
TOTALSCORE: 1
BREATHING: 1
CONSOLABILITY: 0
NEGVOCALIZATION: 1
BREATHING: 1
TOTALSCORE: 1
BODYLANGUAGE: 0
NEGVOCALIZATION: 0
NEGVOCALIZATION: 0
BODYLANGUAGE: 0
TOTALSCORE: 2
FACIALEXPRESSION: 0
NEGVOCALIZATION: 0
TOTALSCORE: 1
FACIALEXPRESSION: 0
CONSOLABILITY: 0
BREATHING: 1
BREATHING: 0
BREATHING: 1
FACIALEXPRESSION: 0
CONSOLABILITY: 0
CONSOLABILITY: 0
BODYLANGUAGE: 0
NEGVOCALIZATION: 0
BODYLANGUAGE: 0
FACIALEXPRESSION: 0
BREATHING: 1
FACIALEXPRESSION: 0
TOTALSCORE: 1
FACIALEXPRESSION: 0
BODYLANGUAGE: 0
TOTALSCORE: 0
CONSOLABILITY: 0
NEGVOCALIZATION: 0
BODYLANGUAGE: 0

## 2022-05-15 ASSESSMENT — PAIN SCALES - GENERAL: PAINLEVEL_OUTOF10: 0

## 2022-05-15 NOTE — PROGRESS NOTES
Speech Language Pathology  Facility/Department: [place] [address] [facility]  Dysphagia Daily Treatment Note    NAME: Gia Hughes  : 1950  MRN: [id number]    Patient Diagnosis(es):   Patient Active Problem List    Diagnosis Date Noted    AMS (altered mental status) 2022    Cellulitis 2022    CKD (chronic kidney disease) 2022    Sepsis ([facility] 2022    Numbness of right hand 10/07/2021    Schizophrenia ([facility]     Dementia ([facility]     Essential hypertension     Right hand weakness 10/06/2021    Other fatigue      Allergies: Allergies   Allergen Reactions    Codeine     Cogentin [Benztropine]     Penicillins      Recent Chest Xray 22      No acute pulmonary disease.        Previous dysphagia history:  22- 3/1/22 - SBS/Thin recommended   10/7/21: ETC/thin recommended    Chart reviewed. Medical Diagnosis: First degree AV block [I44.0]  Altered mental status, unspecified altered mental status type [R41.82]  Acute pancreatitis, unspecified complication status, unspecified pancreatitis type [K85.90]  AMS (altered mental status) [R41.82]   Treatment Diagnosis: oropharyngeal dysphagia    BSE Impression 22   Rn states okay to assess pt. Pt lethargic, not able to maintain alertness throughout session. Pt did not follow commands or respond to questions asked. Presented small amounts of ice chips and tsp of water. Pt removed from spoon and demonstrated adequate labial seal.  No overt signs of aspiration emerged, however pt did not maintain alertness with several trials,  therefore did not attempt additional trials due to concern for pt safety. Recommend cont NPO, aggressive oral care, small amounts of ice chips if alert enough.  Plan to reassess next session as pt appropriate    MBS results - pt not appropriate at this time due to inconsistent participation    Pain: none indicated by any means    Current Diet : Diet NPO  ADULT TUBE FEEDING; Nasogastric; Standard with Fiber; Continuous;  NPO recommended with ice chip (one small one at a time with nursing only when pt alert and with oral care with suction kit prior)  Recommended Form of Meds: Via alternative means of nutrition      Treatment:  Pt seen bedside to address the following goals:  1-The patient will tolerate repeat bedside swallowing evaluation when able. 5/13: RN notified SLP that pt more alert and MD asks for pt to be re-assessed. Pt much more alert, did not follow commands, verbalizing but not intelligible. Pt analyzed with ice chips, water and pepsi via cup and straw and puree. Pt exhibited cough after thin liquid trial when SLP walked out of room to obtain additional PO. No other overt signs of aspiration, voice remained clear. Pt held puree in oral cavity and required use of suction to remove. Pt required max encouragement to take PO, stating \"no,\"  In between trials. Recommend trial clear liquids with close monitoring for s/s of aspiration. Recommend medications be given through alternate means. Plan to re-assess next session to determine ability to advance diet vs need (and ability to participate) in instrumental exam  Goal met  5/14: Received repeat order for bedside swallow evaluation. Per chart review/nursing, pt with increased difficulty swallowing and managing secretions over night, with pt requiring frequent suctioning. Received pt awake, and alert, but not following directions. Anterior loss of secretions. Positioned patient up in bed. Assessed tolerance ice chips, thins via tsp/straw/cup. Pt with positive oral acceptance, reduced labial seal (inadequate for straw use), oral clearance, however unable to perceive laryngeal swallow movement. Delayed cough. Recommend continue NPO, consider alternative means nutrition/hydration. Cont goal  5/15: Pt with one verbal response (\"oh\"); otherwise no attempts at communication. Eyes open with some lethargy.   Oral car with suction toothbrush given to pt with mild dryness which improved following oral care (no significant oral coating on tongue, no bleeding of gums). Pt with labial closure (slow with cues from clinician) for  4/4 trials of ice chips. Pt kept mouth open with no oral manipulation with first 2 trials with removal of ice chip needed. Last two trials, pt closed mouth with some oral movement (needed dry spoon presentation to facilitate continued oral movement) with eventual swallow (around 30 seconds for each) with one with delayed cough. Some audible congestion prior to and after (did not significantly increase after ice chip trials). Recommend NPO with continued NG tube with  ice chip (one small one at a time with nursing only when pt alert and with oral care with suction kit prior). Cont goal    2- Pt will consume PO safely without signs or symptoms of aspiration  5/14: see goal 1 above; d/c goal 2    Patient/Family/Caregiver Education:  Pt educated to purpose of visit and rationale for taking PO trials, what aspiration is and plan for ice chips when appropriate by nursing only. Pt did not indicate anycomprehension    Compensatory Strategies:  Frequent oral care with suction kit     Plan:  Continue dysphagia treatment with goals per plan of care. Diet recommendations:     - ; NPO recommended with ice chip (one small one at a time with nursing only when pt alert and with oral care with suction kit prior)  - frequent oral hygiene w/ suction kit   - cont alternative means nutrition/hydration    DC recommendation: ongoing treatment indicated during this hospitalization  Treatment: 13  D/W nursing [facility]  Needs met prior to leaving room, call button in reach.     Olga Maki MA CCC/SLP [id number]  Speech Language Pathologist  Pager [phone]  If patient is discharged prior to next treatment, this note will serve as the discharge summary

## 2022-05-15 NOTE — PROGRESS NOTES
Pt's sister Enoc Whitings updated by phone on patient status and plan of care.  Electronically signed by Clara Kauffman RN on 5/15/2022 at 7:20 PM

## 2022-05-15 NOTE — PROGRESS NOTES
Clinical Pharmacy Progress Note    Vancomycin - Management by Pharmacy    Consult Date(s): 5/13/22  Consulting Provider(s): Dr. Vladimir Cain / Plan    1)   UTI, Aspiration PNA - Vancomycin   Concurrent Antimicrobials:   o Meropenem - Day #4   Day of Vanc Therapy: #3   Current Dosing Method: Bayesian-Guided AUC Dosing   Therapeutic Goal: 400-600 mg/L*hr   Current Dose / Frequency:  1250mg IV q24h   Plan / Rationale:   o Admitted with KIMBERLY on CKD. Baseline SCr ~1.2. SCr stable at 1.5.  o Currently on 1250mg IV q24h. o Vancomycin level today = 17.3 mg/L - drawn ~15.5h after previous dose.  o Based on Bayesian kinetics, AUC is 509 mg/L*h with steady-state trough of 15.6 mg/L. Will continue current dose.  Will continue to monitor clinical condition and make adjustments to regimen as appropriate       Urine culture growing >100k MRSA and 25k VRE -- consider changing vancomycin to Linezolid 600mg IV q12h. Please call with questions--  Colby Padilla, PharmD, [facility]  Wireless: [phone]   5/15/2022 7:58 AM          Interval update: Urine Cx growing MRSA and VRE. WBC remains elevated (16.4). Tmax 99.1F over past 24h. Subjective/Objective: Mr. Vamsi Marshall is a 81 y.o. male with a PMHx significant for dementia, bipolar 1 disorder, hx of MDRO UTIs, CKD, dementia, depression, DM2, gout, HTN, schizophrenia admitted for acute encephalopathy, UTI and elevated lipase. Pharmacy has been consulted to dose vancomycin.     Height:   Ht Readings from Last 1 Encounters:   05/11/22 5' 9\" (1.753 m)     Weight:   Wt Readings from Last 1 Encounters:   05/11/22 215 lb (97.5 kg)       Current & Prior Antimicrobial Regimen(s):   Meropenem (5/12-current)   Vancomycin - pharmacy to dose  o  5/13  o 1250mg IV q24h (5/14-current)    Level(s) / Doses:    Date Time Dose Level / Type of Level Interpretation   5/15 0428 1250mg IV q24h 17.3 mg/L - Random · Level drawn ~15.5h after previous dose  · Calculated AUC ~509 mg/L*h with trough of 15.6 mg/L  · Continue same dose          Note: Serum levels collected for AUC-based dosing may be high if collected in close proximity to the dose administered. This is not necessarily indicative of toxicity. Cultures & Sensitivities:    Date Site Micro Susceptibility / Result   5/11 Blood  NGTD x 2    5/11 Urine  >100k MRSA        25k VRE Sensitive - linezolid, NTF, Bactrim, Vanc (BAKARI 2)  Resistant - tetracycline       Labs / Ancillary Data:    Estimated Creatinine Clearance: 53 mL/min (A) (based on SCr of 1.5 mg/dL (H)). Recent Labs     05/13/22  0824 05/13/22  1035 05/13/22  2050 05/14/22  0443 05/15/22  0428   CREATININE 1.6*   < > 1.6* 1.6* 1.5*   BUN 28*   < > 28* 30* 32*   WBC 14.1*  --   --  18.3* 16.4*    < > = values in this interval not displayed. Additional Lab Values / Findings of Note:    Procalcitonin: No results for input(s): PROCAL in the last 72 hours.

## 2022-05-15 NOTE — PLAN OF CARE
Problem: Discharge Planning  Goal: Discharge to home or other facility with appropriate resources  5/15/2022 1451 by Macie Blas RN  Outcome: Progressing     Problem: Chronic Conditions and Co-morbidities  Goal: Patient's chronic conditions and co-morbidity symptoms are monitored and maintained or improved  5/15/2022 1451 by Macie Blas RN  Outcome: Progressing  Flowsheets (Taken 5/15/2022 1451)  Care Plan - Patient's Chronic Conditions and Co-Morbidity Symptoms are Monitored and Maintained or Improved:   Monitor and assess patient's chronic conditions and comorbid symptoms for stability, deterioration, or improvement   Collaborate with multidisciplinary team to address chronic and comorbid conditions and prevent exacerbation or deterioration   Update acute care plan with appropriate goals if chronic or comorbid symptoms are exacerbated and prevent overall improvement and discharge     Problem: Skin/Tissue Integrity  Goal: Absence of new skin breakdown  Description: 1. Monitor for areas of redness and/or skin breakdown  2. Assess vascular access sites hourly  3. Every 4-6 hours minimum:  Change oxygen saturation probe site  4. Every 4-6 hours:  If on nasal continuous positive airway pressure, respiratory therapy assess nares and determine need for appliance change or resting period. 5/15/2022 1451 by Macie Blas RN  Note: Pt on specialty low airloss mattress. Q2H turning by nursing staff. Podiatry following for management of left heel wound. Foam mepilex applied per podiatry orders. Wound care nurse managing leg wound (see flowsheets). No new skin breakdown noted this shift. Electronically signed by Macie Blas RN on 5/15/2022 at 2:53 PM       Problem: Nutrition Deficit:  Goal: Optimize nutritional status  5/15/2022 1451 by Macie Blas RN  Outcome: Progressing  Note: Pt with continuous tube feed via corepak.  Rate advanced by 10 ml from

## 2022-05-15 NOTE — PROGRESS NOTES
[facility] and call went to voicemail. Message was left regarding status and plan of care.

## 2022-05-15 NOTE — PROGRESS NOTES
Nephrology Consult Note          [facility]      [email or web address]         Phone: [phone]      5/15/2022 9:40 AM     Patient: Rosamaria Kehr [id number]  [id number]  Date of Admit: 5/11/2022 LOS: 4 days      Plan:    Stable creatinine 1.5 ( near baseline )  K is better 4.4  BP is high and urine is 1345 ml / day     Decrease  IV hydrocortisone   DC IVF  On antibiotics for UTI  Avoid nephrotoxins  Monitor I/O and vitals closely  Daily standing weight if possible      Renally dose all medications  Follow closely        Thank you for allowing us to participate in this patient's care    In case of any question please call us at our 24 hour answering service [phone] or from 7 AM to 5 PM via Perfect Serve or cell number    Lisseth Wagner MD  [facility]  [address]  Fax: [phone]  Office: [phone]     Assessment & Plan     Hypothyroidism with hypothermia/hypoglycemia/encephalopathy  On dextrose in normal saline  On Stress dose steroids and IV synthroid    UTI: On Abx     Chronic kidney disease 3  Baseline Cr:1.4- 1.6  UA: 5/11 > 100 WBC,  RBC  Proteinuria: 30 mg/dl on UA. Cr around baseline but due to hypotension and sepsis high risk of Acute kidney injury   CT A/P: Benign renal cysts left kidney and punctate nonobstructive calculus lower pole        Electrolytes:  Mild hyperkalemia in patient with underlying CKD while getting Enoxaparin and he might also be developing acute kidney injury which may appear on later labs. Rule out rhabdomyolysis.        Lab Results   Component Value Date    CREATININE 1.5 (H) 05/15/2022    BUN 32 (H) 05/15/2022     05/15/2022    K 4.4 05/15/2022     (H) 05/15/2022    CO2 21 05/15/2022      Lab Results   Component Value Date    PTH 41.8 02/27/2022    CALCIUM 9.4 05/15/2022    PHOS 2.0 (L) 05/15/2022         Acid/Base status:  Stable      Volume status/BP:  Hypotension    Chronic appearing leg edema. On 2 liter oxygen. No RD  CXR on 5/11 no acute process. Hematology:   Lab Results   Component Value Date    IRON 36 (L) 02/28/2022    TIBC 187 (L) 02/28/2022    FERRITIN 483.6 (H) 02/28/2022     Lab Results   Component Value Date    WBC 16.4 (H) 05/15/2022    HGB 9.8 (L) 05/15/2022    HCT 31.2 (L) 05/15/2022    MCV 81.4 05/15/2022     05/15/2022           Reason for Consult     Reason for consult: CKD with hyperkalemia. Chief complaint:   Chief Complaint   Patient presents with    Altered Mental Status     per NH pt slower to respond, pt asks why he is at the hospital he feels fine       History of Present Illness   Stacia Arroyo is a 81 y.o. with PMH significant for MDR UTI, CVA with dementia, bipolar disorder, schizophrenia, DM, HTN, CKD was admitted from NH with AMS. UA concerning for UTI for which is getting treatment. Patient was also hypoglycemic with low BP and patient is on IV dextrose with normal saline, IV stress dose steroids and IV synthroid. Labs showed hyperkalemia and nephrology was consulted to assist in management. Cr around baseline. Patient is very lethargic and unable to answer my questions although he is awake. Review of Systems     Positive in bold or unable to assess     GEN: Fever, chills, night sweats. HEENT: Changes in vision, sore throat, rhinorrhea   CVS: Chest pain, palpitations, swelling or edema in legs  Pulmonary: Cough, hemoptysis, SOB  GI: Nausea, vomiting, diarrhea, constipation, abdominal pain  : Bladder incontinence, dysuria, hematuria. MSK: Muscle or joint or bone pains  Skin: Rashes, ulcers, skin thickness  CNS: Headache, dizziness, confusion, focal weakness, seizure. Psych: Anxiety, agitation, depression. Reviewed all 12 systems, negative except as above.      Past Medical History     Past Medical History:   Diagnosis Date    Ataxia     Bipolar 1 disorder ([facility] [address]     BPH (benign prostatic hyperplasia)     Cerebral infarct ([facility] [facility]     Chronic kidney disease     [facility]     Depression     Diabetes mellitus type 2, uncomplicated [facility]     Gout     Hyperlipidemia     Hypertension     Schizophrenia ([facility]          Past Surgical History     History reviewed. No pertinent surgical history. Family History     History reviewed. No pertinent family history. Social History     Social History     Tobacco Use    Smoking status: Never Smoker    Smokeless tobacco: Never Used   Substance Use Topics    Alcohol use: Not Currently          Past medical, family, and social histories were reviewed as previously documented. Updates were made as necessary. Inpatient Medications and Allergies       Scheduled Meds:   meropenem  1,000 mg IntraVENous Q8H    linezolid  600 mg IntraVENous Q12H    thiamine (VITAMIN B1) IVPB  200 mg IntraVENous TID    levothyroxine  50 mcg IntraVENous Daily    And    sodium chloride (PF)  5 mL IntraVENous Daily    hydrocortisone sodium succinate PF  100 mg IntraVENous Q8H    ARIPiprazole  15 mg Oral Daily    aspirin  81 mg Oral Daily    atorvastatin  40 mg Oral Nightly    divalproex  250 mg Oral Nightly    divalproex  125 mg Oral QAM    latanoprost  1 drop Right Eye Nightly    sodium chloride flush  5-40 mL IntraVENous 2 times per day    enoxaparin  40 mg SubCUTAneous Daily    insulin lispro  0-6 Units SubCUTAneous Q4H    nystatin-triamcinolone   Topical BID         Allergies: Allergies   Allergen Reactions    Codeine     Cogentin [Benztropine]     Penicillins          Vital Signs and Input Output     Vitals:    05/15/22 0808   BP: (!) 168/90   Pulse: 74   Resp: 28   Temp: 98.2 °F (36.8 °C)   SpO2: 94%           Intake/Output Summary (Last 24 hours) at 5/15/2022 0988  Last data filed at 5/15/2022 0657  Gross per 24 hour   Intake 370 ml   Output 1345 ml   Net -975 ml           Physical Exam     General appearance: NAD, look sick.    Head: Normocephalic, without obvious abnormality, atraumatic Mouth: Dry mucous membrane  Neck: Supple. Lungs: Good air entry bilaterally and no respiratory distress on 2 liter oxygen. Auscultated from front. Heart: S1, S2.   Abdomen: soft, non-tender non-distended  Extremities: Chronic appearing leg edema. Skin: No concerning rash noted on limited exam  Psych: Calm  Neuro: Awake but confused. Patient also has dementia.        Laboratory Data   See above    Diagnostic Studies   Pertinent images reviewed

## 2022-05-15 NOTE — PROGRESS NOTES
Call received from microbiology informing this RN that urine cultures drawn 5/11 growing vanc resistant enterococcus. Contact precautions already in place. Dr. Rome Fraser made aware of these results via perfect serve. No new orders at this time.  Electronically signed by Darinel Carpenter RN on 5/15/2022 at 7:43 AM

## 2022-05-15 NOTE — PROGRESS NOTES
(!) 166/88   Pulse 74   Temp 98.2 °F (36.8 °C) (Axillary)   Resp 28   Ht 5' 9\" (1.753 m)   Wt 215 lb (97.5 kg)   SpO2 94%   BMI 31.75 kg/m²     General appearance: chronically ill appearing elderly, frail  HEENT: Pupils equal, round, and reactive to light. Conjunctivae/corneas clear. Neck: Supple, with full range of motion. No jugular venous distention. Trachea midline. Respiratory:  Normal respiratory effort. Clear to auscultation, bilaterally without Rales/Wheezes/Rhonchi. Cardiovascular: Regular rate and rhythm with normal S1/S2 without murmurs, rubs or gallops. Abdomen: Soft, non-tender, non-distended with normal bowel sounds. Glans and foreskin redness. Musculoskeletal: Lower extremity chronic venous stasis changes bilateral soft restraint  Skin: Skin color, texture, turgor normal.    Neurologic: Alert and oriented 0. Not following any commands  Psychiatric: Some limited due to clinical condition  Capillary Refill: Brisk,< 3 seconds   Peripheral Pulses: +2 palpable, equal bilaterally       Labs:   Recent Labs     05/13/22  0824 05/14/22  0443 05/15/22  0428   WBC 14.1* 18.3* 16.4*   HGB 10.1* 10.4* 9.8*   HCT 31.7* 33.8* 31.2*    236 213     Recent Labs     05/13/22  2050 05/14/22  0443 05/15/22  0428    144 143   K 5.4* 5.2* 4.4    113* 111*   CO2 21 21 21   BUN 28* 30* 32*   CREATININE 1.6* 1.6* 1.5*   CALCIUM 9.3 9.5 9.4   PHOS 2.4* 3.0 2.0*     Recent Labs     05/13/22  0824 05/15/22  0428   AST 29 38*   ALT 18 22   BILITOT <0.2 0.3   ALKPHOS 35* 164*     No results for input(s): INR in the last 72 hours. Recent Labs     05/13/22 2050   CKTOTAL 87       Urinalysis:      Lab Results   Component Value Date    NITRU POSITIVE 05/11/2022    WBCUA >100 05/11/2022    BACTERIA 2+ 05/11/2022    RBCUA  05/11/2022    BLOODU MODERATE 05/11/2022    SPECGRAV 1.025 05/11/2022    GLUCOSEU Negative 05/11/2022       Radiology:  CT HEAD WO CONTRAST   Final Result      1.   No acute intracranial process. 2.  Mild cerebral atrophy and mild chronic small vessel ischemic disease. XR ABDOMEN (KUB) (SINGLE AP VIEW)   Final Result      As above. CT ABDOMEN PELVIS WO CONTRAST Additional Contrast? None   Final Result      1. Bibasilar consolidations and small pleural effusions noted. 2. Small amount of stranding in the paracolic gutters and small amount of ascites,   3. Benign renal cysts left kidney and punctate nonobstructive calculus lower pole   4. Bladder wall thickening      XR FOOT LEFT (MIN 3 VIEWS)   Final Result      Lucency and irregularity noted involving the posterior surface of the calcaneus which is questionable for osteomyelitis. XR CHEST PORTABLE   Final Result      No acute pulmonary disease. Assessment/Plan:    Active Hospital Problems    Diagnosis Date Noted    AMS (altered mental status) [R41.82] 05/11/2022     Priority: Medium     #Dysphagia. Patient failed speech eval.  Continue G-tube for feeding. Advance tube feed to achieve goals. #Acute metabolic encephalopathy  Suspected this is patient's baseline. I tried to call patient's sister Shiraz Wu left a voicemail    #UTI with MRSA. Urine cultures positive for MRSA and VRE. Will change vancomycin to Zyvox. ID consulted. Next    #Suspected aspiration pneumonia. Continue meropenem. #Hypothermia/hypoglycemia/hypothyroid  in the past, Hx of elevated TSH 11.91 in 02/2021 but Free T4 was normal, patient is not on synthroid supplementation  Labs with TSH 26.73, Free T4 low @ 0.6, free T3 1.7. Started on IV Synthroid yesterday. Follow-up on free T4 level on 5/13 and 5/14. Cortisol baseline around 11  Cosyntropin test did not adequate response to cortisol level. I have low suspicious for adrenal insufficiency. Thyroid can cause those symptoms.   We will stop hydrocortisone    UTI, symptomatic with change of mentation per NH  -Continue device as stated above  -Catheter in place,

## 2022-05-15 NOTE — CONSULTS
Clinical Pharmacy Progress Note    Vancomycin has been discontinued. Pharmacy will sign off. Please re-consult pharmacy if vancomycin dosing is wanted in the future. Please call with questions.   Gretchen Tobias PharmD, BCPS  Wireless: [phone]   5/15/2022 9:03 AM

## 2022-05-15 NOTE — PROGRESS NOTES
Pharmacy Note - Extended Infusion Beta-Lactam Adjustment    Meropenem ordered for treatment of UTI & aspiration PNA. Per [address] Extended Infusion Beta-Lactam Policy, Meropenem will be changed to 1000 mg IV EI q8h. Estimated Creatinine Clearance: Estimated Creatinine Clearance: 53 mL/min (A) (based on SCr of 1.5 mg/dL (H)). Dialysis Status, KIMBERLY, CKD: KIMBERLY - SCr down to 1.5 today. BMI: Body mass index is 31.75 kg/m². Rationale for Adjustment: Agent is renally eliminated and demonstrates time-dependent effect on bacterial eradication. Extended-infusion dosing strategy aims to enhance microbiologic and clinical efficacy. Adjusting dose as above based on indication and renal function. [facility] will continue to monitor renal function and adjust dose as necessary. Please call with any questions.     Asia TurnerD, BCPS  Wireless: [phone]   5/15/2022 8:17 AM

## 2022-05-15 NOTE — PLAN OF CARE
Pt A/Ox1, no signs of pain or SOB, and VSS. Meds given per eMAR. Pt is on 1L O2 via NC. TF running @ 20 ml/hr. D5 & NS infusing @ 100 ml/hr. Kumar in place. Pt is resting with no other complaints at this time. Problem: Discharge Planning  Goal: Discharge to home or other facility with appropriate resources  Outcome: Progressing  Flowsheets (Taken 5/14/2022 2110)  Discharge to home or other facility with appropriate resources: Identify barriers to discharge with patient and caregiver     Problem: Pain  Goal: Verbalizes/displays adequate comfort level or baseline comfort level  Outcome: Progressing  Flowsheets (Taken 5/14/2022 2010)  Verbalizes/displays adequate comfort level or baseline comfort level: Assess pain using appropriate pain scale     Problem: Chronic Conditions and Co-morbidities  Goal: Patient's chronic conditions and co-morbidity symptoms are monitored and maintained or improved  Outcome: Progressing  Flowsheets (Taken 5/14/2022 2110)  Care Plan - Patient's Chronic Conditions and Co-Morbidity Symptoms are Monitored and Maintained or Improved: Monitor and assess patient's chronic conditions and comorbid symptoms for stability, deterioration, or improvement     Problem: Skin/Tissue Integrity  Goal: Absence of new skin breakdown  Description: 1. Monitor for areas of redness and/or skin breakdown  2. Assess vascular access sites hourly  3. Every 4-6 hours minimum:  Change oxygen saturation probe site  4. Every 4-6 hours:  If on nasal continuous positive airway pressure, respiratory therapy assess nares and determine need for appliance change or resting period.   Outcome: Progressing     Problem: Safety - Adult  Goal: Free from fall injury  Outcome: Progressing  Flowsheets (Taken 5/14/2022 2110)  Free From Fall Injury: Instruct family/caregiver on patient safety     Problem: ABCDS Injury Assessment  Goal: Absence of physical injury  Outcome: Progressing  Flowsheets (Taken 5/14/2022 2110)  Absence of Physical Injury: Implement safety measures based on patient assessment     Problem: Nutrition Deficit:  Goal: Optimize nutritional status  Outcome: Progressing

## 2022-05-16 LAB
ALBUMIN SERPL-MCNC: 2.6 G/DL (ref 3.4–5)
ANION GAP SERPL CALCULATED.3IONS-SCNC: 10 MMOL/L (ref 3–16)
BLOOD CULTURE, ROUTINE: NORMAL
BUN BLDV-MCNC: 34 MG/DL (ref 7–20)
CALCIUM SERPL-MCNC: 9 MG/DL (ref 8.3–10.6)
CHLORIDE BLD-SCNC: 113 MMOL/L (ref 99–110)
CO2: 23 MMOL/L (ref 21–32)
CREAT SERPL-MCNC: 1.3 MG/DL (ref 0.8–1.3)
CULTURE, BLOOD 2: NORMAL
GFR AFRICAN AMERICAN: >60
GFR NON-AFRICAN AMERICAN: 55
GLUCOSE BLD-MCNC: 194 MG/DL (ref 70–99)
GLUCOSE BLD-MCNC: 198 MG/DL (ref 70–99)
GLUCOSE BLD-MCNC: 229 MG/DL (ref 70–99)
GLUCOSE BLD-MCNC: 240 MG/DL (ref 70–99)
GLUCOSE BLD-MCNC: 242 MG/DL (ref 70–99)
GLUCOSE BLD-MCNC: 248 MG/DL (ref 70–99)
GLUCOSE BLD-MCNC: 253 MG/DL (ref 70–99)
MAGNESIUM: 1.8 MG/DL (ref 1.8–2.4)
PERFORMED ON: ABNORMAL
PHOSPHORUS: 1.3 MG/DL (ref 2.5–4.9)
POTASSIUM SERPL-SCNC: 3.8 MMOL/L (ref 3.5–5.1)
SODIUM BLD-SCNC: 146 MMOL/L (ref 136–145)
T4 FREE: 0.8 NG/DL (ref 0.9–1.8)

## 2022-05-16 PROCEDURE — 6360000002 HC RX W HCPCS: Performed by: STUDENT IN AN ORGANIZED HEALTH CARE EDUCATION/TRAINING PROGRAM

## 2022-05-16 PROCEDURE — 36415 COLL VENOUS BLD VENIPUNCTURE: CPT

## 2022-05-16 PROCEDURE — 2060000000 HC ICU INTERMEDIATE R&B

## 2022-05-16 PROCEDURE — 2580000003 HC RX 258: Performed by: STUDENT IN AN ORGANIZED HEALTH CARE EDUCATION/TRAINING PROGRAM

## 2022-05-16 PROCEDURE — 2500000003 HC RX 250 WO HCPCS: Performed by: INTERNAL MEDICINE

## 2022-05-16 PROCEDURE — 2580000003 HC RX 258: Performed by: INTERNAL MEDICINE

## 2022-05-16 PROCEDURE — 83735 ASSAY OF MAGNESIUM: CPT

## 2022-05-16 PROCEDURE — 99222 1ST HOSP IP/OBS MODERATE 55: CPT | Performed by: NURSE PRACTITIONER

## 2022-05-16 PROCEDURE — 6370000000 HC RX 637 (ALT 250 FOR IP): Performed by: INTERNAL MEDICINE

## 2022-05-16 PROCEDURE — 6360000002 HC RX W HCPCS: Performed by: INTERNAL MEDICINE

## 2022-05-16 PROCEDURE — 80069 RENAL FUNCTION PANEL: CPT

## 2022-05-16 PROCEDURE — 99223 1ST HOSP IP/OBS HIGH 75: CPT | Performed by: INTERNAL MEDICINE

## 2022-05-16 PROCEDURE — 2500000003 HC RX 250 WO HCPCS: Performed by: STUDENT IN AN ORGANIZED HEALTH CARE EDUCATION/TRAINING PROGRAM

## 2022-05-16 RX ORDER — AMLODIPINE BESYLATE 10 MG/1
10 TABLET ORAL DAILY
Status: DISCONTINUED | OUTPATIENT
Start: 2022-05-17 | End: 2022-05-18 | Stop reason: HOSPADM

## 2022-05-16 RX ORDER — GAUZE BANDAGE 2" X 2"
100 BANDAGE TOPICAL DAILY
Status: DISCONTINUED | OUTPATIENT
Start: 2022-05-17 | End: 2022-05-17

## 2022-05-16 RX ORDER — HYDRALAZINE HYDROCHLORIDE 20 MG/ML
10 INJECTION INTRAMUSCULAR; INTRAVENOUS EVERY 4 HOURS PRN
Status: DISCONTINUED | OUTPATIENT
Start: 2022-05-16 | End: 2022-05-18 | Stop reason: HOSPADM

## 2022-05-16 RX ORDER — LINEZOLID 600 MG/1
600 TABLET, FILM COATED ORAL EVERY 12 HOURS SCHEDULED
Status: DISCONTINUED | OUTPATIENT
Start: 2022-05-16 | End: 2022-05-16

## 2022-05-16 RX ORDER — LINEZOLID 100 MG/5ML
600 SUSPENSION ORAL EVERY 12 HOURS SCHEDULED
Status: DISCONTINUED | OUTPATIENT
Start: 2022-05-16 | End: 2022-05-17

## 2022-05-16 RX ADMIN — ENOXAPARIN SODIUM 40 MG: 100 INJECTION SUBCUTANEOUS at 09:43

## 2022-05-16 RX ADMIN — LATANOPROST 1 DROP: 50 SOLUTION OPHTHALMIC at 20:39

## 2022-05-16 RX ADMIN — SODIUM CHLORIDE 25 ML: 9 INJECTION, SOLUTION INTRAVENOUS at 04:41

## 2022-05-16 RX ADMIN — INSULIN LISPRO 3 UNITS: 100 INJECTION, SOLUTION INTRAVENOUS; SUBCUTANEOUS at 20:57

## 2022-05-16 RX ADMIN — THIAMINE HYDROCHLORIDE 200 MG: 100 INJECTION, SOLUTION INTRAMUSCULAR; INTRAVENOUS at 04:43

## 2022-05-16 RX ADMIN — INSULIN LISPRO 2 UNITS: 100 INJECTION, SOLUTION INTRAVENOUS; SUBCUTANEOUS at 05:50

## 2022-05-16 RX ADMIN — DIVALPROEX SODIUM 125 MG: 125 CAPSULE ORAL at 09:42

## 2022-05-16 RX ADMIN — HYOSCYAMINE SULFATE 125 MCG: 0.12 TABLET, ORALLY DISINTEGRATING ORAL at 17:47

## 2022-05-16 RX ADMIN — INSULIN LISPRO 2 UNITS: 100 INJECTION, SOLUTION INTRAVENOUS; SUBCUTANEOUS at 01:27

## 2022-05-16 RX ADMIN — MEROPENEM 1000 MG: 1 INJECTION, POWDER, FOR SOLUTION INTRAVENOUS at 09:51

## 2022-05-16 RX ADMIN — MEROPENEM 1000 MG: 1 INJECTION, POWDER, FOR SOLUTION INTRAVENOUS at 01:22

## 2022-05-16 RX ADMIN — LEVOTHYROXINE SODIUM ANHYDROUS 50 MCG: 100 INJECTION, POWDER, LYOPHILIZED, FOR SOLUTION INTRAVENOUS at 09:43

## 2022-05-16 RX ADMIN — NYSTATIN AND TRIAMCINOLONE ACETONIDE: 100000; 1 CREAM TOPICAL at 09:53

## 2022-05-16 RX ADMIN — SODIUM CHLORIDE, PRESERVATIVE FREE 10 ML: 5 INJECTION INTRAVENOUS at 20:39

## 2022-05-16 RX ADMIN — INSULIN LISPRO 1 UNITS: 100 INJECTION, SOLUTION INTRAVENOUS; SUBCUTANEOUS at 13:26

## 2022-05-16 RX ADMIN — LINEZOLID 600 MG: 600 INJECTION, SOLUTION INTRAVENOUS at 09:50

## 2022-05-16 RX ADMIN — ATORVASTATIN CALCIUM 40 MG: 40 TABLET, FILM COATED ORAL at 20:38

## 2022-05-16 RX ADMIN — HYDRALAZINE HYDROCHLORIDE 10 MG: 20 INJECTION INTRAMUSCULAR; INTRAVENOUS at 23:23

## 2022-05-16 RX ADMIN — SODIUM CHLORIDE, PRESERVATIVE FREE 10 ML: 5 INJECTION INTRAVENOUS at 09:52

## 2022-05-16 RX ADMIN — LINEZOLID 600 MG: 100 SUSPENSION ORAL at 20:38

## 2022-05-16 RX ADMIN — THIAMINE HYDROCHLORIDE 200 MG: 100 INJECTION, SOLUTION INTRAMUSCULAR; INTRAVENOUS at 11:32

## 2022-05-16 RX ADMIN — SODIUM PHOSPHATE, MONOBASIC, MONOHYDRATE 15 MMOL: 276; 142 INJECTION, SOLUTION INTRAVENOUS at 09:53

## 2022-05-16 RX ADMIN — NYSTATIN AND TRIAMCINOLONE ACETONIDE: 100000; 1 CREAM TOPICAL at 20:39

## 2022-05-16 RX ADMIN — ARIPIPRAZOLE 15 MG: 5 TABLET ORAL at 09:43

## 2022-05-16 RX ADMIN — INSULIN LISPRO 1 UNITS: 100 INJECTION, SOLUTION INTRAVENOUS; SUBCUTANEOUS at 17:48

## 2022-05-16 RX ADMIN — INSULIN LISPRO 2 UNITS: 100 INJECTION, SOLUTION INTRAVENOUS; SUBCUTANEOUS at 09:54

## 2022-05-16 RX ADMIN — DIVALPROEX SODIUM 250 MG: 125 CAPSULE ORAL at 20:38

## 2022-05-16 RX ADMIN — SODIUM CHLORIDE, PRESERVATIVE FREE 5 ML: 5 INJECTION INTRAVENOUS at 09:43

## 2022-05-16 RX ADMIN — ASPIRIN 81 MG 81 MG: 81 TABLET ORAL at 09:43

## 2022-05-16 RX ADMIN — AMLODIPINE BESYLATE 5 MG: 5 TABLET ORAL at 09:43

## 2022-05-16 RX ADMIN — SODIUM CHLORIDE 25 ML: 9 INJECTION, SOLUTION INTRAVENOUS at 01:21

## 2022-05-16 RX ADMIN — HYOSCYAMINE SULFATE 125 MCG: 0.12 TABLET, ORALLY DISINTEGRATING ORAL at 14:07

## 2022-05-16 ASSESSMENT — PAIN SCALES - PAIN ASSESSMENT IN ADVANCED DEMENTIA (PAINAD)
NEGVOCALIZATION: 0
CONSOLABILITY: 0
NEGVOCALIZATION: 0
CONSOLABILITY: 0
NEGVOCALIZATION: 0
BODYLANGUAGE: 0
NEGVOCALIZATION: 0
TOTALSCORE: 2
TOTALSCORE: 0
CONSOLABILITY: 0
BREATHING: 2
CONSOLABILITY: 0
TOTALSCORE: 0
NEGVOCALIZATION: 0
FACIALEXPRESSION: 0
CONSOLABILITY: 0
FACIALEXPRESSION: 0
FACIALEXPRESSION: 0
TOTALSCORE: 0
BODYLANGUAGE: 0
FACIALEXPRESSION: 0
BREATHING: 1
BREATHING: 0
TOTALSCORE: 1
BODYLANGUAGE: 0
BODYLANGUAGE: 0
BREATHING: 1
NEGVOCALIZATION: 0
BREATHING: 0
BODYLANGUAGE: 0
CONSOLABILITY: 0
BREATHING: 0
FACIALEXPRESSION: 0
FACIALEXPRESSION: 0
TOTALSCORE: 1
BODYLANGUAGE: 0

## 2022-05-16 ASSESSMENT — PAIN SCALES - GENERAL
PAINLEVEL_OUTOF10: 2
PAINLEVEL_OUTOF10: 1

## 2022-05-16 NOTE — PROGRESS NOTES
Hospitalist Progress Note      PCP: Buffy Rubalcava MD    Date of Admission: 5/11/2022    Chief Complaint: Sent from nursing home for altered mental state    Hospital Course:   81 y.o. male  of CKD stage IV, hypertension, diabetes mellitus type 2, advanced dementia with mood disorder MDRO UTI who presents from his nursing home for altered mental status  - poor historian with hx of dementia   - work up in the hospital concerning for MRSA UTI, Hypothyroidism (symptomatic with hypothermia, hypoglycemia, AMS)    Subjective:  Patient seen and examined today.     Moaning but unable to form any questions    Medications:  Reviewed    Infusion Medications    sodium chloride 25 mL (05/16/22 0441)    dextrose Stopped (05/13/22 0073)     Scheduled Medications    sodium phosphate IVPB  15 mmol IntraVENous Once    meropenem  1,000 mg IntraVENous Q8H    linezolid  600 mg IntraVENous Q12H    aspirin  81 mg Oral Daily    amLODIPine  5 mg Oral Daily    thiamine (VITAMIN B1) IVPB  200 mg IntraVENous TID    levothyroxine  50 mcg IntraVENous Daily    And    sodium chloride (PF)  5 mL IntraVENous Daily    ARIPiprazole  15 mg Oral Daily    atorvastatin  40 mg Oral Nightly    divalproex  250 mg Oral Nightly    divalproex  125 mg Oral QAM    latanoprost  1 drop Right Eye Nightly    sodium chloride flush  5-40 mL IntraVENous 2 times per day    enoxaparin  40 mg SubCUTAneous Daily    insulin lispro  0-6 Units SubCUTAneous Q4H    nystatin-triamcinolone   Topical BID     PRN Meds: sodium chloride, hyoscyamine, sodium chloride flush, sodium chloride, polyethylene glycol, acetaminophen **OR** acetaminophen, glucose, dextrose bolus **OR** dextrose bolus, glucagon (rDNA), dextrose      Intake/Output Summary (Last 24 hours) at 5/16/2022 0816  Last data filed at 5/16/2022 0659  Gross per 24 hour   Intake 912 ml   Output 1175 ml   Net -263 ml       Physical Exam Performed:    BP (!) 149/85   Pulse 73   Temp 98.1 °F (36.7 °C) (Axillary)   Resp 22   Ht 5' 9\" (1.753 m)   Wt 216 lb 7.9 oz (98.2 kg)   SpO2 96%   BMI 31.97 kg/m²     General appearance: chronically ill appearing elderly, frail  HEENT: Pupils equal, round, and reactive to light. Conjunctivae/corneas clear. Neck: Supple, with full range of motion. No jugular venous distention. Trachea midline. Respiratory:  Normal respiratory effort. Clear to auscultation, bilaterally without Rales/Wheezes/Rhonchi. Cardiovascular: Regular rate and rhythm with normal S1/S2 without murmurs, rubs or gallops. Abdomen: Soft, non-tender, non-distended with normal bowel sounds. Glans and foreskin redness. Musculoskeletal: Lower extremity chronic venous stasis changes bilateral soft restraint  Skin: Skin color, texture, turgor normal.    Neurologic: Alert and oriented 0. Not following any commands  Psychiatric: Some limited due to clinical condition  Capillary Refill: Brisk,< 3 seconds   Peripheral Pulses: +2 palpable, equal bilaterally       Labs:   Recent Labs     05/13/22  0824 05/14/22  0443 05/15/22  0428   WBC 14.1* 18.3* 16.4*   HGB 10.1* 10.4* 9.8*   HCT 31.7* 33.8* 31.2*    236 213     Recent Labs     05/14/22  0443 05/15/22  0428 05/16/22  0436    143 146*   K 5.2* 4.4 3.8   * 111* 113*   CO2 21 21 23   BUN 30* 32* 34*   CREATININE 1.6* 1.5* 1.3   CALCIUM 9.5 9.4 9.0   PHOS 3.0 2.0* 1.3*     Recent Labs     05/13/22  0824 05/15/22  0428   AST 29 38*   ALT 18 22   BILITOT <0.2 0.3   ALKPHOS 35* 164*     No results for input(s): INR in the last 72 hours. Recent Labs     05/13/22 2050   CKTOTAL 87       Urinalysis:      Lab Results   Component Value Date    NITRU POSITIVE 05/11/2022    WBCUA >100 05/11/2022    BACTERIA 2+ 05/11/2022    RBCUA  05/11/2022    BLOODU MODERATE 05/11/2022    SPECGRAV 1.025 05/11/2022    GLUCOSEU Negative 05/11/2022       Radiology:  CT HEAD WO CONTRAST   Final Result      1. No acute intracranial process.    2.  Mild cerebral atrophy and mild chronic small vessel ischemic disease. XR ABDOMEN (KUB) (SINGLE AP VIEW)   Final Result      As above. CT ABDOMEN PELVIS WO CONTRAST Additional Contrast? None   Final Result      1. Bibasilar consolidations and small pleural effusions noted. 2. Small amount of stranding in the paracolic gutters and small amount of ascites,   3. Benign renal cysts left kidney and punctate nonobstructive calculus lower pole   4. Bladder wall thickening      XR FOOT LEFT (MIN 3 VIEWS)   Final Result      Lucency and irregularity noted involving the posterior surface of the calcaneus which is questionable for osteomyelitis. XR CHEST PORTABLE   Final Result      No acute pulmonary disease. Assessment/Plan:    Active Hospital Problems    Diagnosis Date Noted    AMS (altered mental status) [R41.82] 05/11/2022     Priority: Medium     #Dysphagia - Patient failed speech eval.  Continue G-tube for feeding. On tube feeds  PEG tube not per goals of care, discussed with patient's sister Sherley Cline    #Acute metabolic encephalopathy - Suspected this is patient's baseline. #UTI with MRSA. Urine cultures positive for MRSA and VRE. Continue zyvox, reviewed ID recs    #Suspected aspiration pneumonia. Continue zyvox, off merrem    #Hypothermia/hypoglycemia/hypothyroid  in the past, Hx of elevated TSH 11.91 in 02/2021 but Free T4 was normal, patient is not on synthroid supplementation  Labs with TSH 26.73, Free T4 low @ 0.6, free T3 1.7. Started on IV Synthroid yesterday. Follow-up on free T4 level on 5/13 and 5/14. Cortisol baseline around 11  Cosyntropin test did not adequate response to cortisol level. I have low suspicious for adrenal insufficiency. Thyroid can cause those symptoms.   We will stop hydrocortisone    UTI, symptomatic with change of mentation per NH  -Continue device as stated above  -Catheter in place, urology as outpatient on admission     Elevated lipase, > 600  Abdominal CT without acute findings    KIMBERLY on CKD 2: POA: improving  -Creatinine continues 1.6, hyperkalemia  -Kumar catheter is in place  -Nephrology consult for further evaluation    Hyperkalemia -Kumar is in place despite that potassium level continues to be elevated  Nephrology consulted    Leg edema, chronic - with chronic venous stasis changes. -- recent hospitalization, Norvasc was stopped    Left plantar lateral heel pressure ulcer  X-ray was done, reviewed by podiatry low suspicion for osteomyelitis as there is no open or draining wound located in the location.     History of schizophrenia and bipolar, bipolar  Continue pta meds     Diabetes mellitus, HgbA1c 8.7  Hypoglycemia, Continue SSI, hold off long acting insulin    DVT Prophylaxis: lovenox  Diet: Diet NPO  ADULT TUBE FEEDING; Nasogastric; Standard with Fiber; Continuous; 20; Yes; 10; Q 4 hours; 40; 30; Q 4 hours; Protein; 1 Protein shot QD via feeding tube. Flush with 30 ml water before/after administration. Do not mix with tube feeding formula.   Code Status: Full Code    PT/OT Eval Status: from long term care    Dispo - continue inpatient care,   Hospice consulted, will likely need Willie Ricci MD  Internal Medicine

## 2022-05-16 NOTE — PLAN OF CARE
Pt A/Ox2, no signs of pain, and VSS. Meds given per eMAR. Pt is on 2L O2 via NC. Kumar in place. TF infusing @ goal rate. Pt is resting with no other complaints at this time. Problem: Discharge Planning  Goal: Discharge to home or other facility with appropriate resources  Outcome: Progressing  Flowsheets (Taken 5/15/2022 2032)  Discharge to home or other facility with appropriate resources: Identify barriers to discharge with patient and caregiver     Problem: Pain  Goal: Verbalizes/displays adequate comfort level or baseline comfort level  Outcome: Progressing  Flowsheets (Taken 5/15/2022 2032)  Verbalizes/displays adequate comfort level or baseline comfort level: Assess pain using appropriate pain scale     Problem: Chronic Conditions and Co-morbidities  Goal: Patient's chronic conditions and co-morbidity symptoms are monitored and maintained or improved  Outcome: Progressing  Flowsheets (Taken 5/15/2022 2032)  Care Plan - Patient's Chronic Conditions and Co-Morbidity Symptoms are Monitored and Maintained or Improved: Monitor and assess patient's chronic conditions and comorbid symptoms for stability, deterioration, or improvement     Problem: Skin/Tissue Integrity  Goal: Absence of new skin breakdown  Description: 1. Monitor for areas of redness and/or skin breakdown  2. Assess vascular access sites hourly  3. Every 4-6 hours minimum:  Change oxygen saturation probe site  4. Every 4-6 hours:  If on nasal continuous positive airway pressure, respiratory therapy assess nares and determine need for appliance change or resting period.   Outcome: Progressing     Problem: Safety - Adult  Goal: Free from fall injury  Outcome: Progressing  Flowsheets (Taken 5/15/2022 2032)  Free From Fall Injury: Instruct family/caregiver on patient safety     Problem: ABCDS Injury Assessment  Goal: Absence of physical injury  Outcome: Progressing  Flowsheets (Taken 5/15/2022 2032)  Absence of Physical Injury: Implement safety measures based on patient assessment     Problem: Nutrition Deficit:  Goal: Optimize nutritional status  Outcome: Progressing

## 2022-05-16 NOTE — PROGRESS NOTES
Nephrology Note          [facility]      [email or web address]         Phone: [phone]      5/16/2022 7:51 AM     Patient: Yasmany Farrell [id number]  [id number]  Date of Admit: 5/11/2022 LOS: 5 days      Interval History and Plan:  Patient remain lethargic and unable to communicate although he opens eyes to command. Cr improving. K normal  Phos low  Na 146  Hypotension resolved and SBP between 140- 160  Urine output 1175 ml last 24 hours  Off steroids and IVF now      Continue amlodipine 5 mg daily, monitor BP and adjust medications as needed  On antibiotics for UTI  Monitor and replete electrolytes. IV phos ordered  Increase free water through NG tube as Na is now 146  Avoid nephrotoxins  Monitor I/O and vitals closely  Daily standing weight if possible  Renally dose all medications  Follow closely    D/W nurse at bedside. Thank you for allowing us to participate in this patient's care    In case of any question please call us at our 24 hour answering service [phone] or from 7 AM to 5 PM via [employer] or cell number    Christy Fritz MD  [facility]  [address], [address]  Fax: [phone]  Office: [phone]     Assessment & Plan   AMS:  Patient has dementia  CT Head no acute process. Hypothyroidism with hypothermia/hypoglycemia/encephalopathy  On dextrose in normal saline  S/P  Stress dose steroids  On Levothyroxine      UTI: On Abx     Chronic kidney disease 3  Baseline Cr:1.4- 1.6  UA: 5/11 > 100 WBC,  RBC  Proteinuria: 30 mg/dl on UA. Cr around baseline but due to hypotension and sepsis high risk of Acute kidney injury   CT A/P: Benign renal cysts left kidney and punctate nonobstructive calculus lower pole        Electrolytes:  1) Mild hyperkalemia in patient with underlying CKD while getting Enoxaparin and he might also be developing acute kidney injury which may appear on later labs.    CK fine  Resolved now    2) Na 146    Lab Results   Component Value Date    CREATININE 1.3 05/16/2022    BUN 34 (H) 05/16/2022     (H) 05/16/2022    K 3.8 05/16/2022     (H) 05/16/2022    CO2 23 05/16/2022      Lab Results   Component Value Date    PTH 41.8 02/27/2022    CALCIUM 9.0 05/16/2022    PHOS 1.3 (L) 05/16/2022         Acid/Base status:  Stable      Volume status/BP:  Hypotension > resolved after IVF and Steroids    Chronic appearing leg edema. On 2 liter oxygen. No RD  CXR on 5/11 no acute process. Hematology:   Lab Results   Component Value Date    IRON 36 (L) 02/28/2022    TIBC 187 (L) 02/28/2022    FERRITIN 483.6 (H) 02/28/2022     Lab Results   Component Value Date    WBC 16.4 (H) 05/15/2022    HGB 9.8 (L) 05/15/2022    HCT 31.2 (L) 05/15/2022    MCV 81.4 05/15/2022     05/15/2022           Reason for Consult     Reason for consult: CKD with hyperkalemia. Chief complaint:   Chief Complaint   Patient presents with    Altered Mental Status     per NH pt slower to respond, pt asks why he is at the hospital he feels fine       History of Present Illness   Tahir Ziegler is a 81 y.o. with PMH significant for MDR UTI, CVA with dementia, bipolar disorder, schizophrenia, DM, HTN, CKD was admitted from NH with AMS. UA concerning for UTI for which is getting treatment. Patient was also hypoglycemic with low BP and patient is on IV dextrose with normal saline, IV stress dose steroids and IV synthroid. Labs showed hyperkalemia and nephrology was consulted to assist in management. Cr around baseline. Patient is very lethargic and unable to answer my questions although he is awake. Review of Systems     Positive in bold or unable to assess     GEN: Fever, chills, night sweats.   HEENT: Changes in vision, sore throat, rhinorrhea   CVS: Chest pain, palpitations, swelling or edema in legs  Pulmonary: Cough, hemoptysis, SOB  GI: Nausea, vomiting, diarrhea, constipation, abdominal pain  : Bladder incontinence, dysuria, hematuria. MSK: Muscle or joint or bone pains  Skin: Rashes, ulcers, skin thickness  CNS: Headache, dizziness, confusion, focal weakness, seizure. Psych: Anxiety, agitation, depression. Reviewed all 12 systems, negative except as above. Past Medical History     Past Medical History:   Diagnosis Date    Ataxia     Bipolar 1 disorder ([facility]     BPH (benign prostatic hyperplasia)     Cerebral infarct (HCC)     Chronic kidney disease     Dementia ([facility]     Depression     Diabetes mellitus type 2, uncomplicated ([facility]     Gout     Hyperlipidemia     Hypertension     Schizophrenia ([facility]          Past Surgical History     History reviewed. No pertinent surgical history. Family History     History reviewed. No pertinent family history. Social History     Social History     Tobacco Use    Smoking status: Never Smoker    Smokeless tobacco: Never Used   Substance Use Topics    Alcohol use: Not Currently          Past medical, family, and social histories were reviewed as previously documented. Updates were made as necessary. Inpatient Medications and Allergies       Scheduled Meds:   meropenem  1,000 mg IntraVENous Q8H    linezolid  600 mg IntraVENous Q12H    aspirin  81 mg Oral Daily    amLODIPine  5 mg Oral Daily    thiamine (VITAMIN B1) IVPB  200 mg IntraVENous TID    levothyroxine  50 mcg IntraVENous Daily    And    sodium chloride (PF)  5 mL IntraVENous Daily    ARIPiprazole  15 mg Oral Daily    atorvastatin  40 mg Oral Nightly    divalproex  250 mg Oral Nightly    divalproex  125 mg Oral QAM    latanoprost  1 drop Right Eye Nightly    sodium chloride flush  5-40 mL IntraVENous 2 times per day    enoxaparin  40 mg SubCUTAneous Daily    insulin lispro  0-6 Units SubCUTAneous Q4H    nystatin-triamcinolone   Topical BID         Allergies:    Allergies   Allergen Reactions    Codeine     Cogentin [Benztropine]     Penicillins          Vital Signs and Input Output Vitals:    05/16/22 0403   BP: (!) 149/85   Pulse: 73   Resp: 22   Temp: 98.1 °F (36.7 °C)   SpO2: 96%           Intake/Output Summary (Last 24 hours) at 5/16/2022 0751  Last data filed at 5/16/2022 0659  Gross per 24 hour   Intake 942 ml   Output 1175 ml   Net -233 ml           Physical Exam     General appearance: NAD, look sick. Head: Normocephalic, without obvious abnormality, atraumatic   Mouth: Dry mucous membrane  Neck: Supple. Lungs: Good air entry bilaterally and no respiratory distress on 2 liter oxygen. Auscultated from front. Heart: S1, S2.   Abdomen: soft, non-tender non-distended  Extremities: Chronic appearing leg edema. Skin: No concerning rash noted on limited exam  Psych: Calm  Neuro: Confused .         Laboratory Data   See above    Diagnostic Studies   Pertinent images reviewed

## 2022-05-16 NOTE — CONSULTS
[facility]  Palliative Medicine Consultation Note      Date Of Admission:5/11/2022  Date of consult: 05/16/22  Seen by [facility] in the past:  No    Recommendations:        Saw the pt at the bedside. He is obtunded, not able to answer questions or provide history. No visitors present. D/w RN at bedside and Dr. Hayde Champagne. Reviewed chart, pt from [facility] since 2019.     938 - called pt's sister Neda Daniels, no answer, left VM with callback number. Spoke with pt's sister Jaron Bazan, over the phone, introduced palliative care. Jaron Bazan reports that their sister Neda Daniels is unwell, she was not aware that the pt was in the hospital. She reports that she and their sister Neda Daniels are pt's only family, therefore legal NOK. Provided updates as to the patient's medical condition. She reports that pt does not always answer questions appropriately, also describes some aphasia at baseline as well as increasing drowsiness. She reports pt cannot walk without a walker to her knowledge, and may not be able to walk much at all now. She endorses that she and her sister suspected trouble swallowing however had never been told as much. Discussed pt's current condition, concerns for frequent MDRO UTIs, hospitalizations, no able to take PO and overall decline. In light of this we discussed hospice. Jaron Bazan reports she will talk with her sister Neda Daniels. We also discussed code status today. Called pt's sister Neda Daniels on her cell phone, no answer, left VM with callback number. 1. Goals of Care/Advanced Care planning/Code status: Full code, discussed today with pt's sister Jaron Bazan. She is going to discuss with their sister Neda Daniels. We also discussed hospice. She will discuss with Neda Daniels as well prior to making a decision. Plan to continue with current management at this time. 2. Pain: Pt unable to state  3. SOB, possible aspiration PNA: Pt currently on 2LNC, appears with increased WOB today. D/w pt's sister today  4.  Dysphagia: Currently has NGT for feeding, failed speech eval yesterday. Discussed hospice and allowing to eat for comfort with family today. 5. AMS, likely 2/2 UTI/dementia: per sister, pt usually interactive, does not always answer questions appropriately. Has been sleeping more recently that she's noticed. Pt obtunded today, not opening eyes to voice or answering questions. 6. Disposition: Likely return to [facility], potentially with hospice pending family decision    Reason for Consult:         [x]  Goals of Care  [x]  Code Status Discussion/Advanced Care Planning   []  Psychosocial/Family Support  []  Symptom Management  []  Other (Specify)    Requesting Physician: Dr. Reveles Para:  AMS    History Obtained From:  family member - sister, electronic medical record    History of Present Illness:         Raj Quick is a 81 y.o. male with PMH of BPD, dementia, DMII, HTN, HLD, schizophrenia who presented with altered mental status from his nursing facility. Staff at the facility reported he is usually alert and responds appropriate, however noted that he was slower to respond than normal. Of note, he has a history of MDRO UTIs. Palliative care was consulted for goals of care and code status discussion. Subjective:         Past Medical History:        Diagnosis Date    Ataxia     [facility] disorder ([facility]     BPH (benign prostatic hyperplasia)     Cerebral infarct (HCC)     Chronic kidney disease     Dementia ([facility]     Depression     Diabetes mellitus type 2, uncomplicated ([facility]     Gout     Hyperlipidemia     Hypertension     Schizophrenia ([facility]        Past Surgical History:    History reviewed. No pertinent surgical history.     Current Medications:    Medications Prior to Admission: amLODIPine (NORVASC) 5 MG tablet, Take 5 mg by mouth daily  cimetidine (TAGAMET) 300 MG tablet, Take 300 mg by mouth 2 times daily  furosemide (LASIX) 40 MG tablet, Take 40 mg by mouth daily  linagliptin (TRADJENTA) 5 MG tablet, Take 5 mg by mouth daily  gabapentin (NEURONTIN) 100 MG capsule, Take 100 mg by mouth in the morning and at bedtime. metFORMIN (GLUCOPHAGE) 500 MG tablet, Take 500 mg by mouth 2 times daily (with meals)  albuterol (PROVENTIL) (2.5 MG/3ML) 0.083% nebulizer solution, Take 3 mLs by nebulization every 6 hours as needed for Wheezing or Shortness of Breath  FLUoxetine HCl 60 MG TABS, Take 60 mg by mouth daily  [DISCONTINUED] mupirocin (BACTROBAN) 2 % ointment, Apply topically 3 times daily. [DISCONTINUED] furosemide (LASIX) 20 MG tablet, Take 2 tablets by mouth daily  [DISCONTINUED] lactobacillus (CULTURELLE) CAPS capsule, Take 1 capsule by mouth daily  insulin detemir (LEVEMIR) 100 UNIT/ML injection vial, Inject 5 Units into the skin nightly  insulin aspart (NOVOLOG) 100 UNIT/ML injection vial, Inject 3 Units into the skin 3 times daily (before meals)  miconazole (MICOTIN) 2 % powder, Apply topically 2 times daily.   nystatin (MYCOSTATIN) 151386 UNIT/GM powder, Apply topically every 6-8 hours as needed (athlete's foot)   polyethylene glycol (MIRALAX) 17 g PACK packet, Take 17 g by mouth every 48 hours   divalproex (DEPAKOTE SPRINKLES) 125 MG capsule, Take 250 mg by mouth at bedtime  divalproex (DEPAKOTE SPRINKLES) 125 MG capsule, Take 125 mg by mouth every morning  latanoprost (XALATAN) 0.005 % ophthalmic solution, Place 1 drop into the right eye nightly   acetaminophen (TYLENOL) 500 MG tablet, Take 500 mg by mouth every 6 hours as needed for Pain  allopurinol (ZYLOPRIM) 100 MG tablet, Take 100 mg by mouth daily  ARIPiprazole (ABILIFY) 15 MG tablet, Take 15 mg by mouth daily  aspirin 81 MG EC tablet, Take 81 mg by mouth daily  atorvastatin (LIPITOR) 40 MG tablet, Take 40 mg by mouth nightly  bisacodyl (DULCOLAX) 10 MG suppository, Place 10 mg rectally daily as needed for Constipation  Cranberry 450 MG TABS, Take 450 mg by mouth daily   docusate sodium (COLACE) 100 MG capsule, Take 100 mg by mouth 2 times daily finasteride (PROSCAR) 5 MG tablet, Take 5 mg by mouth daily   Multiple Vitamins-Minerals (THERAPEUTIC MULTIVITAMIN-MINERALS) tablet, Take 1 tablet by mouth daily  insulin aspart (NOVOLOG) 100 UNIT/ML injection vial, Inject 0-21 Units into the skin 3 times daily (before meals) Sliding scale  201-250 = 5 units 251-300 = 8 units 301-350 = 12 units 351-400 = 15 units 401-450 = 18 units 451-500 = 21 units 500+ notify MD  ondansetron (ZOFRAN-ODT) 4 MG disintegrating tablet, Take 4 mg by mouth every 8 hours as needed for Nausea or Vomiting  polyethylene glycol (GLYCOLAX) 17 g packet, Take 17 g by mouth daily as needed for Constipation   tamsulosin (FLOMAX) 0.4 MG capsule, Take 0.8 mg by mouth nightly    Allergies:  Codeine, Cogentin [benztropine], and Penicillins    Social History:    · TOBACCO: reports that he has never smoked. He has never used smokeless tobacco.  · ETOH:   reports previous alcohol use. · Patient currently lives in a nursing home    Review of Systems -   Review of Systems   Unable to perform ROS: Mental status change       Objective:          Physical Exam  Constitutional:       Appearance: He is ill-appearing. Cardiovascular:      Rate and Rhythm: Normal rate and regular rhythm. Heart sounds: Normal heart sounds. Pulmonary:      Breath sounds: Rhonchi present. Abdominal:      General: Bowel sounds are normal.      Palpations: Abdomen is soft. Musculoskeletal:      Right lower leg: No edema. Left lower leg: No edema. Skin:     General: Skin is warm and dry. Neurological:      Mental Status: He is disoriented. Comments: obtunded          Palliative Performance Scale:  [] 60% Ambulation reduced; Significant disease; Can't do hobbies/housework; intake normal or reduced; occasional assist; LOC full/confusion  [] 50% Mainly sit/lie;  Extensive disease; Can't do any work; Considerable assist; intake normal  Or reduced; LOC full/confusion  [] 40% Mainly in bed; Extensive disease; Mainly assist; intake normal or reduced; occasional assist; LOC full/confusion  [x] 30% Bed Bound; Extensive disease; Total care; intake reduced; LOC full/confusion  [] 20% Bed Bound; Extensive disease; Total care; intake minimal; Drowsy/coma  [] 10% Bed Bound; Extensive disease; Total care; Mouth care only; Drowsy/coma  [] 0% Death    PPS: 30    Vitals:    BP (!) 149/85   Pulse 73   Temp 98.1 °F (36.7 °C) (Axillary)   Resp 22   Ht 5' 9\" (1.753 m)   Wt 216 lb 7.9 oz (98.2 kg)   SpO2 96%   BMI 31.97 kg/m²     Labs:    BMP:   Recent Labs     05/14/22  0443 05/15/22  0428 05/16/22  0436    143 146*   K 5.2* 4.4 3.8   * 111* 113*   CO2 21 21 23   BUN 30* 32* 34*   CREATININE 1.6* 1.5* 1.3   GLUCOSE 139* 276* 242*     CBC:   Recent Labs     05/13/22  0824 05/14/22  0443 05/15/22  0428   WBC 14.1* 18.3* 16.4*   HGB 10.1* 10.4* 9.8*   HCT 31.7* 33.8* 31.2*    236 213       LFT's:   Recent Labs     05/13/22  0824 05/15/22  0428   AST 29 38*   ALT 18 22   BILITOT <0.2 0.3   ALKPHOS 35* 164*     Troponin: No results for input(s): TROPONINI in the last 72 hours. BNP: No results for input(s): BNP in the last 72 hours. ABGs:   Recent Labs     05/14/22  1645   PHART 7.442   GCA9VUF 36.3   PO2ART 92.1     INR: No results for input(s): INR in the last 72 hours. U/A:No results for input(s): NITRITE, COLORU, PHUR, LABCAST, WBCUA, RBCUA, MUCUS, TRICHOMONAS, YEAST, BACTERIA, CLARITYU, SPECGRAV, LEUKOCYTESUR, UROBILINOGEN, BILIRUBINUR, BLOODU, GLUCOSEU, AMORPHOUS in the last 72 hours. Invalid input(s): KETONESU    CT HEAD WO CONTRAST   Final Result      1. No acute intracranial process. 2.  Mild cerebral atrophy and mild chronic small vessel ischemic disease. XR ABDOMEN (KUB) (SINGLE AP VIEW)   Final Result      As above. CT ABDOMEN PELVIS WO CONTRAST Additional Contrast? None   Final Result      1. Bibasilar consolidations and small pleural effusions noted.    2. Small amount of stranding in the paracolic gutters and small amount of ascites,   3. Benign renal cysts left kidney and punctate nonobstructive calculus lower pole   4. Bladder wall thickening      XR FOOT LEFT (MIN 3 VIEWS)   Final Result      Lucency and irregularity noted involving the posterior surface of the calcaneus which is questionable for osteomyelitis. XR CHEST PORTABLE   Final Result      No acute pulmonary disease. Conclusion/Time spent:         Recommendations see above    Time spent with patient and/or family: 40  Time reviewing records: 10 min   Time communicating with staff: 5 min     A total of 55 minutes spent with the patient and family on unit greater than 50% in counseling regarding palliative care and in goals of care for the patient. Thank you to Dr. Abe Archuleta for this consultation. We will continue to follow Mr. Kearns Patient care as needed.       [address] [facility]  Inpatient Palliative Care  [phone]

## 2022-05-16 NOTE — CARE COORDINATION
Case Management Assessment           Daily Note                 Date/ Time of Note: 5/16/2022 11:57 AM         Note completed by: Waylon High RN    Patient Name: Yasmany Farrell  YOB: 1950    Diagnosis:First degree AV block [I44.0]  Altered mental status, unspecified altered mental status type [R41.82]  Acute pancreatitis, unspecified complication status, unspecified pancreatitis type [K85.90]  AMS (altered mental status) [R41.82]  Patient Admission Status: Inpatient    Date of Admission:5/11/2022  6:56 PM Length of Stay: 5 GLOS: GMLOS: 3.8    Current Plan of Care: Corepak placed over the weekend, TF via corepak, ID consulted, PC consulted  ________________________________________________________________________________________  PT AM-PAC:   / 24 per last evaluation on: n/a    OT AM-PAC:   / 24 per last evaluation on: n/a    DME Needs for discharge:   ________________________________________________________________________________________  Discharge Plan: To Be Determined DUE TO: return to LTC vs hospice care    Tentative discharge date: TBD    Current barriers to discharge: medical stability, feeding plan    Referrals completed: Not Applicable    Resources/ information provided: Not indicated at this time  ________________________________________________________________________________________  Case Management Notes: CM continues to follow for DC planning and needs. Patient is from LTC at [address], he had a corepak placed this weekend and has TF per orders. Patient has anderson catheter in place, podiatry following. Patient has new consult for PC, Ashley ESTEBAN w/ PC has reached out to family who is discussing goals of care and possible plan for hospice care. CM will continue to follow for further DC planning.     1:33 PM  DANNI received call from MD, he has spoken with patients family and they would like referral sent to [facility] for hospice care and are interested in [facility] for patient at [place]. CM has made referral to [facility], [facility] RN will follow up with CM after speaking with family and when meeting will be. Kary Mendoza and his family were provided with choice of provider; he and his family are in agreement with the discharge plan.     Care Transition Patient: Soraya Stringer RN  [facility]  Case Management Department  Ph: [phone]  Fax: [phone]

## 2022-05-16 NOTE — PROGRESS NOTES
Speech Language Pathology - Contact Note - No Treatment  Chart reviewed and spoke with RN, who indicates that pt continuing to receive nutrition via NG tube, participating in oral care. Attempted to see pt for dysphagia treatment/reassessment visit; however pt not available at this time (receiving nursing care). Will re-attempt at a later time/date. Please page ST department with any concerns.     Electronically Signed by:  Gray Arceo.Rj  Speech-Language Pathologist  [phone]  Pager #[phone]

## 2022-05-16 NOTE — PROGRESS NOTES
This RN called pt's Estela Ibrahim but call went to voicemail. Message was left regarding status and plan of care.

## 2022-05-16 NOTE — CONSULTS
Infectious Diseases Inpatient Consult Note    Reason for Consult:   UTI vs bacteriuria, Encephalopathy  Requesting Physician:   Dr Jez Maharaj  Primary Care Physician:  Jose Luis Walton MD  History Obtained From:   Pt, EPIC    Admit Date: 5/11/2022  Hospital Day: 10    CHIEF COMPLAINT:       Chief Complaint   Patient presents with    Altered Mental Status     per NH pt slower to respond, pt asks why he is at the hospital he feels fine       HISTORY OF PRESENT ILLNESS:      82 yo man with hx dementia, CVA, CKD, DM, HTN, schizophrenia / bipolar  Lives in a nursing facility ([facility]_  Hx UTIs    Admit 4/13-18 - dx UTI w Proteus. Presents with 'altered mental status'  In ED 5/11, afeb, SBP 90s, HR 50s, 'drousy'  WBC 9.1, Cr 1.6, lipase 600. UA large LE, micro >100 WBC, CXR neg  Admit 5/11 - started on Meropenem  Abd / pelvic CT with basilar consolidations and effusions, bladder wall thickening    Seen by Renal (CKD),  (penile edema with urinary retention), Podiatry (heel ulcer)    Today 5/16 - afeb, 2L, Cr 1.3  Lethargic, able to answer simple question, 1 word answers      Past Medical History:    Past Medical History:   Diagnosis Date    Ataxia     Bipolar 1 disorder ([address]     BPH (benign prostatic hyperplasia)     Cerebral infarct ([address]     Chronic kidney disease     Dementia ([address]     Depression     Diabetes mellitus type 2, uncomplicated ([address]     Gout     Hyperlipidemia     Hypertension     Schizophrenia ([facility]        Past Surgical History:    History reviewed. No pertinent surgical history.     Current Medications:     sodium phosphate IVPB  15 mmol IntraVENous Once    meropenem  1,000 mg IntraVENous Q8H    linezolid  600 mg IntraVENous Q12H    aspirin  81 mg Oral Daily    amLODIPine  5 mg Oral Daily    thiamine (VITAMIN B1) IVPB  200 mg IntraVENous TID    levothyroxine  50 mcg IntraVENous Daily    And    sodium chloride (PF)  5 mL IntraVENous Daily    ARIPiprazole  15 mg Oral Daily    atorvastatin  40 mg Oral Nightly    divalproex  250 mg Oral Nightly    divalproex  125 mg Oral QAM    latanoprost  1 drop Right Eye Nightly    sodium chloride flush  5-40 mL IntraVENous 2 times per day    enoxaparin  40 mg SubCUTAneous Daily    insulin lispro  0-6 Units SubCUTAneous Q4H    nystatin-triamcinolone   Topical BID       Allergies:  Codeine, Cogentin [benztropine], and Penicillins    Social History:    TOBACCO:    None   ETOH:    None   DRUGS:   None   MARITAL STATUS:   Single     Patient lives ECF / [facility]     Family History:   No immunodeficiency    REVIEW OF SYSTEMS:    Unable to obtain    PHYSICAL EXAM:      Vitals:    BP (!) 149/85   Pulse 73   Temp 98.1 °F (36.7 °C) (Axillary)   Resp 22   Ht 5' 9\" (1.753 m)   Wt 216 lb 7.9 oz (98.2 kg)   SpO2 96%   BMI 31.97 kg/m²     GENERAL: Lethargic   HEENT: Membranes moist, no oral lesion  NECK:  Supple, no lymphadenopathy  LUNGS: Clear b/l, no rales, no dullness  CARDIAC: RRR, no murmur appreciated  ABD:  + BS, soft / NT   - has anderson, + penile / edema  EXT:  No rash, + edema, no lesions  NEURO: No focal neurologic findings - limited exam  LINES:  Peripheral iv    DATA:    Lab Results   Component Value Date    WBC 16.4 (H) 05/15/2022    HGB 9.8 (L) 05/15/2022    HCT 31.2 (L) 05/15/2022    MCV 81.4 05/15/2022     05/15/2022     Lab Results   Component Value Date    CREATININE 1.3 05/16/2022    BUN 34 (H) 05/16/2022     (H) 05/16/2022    K 3.8 05/16/2022     (H) 05/16/2022    CO2 23 05/16/2022       Hepatic Function Panel:   Lab Results   Component Value Date    ALKPHOS 164 05/15/2022    ALT 22 05/15/2022    AST 38 05/15/2022    PROT 6.8 05/15/2022    BILITOT 0.3 05/15/2022    BILIDIR <0.2 05/11/2022    IBILI see below 05/11/2022    LABALBU 2.6 05/16/2022       Micro:  5/11 BC no growth  5/11 Urine cult >100k MRSA, 25k VRE  Staph aureus mrsa (1)  Antibiotic Interpretation Microscan    ceFAZolin Resistant >16 mcg/mL DAPTOmycin Sensitive 1 mcg/mL   linezolid Sensitive 4 mcg/mL   nitrofurantoin Sensitive <=32 mcg/mL   oxacillin Resistant >2 mcg/mL   tetracycline Resistant >8 mcg/mL   trimethoprim-sulfamethoxazole Sensitive <=0.5/9.5 mcg/mL   vancomycin Sensitive 2 mcg/mL     Enterococcus faecium vre (2)  Antibiotic Interpretation Microscan    ampicillin Resistant >=32 mcg/mL   linezolid Sensitive 2 mcg/mL   nitrofurantoin Intermediate 64 mcg/mL   tetracycline Resistant >=16 mcg/mL   vancomycin Resistant >=32 mcg/mL       Imagin/12 Abd / Pelvic CT  1. Bibasilar consolidations and small pleural effusions noted. 2. Small amount of stranding in the paracolic gutters and small amount of ascites,   3. Benign renal cysts left kidney and punctate nonobstructive calculus lower pole   4. Bladder wall thickening     L foot x-ray  Lucency and irregularity noted involving the posterior surface of the calcaneus which is questionable for osteomyelitis.  Head CT  1.  No acute intracranial process. 2.  Mild cerebral atrophy and mild chronic small vessel ischemic disease. IMPRESSION:      Patient Active Problem List   Diagnosis    Other fatigue    Right hand weakness    Numbness of right hand    Schizophrenia ([address]    Dementia ([address]    Essential hypertension    Sepsis ([address]    CKD (chronic kidney disease)    Cellulitis    AMS (altered mental status)       Hx dementia, CVA, CKD, DM, HTN, schizophrenia / bipolar  Lives in a nursing facility ([facility])  Hx UTIs    Chronic encephalopathy    - urinary retention, now with anderson  UTI vs bacteriuria     RECOMMENDATIONS:    Cont Linezolid - change to oral / per NGT  D/c Meropenem    F/u  - may need chronic anderson or SPT  F/u Speech - may need PEG      Medical Decision Making:   The following items were considered in medical decision making:  Discussion of patient care with other providers  Reviewed clinical lab tests  Reviewed radiology tests  Reviewed other diagnostic tests/interventions  Microbiology cultures and other micro tests reviewed      Risk of Complications/Morbidity: High   Illness(es)/ Infection present that pose threat to bodily function. There is potential for severe exacerbation of infection/side effects of treatment. Therapy requires intensive monitoring for antimicrobial agent toxicity    Palliative Care involved.     Pt with recurrent hospitalization, declining status     Discussed with RN, Palliative Care NP  Omar Cheung MD

## 2022-05-17 ENCOUNTER — APPOINTMENT (OUTPATIENT)
Dept: GENERAL RADIOLOGY | Age: 72
DRG: 871 | End: 2022-05-17
Payer: MEDICARE

## 2022-05-17 LAB
ALBUMIN SERPL-MCNC: 2.7 G/DL (ref 3.4–5)
ANION GAP SERPL CALCULATED.3IONS-SCNC: 9 MMOL/L (ref 3–16)
BASE EXCESS VENOUS: 1 MMOL/L (ref -2–3)
BASOPHILS ABSOLUTE: 0 K/UL (ref 0–0.2)
BASOPHILS RELATIVE PERCENT: 0.2 %
BUN BLDV-MCNC: 41 MG/DL (ref 7–20)
CALCIUM SERPL-MCNC: 9.7 MG/DL (ref 8.3–10.6)
CARBOXYHEMOGLOBIN: <0 % (ref 0–1.5)
CHLORIDE BLD-SCNC: 108 MMOL/L (ref 99–110)
CO2: 25 MMOL/L (ref 21–32)
CREAT SERPL-MCNC: 1.2 MG/DL (ref 0.8–1.3)
EOSINOPHILS ABSOLUTE: 0.1 K/UL (ref 0–0.6)
EOSINOPHILS RELATIVE PERCENT: 0.6 %
GFR AFRICAN AMERICAN: >60
GFR NON-AFRICAN AMERICAN: 60
GLUCOSE BLD-MCNC: 219 MG/DL (ref 70–99)
GLUCOSE BLD-MCNC: 238 MG/DL (ref 70–99)
GLUCOSE BLD-MCNC: 241 MG/DL (ref 70–99)
GLUCOSE BLD-MCNC: 248 MG/DL (ref 70–99)
GLUCOSE BLD-MCNC: 253 MG/DL (ref 70–99)
GLUCOSE BLD-MCNC: 278 MG/DL (ref 70–99)
HCO3 VENOUS: 27.4 MMOL/L (ref 24–28)
HCT VFR BLD CALC: 31.5 % (ref 40.5–52.5)
HEMOGLOBIN, VEN, REDUCED: 5.7 %
HEMOGLOBIN: 9.9 G/DL (ref 13.5–17.5)
LYMPHOCYTES ABSOLUTE: 0.9 K/UL (ref 1–5.1)
LYMPHOCYTES RELATIVE PERCENT: 4.5 %
MAGNESIUM: 2 MG/DL (ref 1.8–2.4)
MCH RBC QN AUTO: 25.7 PG (ref 26–34)
MCHC RBC AUTO-ENTMCNC: 31.6 G/DL (ref 31–36)
MCV RBC AUTO: 81.5 FL (ref 80–100)
METHEMOGLOBIN VENOUS: 0.1 % (ref 0–1.5)
MONOCYTES ABSOLUTE: 1.5 K/UL (ref 0–1.3)
MONOCYTES RELATIVE PERCENT: 7.1 %
NEUTROPHILS ABSOLUTE: 18.3 K/UL (ref 1.7–7.7)
NEUTROPHILS RELATIVE PERCENT: 87.6 %
O2 SAT, VEN: 94 %
PCO2, VEN: 48.7 MMHG (ref 41–51)
PDW BLD-RTO: 19.1 % (ref 12.4–15.4)
PERFORMED ON: ABNORMAL
PH VENOUS: 7.36 (ref 7.35–7.45)
PHOSPHORUS: 2.8 MG/DL (ref 2.5–4.9)
PLATELET # BLD: 181 K/UL (ref 135–450)
PMV BLD AUTO: 8.5 FL (ref 5–10.5)
PO2, VEN: 93.2 MMHG (ref 25–40)
POTASSIUM SERPL-SCNC: 4.7 MMOL/L (ref 3.5–5.1)
PRO-BNP: 725 PG/ML (ref 0–124)
PROCALCITONIN: 0.17 NG/ML (ref 0–0.15)
RBC # BLD: 3.86 M/UL (ref 4.2–5.9)
SODIUM BLD-SCNC: 142 MMOL/L (ref 136–145)
TCO2 CALC VENOUS: 29 MMOL/L
WBC # BLD: 20.9 K/UL (ref 4–11)

## 2022-05-17 PROCEDURE — 2500000003 HC RX 250 WO HCPCS: Performed by: INTERNAL MEDICINE

## 2022-05-17 PROCEDURE — 85025 COMPLETE CBC W/AUTO DIFF WBC: CPT

## 2022-05-17 PROCEDURE — 80069 RENAL FUNCTION PANEL: CPT

## 2022-05-17 PROCEDURE — 2060000000 HC ICU INTERMEDIATE R&B

## 2022-05-17 PROCEDURE — 36415 COLL VENOUS BLD VENIPUNCTURE: CPT

## 2022-05-17 PROCEDURE — 83880 ASSAY OF NATRIURETIC PEPTIDE: CPT

## 2022-05-17 PROCEDURE — 2580000003 HC RX 258: Performed by: INTERNAL MEDICINE

## 2022-05-17 PROCEDURE — 6370000000 HC RX 637 (ALT 250 FOR IP): Performed by: INTERNAL MEDICINE

## 2022-05-17 PROCEDURE — 71045 X-RAY EXAM CHEST 1 VIEW: CPT

## 2022-05-17 PROCEDURE — 94761 N-INVAS EAR/PLS OXIMETRY MLT: CPT

## 2022-05-17 PROCEDURE — 6360000002 HC RX W HCPCS: Performed by: INTERNAL MEDICINE

## 2022-05-17 PROCEDURE — 6360000002 HC RX W HCPCS: Performed by: STUDENT IN AN ORGANIZED HEALTH CARE EDUCATION/TRAINING PROGRAM

## 2022-05-17 PROCEDURE — 99232 SBSQ HOSP IP/OBS MODERATE 35: CPT | Performed by: INTERNAL MEDICINE

## 2022-05-17 PROCEDURE — 2700000000 HC OXYGEN THERAPY PER DAY

## 2022-05-17 PROCEDURE — 82803 BLOOD GASES ANY COMBINATION: CPT

## 2022-05-17 PROCEDURE — 84145 PROCALCITONIN (PCT): CPT

## 2022-05-17 PROCEDURE — 83735 ASSAY OF MAGNESIUM: CPT

## 2022-05-17 PROCEDURE — A4216 STERILE WATER/SALINE, 10 ML: HCPCS | Performed by: INTERNAL MEDICINE

## 2022-05-17 RX ORDER — NIFEDIPINE 10 MG/1
10 CAPSULE ORAL ONCE
Status: COMPLETED | OUTPATIENT
Start: 2022-05-17 | End: 2022-05-17

## 2022-05-17 RX ORDER — LINEZOLID 2 MG/ML
600 INJECTION, SOLUTION INTRAVENOUS EVERY 12 HOURS
Status: DISCONTINUED | OUTPATIENT
Start: 2022-05-17 | End: 2022-05-18 | Stop reason: HOSPADM

## 2022-05-17 RX ORDER — FUROSEMIDE 10 MG/ML
40 INJECTION INTRAMUSCULAR; INTRAVENOUS ONCE
Status: COMPLETED | OUTPATIENT
Start: 2022-05-17 | End: 2022-05-17

## 2022-05-17 RX ORDER — LABETALOL HYDROCHLORIDE 5 MG/ML
10 INJECTION, SOLUTION INTRAVENOUS EVERY 4 HOURS PRN
Status: COMPLETED | OUTPATIENT
Start: 2022-05-17 | End: 2022-05-18

## 2022-05-17 RX ADMIN — LEVOTHYROXINE SODIUM ANHYDROUS 50 MCG: 100 INJECTION, POWDER, LYOPHILIZED, FOR SOLUTION INTRAVENOUS at 10:19

## 2022-05-17 RX ADMIN — NIFEDIPINE 10 MG: 10 CAPSULE ORAL at 01:32

## 2022-05-17 RX ADMIN — INSULIN LISPRO 3 UNITS: 100 INJECTION, SOLUTION INTRAVENOUS; SUBCUTANEOUS at 01:43

## 2022-05-17 RX ADMIN — HYOSCYAMINE SULFATE 125 MCG: 0.12 TABLET, ORALLY DISINTEGRATING ORAL at 01:59

## 2022-05-17 RX ADMIN — LINEZOLID 600 MG: 600 INJECTION, SOLUTION INTRAVENOUS at 13:43

## 2022-05-17 RX ADMIN — INSULIN LISPRO 2 UNITS: 100 INJECTION, SOLUTION INTRAVENOUS; SUBCUTANEOUS at 13:27

## 2022-05-17 RX ADMIN — INSULIN LISPRO 2 UNITS: 100 INJECTION, SOLUTION INTRAVENOUS; SUBCUTANEOUS at 20:56

## 2022-05-17 RX ADMIN — FUROSEMIDE 40 MG: 10 INJECTION, SOLUTION INTRAMUSCULAR; INTRAVENOUS at 13:37

## 2022-05-17 RX ADMIN — VALPROATE SODIUM 125 MG: 100 INJECTION, SOLUTION INTRAVENOUS at 18:01

## 2022-05-17 RX ADMIN — SODIUM CHLORIDE, PRESERVATIVE FREE 5 ML: 5 INJECTION INTRAVENOUS at 10:19

## 2022-05-17 RX ADMIN — NYSTATIN AND TRIAMCINOLONE ACETONIDE: 100000; 1 CREAM TOPICAL at 22:44

## 2022-05-17 RX ADMIN — SODIUM CHLORIDE, PRESERVATIVE FREE 10 ML: 5 INJECTION INTRAVENOUS at 20:55

## 2022-05-17 RX ADMIN — LABETALOL HYDROCHLORIDE 10 MG: 5 INJECTION, SOLUTION INTRAVENOUS at 01:37

## 2022-05-17 RX ADMIN — ENOXAPARIN SODIUM 40 MG: 100 INJECTION SUBCUTANEOUS at 10:18

## 2022-05-17 RX ADMIN — SODIUM CHLORIDE, PRESERVATIVE FREE 10 ML: 5 INJECTION INTRAVENOUS at 10:21

## 2022-05-17 RX ADMIN — FUROSEMIDE 40 MG: 10 INJECTION, SOLUTION INTRAMUSCULAR; INTRAVENOUS at 20:55

## 2022-05-17 RX ADMIN — ACETAMINOPHEN 650 MG: 325 TABLET ORAL at 01:32

## 2022-05-17 RX ADMIN — VALPROATE SODIUM 125 MG: 100 INJECTION, SOLUTION INTRAVENOUS at 21:18

## 2022-05-17 RX ADMIN — INSULIN LISPRO 2 UNITS: 100 INJECTION, SOLUTION INTRAVENOUS; SUBCUTANEOUS at 18:05

## 2022-05-17 RX ADMIN — SODIUM CHLORIDE 25 ML: 9 INJECTION, SOLUTION INTRAVENOUS at 21:17

## 2022-05-17 RX ADMIN — LATANOPROST 1 DROP: 50 SOLUTION OPHTHALMIC at 20:56

## 2022-05-17 RX ADMIN — NYSTATIN AND TRIAMCINOLONE ACETONIDE: 100000; 1 CREAM TOPICAL at 11:24

## 2022-05-17 RX ADMIN — THIAMINE HYDROCHLORIDE 100 MG: 100 INJECTION, SOLUTION INTRAMUSCULAR; INTRAVENOUS at 13:46

## 2022-05-17 RX ADMIN — INSULIN LISPRO 2 UNITS: 100 INJECTION, SOLUTION INTRAVENOUS; SUBCUTANEOUS at 05:51

## 2022-05-17 ASSESSMENT — PAIN SCALES - PAIN ASSESSMENT IN ADVANCED DEMENTIA (PAINAD)
CONSOLABILITY: 0
BODYLANGUAGE: 0
TOTALSCORE: 1
TOTALSCORE: 1
FACIALEXPRESSION: 0
NEGVOCALIZATION: 1
BODYLANGUAGE: 0
FACIALEXPRESSION: 0
NEGVOCALIZATION: 0
BREATHING: 1
CONSOLABILITY: 0
CONSOLABILITY: 0
NEGVOCALIZATION: 1
FACIALEXPRESSION: 0
TOTALSCORE: 1
BREATHING: 0
BREATHING: 0
BODYLANGUAGE: 0

## 2022-05-17 ASSESSMENT — PAIN SCALES - GENERAL
PAINLEVEL_OUTOF10: 0
PAINLEVEL_OUTOF10: 0
PAINLEVEL_OUTOF10: 1
PAINLEVEL_OUTOF10: 1
PAINLEVEL_OUTOF10: 0
PAINLEVEL_OUTOF10: 1

## 2022-05-17 ASSESSMENT — PAIN SCALES - WONG BAKER
WONGBAKER_NUMERICALRESPONSE: 0

## 2022-05-17 NOTE — PROGRESS NOTES
Pt sustaining SpO2 high 90s on 15L high flow nasal cannula. Placed Avasys monitor in pt's room to continuously monitor pt's SpO2 level.

## 2022-05-17 NOTE — PROGRESS NOTES
Speech Language Pathology    Attempted to see pt for dysphagia therapy. Reviewed chart. Spoke with Rn who reports pt still not sufficiently alert to participate. Will continue to hold. Burnadette Apgar, [place], NELSY [phone]  Pg.  # [phone]

## 2022-05-17 NOTE — PROGRESS NOTES
Nephrology Note          [facility] [place]      [email or web address]         Phone: [phone]      5/17/2022 7:35 AM     Patient: Davon Bocanegra [id number]  [id number]  Date of Admit: 5/11/2022 LOS: 6 days      Interval History and Plan:  Patient remain lethargic and unable to communicate  Coughing. Cr improving, lytes stable  Oxygen requirement worse to 15 liter/min  CXR showed concern for pulmonary edema vs pneumonia. BP fluctuating. Continue amlodipine 10 mg daily, monitor BP and adjust medications as needed  IV Lasix 40 mg x 1 but for high oxygen requirement but look aspiration. On antibiotics already  Monitor and replete electrolytes. On tube feeding  Avoid nephrotoxins  Monitor I/O and vitals closely  Daily standing weight if possible  Renally dose all medications  Follow closely    D/W nurse at bedside. Thank you for allowing us to participate in this patient's care    In case of any question please call us at our 24 hour answering service [phone] or from 7 AM to 5 PM via [employer] or cell number    Duarte Gillis MD  [facility]  Charles Professor [address], [address]  Fax: [phone]  Office: [phone]     Assessment & Plan   AMS:  Patient has dementia  CT Head no acute process. Hypothyroidism with hypothermia/hypoglycemia/encephalopathy  On dextrose in normal saline  S/P  Stress dose steroids  On Levothyroxine      UTI: On Abx       Hypoxic Respiratory Failure:   Acute  Volume overload vs aspiration pneumonia    Chronic kidney disease 3  Baseline Cr:1.4- 1.6  UA: 5/11 > 100 WBC,  RBC  Proteinuria: 30 mg/dl on UA.    Cr around baseline but due to hypotension and sepsis high risk of Acute kidney injury   CT A/P: Benign renal cysts left kidney and punctate nonobstructive calculus lower pole        Electrolytes:   Mild hyperkalemia in patient with underlying CKD while getting Enoxaparin and he might also be developing acute kidney injury which may appear on later labs. CK fine  Resolved now    Lab Results   Component Value Date    CREATININE 1.2 05/17/2022    BUN 41 (H) 05/17/2022     05/17/2022    K 4.7 05/17/2022     05/17/2022    CO2 25 05/17/2022      Lab Results   Component Value Date    PTH 41.8 02/27/2022    CALCIUM 9.7 05/17/2022    PHOS 2.8 05/17/2022         Acid/Base status:  Stable      Volume status/BP:  Hypotension > resolved after IVF and Steroids    Possible hypervolemia ? Hematology:   Lab Results   Component Value Date    IRON 36 (L) 02/28/2022    TIBC 187 (L) 02/28/2022    FERRITIN 483.6 (H) 02/28/2022     Lab Results   Component Value Date    WBC 20.9 (H) 05/17/2022    HGB 9.9 (L) 05/17/2022    HCT 31.5 (L) 05/17/2022    MCV 81.5 05/17/2022     05/17/2022           Reason for Consult     Reason for consult: CKD with hyperkalemia. Chief complaint:   Chief Complaint   Patient presents with    Altered Mental Status     per NH pt slower to respond, pt asks why he is at the hospital he feels fine       History of Present Illness   Tahir Ziegler is a 81 y.o. with PMH significant for MDR UTI, CVA with dementia, bipolar disorder, schizophrenia, DM, HTN, CKD was admitted from NH with AMS. UA concerning for UTI for which is getting treatment. Patient was also hypoglycemic with low BP and patient is on IV dextrose with normal saline, IV stress dose steroids and IV synthroid. Labs showed hyperkalemia and nephrology was consulted to assist in management. Cr around baseline. Patient is very lethargic and unable to answer my questions although he is awake. Review of Systems     Positive in bold or unable to assess     GEN: Fever, chills, night sweats. HEENT: Changes in vision, sore throat, rhinorrhea   CVS: Chest pain, palpitations, swelling or edema in legs  Pulmonary: Cough, hemoptysis, SOB  GI: Nausea, vomiting, diarrhea, constipation, abdominal pain  : Bladder incontinence, dysuria, hematuria.    MSK: Muscle or joint or bone pains  Skin: Rashes, ulcers, skin thickness  CNS: Headache, dizziness, confusion, focal weakness, seizure. Psych: Anxiety, agitation, depression. Reviewed all 12 systems, negative except as above. Past Medical History     Past Medical History:   Diagnosis Date    Ataxia     Bipolar [facility] disorder ([facility]     BPH (benign prostatic hyperplasia)     Cerebral infarct (HCC)     Chronic kidney disease     Dementia ([facility]     Depression     Diabetes mellitus type 2, uncomplicated ([facility]     Gout     Hyperlipidemia     Hypertension     Schizophrenia ([facility]          Past Surgical History     History reviewed. No pertinent surgical history. Family History     History reviewed. No pertinent family history. Social History     Social History     Tobacco Use    Smoking status: Never Smoker    Smokeless tobacco: Never Used   Substance Use Topics    Alcohol use: Not Currently          Past medical, family, and social histories were reviewed as previously documented. Updates were made as necessary. Inpatient Medications and Allergies       Scheduled Meds:   linezolid  600 mg Per NG tube 2 times per day    thiamine mononitrate  100 mg Per NG tube Daily    amLODIPine  10 mg Oral Daily    aspirin  81 mg Oral Daily    levothyroxine  50 mcg IntraVENous Daily    And    sodium chloride (PF)  5 mL IntraVENous Daily    ARIPiprazole  15 mg Oral Daily    atorvastatin  40 mg Oral Nightly    divalproex  250 mg Oral Nightly    divalproex  125 mg Oral QAM    latanoprost  1 drop Right Eye Nightly    sodium chloride flush  5-40 mL IntraVENous 2 times per day    enoxaparin  40 mg SubCUTAneous Daily    insulin lispro  0-6 Units SubCUTAneous Q4H    nystatin-triamcinolone   Topical BID         Allergies:    Allergies   Allergen Reactions    Codeine     Cogentin [Benztropine]     Penicillins          Vital Signs and Input Output     Vitals:    05/17/22 0600   BP:    Pulse:    Resp:    Temp: SpO2: 99%           Intake/Output Summary (Last 24 hours) at 5/17/2022 0735  Last data filed at 5/17/2022 0600  Gross per 24 hour   Intake 1439 ml   Output 1175 ml   Net 264 ml           Physical Exam     General appearance: NAD, look sick. Head: Normocephalic, without obvious abnormality, atraumatic   Mouth: Dry mucous membrane  Neck: Supple. Lungs: Coarse crackles. On 15 liter oxygen. No wheezing. Heart: S1, S2.   Abdomen: soft, non-tender non-distended  Extremities: Chronic appearing leg edema. Skin: No concerning rash noted on limited exam  Psych: Calm  Neuro: Awake but confused .         Laboratory Data   See above    Diagnostic Studies   Pertinent images reviewed

## 2022-05-17 NOTE — PROGRESS NOTES
ID Follow-up NOTE    CC:   Encephalopathy, UTI vs bacteriuria  Antibiotics: Linezolid    Admit Date: 5/11/2022  Hospital Day: 7    Subjective:     Patient is lethargic  Able to arouse and reports feels weak, no specific complaint       Objective:     Patient Vitals for the past 8 hrs:   BP Temp Temp src Pulse Resp SpO2 Weight   05/17/22 0600 -- -- -- -- -- 99 % 221 lb 1.9 oz (100.3 kg)   05/17/22 0405 -- -- -- -- -- 96 % --   05/17/22 0353 -- -- -- -- -- 98 % --   05/17/22 0328 116/67 97.6 °F (36.4 °C) Axillary 61 24 99 % --   05/17/22 0214 -- -- -- -- 24 97 % --   05/17/22 0206 125/65 -- -- -- -- -- --   05/17/22 0155 (!) 147/69 -- -- -- -- -- --   05/17/22 0130 (!) 190/72 -- -- -- -- (!) 88 % --     I/O last 3 completed shifts: In: 2069 [I.V.:10; NG/GT:2059]  Out: 0992 [Urine:1775]  No intake/output data recorded. EXAM:  GENERAL: No apparent distress.   12L  HEENT: Membranes moist, no oral lesion  NECK:  Supple, no lymphadenopathy  LUNGS: Clear b/l, no rales, no dullness  CARDIAC: RRR, no murmur appreciated  ABD:  + BS, soft / NT   - has anderson, + penile / edema  EXT:  No rash, no edema, no lesions  NEURO: No focal neurologic findings  LINES:  Peripheral iv       Data Review:  Lab Results   Component Value Date    WBC 20.9 (H) 05/17/2022    HGB 9.9 (L) 05/17/2022    HCT 31.5 (L) 05/17/2022    MCV 81.5 05/17/2022     05/17/2022     Lab Results   Component Value Date    CREATININE 1.2 05/17/2022    BUN 41 (H) 05/17/2022     05/17/2022    K 4.7 05/17/2022     05/17/2022    CO2 25 05/17/2022       Hepatic Function Panel:   Lab Results   Component Value Date    ALKPHOS 164 05/15/2022    ALT 22 05/15/2022    AST 38 05/15/2022    PROT 6.8 05/15/2022    BILITOT 0.3 05/15/2022    BILIDIR <0.2 05/11/2022    IBILI see below 05/11/2022    LABALBU 2.7 05/17/2022       Micro:  5/11 BC no growth  5/11 Urine cult >100k MRSA, 25k VRE  Staph aureus mrsa (1)  Antibiotic Interpretation Microscan     ceFAZolin Resistant >16 mcg/mL   DAPTOmycin Sensitive 1 mcg/mL   linezolid Sensitive 4 mcg/mL   nitrofurantoin Sensitive <=32 mcg/mL   oxacillin Resistant >2 mcg/mL   tetracycline Resistant >8 mcg/mL   trimethoprim-sulfamethoxazole Sensitive <=0.5/9.5 mcg/mL   vancomycin Sensitive 2 mcg/mL      Enterococcus faecium vre (2)  Antibiotic Interpretation Microscan     ampicillin Resistant >=32 mcg/mL   linezolid Sensitive 2 mcg/mL   nitrofurantoin Intermediate 64 mcg/mL   tetracycline Resistant >=16 mcg/mL   vancomycin Resistant >=32 mcg/mL         Imagin/12 Abd / Pelvic CT  1. Bibasilar consolidations and small pleural effusions noted. 2. Small amount of stranding in the paracolic gutters and small amount of ascites,   3. Benign renal cysts left kidney and punctate nonobstructive calculus lower pole   4. Bladder wall thickening      L foot x-ray  Lucency and irregularity noted involving the posterior surface of the calcaneus which is questionable for osteomyelitis.       Head CT  1.  No acute intracranial process. 2.  Mild cerebral atrophy and mild chronic small vessel ischemic disease.       Scheduled Meds:   linezolid  600 mg Per NG tube 2 times per day    thiamine mononitrate  100 mg Per NG tube Daily    amLODIPine  10 mg Oral Daily    aspirin  81 mg Oral Daily    levothyroxine  50 mcg IntraVENous Daily    And    sodium chloride (PF)  5 mL IntraVENous Daily    ARIPiprazole  15 mg Oral Daily    atorvastatin  40 mg Oral Nightly    divalproex  250 mg Oral Nightly    divalproex  125 mg Oral QAM    latanoprost  1 drop Right Eye Nightly    sodium chloride flush  5-40 mL IntraVENous 2 times per day    enoxaparin  40 mg SubCUTAneous Daily    insulin lispro  0-6 Units SubCUTAneous Q4H    nystatin-triamcinolone   Topical BID       Continuous Infusions:   sodium chloride 25 mL (22 0441)    dextrose Stopped (22 0614)       PRN Meds:  labetalol, hydrALAZINE, sodium chloride, hyoscyamine, sodium chloride flush, sodium chloride, polyethylene glycol, acetaminophen **OR** acetaminophen, glucose, dextrose bolus **OR** dextrose bolus, glucagon (rDNA), dextrose      Assessment:     Hx dementia, CVA, CKD, DM, HTN, schizophrenia / bipolar  Lives in a nursing facility ([facility])  Hx UTIs     Chronic encephalopathy    - urinary retention, now with anderson  UTI vs bacteriuria       Plan:     Cont Linezolid - oral / per NGT     Plan to transition to Hospice which is appropriate given pt's over all status    Medical Decision Making:   The following items were considered in medical decision making:  Discussion of patient care with other providers  Reviewed clinical lab tests  Reviewed radiology tests  Reviewed other diagnostic tests/interventions  Microbiology cultures and other micro tests reviewed      Discussed with Palliative Care RN  Shauna Yang MD

## 2022-05-17 NOTE — PROGRESS NOTES
Pt's BP continued to increase after giving the PRN hydralazine. MD notified. Ordered nifedipine, PRN labetalol, and gave pt tylenol.

## 2022-05-17 NOTE — CARE COORDINATION
CM following. [facility] and will return at discharge with [facility] services. Rhoda Leal from [facility] will set up transport for 5/18/22 and follow up with CM when finalized.      Caitlyn Santos RN, BSN, Radha Pittman  Case Management Department  [phone]

## 2022-05-17 NOTE — PROGRESS NOTES
Hospitalist Progress Note      PCP: Linda Hernandez MD    Date of Admission: 5/11/2022    Chief Complaint: Sent from nursing home for altered mental state    Hospital Course:   81 y.o. male  of CKD stage IV, hypertension, diabetes mellitus type 2, advanced dementia with mood disorder MDRO UTI who presents from his nursing home for altered mental status  - poor historian with hx of dementia   - work up in the hospital concerning for MRSA UTI, Hypothyroidism (symptomatic with hypothermia, hypoglycemia, AMS)    Subjective/Interval Hx  Progressive worse overnight, worsening O2 requirement up to 15L   More lethargic    Family present @ bedside discussed with them about poor prognosis    Medications:  Reviewed    Infusion Medications    sodium chloride 25 mL (05/16/22 7931)    dextrose Stopped (05/13/22 5035)     Scheduled Medications    linezolid  600 mg Per NG tube 2 times per day    thiamine mononitrate  100 mg Per NG tube Daily    amLODIPine  10 mg Oral Daily    aspirin  81 mg Oral Daily    levothyroxine  50 mcg IntraVENous Daily    And    sodium chloride (PF)  5 mL IntraVENous Daily    ARIPiprazole  15 mg Oral Daily    atorvastatin  40 mg Oral Nightly    divalproex  250 mg Oral Nightly    divalproex  125 mg Oral QAM    latanoprost  1 drop Right Eye Nightly    sodium chloride flush  5-40 mL IntraVENous 2 times per day    enoxaparin  40 mg SubCUTAneous Daily    insulin lispro  0-6 Units SubCUTAneous Q4H    nystatin-triamcinolone   Topical BID     PRN Meds: labetalol, hydrALAZINE, sodium chloride, hyoscyamine, sodium chloride flush, sodium chloride, polyethylene glycol, acetaminophen **OR** acetaminophen, glucose, dextrose bolus **OR** dextrose bolus, glucagon (rDNA), dextrose      Intake/Output Summary (Last 24 hours) at 5/17/2022 0819  Last data filed at 5/17/2022 0600  Gross per 24 hour   Intake 1439 ml   Output 1175 ml   Net 264 ml       Physical Exam Performed:    /67   Pulse 61 Temp 97.6 °F (36.4 °C) (Axillary)   Resp 24   Ht 5' 9\" (1.753 m)   Wt 221 lb 1.9 oz (100.3 kg)   SpO2 99%   BMI 32.65 kg/m²     General appearance: ill appearing elderly, frail  HEENT: Pupils equal, round, and reactive to light. Conjunctivae/corneas clear. Neck: Supple, with full range of motion. No jugular venous distention. Trachea midline. Respiratory:  Normal respiratory effort. Clear to auscultation, bilaterally without Rales/Wheezes/Rhonchi. Cardiovascular: Regular rate and rhythm with normal S1/S2 without murmurs, rubs or gallops. Abdomen: Soft, non-tender, non-distended with normal bowel sounds. Glans and foreskin redness. Musculoskeletal: Lower extremity chronic venous stasis  Bilateral upper and LE edema  Skin: Skin color, texture, turgor normal.    Neurologic: lethargic, oriented 0. Not following any commands  Psychiatric: Some limited due to clinical condition  Capillary Refill: Brisk,< 3 seconds   Peripheral Pulses: +2 palpable, equal bilaterally       Labs:   Recent Labs     05/15/22  0428 05/17/22  0432   WBC 16.4* 20.9*   HGB 9.8* 9.9*   HCT 31.2* 31.5*    181     Recent Labs     05/15/22  0428 05/16/22  0436 05/17/22  0432    146* 142   K 4.4 3.8 4.7   * 113* 108   CO2 21 23 25   BUN 32* 34* 41*   CREATININE 1.5* 1.3 1.2   CALCIUM 9.4 9.0 9.7   PHOS 2.0* 1.3* 2.8     Recent Labs     05/15/22  0428   AST 38*   ALT 22   BILITOT 0.3   ALKPHOS 164*     No results for input(s): INR in the last 72 hours. No results for input(s): Lara Basques in the last 72 hours. Urinalysis:      Lab Results   Component Value Date    NITRU POSITIVE 05/11/2022    WBCUA >100 05/11/2022    BACTERIA 2+ 05/11/2022    RBCUA  05/11/2022    BLOODU MODERATE 05/11/2022    SPECGRAV 1.025 05/11/2022    GLUCOSEU Negative 05/11/2022       Radiology:  XR CHEST PORTABLE   Final Result   1.   The lung volumes with portable technique and large body    habitus exaggerating cardiomegaly with increased perihilar and    bibasilar edema versus pneumonia or atelectasis compared to    previous. 2.  There is a Dobbhoff tube extending into the mid stomach. CT HEAD WO CONTRAST   Final Result      1. No acute intracranial process. 2.  Mild cerebral atrophy and mild chronic small vessel ischemic disease. XR ABDOMEN (KUB) (SINGLE AP VIEW)   Final Result      As above. CT ABDOMEN PELVIS WO CONTRAST Additional Contrast? None   Final Result      1. Bibasilar consolidations and small pleural effusions noted. 2. Small amount of stranding in the paracolic gutters and small amount of ascites,   3. Benign renal cysts left kidney and punctate nonobstructive calculus lower pole   4. Bladder wall thickening      XR FOOT LEFT (MIN 3 VIEWS)   Final Result      Lucency and irregularity noted involving the posterior surface of the calcaneus which is questionable for osteomyelitis. XR CHEST PORTABLE   Final Result      No acute pulmonary disease. Assessment/Plan:    Active Hospital Problems    Diagnosis Date Noted    AMS (altered mental status) [R41.82] 05/11/2022     Priority: Medium       #Dysphagia - Patient failed speech eval. Continue G-tube for feeding. On tube feeds  PEG tube not per goals of care, discussed with patient's sister Hollie Galindo  Concern for aspiration of tube feeds    #Acute metabolic encephalopathy in the setting of underlying progressive dementia, MRSA UTI  Continue Rx for UTI, on Zyvox    # Acute respiratory failure likely due to aspiration pneumonia - Continue zyvox, off merrem  - possibly actively aspirating    #Hypothermia/hypoglycemia/hypothyroid  in the past, Hx of elevated TSH 11.91 in 02/2021 but Free T4 was normal, patient is not on synthroid supplementation  Labs with TSH 26.73, Free T4 low @ 0.6, free T3 1.7.   Continue IV synthroid  Despite rx no improvement    UTI, symptomatic with change of mentation per NH  -Continue device as stated above  -Catheter in place    Elevated lipase, > 600  Abdominal CT without acute findings    KIMBERLY on CKD 2: POA: improving  -Creatinine continues 1.6, hyperkalemia  -Kumar catheter is in place  -Nephrology consult for further evaluation    Left plantar lateral heel pressure ulcer  X-ray was done, reviewed by podiatry low suspicion for osteomyelitis as there is no open or draining wound located in the location.     History of schizophrenia and bipolar, bipolar  Continue pta meds     Diabetes mellitus, HgbA1c 8.7  Hypoglycemia, Continue SSI, hold off long acting insulin    DVT Prophylaxis: lovenox  Diet: Diet NPO  ADULT TUBE FEEDING; Nasogastric; Standard with Fiber; Continuous; 20; Yes; 10; Q 4 hours; 40; 50; Q 3 hours; Protein; 1 Protein shot QD via feeding tube. Flush with 30 ml water before/after administration. Do not mix with tube feeding formula.   Code Status: DNR-CC    PT/OT Eval Status: n/a  Guarded prognosis, worsening resp failure  Hospice has been consulted will need Marlen Rawls MD  Internal Medicine

## 2022-05-17 NOTE — CONSULTS
Comprehensive Nutrition Assessment    RECOMMENDATIONS:  1. PO Diet: Continue NPO per MD/SLP recs  Nutrition Support: Will dc TF for now as pt has been aspirating and may transition to hospice. Will leave recs below for as needed:  · Recommend EN formula Renal  Nepro  @ goal rate 40ml/hr   · Recommend water bolus 180ml every 4 hours or Defer to MD.   · Recommend protein modular QD via feeding tube. Flush with 30 ml water before/after administration. Do not mix with tube feeding formula. NUTRITION ASSESSMENT:   Nutritional summary & status: Per RN pt aspirating TF. Noted plans for hospice meeting today. Pt not alert enough to participate in SLP eval. Recommend continuing to hold TF until goals of care determined. Will continue to monitor.  Admission/PMH:  Admission: AMS; PMH: CKD stage IV, hypertension, diabetes mellitus type 2, advanced dementia with mood disorder MDRO UTI      MALNUTRITION ASSESSMENT  Context of Malnutrition: Acute Illness   Malnutrition Status: Mild malnutrition  Findings of the 6 clinical characteristics of malnutrition (Minimum of 2 out of 6 clinical characteristics is required to make the diagnosis of moderate or severe Protein Calorie Malnutrition based on AND/ASPEN Guidelines):  Energy Intake: Less than/equal to 50% of estimated energy requirements    Energy Intake Time: x 3 days   Weight Loss %: 5% loss or greater    Weight loss Time: Greater than or equal to 1 month      NUTRITION DIAGNOSIS   Inadequate oral intake related to cognitive or neurological impairment as evidenced by NPO or clear liquid status due to medical condition    NUTRITION INTERVENTION  Food and/or Nutrient Delivery:  Continue NPO,Discontinue Tube Feeding  Nutrition Education/Counseling:  No recommendation at this time   Goals:  Pt to tolerate most appropriate diet to meet >75% of energy needs        Nutrition Monitoring and Evaluation:   Food/Nutrient Intake Outcomes:  Diet Advancement/Tolerance,Enteral Nutrition Intake/Tolerance  Physical Signs/Symptoms Outcomes:  Biochemical Data,Chewing or Swallowing,Weight     OBJECTIVE DATA: Significant to nutrition assessment  · Nutrition-Focused Physical Findings: lbm 5/15; +1 BLE edema  · Labs: Reviewed; POCBG 253-241  · Meds: Reviewed; thiamine, lasix, synthroid  · Wounds: Stage III,Pressure Injury (L heel)       CURRENT NUTRITION THERAPIES  Diet NPO  ADULT TUBE FEEDING; Nasogastric; Standard with Fiber; Continuous; 20; Yes; 10; Q 4 hours; 40; 50; Q 3 hours; Protein; 1 Protein shot QD via feeding tube. Flush with 30 ml water before/after administration. Do not mix with tube feeding formula. PO Intake: NPO   PO Supplement Intake:NPO  Additional Sources of Calories/IVF:n/a     ANTHROPOMETRICS  Current Height: 5' 9\" (175.3 cm)  Current Weight: 221 lb 1.9 oz (100.3 kg)    Admission weight: 215 lb (97.5 kg)  Ideal Body Weight (IBW): 160 lbs  (73 kg)    Usual Bodyweight  (227-229lb per EMR)   Weight Changes: -14lb x 2 mo (6.1%)      BMI: 32.7    Wt Readings from Last 50 Encounters:   05/11/22 215 lb (97.5 kg)   04/13/22 198 lb 6.6 oz (90 kg) estimated   03/05/22 215 lb 13.3 oz (97.9 kg)   03/01/22 229 lb 0.9 oz (103.9 kg)       COMPARATIVE STANDARDS  Energy (kcal):  5597-5490     Protein (g):  108-130       Fluid (ml/day):  1500-1800ml    The patient will still be monitored per nutrition standards of care. Consult dietitian if nutrition interventions essential to patient care is needed.      Usha Reid, [address], [address] [place] [address] Drive:  [phone]  Office:  [phone]

## 2022-05-17 NOTE — PROGRESS NOTES
Notified MD of pt's labored breathing and increased oxygen requirement. Ordered chest Xray, held pt's tube feeds.

## 2022-05-17 NOTE — PLAN OF CARE
Problem: Nutrition Deficit:  Goal: Optimize nutritional status  Outcome: Not Progressing  Flowsheets (Taken 5/17/2022 0629)  Nutrient intake appropriate for improving, restoring, or maintaining nutritional needs: Monitor oral intake, labs, and treatment plans     Problem: Neurosensory - Adult  Goal: Achieves stable or improved neurological status  Outcome: Not Progressing  Flowsheets (Taken 5/17/2022 0629)  Achieves stable or improved neurological status:   Assess for and report changes in neurological status   Maintain blood pressure and fluid volume within ordered parameters to optimize cerebral perfusion and minimize risk of hemorrhage   Monitor temperature, glucose, and sodium. Initiate appropriate interventions as ordered     Problem: Respiratory - Adult  Goal: Achieves optimal ventilation and oxygenation  Outcome: Not Progressing  Flowsheets (Taken 5/17/2022 0629)  Achieves optimal ventilation and oxygenation:   Assess for changes in respiratory status   Assess and instruct to report shortness of breath or any respiratory difficulty   Oxygen supplementation based on oxygen saturation or arterial blood gases     Problem: Pain  Goal: Verbalizes/displays adequate comfort level or baseline comfort level  Outcome: Progressing  Flowsheets (Taken 5/17/2022 0629)  Verbalizes/displays adequate comfort level or baseline comfort level:   Assess pain using appropriate pain scale   Implement non-pharmacological measures as appropriate and evaluate response     Problem: Skin/Tissue Integrity  Goal: Absence of new skin breakdown  Description: 1. Monitor for areas of redness and/or skin breakdown  2. Assess vascular access sites hourly  3. Every 4-6 hours minimum:  Change oxygen saturation probe site  4. Every 4-6 hours:  If on nasal continuous positive airway pressure, respiratory therapy assess nares and determine need for appliance change or resting period.   Outcome: Progressing     Problem: Safety - Adult  Goal: Free from fall injury  Outcome: Progressing  Flowsheets  Taken 5/17/2022 4589  Free From Fall Injury:   Instruct family/caregiver on patient safety   Based on caregiver fall risk screen, instruct family/caregiver to ask for assistance with transferring infant if caregiver noted to have fall risk factors  Taken 5/16/2022 2204  Free From Fall Injury: Instruct family/caregiver on patient safety     Problem: ABCDS Injury Assessment  Goal: Absence of physical injury  Outcome: Progressing  Flowsheets (Taken 5/16/2022 2204)  Absence of Physical Injury: Implement safety measures based on patient assessment     Problem: Cardiovascular - Adult  Goal: Maintains optimal cardiac output and hemodynamic stability  Outcome: Progressing  Flowsheets (Taken 5/17/2022 0629)  Maintains optimal cardiac output and hemodynamic stability:   Monitor blood pressure and heart rate   Assess for signs of decreased cardiac output     Problem: Metabolic/Fluid and Electrolytes - Adult  Goal: Glucose maintained within prescribed range  Outcome: Progressing  Flowsheets (Taken 5/17/2022 0629)  Glucose maintained within prescribed range:   Monitor blood glucose as ordered   Administer ordered medications to maintain glucose within target range

## 2022-05-18 VITALS
HEART RATE: 74 BPM | HEIGHT: 69 IN | OXYGEN SATURATION: 92 % | SYSTOLIC BLOOD PRESSURE: 169 MMHG | BODY MASS INDEX: 32.62 KG/M2 | RESPIRATION RATE: 20 BRPM | WEIGHT: 220.24 LBS | TEMPERATURE: 97.9 F | DIASTOLIC BLOOD PRESSURE: 80 MMHG

## 2022-05-18 PROBLEM — A49.02 MRSA (METHICILLIN RESISTANT STAPHYLOCOCCUS AUREUS) INFECTION: Status: ACTIVE | Noted: 2022-05-18

## 2022-05-18 PROBLEM — G93.41 ACUTE METABOLIC ENCEPHALOPATHY: Status: ACTIVE | Noted: 2022-05-18

## 2022-05-18 LAB
ALBUMIN SERPL-MCNC: 2.7 G/DL (ref 3.4–5)
ANION GAP SERPL CALCULATED.3IONS-SCNC: 13 MMOL/L (ref 3–16)
ANION GAP SERPL CALCULATED.3IONS-SCNC: 13 MMOL/L (ref 3–16)
BUN BLDV-MCNC: 40 MG/DL (ref 7–20)
BUN BLDV-MCNC: 40 MG/DL (ref 7–20)
CALCIUM SERPL-MCNC: 9.8 MG/DL (ref 8.3–10.6)
CALCIUM SERPL-MCNC: 9.9 MG/DL (ref 8.3–10.6)
CHLORIDE BLD-SCNC: 105 MMOL/L (ref 99–110)
CHLORIDE BLD-SCNC: 105 MMOL/L (ref 99–110)
CO2: 25 MMOL/L (ref 21–32)
CO2: 25 MMOL/L (ref 21–32)
CREAT SERPL-MCNC: 1.2 MG/DL (ref 0.8–1.3)
CREAT SERPL-MCNC: 1.2 MG/DL (ref 0.8–1.3)
GFR AFRICAN AMERICAN: >60
GFR AFRICAN AMERICAN: >60
GFR NON-AFRICAN AMERICAN: 60
GFR NON-AFRICAN AMERICAN: 60
GLUCOSE BLD-MCNC: 200 MG/DL (ref 70–99)
GLUCOSE BLD-MCNC: 226 MG/DL (ref 70–99)
GLUCOSE BLD-MCNC: 236 MG/DL (ref 70–99)
GLUCOSE BLD-MCNC: 240 MG/DL (ref 70–99)
GLUCOSE BLD-MCNC: 260 MG/DL (ref 70–99)
MAGNESIUM: 1.8 MG/DL (ref 1.8–2.4)
PERFORMED ON: ABNORMAL
PHOSPHORUS: 2.9 MG/DL (ref 2.5–4.9)
POTASSIUM SERPL-SCNC: 4.3 MMOL/L (ref 3.5–5.1)
POTASSIUM SERPL-SCNC: 4.3 MMOL/L (ref 3.5–5.1)
SODIUM BLD-SCNC: 143 MMOL/L (ref 136–145)
SODIUM BLD-SCNC: 143 MMOL/L (ref 136–145)

## 2022-05-18 PROCEDURE — 6360000002 HC RX W HCPCS: Performed by: INTERNAL MEDICINE

## 2022-05-18 PROCEDURE — 31720 CLEARANCE OF AIRWAYS: CPT

## 2022-05-18 PROCEDURE — 2500000003 HC RX 250 WO HCPCS: Performed by: INTERNAL MEDICINE

## 2022-05-18 PROCEDURE — 94761 N-INVAS EAR/PLS OXIMETRY MLT: CPT

## 2022-05-18 PROCEDURE — 36415 COLL VENOUS BLD VENIPUNCTURE: CPT

## 2022-05-18 PROCEDURE — 2580000003 HC RX 258: Performed by: INTERNAL MEDICINE

## 2022-05-18 PROCEDURE — 2700000000 HC OXYGEN THERAPY PER DAY

## 2022-05-18 PROCEDURE — A4216 STERILE WATER/SALINE, 10 ML: HCPCS | Performed by: INTERNAL MEDICINE

## 2022-05-18 PROCEDURE — 83735 ASSAY OF MAGNESIUM: CPT

## 2022-05-18 PROCEDURE — 80069 RENAL FUNCTION PANEL: CPT

## 2022-05-18 PROCEDURE — 6360000002 HC RX W HCPCS: Performed by: NURSE PRACTITIONER

## 2022-05-18 RX ORDER — DIPHENHYDRAMINE HYDROCHLORIDE 50 MG/ML
25 INJECTION INTRAMUSCULAR; INTRAVENOUS EVERY 6 HOURS PRN
Status: DISCONTINUED | OUTPATIENT
Start: 2022-05-18 | End: 2022-05-18 | Stop reason: HOSPADM

## 2022-05-18 RX ORDER — LEVOTHYROXINE SODIUM 0.07 MG/1
75 TABLET ORAL DAILY
Qty: 90 TABLET | Refills: 1
Start: 2022-05-18

## 2022-05-18 RX ORDER — MORPHINE SULFATE 2 MG/ML
2 INJECTION, SOLUTION INTRAMUSCULAR; INTRAVENOUS EVERY 4 HOURS PRN
Status: DISCONTINUED | OUTPATIENT
Start: 2022-05-18 | End: 2022-05-18 | Stop reason: HOSPADM

## 2022-05-18 RX ORDER — LINEZOLID 600 MG/1
600 TABLET, FILM COATED ORAL 2 TIMES DAILY
Qty: 20 TABLET | Refills: 0
Start: 2022-05-18 | End: 2022-05-28

## 2022-05-18 RX ADMIN — LABETALOL HYDROCHLORIDE 10 MG: 5 INJECTION, SOLUTION INTRAVENOUS at 00:04

## 2022-05-18 RX ADMIN — SODIUM CHLORIDE, PRESERVATIVE FREE 10 ML: 5 INJECTION INTRAVENOUS at 09:13

## 2022-05-18 RX ADMIN — SODIUM CHLORIDE, PRESERVATIVE FREE 5 ML: 5 INJECTION INTRAVENOUS at 08:05

## 2022-05-18 RX ADMIN — INSULIN LISPRO 2 UNITS: 100 INJECTION, SOLUTION INTRAVENOUS; SUBCUTANEOUS at 08:35

## 2022-05-18 RX ADMIN — LEVOTHYROXINE SODIUM ANHYDROUS 50 MCG: 100 INJECTION, POWDER, LYOPHILIZED, FOR SOLUTION INTRAVENOUS at 08:05

## 2022-05-18 RX ADMIN — MORPHINE SULFATE 2 MG: 2 INJECTION, SOLUTION INTRAMUSCULAR; INTRAVENOUS at 11:35

## 2022-05-18 RX ADMIN — VALPROATE SODIUM 125 MG: 100 INJECTION, SOLUTION INTRAVENOUS at 08:17

## 2022-05-18 RX ADMIN — ENOXAPARIN SODIUM 40 MG: 100 INJECTION SUBCUTANEOUS at 08:04

## 2022-05-18 RX ADMIN — INSULIN LISPRO 3 UNITS: 100 INJECTION, SOLUTION INTRAVENOUS; SUBCUTANEOUS at 01:13

## 2022-05-18 RX ADMIN — INSULIN LISPRO 2 UNITS: 100 INJECTION, SOLUTION INTRAVENOUS; SUBCUTANEOUS at 05:50

## 2022-05-18 RX ADMIN — SODIUM CHLORIDE 25 ML: 9 INJECTION, SOLUTION INTRAVENOUS at 02:22

## 2022-05-18 RX ADMIN — LINEZOLID 600 MG: 600 INJECTION, SOLUTION INTRAVENOUS at 02:24

## 2022-05-18 ASSESSMENT — PAIN SCALES - PAIN ASSESSMENT IN ADVANCED DEMENTIA (PAINAD)
NEGVOCALIZATION: 1
BODYLANGUAGE: 0
BREATHING: 0
TOTALSCORE: 1
CONSOLABILITY: 0
FACIALEXPRESSION: 0

## 2022-05-18 ASSESSMENT — PAIN SCALES - GENERAL
PAINLEVEL_OUTOF10: 1
PAINLEVEL_OUTOF10: 5

## 2022-05-18 NOTE — PROGRESS NOTES
[facility]:  Pt with increased dyspnea and congestion. Spoke with his sister Ana Lilia Yates to update on status and again review hospice philosophy, comfort care. She is agreeable to  Hospice and comfort care for her brother. Hospice consent is signed. Ana Lilia Yates would like the patient to go to the [address] [place] [address] instead of back to [facility]. Plan is to move him to the [facility] today. Transport already set up with [facility] for noon. Discharge order in place. RN and CM updated.      Thank you,  Randolph Saavedra RN [address] [place] [address] [phone]

## 2022-05-18 NOTE — PROGRESS NOTES
Palliative Care Chart Review  and Check in Note:     NAME:  Tim López  Admit Date: 5/11/2022  Hospital Day:  Hospital Day: 8   Current Code status: DNR-CC    Palliative care is continuing to following Mr. Storm Mattson for symptom management,  and goals of care discussion as needed. Patient's chart reviewed today 5/18/22. Saw pt at the bedside, he is more alert today, endorses feeling \"dehydrated\" - assisted with oral care. Pt appears more uncomfortable this morning. Noted allergy to codeine, called sister Ekaterina Toussaint - she is unsure what his reaction is to codeine. Spoke with pt's sister Gisel Jacobs - she is also unaware of his allergy to codeine. Explained that pt appears to be nearing end of life, she expresses understanding and also wants to focus on comfort at this point. D/w pharmacy, will order 2mg morphine IV and order benadryl as needed in case of reaction. Will d/w RN at bedside. [address] Asher Silveira RN evaluating at bedside for possible continuous care or hospice IPU. The following are the currently established goals/code status, and Symptom management.      Goals of care: Goals are comfort directed    Code status: [facility]    Discharge plan: Planning to return to [facility] with HOC today      ALTAGRACIA Bhatia CNP  05/18/22  9:48 AM

## 2022-05-18 NOTE — DISCHARGE SUMMARY
Hospital Medicine Discharge Summary    Patient ID: Chrissie Hernandez      Patient's PCP: Royetta Boas, MD    Admit Date: 5/11/2022     Discharge Date:   05/18/22    Admitting Physician: Shahab Jerez MD     Discharge Physician: Minerva Sands MD     Discharge Diagnoses: Active Hospital Problems    Diagnosis Date Noted    MRSA (methicillin resistant Staphylococcus aureus) infection [A49.02] 05/18/2022     Priority: Medium    Acute metabolic encephalopathy [V62.26] 05/18/2022     Priority: Medium    AMS (altered mental status) [R41.82] 05/11/2022     Priority: Medium    CKD (chronic kidney disease) [N18.9] 03/01/2022    Dementia associated with other underlying disease without behavioral disturbance ([address] [F02.80]     Schizophrenia ([facility] [F20.9]        The patient was seen and examined on day of discharge and this discharge summary is in conjunction with any daily progress note from day of discharge. Hospital Course:     81 y. o. male  of CKD stage IV, hypertension, diabetes mellitus type 2, advanced dementia with mood disorder MDRO UTI who presents from his nursing home for altered mental status  - poor historian with hx of dementia   - work up in the hospital concerning for MRSA UTI, Hypothyroidism (symptomatic with hypothermia, hypoglycemia, AMS)    During hospitalization noted to have dysphagia, failed multiple speech evaluations, PEG tube was placed for medications and starting of tube feeds, PEG tube was not per patient's goals of care. Discussed with patient's power of  with sister  Despite being on PEG tube there is concern for aspiration to feel leading to respiratory failure    Acute metabolic encephalopathy underlying MRSA UTI dementia and pneumonia    Acute respiratory failure due to aspiration pneumonia, he was found to be actively aspirating on tube feeds. He had symptoms of hypothermia/hypoglycemia work-up showed elevated TSH 26.73 Free T4 0.6.   Started on IV Synthroid despite treatment of hypothyroidism continued to be lethargic    MRSA UTI, was treated with Zyvox    Noted to have elevated lipase greater than 600 abdominal CT did not show acute finding    History of schizophrenia/bipolar disorder    Despite aggressive treatment during hospitalization he continues to have significant dysphagia respiratory failure and multiorgan failure, discussed with family, hospice was consulted, patient be discharged to inpatient hospice unit    Physical Exam Performed:     BP (!) 169/80   Pulse 74   Temp 97.9 °F (36.6 °C) (Axillary)   Resp 20   Ht 5' 9\" (1.753 m)   Wt 220 lb 3.8 oz (99.9 kg)   SpO2 97%   BMI 32.52 kg/m²       General appearance: ill appearing elderly, frail  HEENT: Pupils equal, round, and reactive to light. Conjunctivae/corneas clear. Neck: Supple, with full range of motion. No jugular venous distention. Trachea midline. Respiratory:  Normal respiratory effort. Clear to auscultation, bilaterally without Rales/Wheezes/Rhonchi. Cardiovascular: Regular rate and rhythm with normal S1/S2 without murmurs, rubs or gallops. Abdomen: Soft, non-tender, non-distended with normal bowel sounds. Glans and foreskin redness. Musculoskeletal: Lower extremity chronic venous stasis  Bilateral upper and LE edema  Skin: Skin color, texture, turgor normal.    Neurologic: lethargic, oriented 0. Not following any commands  Psychiatric: Some limited due to clinical condition  Capillary Refill: Brisk,< 3 seconds   Peripheral Pulses: +2 palpable, equal bilaterally       Labs:  For convenience and continuity at follow-up the following most recent labs are provided:      CBC:    Lab Results   Component Value Date    WBC 20.9 05/17/2022    HGB 9.9 05/17/2022    HCT 31.5 05/17/2022     05/17/2022       Renal:    Lab Results   Component Value Date     05/18/2022     05/18/2022    K 4.3 05/18/2022    K 4.3 05/18/2022    K 5.3 05/12/2022     05/18/2022     05/18/2022    CO2 25 05/18/2022    CO2 25 05/18/2022    BUN 40 05/18/2022    BUN 40 05/18/2022    CREATININE 1.2 05/18/2022    CREATININE 1.2 05/18/2022    CALCIUM 9.9 05/18/2022    CALCIUM 9.8 05/18/2022    PHOS 2.9 05/18/2022         Significant Diagnostic Studies    Radiology:   XR CHEST PORTABLE   Final Result   1. The lung volumes with portable technique and large body    habitus exaggerating cardiomegaly with increased perihilar and    bibasilar edema versus pneumonia or atelectasis compared to    previous. 2.  There is a Dobbhoff tube extending into the mid stomach. CT HEAD WO CONTRAST   Final Result      1. No acute intracranial process. 2.  Mild cerebral atrophy and mild chronic small vessel ischemic disease. XR ABDOMEN (KUB) (SINGLE AP VIEW)   Final Result      As above. CT ABDOMEN PELVIS WO CONTRAST Additional Contrast? None   Final Result      1. Bibasilar consolidations and small pleural effusions noted. 2. Small amount of stranding in the paracolic gutters and small amount of ascites,   3. Benign renal cysts left kidney and punctate nonobstructive calculus lower pole   4. Bladder wall thickening      XR FOOT LEFT (MIN 3 VIEWS)   Final Result      Lucency and irregularity noted involving the posterior surface of the calcaneus which is questionable for osteomyelitis. XR CHEST PORTABLE   Final Result      No acute pulmonary disease.                 Consults:     IP CONSULT TO HOSPITALIST  IP CONSULT TO CASE MANAGEMENT  IP CONSULT TO SOCIAL WORK  IP CONSULT TO PHARMACY  IP CONSULT TO PODIATRY  IP CONSULT TO PHARMACY  IP CONSULT TO NEPHROLOGY  IP CONSULT TO PALLIATIVE CARE  IP CONSULT TO DIETITIAN  IP CONSULT TO INFECTIOUS DISEASES  IP CONSULT TO HOSPICE    Disposition: Inpatient hospice unit    Condition at Discharge: Unstable    Discharge Instructions/Follow-up:    n/a    Code Status:  DNR-CC     Activity: activity as tolerated    Diet: Diet as tolerated      Discharge Medications:     Current Discharge Medication List           Details   hyoscyamine (LEVSIN/SL) 125 MCG sublingual tablet Place 1 tablet under the tongue every 4 hours as needed (secretions)  Qty: 90 tablet, Refills: 3      levothyroxine (SYNTHROID) 75 MCG tablet Take 1 tablet by mouth daily  Qty: 90 tablet, Refills: 1      linezolid (ZYVOX) 600 MG tablet Take 1 tablet by mouth 2 times daily for 10 days  Qty: 20 tablet, Refills: 0              Details   !! insulin aspart (NOVOLOG) 100 UNIT/ML injection vial Inject 3 Units into the skin 3 times daily (before meals)  Qty: 10 mL, Refills: 3      nystatin (MYCOSTATIN) 686827 UNIT/GM powder Apply topically every 6-8 hours as needed (athlete's foot)       ! ! polyethylene glycol (MIRALAX) 17 g PACK packet Take 17 g by mouth every 48 hours       !! divalproex (DEPAKOTE SPRINKLES) 125 MG capsule Take 250 mg by mouth at bedtime      !! divalproex (DEPAKOTE SPRINKLES) 125 MG capsule Take 125 mg by mouth every morning      latanoprost (XALATAN) 0.005 % ophthalmic solution Place 1 drop into the right eye nightly       allopurinol (ZYLOPRIM) 100 MG tablet Take 100 mg by mouth daily      ARIPiprazole (ABILIFY) 15 MG tablet Take 15 mg by mouth daily      aspirin 81 MG EC tablet Take 81 mg by mouth daily      atorvastatin (LIPITOR) 40 MG tablet Take 40 mg by mouth nightly      bisacodyl (DULCOLAX) 10 MG suppository Place 10 mg rectally daily as needed for Constipation      docusate sodium (COLACE) 100 MG capsule Take 100 mg by mouth 2 times daily       finasteride (PROSCAR) 5 MG tablet Take 5 mg by mouth daily       !! insulin aspart (NOVOLOG) 100 UNIT/ML injection vial Inject 0-21 Units into the skin 3 times daily (before meals) Sliding scale   201-250 = 5 units  251-300 = 8 units  301-350 = 12 units  351-400 = 15 units  401-450 = 18 units  451-500 = 21 units  500+ notify MD      ondansetron (ZOFRAN-ODT) 4 MG disintegrating tablet Take 4 mg by mouth every 8 hours as needed for Nausea or Vomiting      ! ! polyethylene glycol (GLYCOLAX) 17 g packet Take 17 g by mouth daily as needed for Constipation       tamsulosin (FLOMAX) 0.4 MG capsule Take 0.8 mg by mouth nightly       !! - Potential duplicate medications found. Please discuss with provider. Time Spent on discharge is more than 30 minutes in the examination, evaluation, counseling and review of medications and discharge plan. Signed:    Meri Patel MD   5/18/2022      Thank you David Brown MD for the opportunity to be involved in this patient's care. If you have any questions or concerns please feel free to contact me at [phone].

## 2022-05-18 NOTE — PLAN OF CARE
Problem: Nutrition Deficit:  Goal: Optimize nutritional status  Outcome: Not Progressing  Flowsheets (Taken 5/18/2022 0131)  Nutrient intake appropriate for improving, restoring, or maintaining nutritional needs: Monitor oral intake, labs, and treatment plans     Problem: Pain  Goal: Verbalizes/displays adequate comfort level or baseline comfort level  Outcome: Progressing  Flowsheets (Taken 5/18/2022 0131)  Verbalizes/displays adequate comfort level or baseline comfort level:   Assess pain using appropriate pain scale   Implement non-pharmacological measures as appropriate and evaluate response     Problem: Skin/Tissue Integrity  Goal: Absence of new skin breakdown  Description: 1. Monitor for areas of redness and/or skin breakdown  2. Assess vascular access sites hourly  3. Every 4-6 hours minimum:  Change oxygen saturation probe site  4. Every 4-6 hours:  If on nasal continuous positive airway pressure, respiratory therapy assess nares and determine need for appliance change or resting period.   Outcome: Progressing     Problem: Safety - Adult  Goal: Free from fall injury  Outcome: Progressing  Flowsheets (Taken 5/17/2022 2039)  Free From Fall Injury:   Instruct family/caregiver on patient safety   Based on caregiver fall risk screen, instruct family/caregiver to ask for assistance with transferring infant if caregiver noted to have fall risk factors     Problem: ABCDS Injury Assessment  Goal: Absence of physical injury  Outcome: Progressing  Flowsheets (Taken 5/17/2022 2039)  Absence of Physical Injury: Implement safety measures based on patient assessment     Problem: Neurosensory - Adult  Goal: Achieves stable or improved neurological status  Outcome: Progressing  Flowsheets (Taken 5/18/2022 0131)  Achieves stable or improved neurological status:   Assess for and report changes in neurological status   Maintain blood pressure and fluid volume within ordered parameters to optimize cerebral perfusion and minimize risk of hemorrhage   Monitor temperature, glucose, and sodium.  Initiate appropriate interventions as ordered     Problem: Cardiovascular - Adult  Goal: Maintains optimal cardiac output and hemodynamic stability  Outcome: Progressing  Flowsheets (Taken 5/18/2022 0131)  Maintains optimal cardiac output and hemodynamic stability:   Monitor blood pressure and heart rate   Assess for signs of decreased cardiac output     Problem: Respiratory - Adult  Goal: Achieves optimal ventilation and oxygenation  Outcome: Progressing  Flowsheets (Taken 5/18/2022 0131)  Achieves optimal ventilation and oxygenation:   Assess for changes in respiratory status   Assess the need for suctioning and aspirate as needed   Assess and instruct to report shortness of breath or any respiratory difficulty   Oxygen supplementation based on oxygen saturation or arterial blood gases   Assess for changes in mentation and behavior

## 2023-10-23 NOTE — PROGRESS NOTES
Patient notified and verbalized understanding. Pt. With thick flaking skin (Xerosis) to BLE and feet only. Pink scarring in circles evidence of previous venous ulcers. No openings, no drainage. Pt. With feet down in chair, chair leg raised with instruciton to keep elevated. Pt. Agreeable and complying but continues ot state, \"I Tarri Reddish go home\". Moisturizing lotion applied feet to knees.

## 2024-06-14 NOTE — PLAN OF CARE
Problem: Falls - Risk of:  Goal: Will remain free from falls  Description: Will remain free from falls  Note: Bed low with wheels locked  bed alarm on  side rail up x2  increased rounding per staff       Problem: Gas Exchange - Impaired:  Goal: Levels of oxygenation will improve  Description: Levels of oxygenation will improve  Outcome: Met This Shift     Problem: Infection, Septic Shock:  Goal: Will show no infection signs and symptoms  Description: Will show no infection signs and symptoms  Note: Temp 99 axillary without decrease after tylenol per order  patient increased lethargy, mumbles/grunts in verbal response to simple commands  patient is incontinent of urine  remains NPO     Problem: Serum Glucose Level - Abnormal:  Goal: Ability to maintain appropriate glucose levels will improve  Description: Ability to maintain appropriate glucose levels will improve  Note: Blood sugar every 4hr with SSI     Problem: Skin Integrity:  Goal: Will show no infection signs and symptoms  Description: Will show no infection signs and symptoms  Outcome: Met This Shift none

## 2025-01-20 NOTE — CONSULTS
Nephrology Consult Note          [facility]      [email or web address]         Phone: [phone]      5/13/2022 3:25 PM     Patient: Tim López [id number]  [id number]  Date of Admit: 5/11/2022 LOS: 2 days      Plan:  IV normal saline bolus 1 liter over 1 hour to improve hemodynamics  IV Steroids will be started which will also help improve BP  Follow ACTH stimulation test results. Continue IV Dextrose containing fluid for hypoglycemia. On antibiotics for UTI  If BP does not improve, then might need to start Midodrine and can also give IV Albumin for IV volume expansion. Avoid potassium containing fluids   Distal sodium delivery should improve mild hyperkalemia  If K does not improve then can use IV normal saline with IV Lasix depending upon hemodynamics , place NG tube and start Lokelma  Avoid nephrotoxins  Monitor I/O and vitals closely  Daily standing weight if possible  Repeat renal panel with CK level @ 8 PM  Renally dose all medications  Sick patient and might need higher level of care   Follow closely    D/W Dr Melissa Stanford and nurse     Thank you for allowing us to participate in this patient's care    In case of any question please call us at our 24 hour answering service [phone] or from 7 AM to 5 PM via [employer] or cell number    Kimmy Farrell MD  [facility]  [facility] [address], [address]  Fax: [phone]  Office: [phone]     Assessment & Plan   Hypothyroidism with hypothermia/hypoglycemia/encephalopathy  On dextrose in normal saline  On Stress dose steroids and IV synthroid    UTI: On Abx     Chronic kidney disease 3  Baseline Cr:1.4- 1.6  UA: 5/11 > 100 WBC,  RBC  Proteinuria: 30 mg/dl on UA.    Cr around baseline but due to hypotension and sepsis high risk of Acute kidney injury   CT A/P: Benign renal cysts left kidney and punctate nonobstructive calculus lower pole        Electrolytes:  Mild hyperkalemia in patient with underlying CKD while getting Enoxaparin and he might also be developing acute kidney injury which may appear on later labs. Rule out rhabdomyolysis. Lab Results   Component Value Date    CREATININE 1.6 (H) 05/13/2022    BUN 29 (H) 05/13/2022     05/13/2022    K 5.6 (H) 05/13/2022     05/13/2022    CO2 21 05/13/2022      Lab Results   Component Value Date    PTH 41.8 02/27/2022    CALCIUM 9.3 05/13/2022    PHOS 2.8 05/13/2022         Acid/Base status:  Stable      Volume status/BP:  Hypotension    Chronic appearing leg edema. On 2 liter oxygen. No RD  CXR on 5/11 no acute process. Hematology:   Lab Results   Component Value Date    IRON 36 (L) 02/28/2022    TIBC 187 (L) 02/28/2022    FERRITIN 483.6 (H) 02/28/2022     Lab Results   Component Value Date    WBC 14.1 (H) 05/13/2022    HGB 10.1 (L) 05/13/2022    HCT 31.7 (L) 05/13/2022    MCV 81.1 05/13/2022     05/13/2022           Reason for Consult     Reason for consult: CKD with hyperkalemia. Chief complaint:   Chief Complaint   Patient presents with    Altered Mental Status     per NH pt slower to respond, pt asks why he is at the hospital he feels fine       History of Present Illness   Sangita Wallace is a 81 y.o. with PMH significant for MDR UTI, CVA with dementia, bipolar disorder, schizophrenia, DM, HTN, CKD was admitted from NH with AMS. UA concerning for UTI for which is getting treatment. Patient was also hypoglycemic with low BP and patient is on IV dextrose with normal saline, IV stress dose steroids and IV synthroid. Labs showed hyperkalemia and nephrology was consulted to assist in management. Cr around baseline. Patient is very lethargic and unable to answer my questions although he is awake. Review of Systems     Positive in bold or unable to assess     GEN: Fever, chills, night sweats.   HEENT: Changes in vision, sore throat, rhinorrhea   CVS: Chest pain, palpitations, swelling or edema in legs  Pulmonary: Cough, hemoptysis, SOB  GI: Nausea, vomiting, diarrhea, constipation, abdominal pain  : Bladder incontinence, dysuria, hematuria. MSK: Muscle or joint or bone pains  Skin: Rashes, ulcers, skin thickness  CNS: Headache, dizziness, confusion, focal weakness, seizure. Psych: Anxiety, agitation, depression. Reviewed all 12 systems, negative except as above. Past Medical History     Past Medical History:   Diagnosis Date    Ataxia     [facility]     BPH (benign prostatic hyperplasia)     Cerebral infarct (HCC)     Chronic kidney disease     Dementia ([facility]     Depression     Diabetes mellitus type 2, uncomplicated ([facility]     Gout     Hyperlipidemia     Hypertension     Schizophrenia ([facility]          Past Surgical History     History reviewed. No pertinent surgical history. Family History     History reviewed. No pertinent family history. Social History     Social History     Tobacco Use    Smoking status: Never Smoker    Smokeless tobacco: Never Used   Substance Use Topics    Alcohol use: Not Currently          Past medical, family, and social histories were reviewed as previously documented. Updates were made as necessary.       Inpatient Medications and Allergies       Scheduled Meds:   thiamine (VITAMIN B1) IVPB  200 mg IntraVENous TID    vancomycin  1,750 mg IntraVENous Once    Followed by   Chyna Bills ON 5/14/2022] vancomycin  1,250 mg IntraVENous Q24H    levothyroxine  50 mcg IntraVENous Daily    And    [START ON 5/14/2022] sodium chloride (PF)  5 mL IntraVENous Daily    levothyroxine  100 mcg IntraVENous Once    And    sodium chloride (PF)  5 mL IntraVENous Once    hydrocortisone sodium succinate PF  100 mg IntraVENous Q8H    ARIPiprazole  15 mg Oral Daily    aspirin  81 mg Oral Daily    atorvastatin  40 mg Oral Nightly    divalproex  250 mg Oral Nightly    divalproex  125 mg Oral QAM    latanoprost  1 drop Right Eye Nightly    sodium chloride flush  5-40 mL IntraVENous 2 times per day    enoxaparin  40 mg SubCUTAneous Daily    insulin lispro  0-6 Units SubCUTAneous Q4H    nystatin-triamcinolone   Topical BID    meropenem  1,000 mg IntraVENous Q12H         Allergies: Allergies   Allergen Reactions    Codeine     Cogentin [Benztropine]     Penicillins          Vital Signs and Input Output     Vitals:    05/13/22 1208   BP: (!) 99/52   Pulse: 75   Resp: 18   Temp: 96.1 °F (35.6 °C)   SpO2: 94%           Intake/Output Summary (Last 24 hours) at 5/13/2022 1525  Last data filed at 5/13/2022 0500  Gross per 24 hour   Intake 0 ml   Output 375 ml   Net -375 ml           Physical Exam     General appearance: NAD, look sick. Head: Normocephalic, without obvious abnormality, atraumatic   Mouth: Dry mucous membrane  Neck: Supple. Lungs: Good air entry bilaterally and no respiratory distress on 2 liter oxygen. Auscultated from front. Heart: S1, S2.   Abdomen: soft, non-tender non-distended  Extremities: Chronic appearing leg edema. Skin: No concerning rash noted on limited exam  Psych: Calm  Neuro: Awake but confused. Patient also has dementia.        Laboratory Data   See above    Diagnostic Studies   Pertinent images reviewed moderate assist (50% patients effort) moderate assist (50% patients effort)